# Patient Record
Sex: MALE | Race: WHITE | NOT HISPANIC OR LATINO | Employment: OTHER | URBAN - METROPOLITAN AREA
[De-identification: names, ages, dates, MRNs, and addresses within clinical notes are randomized per-mention and may not be internally consistent; named-entity substitution may affect disease eponyms.]

---

## 2019-04-09 ENCOUNTER — TELEPHONE (OUTPATIENT)
Dept: RHEUMATOLOGY | Facility: CLINIC | Age: 44
End: 2019-04-09

## 2019-04-09 NOTE — TELEPHONE ENCOUNTER
Called and spoke with pt, offered him an appt to see Dr. Luna on 5/17, he will accept.  Discussed records, he will send us an JEFFREY, emailed blank one to him.  Pt aware that Dr. Luna will look at ordering labs after she sees him.      Adeline Piña RN  [facility]

## 2019-05-03 NOTE — TELEPHONE ENCOUNTER
JEFFREY received for [facility] and faxed to [phone].  Va Small CMA  5/3/2019 10:13 AM

## 2019-05-15 ENCOUNTER — ANCILLARY PROCEDURE (OUTPATIENT)
Dept: GENERAL RADIOLOGY | Facility: CLINIC | Age: 44
End: 2019-05-15
Attending: INTERNAL MEDICINE
Payer: COMMERCIAL

## 2019-05-15 ENCOUNTER — OFFICE VISIT (OUTPATIENT)
Dept: RHEUMATOLOGY | Facility: CLINIC | Age: 44
End: 2019-05-15
Attending: INTERNAL MEDICINE
Payer: COMMERCIAL

## 2019-05-15 VITALS
DIASTOLIC BLOOD PRESSURE: 83 MMHG | HEART RATE: 82 BPM | WEIGHT: 177.6 LBS | OXYGEN SATURATION: 96 % | HEIGHT: 74 IN | SYSTOLIC BLOOD PRESSURE: 119 MMHG | BODY MASS INDEX: 22.79 KG/M2

## 2019-05-15 DIAGNOSIS — M47.819 SPONDYLOARTHROPATHY: ICD-10-CM

## 2019-05-15 DIAGNOSIS — M54.50 LOW BACK PAIN WITHOUT SCIATICA, UNSPECIFIED BACK PAIN LATERALITY, UNSPECIFIED CHRONICITY: ICD-10-CM

## 2019-05-15 DIAGNOSIS — M47.819 SPONDYLOARTHROPATHY: Primary | ICD-10-CM

## 2019-05-15 LAB
ALBUMIN SERPL-MCNC: 4.2 G/DL (ref 3.4–5)
ALT SERPL W P-5'-P-CCNC: 40 U/L (ref 0–70)
AST SERPL W P-5'-P-CCNC: 23 U/L (ref 0–45)
BASOPHILS # BLD AUTO: 0 10E9/L (ref 0–0.2)
BASOPHILS NFR BLD AUTO: 0.8 %
CREAT SERPL-MCNC: 0.86 MG/DL (ref 0.66–1.25)
CRP SERPL-MCNC: <2.9 MG/L (ref 0–8)
DIFFERENTIAL METHOD BLD: NORMAL
EOSINOPHIL # BLD AUTO: 0.1 10E9/L (ref 0–0.7)
EOSINOPHIL NFR BLD AUTO: 1 %
ERYTHROCYTE [DISTWIDTH] IN BLOOD BY AUTOMATED COUNT: 12.1 % (ref 10–15)
ERYTHROCYTE [SEDIMENTATION RATE] IN BLOOD BY WESTERGREN METHOD: 4 MM/H (ref 0–15)
GFR SERPL CREATININE-BSD FRML MDRD: >90 ML/MIN/{1.73_M2}
HCT VFR BLD AUTO: 46 % (ref 40–53)
HGB BLD-MCNC: 15.3 G/DL (ref 13.3–17.7)
IMM GRANULOCYTES # BLD: 0 10E9/L (ref 0–0.4)
IMM GRANULOCYTES NFR BLD: 0.2 %
LYMPHOCYTES # BLD AUTO: 0.9 10E9/L (ref 0.8–5.3)
LYMPHOCYTES NFR BLD AUTO: 19.1 %
MCH RBC QN AUTO: 30.7 PG (ref 26.5–33)
MCHC RBC AUTO-ENTMCNC: 33.3 G/DL (ref 31.5–36.5)
MCV RBC AUTO: 92 FL (ref 78–100)
MONOCYTES # BLD AUTO: 0.4 10E9/L (ref 0–1.3)
MONOCYTES NFR BLD AUTO: 7.8 %
NEUTROPHILS # BLD AUTO: 3.5 10E9/L (ref 1.6–8.3)
NEUTROPHILS NFR BLD AUTO: 71.1 %
NRBC # BLD AUTO: 0 10*3/UL
NRBC BLD AUTO-RTO: 0 /100
PLATELET # BLD AUTO: 202 10E9/L (ref 150–450)
RBC # BLD AUTO: 4.99 10E12/L (ref 4.4–5.9)
WBC # BLD AUTO: 4.9 10E9/L (ref 4–11)

## 2019-05-15 PROCEDURE — G0463 HOSPITAL OUTPT CLINIC VISIT: HCPCS | Mod: ZF

## 2019-05-15 PROCEDURE — 82565 ASSAY OF CREATININE: CPT | Performed by: INTERNAL MEDICINE

## 2019-05-15 PROCEDURE — 85652 RBC SED RATE AUTOMATED: CPT | Performed by: INTERNAL MEDICINE

## 2019-05-15 PROCEDURE — 36415 COLL VENOUS BLD VENIPUNCTURE: CPT | Performed by: INTERNAL MEDICINE

## 2019-05-15 PROCEDURE — 86225 DNA ANTIBODY NATIVE: CPT | Performed by: INTERNAL MEDICINE

## 2019-05-15 PROCEDURE — 82306 VITAMIN D 25 HYDROXY: CPT | Performed by: INTERNAL MEDICINE

## 2019-05-15 PROCEDURE — 84450 TRANSFERASE (AST) (SGOT): CPT | Performed by: INTERNAL MEDICINE

## 2019-05-15 PROCEDURE — 82040 ASSAY OF SERUM ALBUMIN: CPT | Performed by: INTERNAL MEDICINE

## 2019-05-15 PROCEDURE — 85025 COMPLETE CBC W/AUTO DIFF WBC: CPT | Performed by: INTERNAL MEDICINE

## 2019-05-15 PROCEDURE — 84460 ALANINE AMINO (ALT) (SGPT): CPT | Performed by: INTERNAL MEDICINE

## 2019-05-15 PROCEDURE — 86140 C-REACTIVE PROTEIN: CPT | Performed by: INTERNAL MEDICINE

## 2019-05-15 ASSESSMENT — PAIN SCALES - GENERAL: PAINLEVEL: NO PAIN (0)

## 2019-05-15 ASSESSMENT — MIFFLIN-ST. JEOR: SCORE: 1760.34

## 2019-05-15 NOTE — PROGRESS NOTES
Rheumatology Consult Note    Reason for referral: Concern for SLE given +anti-DNA, h/o possible reactive arthritis well-controlled on SSZ, needs to transfer care here after moving to [place]    Referring physician: Referred Self    DOS: 5/15/2019    HPI:    Sabino Cardona is a 43 year old  male with fh/o psoriasis and RA, who was referred to our clinic for evaluation and management of possible SLE given +anti-DNA. His wife is a transplant surgeon here at the  and they say the actual reason for referral is to establish/transfer care as they just moved to [place].      Today, he is feeling well.      They moved back from [place] to [place] about 1.5 month ago.      He reports having degenerative arthritis since age 18. Has chronic low back pain with worsening in AM and worsening with inactivity. Allergies in Spring makes it worse.      He started to have heel and ankle pain in Feb 2019, also knees and elbows. That was stressful time of life looking for moving back. Also has 3 small children (one 3 yo and two 1 yo twins) which keep them busy.      Both heels were red and blaching and his wife thought it was gout.      His brother has gout and is almost 51 yo.      He saw a rheumatologist at [facility]. 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 His wife questioned the diagnosis given lack of lupus symptoms.      Then he had x-rays of spine which showed lots of bone spurs.    He was diagnosed with OA, possible reactive arthritis. No h/o uveitis, urethritis, STDs. Has h/o food poisenings. Was put on SSZ in 2/2019, the dose was increased to current dose of 1 gr bid, he tolerates it well with no toxicity. SSZ has helped and he has no current heel inflammation or recent flare ups.      Has fatigue but because of having 3 small children, it's hard for him to say if fatigue was related to inflammation.    ROS is positive for intermittent CP  for years, ibuprofen helps. Prednisone 10 mg every day did not help in the past.    Gets cold sores. No photosensitivity. Gets migraines. No weight loss. No Raynaud's.        ROS:  A comprehensive ROS was done, positives are per HPI.      Past Medical History:   Diagnosis Date     GERD (gastroesophageal reflux disease)      Past Surgical History:   Procedure Laterality Date     APPENDECTOMY       HERNIA REPAIR, INGUINAL RT/LT      left     Family History   Problem Relation Age of Onset     Hypertension Mother      Hypertension Father      Asthma Father      Diabetes Father      Psoriasis Father      Asthma Sister      Hypertension Maternal Grandmother         RA     Diabetes Maternal Grandmother      Hypertension Maternal Grandfather      Cancer Maternal Grandfather         stomach     Cancer Paternal Grandfather         lung-smoker     Social History     Socioeconomic History     Marital status:      Spouse name: Not on file     Number of children: 3     Years of education: Not on file     Highest education level: Not on file   Occupational History     Occupation: [employer]   Social Needs     Financial resource strain: Not on file     Food insecurity:     Worry: Not on file     Inability: Not on file     Transportation needs:     Medical: Not on file     Non-medical: Not on file   Tobacco Use     Smoking status: Never Smoker     Smokeless tobacco: Former User     Types: Chew     Tobacco comment: chew 9 yr   Substance and Sexual Activity     Alcohol use: Yes     Alcohol/week: 1.0 oz     Types: 2 Standard drinks or equivalent per week     Drug use: No     Sexual activity: Yes     Partners: Female   Lifestyle     Physical activity:     Days per week: Not on file     Minutes per session: Not on file     Stress: Not on file   Relationships     Social connections:     Talks on phone: Not on file     Gets together: Not on file     Attends Gnosticism service: Not on file     Active member of club or organization: Not  on file     Attends meetings of clubs or organizations: Not on file     Relationship status: Not on file     Intimate partner violence:     Fear of current or ex partner: Not on file     Emotionally abused: Not on file     Physically abused: Not on file     Forced sexual activity: Not on file   Other Topics Concern      Service Not Asked     Blood Transfusions Not Asked     Caffeine Concern Not Asked     Occupational Exposure Not Asked     Hobby Hazards Not Asked     Sleep Concern Not Asked     Stress Concern Not Asked     Weight Concern Not Asked     Special Diet Not Asked     Back Care Not Asked     Exercise Yes     Comment: run, weights     Bike Helmet Not Asked     Seat Belt Not Asked     Self-Exams Not Asked   Social History Narrative     Not on file     Patient Active Problem List   Diagnosis     Esophageal reflux     Biceps tendinopathy     Wart     Allergies   Allergen Reactions     Nkda [No Known Drug Allergies]      Seasonal Allergies Other (See Comments)     Sneezing, running nose, itchy eyes       Outpatient Encounter Medications as of 5/15/2019   Medication Sig Dispense Refill     Salicylic Acid (SAL-ACID PLASTERS) 40 % PADS Use as instructed. 30 each 3     Multiple Vitamin (MULTI VITAMIN MENS PO) Take 1 tablet by mouth daily.       tazarotene 0.1 % CREA Apply topically to areas of plantar warts daily. Cover w/ mediplast, then cover w/ duct tape. (Patient not taking: Reported on 5/15/2019) 45 g 3     No facility-administered encounter medications on file as of 5/15/2019.                His records were reviewed.    Results for orders placed or performed in visit on 08/03/12   Lipid Profile   Result Value Ref Range    Cholesterol 154 0 - 200 mg/dL    Triglycerides 67 0 - 150 mg/dL    HDL Cholesterol 53 40 - 110 mg/dL    LDL Cholesterol Calculated 88 0 - 129 mg/dL    VLDL-Cholesterol 13 0 - 30 mg/dL    Cholesterol/HDL Ratio 2.9 0.0 - 5.0   CBC with platelets differential   Result Value Ref Range  "   WBC 4.4 4.0 - 11.0 10e9/L    RBC Count 4.75 4.4 - 5.9 10e12/L    Hemoglobin 14.7 13.3 - 17.7 g/dL    Hematocrit 42.1 40.0 - 53.0 %    MCV 89 78 - 100 fl    MCH 30.9 26.5 - 33.0 pg    MCHC 34.9 31.5 - 36.5 g/dL    RDW 12.2 10.0 - 15.0 %    Platelet Count 199 150 - 450 10e9/L    Diff Method Automated Method     % Neutrophils 48.3 40 - 75 %    % Lymphocytes 36.8 20 - 48 %    % Monocytes 8.4 0 - 12 %    % Eosinophils 5.4 0 - 6 %    % Basophils 0.9 0 - 2 %    % Immature Granulocytes 0.2 0 - 0.4 %    Absolute Neutrophil 2.1 1.6 - 8.3 10e9/L    Absolute Lymphocytes 1.6 0.8 - 5.3 10e9/L    Absolute Monocytes 0.4 0.0 - 1.3 10e9/L    Absolute Eosinophils 0.2 0.0 - 0.7 10e9/L    Absolute Basophils 0.0 0.0 - 0.2 10e9/L    Abs Immature Granulocytes 0.0 0 - 0.03 10e9/L   Comprehensive metabolic panel   Result Value Ref Range    Sodium 137 133 - 144 mmol/L    Potassium 4.4 3.4 - 5.3 mmol/L    Chloride 104 94 - 109 mmol/L    Carbon Dioxide 26 20 - 32 mmol/L    Anion Gap 7 6 - 17 mmol/L    Glucose 89 60 - 99 mg/dL    Urea Nitrogen 17 5 - 24 mg/dL    Creatinine 0.83 0.66 - 1.25 mg/dL    GFR Estimate >90 >60 mL/min/1.7m2    GFR Estimate If Black >90 >60 mL/min/1.7m2    Calcium 8.5 8.5 - 10.4 mg/dL    Bilirubin Total 0.6 0.2 - 1.3 mg/dL    Albumin 4.1 3.9 - 5.1 g/dL    Protein Total 7.1 6.8 - 8.8 g/dL    Alkaline Phosphatase 83 40 - 150 U/L    ALT 25 0 - 70 U/L    AST 33 0 - 45 U/L       2/2019: +BEST 1:40 mixed homogenous and nucleolar pattern, positive anti-DNA 17 with NL being up to 4. Neg anti-Sm, RNP, SCL-70, SSA, SSB, ALEXANDREA-1, CHROMATIN, RF, anti-CCP. NL ESR. NL SUA 5.2. Neg HLA-B27.  NL TSH, C3, C4. Unremarkable CMP. Neg U/A. LOW vit D 27. 2/2019: Advanced degenerative changes of L spine and moderate degenerative changes of T spine on x-rays.            Ph.E:    /83   Pulse 82   Ht 1.88 m (6' 2\")   Wt 80.6 kg (177 lb 9.6 oz)   SpO2 96%   BMI 22.80 kg/m        Constitutional: WD/WN. Pleasant. In no acute distress. " Wife present.  Eyes: EOM intact, PERRLA, sclera anicteric, conj not injected  HEENT: No oral ulcers or thrush. Normal salivary pool.  Neck: No cervical LAP or thyromegaly  Chest: Clear to auscultation bilaterally  CV: RRR, no murmurs/ rubs or gallops. No edema, clubbing or cyanosis.   GI: Abdomen is soft and non tender.   MS: No synovitis. Cool joints. No tenderness of the joints. No  joint deformities. Full ROM of the joints. No nodules.  Skin: No skin rash, malar rash, livedo, periungual erythema, or nail changes.  Neuro: A&O x 3. Grossly non focal, muscular power 5/5 in all ext  Psych: NL affect and mood    Assessment/ plan:    1-Spondyloarthropathy with enthesopathy Dx 2/2019 on SSZ since 2/2019. He is HLA-B27 negative. Has fh/o psoriasis. He fits this diagnosis very well and already responds to SSZ. His inflammatory arthritis is under good control and I recommend to continue SSZ at current dose of 1 gr bid. Will get more labs, also SI joint x-rays and T spine MRI to look for signs of axial involvement and axial SpA. Recommend to get SI joint MRI if x-rays come back negative.    2-SSZ monitoring. Labs today and [id number]    3-Positive anti-DNA 2/2019. He does not have lupus and nothing on his exam or hx is suggestive of lupus. He had BEST 1:40 which is considered negative with neg CHRIS and nl C3/C4. I'll re-check it and monitor it for now.    4-Low vit D in the past. Re-check vit D level and replace if indicated.      RTC 3 months    Orders Placed This Encounter   Procedures     X-ray Sacroiliac joint 1-2 vws     MR Thoracic Spine w/o & w Contrast     CBC with platelets differential     AST     ALT     Creatinine     Albumin level     CRP inflammation     DNA double stranded antibodies     Vitamin D Deficiency     Erythrocyte sedimentation rate auto

## 2019-05-15 NOTE — LETTER
5/15/2019      RE: Sabino Cardona  [address]  [facility] [address]       Rheumatology Consult Note    Reason for referral: Concern for SLE given +anti-DNA, h/o possible reactive arthritis well-controlled on SSZ, needs to transfer care here after moving to [place]    Referring physician: Referred Self    DOS: 5/15/2019    HPI:    Sabino Cardona is a 43 year old  male with fh/o psoriasis and RA, who was referred to our clinic for evaluation and management of possible SLE given +anti-DNA. His wife is a transplant surgeon here at the  and they say the actual reason for referral is to establish/transfer care as they just moved to [place].      Today, he is feeling well.      They moved back from [place] to [place] about 1.5 month ago.      He reports having degenerative arthritis since age 18. Has chronic low back pain with worsening in AM and worsening with inactivity. Allergies in Spring makes it worse.      He started to have heel and ankle pain in Feb 2019, also knees and elbows. That was stressful time of life looking for moving back. Also has 3 small children (one 3 yo and two 3 yo twins) which keep them busy.      Both heels were red and blaching and his wife thought it was gout.      His brother has gout and is almost 51 yo.      He saw a rheumatologist at [facility]. 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 His wife questioned the diagnosis given lack of lupus symptoms.      Then he had x-rays of spine which showed lots of bone spurs.    He was diagnosed with OA, possible reactive arthritis. No h/o uveitis, urethritis, STDs. Has h/o food poisenings. Was put on SSZ in 2/2019, the dose was increased to current dose of 1 gr bid, he tolerates it well with no toxicity. SSZ has helped and he has no current heel inflammation or recent flare ups.      Has fatigue but because of having 3 small children, it's hard for him to say  if fatigue was related to inflammation.    ROS is positive for intermittent CP for years, ibuprofen helps. Prednisone 10 mg every day did not help in the past.    Gets cold sores. No photosensitivity. Gets migraines. No weight loss. No Raynaud's.        ROS:  A comprehensive ROS was done, positives are per HPI.      Past Medical History:   Diagnosis Date     GERD (gastroesophageal reflux disease)      Past Surgical History:   Procedure Laterality Date     APPENDECTOMY       HERNIA REPAIR, INGUINAL RT/LT      left     Family History   Problem Relation Age of Onset     Hypertension Mother      Hypertension Father      Asthma Father      Diabetes Father      Psoriasis Father      Asthma Sister      Hypertension Maternal Grandmother         RA     Diabetes Maternal Grandmother      Hypertension Maternal Grandfather      Cancer Maternal Grandfather         stomach     Cancer Paternal Grandfather         lung-smoker     Social History     Socioeconomic History     Marital status:      Spouse name: Not on file     Number of children: 3     Years of education: Not on file     Highest education level: Not on file   Occupational History     Occupation: [employer]   Social Needs     Financial resource strain: Not on file     Food insecurity:     Worry: Not on file     Inability: Not on file     Transportation needs:     Medical: Not on file     Non-medical: Not on file   Tobacco Use     Smoking status: Never Smoker     Smokeless tobacco: Former User     Types: Chew     Tobacco comment: chew 9 yr   Substance and Sexual Activity     Alcohol use: Yes     Alcohol/week: 1.0 oz     Types: 2 Standard drinks or equivalent per week     Drug use: No     Sexual activity: Yes     Partners: Female   Lifestyle     Physical activity:     Days per week: Not on file     Minutes per session: Not on file     Stress: Not on file   Relationships     Social connections:     Talks on phone: Not on file     Gets together: Not on file      Attends Druze service: Not on file     Active member of club or organization: Not on file     Attends meetings of clubs or organizations: Not on file     Relationship status: Not on file     Intimate partner violence:     Fear of current or ex partner: Not on file     Emotionally abused: Not on file     Physically abused: Not on file     Forced sexual activity: Not on file   Other Topics Concern      Service Not Asked     Blood Transfusions Not Asked     Caffeine Concern Not Asked     Occupational Exposure Not Asked     Hobby Hazards Not Asked     Sleep Concern Not Asked     Stress Concern Not Asked     Weight Concern Not Asked     Special Diet Not Asked     Back Care Not Asked     Exercise Yes     Comment: run, weights     Bike Helmet Not Asked     Seat Belt Not Asked     Self-Exams Not Asked   Social History Narrative     Not on file     Patient Active Problem List   Diagnosis     Esophageal reflux     Biceps tendinopathy     Wart     Allergies   Allergen Reactions     Nkda [No Known Drug Allergies]      Seasonal Allergies Other (See Comments)     Sneezing, running nose, itchy eyes       Outpatient Encounter Medications as of 5/15/2019   Medication Sig Dispense Refill     Salicylic Acid (SAL-ACID PLASTERS) 40 % PADS Use as instructed. 30 each 3     Multiple Vitamin (MULTI VITAMIN MENS PO) Take 1 tablet by mouth daily.       tazarotene 0.1 % CREA Apply topically to areas of plantar warts daily. Cover w/ mediplast, then cover w/ duct tape. (Patient not taking: Reported on 5/15/2019) 45 g 3     No facility-administered encounter medications on file as of 5/15/2019.                His records were reviewed.    Results for orders placed or performed in visit on 08/03/12   Lipid Profile   Result Value Ref Range    Cholesterol 154 0 - 200 mg/dL    Triglycerides 67 0 - 150 mg/dL    HDL Cholesterol 53 40 - 110 mg/dL    LDL Cholesterol Calculated 88 0 - 129 mg/dL    VLDL-Cholesterol 13 0 - 30 mg/dL     "Cholesterol/HDL Ratio 2.9 0.0 - 5.0   CBC with platelets differential   Result Value Ref Range    WBC 4.4 4.0 - 11.0 10e9/L    RBC Count 4.75 4.4 - 5.9 10e12/L    Hemoglobin 14.7 13.3 - 17.7 g/dL    Hematocrit 42.1 40.0 - 53.0 %    MCV 89 78 - 100 fl    MCH 30.9 26.5 - 33.0 pg    MCHC 34.9 31.5 - 36.5 g/dL    RDW 12.2 10.0 - 15.0 %    Platelet Count 199 150 - 450 10e9/L    Diff Method Automated Method     % Neutrophils 48.3 40 - 75 %    % Lymphocytes 36.8 20 - 48 %    % Monocytes 8.4 0 - 12 %    % Eosinophils 5.4 0 - 6 %    % Basophils 0.9 0 - 2 %    % Immature Granulocytes 0.2 0 - 0.4 %    Absolute Neutrophil 2.1 1.6 - 8.3 10e9/L    Absolute Lymphocytes 1.6 0.8 - 5.3 10e9/L    Absolute Monocytes 0.4 0.0 - 1.3 10e9/L    Absolute Eosinophils 0.2 0.0 - 0.7 10e9/L    Absolute Basophils 0.0 0.0 - 0.2 10e9/L    Abs Immature Granulocytes 0.0 0 - 0.03 10e9/L   Comprehensive metabolic panel   Result Value Ref Range    Sodium 137 133 - 144 mmol/L    Potassium 4.4 3.4 - 5.3 mmol/L    Chloride 104 94 - 109 mmol/L    Carbon Dioxide 26 20 - 32 mmol/L    Anion Gap 7 6 - 17 mmol/L    Glucose 89 60 - 99 mg/dL    Urea Nitrogen 17 5 - 24 mg/dL    Creatinine 0.83 0.66 - 1.25 mg/dL    GFR Estimate >90 >60 mL/min/1.7m2    GFR Estimate If Black >90 >60 mL/min/1.7m2    Calcium 8.5 8.5 - 10.4 mg/dL    Bilirubin Total 0.6 0.2 - 1.3 mg/dL    Albumin 4.1 3.9 - 5.1 g/dL    Protein Total 7.1 6.8 - 8.8 g/dL    Alkaline Phosphatase 83 40 - 150 U/L    ALT 25 0 - 70 U/L    AST 33 0 - 45 U/L       2/2019: +BEST 1:40 mixed homogenous and nucleolar pattern, positive anti-DNA 17 with NL being up to 4. Neg anti-Sm, RNP, SCL-70, SSA, SSB, ALEXANDREA-1, CHROMATIN, RF, anti-CCP. NL ESR. NL SUA 5.2. Neg HLA-B27.  NL TSH, C3, C4. Unremarkable CMP. Neg U/A. LOW vit D 27.    2/2019: Advanced degenerative changes of L spine and moderate degenerative changes of T spine on x-rays.            Ph.E:    /83   Pulse 82   Ht 1.88 m (6' 2\")   Wt 80.6 kg (177 lb 9.6 oz) "   SpO2 96%   BMI 22.80 kg/m         Constitutional: WD/WN. Pleasant. In no acute distress. Wife present.  Eyes: EOM intact, PERRLA, sclera anicteric, conj not injected  HEENT: No oral ulcers or thrush. Normal salivary pool.  Neck: No cervical LAP or thyromegaly  Chest: Clear to auscultation bilaterally  CV: RRR, no murmurs/ rubs or gallops. No edema, clubbing or cyanosis.   GI: Abdomen is soft and non tender.   MS: No synovitis. Cool joints. No tenderness of the joints. No  joint deformities. Full ROM of the joints. No nodules.  Skin: No skin rash, malar rash, livedo, periungual erythema, or nail changes.  Neuro: A&O x 3. Grossly non focal, muscular power 5/5 in all ext  Psych: NL affect and mood    Assessment/ plan:    1-Spondyloarthropathy with enthesopathy Dx 2/2019 on SSZ since 2/2019. He is HLA-B27 negative. Has fh/o psoriasis. He fits this diagnosis very well and already responds to SSZ. His inflammatory arthritis is under good control and I recommend to continue SSZ at current dose of 1 gr bid. Will get more labs, also SI joint x-rays and T spine MRI to look for signs of axial involvement and axial SpA. Recommend to get SI joint MRI if x-rays come back negative.    2-SSZ monitoring. Labs today and [id number]    3-Positive anti-DNA 2/2019. He does not have lupus and nothing on his exam or hx is suggestive of lupus. He had BEST 1:40 which is considered negative with neg CHRIS and nl C3/C4. I'll re-check it and monitor it for now.    4-Low vit D in the past. Re-check vit D level and replace if indicated.      RTC 3 months    Orders Placed This Encounter   Procedures     X-ray Sacroiliac joint 1-2 vws     MR Thoracic Spine w/o & w Contrast     CBC with platelets differential     AST     ALT     Creatinine     Albumin level     CRP inflammation     DNA double stranded antibodies     Vitamin D Deficiency     Erythrocyte sedimentation rate auto       Jovani Luna MD

## 2019-05-15 NOTE — LETTER
Date:June 11, 2019      Patient was self referred, no letter generated. Do not send.        [facility] Information

## 2019-05-15 NOTE — LETTER
5/15/2019       RE: Sabino Cardona  [address]  [facility] [address]     Dear Colleague,    Thank you for referring your patient, Sabino Cardona, to the [facility] RHEUMATOLOGY at [facility]. Please see a copy of my visit note below.    Rheumatology Consult Note    Reason for referral: Concern for SLE given +anti-DNA, h/o possible reactive arthritis well-controlled on SSZ, needs to transfer care here after moving to [place]    Referring physician: Referred Self    DOS: 5/15/2019    HPI:    Sabino Cardona is a 43 year old  male with fh/o psoriasis and RA, who was referred to our clinic for evaluation and management of possible SLE given +anti-DNA. His wife is a transplant surgeon here at the  and they say the actual reason for referral is to establish/transfer care as they just moved to [place].      Today, he is feeling well.      They moved back from [place] to [place] about 1.5 month ago.      He reports having degenerative arthritis since age 18. Has chronic low back pain with worsening in AM and worsening with inactivity. Allergies in Spring makes it worse.      He started to have heel and ankle pain in Feb 2019, also knees and elbows. That was stressful time of life looking for moving back. Also has 3 small children (one 5 yo and two 1 yo twins) which keep them busy.      Both heels were red and blaching and his wife thought it was gout.      His brother has gout and is almost 49 yo.      He saw a rheumatologist at [facility]. 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 His wife questioned the diagnosis given lack of lupus symptoms.      Then he had x-rays of spine which showed lots of bone spurs.    He was diagnosed with OA, possible reactive arthritis. No h/o uveitis, urethritis, STDs. Has h/o food poisenings. Was put on SSZ in 2/2019, the dose was increased to current dose of 1 gr  bid, he tolerates it well with no toxicity. SSZ has helped and he has no current heel inflammation or recent flare ups.      Has fatigue but because of having 3 small children, it's hard for him to say if fatigue was related to inflammation.    ROS is positive for intermittent CP for years, ibuprofen helps. Prednisone 10 mg every day did not help in the past.    Gets cold sores. No photosensitivity. Gets migraines. No weight loss. No Raynaud's.        ROS:  A comprehensive ROS was done, positives are per HPI.      Past Medical History:   Diagnosis Date     GERD (gastroesophageal reflux disease)      Past Surgical History:   Procedure Laterality Date     APPENDECTOMY       HERNIA REPAIR, INGUINAL RT/LT      left     Family History   Problem Relation Age of Onset     Hypertension Mother      Hypertension Father      Asthma Father      Diabetes Father      Psoriasis Father      Asthma Sister      Hypertension Maternal Grandmother         RA     Diabetes Maternal Grandmother      Hypertension Maternal Grandfather      Cancer Maternal Grandfather         stomach     Cancer Paternal Grandfather         lung-smoker     Social History     Socioeconomic History     Marital status:      Spouse name: Not on file     Number of children: 3     Years of education: Not on file     Highest education level: Not on file   Occupational History     Occupation: sanchez   Social Needs     Financial resource strain: Not on file     Food insecurity:     Worry: Not on file     Inability: Not on file     Transportation needs:     Medical: Not on file     Non-medical: Not on file   Tobacco Use     Smoking status: Never Smoker     Smokeless tobacco: Former User     Types: Chew     Tobacco comment: chew 9 yr   Substance and Sexual Activity     Alcohol use: Yes     Alcohol/week: 1.0 oz     Types: 2 Standard drinks or equivalent per week     Drug use: No     Sexual activity: Yes     Partners: Female   Lifestyle     Physical activity:      Days per week: Not on file     Minutes per session: Not on file     Stress: Not on file   Relationships     Social connections:     Talks on phone: Not on file     Gets together: Not on file     Attends Yarsani service: Not on file     Active member of club or organization: Not on file     Attends meetings of clubs or organizations: Not on file     Relationship status: Not on file     Intimate partner violence:     Fear of current or ex partner: Not on file     Emotionally abused: Not on file     Physically abused: Not on file     Forced sexual activity: Not on file   Other Topics Concern      Service Not Asked     Blood Transfusions Not Asked     Caffeine Concern Not Asked     Occupational Exposure Not Asked     Hobby Hazards Not Asked     Sleep Concern Not Asked     Stress Concern Not Asked     Weight Concern Not Asked     Special Diet Not Asked     Back Care Not Asked     Exercise Yes     Comment: run, weights     Bike Helmet Not Asked     Seat Belt Not Asked     Self-Exams Not Asked   Social History Narrative     Not on file     Patient Active Problem List   Diagnosis     Esophageal reflux     Biceps tendinopathy     Wart     Allergies   Allergen Reactions     Nkda [No Known Drug Allergies]      Seasonal Allergies Other (See Comments)     Sneezing, running nose, itchy eyes       Outpatient Encounter Medications as of 5/15/2019   Medication Sig Dispense Refill     Salicylic Acid (SAL-ACID PLASTERS) 40 % PADS Use as instructed. 30 each 3     Multiple Vitamin (MULTI VITAMIN MENS PO) Take 1 tablet by mouth daily.       tazarotene 0.1 % CREA Apply topically to areas of plantar warts daily. Cover w/ mediplast, then cover w/ duct tape. (Patient not taking: Reported on 5/15/2019) 45 g 3     No facility-administered encounter medications on file as of 5/15/2019.                His records were reviewed.    Results for orders placed or performed in visit on 08/03/12   Lipid Profile   Result Value Ref Range     Cholesterol 154 0 - 200 mg/dL    Triglycerides 67 0 - 150 mg/dL    HDL Cholesterol 53 40 - 110 mg/dL    LDL Cholesterol Calculated 88 0 - 129 mg/dL    VLDL-Cholesterol 13 0 - 30 mg/dL    Cholesterol/HDL Ratio 2.9 0.0 - 5.0   CBC with platelets differential   Result Value Ref Range    WBC 4.4 4.0 - 11.0 10e9/L    RBC Count 4.75 4.4 - 5.9 10e12/L    Hemoglobin 14.7 13.3 - 17.7 g/dL    Hematocrit 42.1 40.0 - 53.0 %    MCV 89 78 - 100 fl    MCH 30.9 26.5 - 33.0 pg    MCHC 34.9 31.5 - 36.5 g/dL    RDW 12.2 10.0 - 15.0 %    Platelet Count 199 150 - 450 10e9/L    Diff Method Automated Method     % Neutrophils 48.3 40 - 75 %    % Lymphocytes 36.8 20 - 48 %    % Monocytes 8.4 0 - 12 %    % Eosinophils 5.4 0 - 6 %    % Basophils 0.9 0 - 2 %    % Immature Granulocytes 0.2 0 - 0.4 %    Absolute Neutrophil 2.1 1.6 - 8.3 10e9/L    Absolute Lymphocytes 1.6 0.8 - 5.3 10e9/L    Absolute Monocytes 0.4 0.0 - 1.3 10e9/L    Absolute Eosinophils 0.2 0.0 - 0.7 10e9/L    Absolute Basophils 0.0 0.0 - 0.2 10e9/L    Abs Immature Granulocytes 0.0 0 - 0.03 10e9/L   Comprehensive metabolic panel   Result Value Ref Range    Sodium 137 133 - 144 mmol/L    Potassium 4.4 3.4 - 5.3 mmol/L    Chloride 104 94 - 109 mmol/L    Carbon Dioxide 26 20 - 32 mmol/L    Anion Gap 7 6 - 17 mmol/L    Glucose 89 60 - 99 mg/dL    Urea Nitrogen 17 5 - 24 mg/dL    Creatinine 0.83 0.66 - 1.25 mg/dL    GFR Estimate >90 >60 mL/min/1.7m2    GFR Estimate If Black >90 >60 mL/min/1.7m2    Calcium 8.5 8.5 - 10.4 mg/dL    Bilirubin Total 0.6 0.2 - 1.3 mg/dL    Albumin 4.1 3.9 - 5.1 g/dL    Protein Total 7.1 6.8 - 8.8 g/dL    Alkaline Phosphatase 83 40 - 150 U/L    ALT 25 0 - 70 U/L    AST 33 0 - 45 U/L       2/2019: +BEST 1:40 mixed homogenous and nucleolar pattern, positive anti-DNA 17 with NL being up to 4. Neg anti-Sm, RNP, SCL-70, SSA, SSB, ALEXANDREA-1, CHROMATIN, RF, anti-CCP. NL ESR. NL SUA 5.2. Neg HLA-B27.  NL TSH, C3, C4. Unremarkable CMP. Neg U/A. LOW vit D 27.    2/2019:  "Advanced degenerative changes of L spine and moderate degenerative changes of T spine on x-rays.            Ph.E:    /83   Pulse 82   Ht 1.88 m (6' 2\")   Wt 80.6 kg (177 lb 9.6 oz)   SpO2 96%   BMI 22.80 kg/m         Constitutional: WD/WN. Pleasant. In no acute distress. Wife present.  Eyes: EOM intact, PERRLA, sclera anicteric, conj not injected  HEENT: No oral ulcers or thrush. Normal salivary pool.  Neck: No cervical LAP or thyromegaly  Chest: Clear to auscultation bilaterally  CV: RRR, no murmurs/ rubs or gallops. No edema, clubbing or cyanosis.   GI: Abdomen is soft and non tender.   MS: No synovitis. Cool joints. No tenderness of the joints. No  joint deformities. Full ROM of the joints. No nodules.  Skin: No skin rash, malar rash, livedo, periungual erythema, or nail changes.  Neuro: A&O x 3. Grossly non focal, muscular power 5/5 in all ext  Psych: NL affect and mood    Assessment/ plan:    1-Spondyloarthropathy with enthesopathy Dx 2/2019 on SSZ since 2/2019. He is HLA-B27 negative. Has fh/o psoriasis. He fits this diagnosis very well and already responds to SSZ. His inflammatory arthritis is under good control and I recommend to continue SSZ at current dose of 1 gr bid. Will get more labs, also SI joint x-rays and T spine MRI to look for signs of axial involvement and axial SpA. Recommend to get SI joint MRI if x-rays come back negative.    2-SSZ monitoring. Labs today and [id number]    3-Positive anti-DNA 2/2019. He does not have lupus and nothing on his exam or hx is suggestive of lupus. He had BEST 1:40 which is considered negative with neg CHRIS and nl C3/C4. I'll re-check it and monitor it for now.    4-Low vit D in the past. Re-check vit D level and replace if indicated.      RTC 3 months    Orders Placed This Encounter   Procedures     X-ray Sacroiliac joint 1-2 vws     MR Thoracic Spine w/o & w Contrast     CBC with platelets differential     AST     ALT     Creatinine     Albumin level     " CRP inflammation     DNA double stranded antibodies     Vitamin D Deficiency     Erythrocyte sedimentation rate auto       Again, thank you for allowing me to participate in the care of your patient.      Sincerely,    Jovani Luna MD

## 2019-05-15 NOTE — LETTER
Patient:  Sabino Cardona  :   1975  MRN:     [id number]        Mr.Richard Cardona  [address] [address] [place] [id number]        Coretta 3, 2019    Dear Mr.Van Hernandez,    We are writing to inform you of your test results. Anti-DNA is slightly above normal range, this could simply be monitored and is not worrisome as we discussed. I faxed a vitamin D prescription to your pharmacy to replace low vitamin D, the goal is 40. After replacement, will re-check level at your next visit and if at goal, will switch to 2000 units daily. I ordered fasting glucose and lipid profile in order to fill out your biometric screening form. You could do it at your convenience.      Results for orders placed or performed in visit on 05/15/19   Erythrocyte sedimentation rate auto   Result Value Ref Range    Sed Rate 4 0 - 15 mm/h   Vitamin D Deficiency   Result Value Ref Range    Vitamin D Deficiency screening 24 20 - 75 ug/L   DNA double stranded antibodies   Result Value Ref Range    DNA-ds 21 (H) <10 IU/mL   CRP inflammation   Result Value Ref Range    CRP Inflammation <2.9 0.0 - 8.0 mg/L   Albumin level   Result Value Ref Range    Albumin 4.2 3.4 - 5.0 g/dL   Creatinine   Result Value Ref Range    Creatinine 0.86 0.66 - 1.25 mg/dL    GFR Estimate >90 >60 mL/min/[1.73_m2]    GFR Estimate If Black >90 >60 mL/min/[1.73_m2]   ALT   Result Value Ref Range    ALT 40 0 - 70 U/L   AST   Result Value Ref Range    AST 23 0 - 45 U/L   CBC with platelets differential   Result Value Ref Range    WBC 4.9 4.0 - 11.0 10e9/L    RBC Count 4.99 4.4 - 5.9 10e12/L    Hemoglobin 15.3 13.3 - 17.7 g/dL    Hematocrit 46.0 40.0 - 53.0 %    MCV 92 78 - 100 fl    MCH 30.7 26.5 - 33.0 pg    MCHC 33.3 31.5 - 36.5 g/dL    RDW 12.1 10.0 - 15.0 %    Platelet Count 202 150 - 450 10e9/L    Diff Method Automated Method     % Neutrophils 71.1 %    % Lymphocytes 19.1 %    % Monocytes 7.8 %    % Eosinophils 1.0 %    % Basophils 0.8 %    % Immature Granulocytes 0.2 %     Nucleated RBCs 0 0 /100    Absolute Neutrophil 3.5 1.6 - 8.3 10e9/L    Absolute Lymphocytes 0.9 0.8 - 5.3 10e9/L    Absolute Monocytes 0.4 0.0 - 1.3 10e9/L    Absolute Eosinophils 0.1 0.0 - 0.7 10e9/L    Absolute Basophils 0.0 0.0 - 0.2 10e9/L    Abs Immature Granulocytes 0.0 0 - 0.4 10e9/L    Absolute Nucleated RBC 0.0        Jovani Luna MD

## 2019-05-15 NOTE — LETTER
Patient:  Sabino Cardona  :   1975  MRN:     [id number]        Mr.Richard Cardona  [address]  [facility] [address]        May 16, 2019    Dear Mr.Van Hernandez,    We are writing to inform you of your test results. There is no evidence of inflammatory arthritis on these SI joint x-rays. I ordered SI joint MRI and asked staff to help you set that up for . If both MRIs show no inflammation or damage due to spondyloarthropathy, will not add biologic or more meds and continue with the sulfasalazine.        Results for orders placed or performed in visit on 05/15/19   X-ray Sacroiliac joint 1-2 vws    Narrative    Exam: 3 views of the sacroiliac joints dated 5/15/2019.    COMPARISON: None.    CLINICAL HISTORY: Evaluate for sacroiliitis.    FINDINGS: 3 views of the sacroiliac joints were obtained. Disc space  narrowing in the lower lumbar spine. Sacroiliac joints are patent. No  erosive changes.      Impression    IMPRESSION:  1. No erosive changes in the sacroiliac joints.  2. Disc space narrowing in the lower lumbar spine.    MD Jovani ALBARRAN MD

## 2019-05-16 DIAGNOSIS — M47.819 SPONDYLOARTHROPATHY: Primary | ICD-10-CM

## 2019-05-16 DIAGNOSIS — M54.50 LOW BACK PAIN WITHOUT SCIATICA, UNSPECIFIED BACK PAIN LATERALITY, UNSPECIFIED CHRONICITY: ICD-10-CM

## 2019-05-16 LAB
DEPRECATED CALCIDIOL+CALCIFEROL SERPL-MC: 24 UG/L (ref 20–75)
DSDNA AB SER-ACNC: 21 IU/ML

## 2019-05-16 NOTE — RESULT ENCOUNTER NOTE
There is no evidence of inflammatory arthritis on these SI joint x-rays. I ordered SI joint MRI and asked staff to help you set that up for 5/25. If both MRIs show no inflammation or damage due to spondyloarthropathy, will not add biologic or more meds and continue with the sulfasalazine.

## 2019-05-28 DIAGNOSIS — E55.9 VITAMIN D DEFICIENCY: Primary | ICD-10-CM

## 2019-05-28 RX ORDER — ERGOCALCIFEROL 1.25 MG/1
50000 CAPSULE, LIQUID FILLED ORAL
Qty: 12 CAPSULE | Refills: 0 | Status: SHIPPED | OUTPATIENT
Start: 2019-05-28 | End: 2020-01-18

## 2019-05-30 ENCOUNTER — ANCILLARY PROCEDURE (OUTPATIENT)
Dept: MRI IMAGING | Facility: CLINIC | Age: 44
End: 2019-05-30
Attending: INTERNAL MEDICINE
Payer: COMMERCIAL

## 2019-05-30 DIAGNOSIS — M54.50 LOW BACK PAIN WITHOUT SCIATICA, UNSPECIFIED BACK PAIN LATERALITY, UNSPECIFIED CHRONICITY: ICD-10-CM

## 2019-05-30 DIAGNOSIS — Z13.220 NEED FOR LIPID SCREENING: Primary | ICD-10-CM

## 2019-05-30 DIAGNOSIS — M47.819 SPONDYLOARTHROPATHY: ICD-10-CM

## 2019-05-30 RX ORDER — GADOBUTROL 604.72 MG/ML
7.5 INJECTION INTRAVENOUS ONCE
Status: COMPLETED | OUTPATIENT
Start: 2019-05-30 | End: 2019-05-30

## 2019-05-30 RX ADMIN — GADOBUTROL 7.5 ML: 604.72 INJECTION INTRAVENOUS at 18:02

## 2019-05-30 NOTE — LETTER
Patient:  Sabino Cardona  :   1975  MRN:     [id number]        Mr.Richard Cardona  [address]  [facility] [address]        Coretta 3, 2019    Dear Mr.Van Hernandez,    We are writing to inform you of your test results. Spondyloarthropathy is the most likely diagnosis based on these MRI findings but the amount of inflammation is low and as long as your pain is under control, recommend to stay on sulfasalazine and hold off starting a biologic.    It was a pleasure seeing you in clinic. Please let me know if you have any questions.        Results for orders placed or performed in visit on 19   MR Pelvis Bone wo & w Contrast    Narrative    Exam: MR PELVIC BONES WO & W CONTRAST    History: B/L SI joint MRI with IV contrast, eval for sacroiliitis;  Spondyloarthropathy (H); Low back pain without sciatica, unspecified  back pain laterality.    Techniques: Multiplanar multisequence imaging through the pelvis was  obtained before and after administration of intravenous gadolinium  contrast using routine protocol.    Contrast: 7.5mL Gadavist    Comparison: May 15, 2019.    Findings:    Focal marrow edema with associated enhancement at the right sacrum  adjacent to sacroiliac joint (image 17 series 5). No evidence of joint  effusion or erosion about the either sacroiliac joint. No MR evidence  of ankylosis, sclerosis or focal fatty marrow replacement to suggest  sequela of chronic inflammation.    Diffuse marrow signal alteration with mild T1 hypointensity, most  compatible with red marrow.    Degenerative changes of visualized spine with desiccation of disks.  L4/L5 left facet synovial cyst.    No evidence of fracture, contusion or minimal infiltrative change.    A physiologic amount of joint fluid are present bilateral hips. Mild  focal edema at the ischium bilaterally, likely enthesopathic change.  Bilateral hamstring tendinosis. The iliopsoas tendons, proximal rectus  femoris and sartorius tendons are  intact bilaterally. Gluteus medius  and minimus tendons are also intact bilaterally. Left greater than the  right nonspecific edema overlying bilateral greater trochanters.    Visualized portions of the bilateral sciatic nerves are unremarkable.      Impression    IMPRESSION:  1. Focal marrow edema with associated enhancement at the right sacrum  adjacent to sacroiliac joint, may be concordant with asymmetric active  sacroiliitis. Given asymmetry, differential consideration by imaging  include entities such as reactive arthritis and psoriatic arthritis.  2. Degenerative changes of the visualized lower lumbar spine.    FLAVIO Luna MD

## 2019-05-30 NOTE — LETTER
Patient:  Sabino Cardona  :   1975  MRN:     [id number]        Mr.Richard Cardona  [address]  [facility] [address]        Coretta 3, 2019    Dear Mr.Van Hernandez,    We are writing to inform you of your test results. There is no evidence of inflammatory arthritis on the MRI, just degenerative arthritis.      Results for orders placed or performed in visit on 19   MR Thoracic Spine w/o & w Contrast    Narrative    MR THORACIC SPINE W/O & W CONTRAST 2019 6:27 PM    Provided History: Mid-back/T-spine pain, initial exam; back pain, eval  for SpA; Spondyloarthropathy (H)  ICD-10: Spondyloarthropathy (H)    Comparison: None    Technique:  Sagittal T1- and T2-weighted images through the thoracic spine and  axial T2-weighted images were obtained without intravenous contrast.  Following intravenous administration of gadolinium, axial and sagittal  T1-weighted images with fat saturation were also obtained.    Contrast: 7.5mL Gadavist    Findings:  The thoracic vertebrae appear normally aligned.  There is multilevel  disc height narrowing T3-6. Multilevel disc bulges at T3-4, T4-5 on  the right, bilateral at T5-6 and T6-7, left T7-8, left T8-9-10. At  T3-4, T6-7 and T7-8 there is mild cord indentation without significant  spinal canal stenosis. No significant neural foraminal stenosis. There  are Schmorl's node endplate deformities scattered throughout the  thoracic spine. The conus medullaris is at the level of T12-L1. There  is normal signal within and normal contour of the thoracic spinal  cord.  There is no abnormal contrast enhancement within the thoracic  spinal cord, thecal sac or vertebral column.      Impression    Impression: Multilevel disc bulges with cord indentation at T3-4, T6-7  and 7-8.    I have personally reviewed the examination and initial interpretation  and I agree with the findings.    MD Jovani BLANCA MD

## 2019-05-30 NOTE — DISCHARGE INSTRUCTIONS
MRI Contrast Discharge Instructions    The IV contrast you received today will pass out of your body in your  urine. This will happen in the next 24 hours. You will not feel this process.  Your urine will not change color.    Drink at least 4 extra glasses of water or juice today (unless your doctor  has restricted your fluids). This reduces the stress on your kidneys.  You may take your regular medicines.    If you are on dialysis: It is best to have dialysis today.    If you have a reaction: Most reactions happen right away. If you have  any new symptoms after leaving the hospital (such as hives or swelling),  call your hospital at the correct number below. Or call your family doctor.  If you have breathing distress or wheezing, call 911.    Special instructions: ***    I have read and understand the above information.    Signature:______________________________________ Date:___________    Staff:__________________________________________ Date:___________     Time:__________    [facility] Departments:    ___[place]: [phone]  ___[facility]: [phone]  ___[place]: [phone] ___[facility]: [phone]  ___[facility]: [phone]  ___[facility]: [phone]  ___[phone]  ___[facility]: [phone]  ___[phone]

## 2019-05-31 DIAGNOSIS — Z13.1 ENCOUNTER FOR SCREENING EXAMINATION FOR IMPAIRED GLUCOSE REGULATION AND DIABETES MELLITUS: Primary | ICD-10-CM

## 2019-06-04 NOTE — RESULT ENCOUNTER NOTE
Spondyloarthropathy is the most likely diagnosis based on these MRI findings but the amount of inflammation is low and as long as your pain is under control, recommend to stay on sulfasalazine and hold off starting a biologic.

## 2019-06-04 NOTE — RESULT ENCOUNTER NOTE
Anti-DNA is slightly above normal range, this could simply be monitored and is not worrisome as we discussed. I faxed a vitamin D prescription to your pharmacy to replace low vitamin D, the goal is 40. After replacement, will re-check level at your next visit and if at goal, will switch to 2000 units daily. I ordered fasting glucose and lipid profile in order to fill out your biometric screening form. You could do it at your convenience.

## 2019-07-08 DIAGNOSIS — Z13.1 ENCOUNTER FOR SCREENING EXAMINATION FOR IMPAIRED GLUCOSE REGULATION AND DIABETES MELLITUS: ICD-10-CM

## 2019-07-08 DIAGNOSIS — Z13.220 NEED FOR LIPID SCREENING: ICD-10-CM

## 2019-07-08 LAB
CHOLEST SERPL-MCNC: 180 MG/DL
GLUCOSE SERPL-MCNC: 88 MG/DL (ref 70–99)
HDLC SERPL-MCNC: 48 MG/DL
LDLC SERPL CALC-MCNC: 114 MG/DL
NONHDLC SERPL-MCNC: 132 MG/DL
TRIGL SERPL-MCNC: 90 MG/DL

## 2019-07-08 PROCEDURE — 82947 ASSAY GLUCOSE BLOOD QUANT: CPT | Performed by: INTERNAL MEDICINE

## 2019-07-08 PROCEDURE — 80061 LIPID PANEL: CPT | Performed by: INTERNAL MEDICINE

## 2019-07-08 PROCEDURE — 36415 COLL VENOUS BLD VENIPUNCTURE: CPT | Performed by: INTERNAL MEDICINE

## 2019-07-08 NOTE — LETTER
Patient:  Sabino aCrdona  :   1975  MRN:     [id number]        Mr.Richard Cardona  [address] [address] [place] [address]        2019    Dear Mr.Van Hernandez,    We are writing to inform you of your test results. LDL (bad cholesterol) is slightly high, please discuss it with your PCP. I'll fill out the well-being form for you based on these labs.      Results for orders placed or performed in visit on 19   Glucose   Result Value Ref Range    Glucose 88 70 - 99 mg/dL   Lipid panel reflex to direct LDL Fasting   Result Value Ref Range    Cholesterol 180 <200 mg/dL    Triglycerides 90 <150 mg/dL    HDL Cholesterol 48 >39 mg/dL    LDL Cholesterol Calculated 114 (H) <100 mg/dL    Non HDL Cholesterol 132 (H) <130 mg/dL       Jovani Luna MD

## 2019-07-09 NOTE — RESULT ENCOUNTER NOTE
LDL (bad cholesterol) is slightly high, please discuss it with your PCP. I'll fill out the well-being form for you based on these labs.

## 2019-09-23 ENCOUNTER — TELEPHONE (OUTPATIENT)
Dept: RHEUMATOLOGY | Facility: CLINIC | Age: 44
End: 2019-09-23

## 2019-09-23 NOTE — TELEPHONE ENCOUNTER
Called and spoke with patient, he is continuing to have pain in his chest wall every evening at the same time.  It fairly predictable and is irritated by certain movements, pt states that it does not involve his heart.  Discussed appt date and time.  Pt will come on 10/2 at 3:30 pm    Adeline Piña RN  Rheumatology Clinic

## 2020-01-09 ENCOUNTER — TELEPHONE (OUTPATIENT)
Dept: RHEUMATOLOGY | Facility: CLINIC | Age: 45
End: 2020-01-09

## 2020-01-09 DIAGNOSIS — E55.9 VITAMIN D DEFICIENCY: ICD-10-CM

## 2020-01-09 DIAGNOSIS — Z51.81 MEDICATION MONITORING ENCOUNTER: ICD-10-CM

## 2020-01-09 DIAGNOSIS — M47.819 SPONDYLOARTHROPATHY: ICD-10-CM

## 2020-01-09 DIAGNOSIS — Z13.1 ENCOUNTER FOR SCREENING EXAMINATION FOR IMPAIRED GLUCOSE REGULATION AND DIABETES MELLITUS: Primary | ICD-10-CM

## 2020-01-09 NOTE — TELEPHONE ENCOUNTER
Spoke to patient , called with a reminder about there upcoming appointment , also instructed to bring medications or medication list.    Patient stated he will cancel appt, per he was dx: with influenza B, spoke to Adeline and she stated the patient doesn't need to come to tomorrow, but will need to reschedule appt. .Stephanie Patel, CMA

## 2020-01-17 ENCOUNTER — HOSPITAL ENCOUNTER (OUTPATIENT)
Dept: LAB | Facility: CLINIC | Age: 45
Discharge: HOME | End: 2020-01-17
Attending: INTERNAL MEDICINE | Admitting: INTERNAL MEDICINE
Payer: COMMERCIAL

## 2020-01-17 DIAGNOSIS — E55.9 VITAMIN D DEFICIENCY: ICD-10-CM

## 2020-01-17 DIAGNOSIS — Z51.81 MEDICATION MONITORING ENCOUNTER: ICD-10-CM

## 2020-01-17 DIAGNOSIS — M47.819 SPONDYLOARTHROPATHY: ICD-10-CM

## 2020-01-17 LAB
ALBUMIN SERPL-MCNC: 3.9 G/DL (ref 3.4–5)
ALT SERPL W P-5'-P-CCNC: 36 U/L (ref 0–70)
AST SERPL W P-5'-P-CCNC: 19 U/L (ref 0–45)
BASOPHILS # BLD AUTO: 0 10E9/L (ref 0–0.2)
BASOPHILS NFR BLD AUTO: 0.3 %
CREAT SERPL-MCNC: 0.83 MG/DL (ref 0.66–1.25)
CRP SERPL-MCNC: <2.9 MG/L (ref 0–8)
DEPRECATED CALCIDIOL+CALCIFEROL SERPL-MC: 27 UG/L (ref 20–75)
DIFFERENTIAL METHOD BLD: ABNORMAL
EOSINOPHIL # BLD AUTO: 0.1 10E9/L (ref 0–0.7)
EOSINOPHIL NFR BLD AUTO: 2.5 %
ERYTHROCYTE [DISTWIDTH] IN BLOOD BY AUTOMATED COUNT: 12 % (ref 10–15)
ERYTHROCYTE [SEDIMENTATION RATE] IN BLOOD BY WESTERGREN METHOD: 8 MM/H (ref 0–15)
GFR SERPL CREATININE-BSD FRML MDRD: >90 ML/MIN/{1.73_M2}
HCT VFR BLD AUTO: 41.1 % (ref 40–53)
HGB BLD-MCNC: 13.8 G/DL (ref 13.3–17.7)
IMM GRANULOCYTES # BLD: 0 10E9/L (ref 0–0.4)
IMM GRANULOCYTES NFR BLD: 0.3 %
LYMPHOCYTES # BLD AUTO: 1.1 10E9/L (ref 0.8–5.3)
LYMPHOCYTES NFR BLD AUTO: 30.4 %
MCH RBC QN AUTO: 30.5 PG (ref 26.5–33)
MCHC RBC AUTO-ENTMCNC: 33.6 G/DL (ref 31.5–36.5)
MCV RBC AUTO: 91 FL (ref 78–100)
MONOCYTES # BLD AUTO: 0.4 10E9/L (ref 0–1.3)
MONOCYTES NFR BLD AUTO: 11 %
NEUTROPHILS # BLD AUTO: 2 10E9/L (ref 1.6–8.3)
NEUTROPHILS NFR BLD AUTO: 55.5 %
NRBC # BLD AUTO: 0 10*3/UL
NRBC BLD AUTO-RTO: 0 /100
PLATELET # BLD AUTO: 258 10E9/L (ref 150–450)
RBC # BLD AUTO: 4.52 10E12/L (ref 4.4–5.9)
WBC # BLD AUTO: 3.6 10E9/L (ref 4–11)

## 2020-01-17 PROCEDURE — 86225 DNA ANTIBODY NATIVE: CPT | Performed by: INTERNAL MEDICINE

## 2020-01-17 PROCEDURE — 36415 COLL VENOUS BLD VENIPUNCTURE: CPT | Performed by: INTERNAL MEDICINE

## 2020-01-17 PROCEDURE — 84460 ALANINE AMINO (ALT) (SGPT): CPT | Performed by: INTERNAL MEDICINE

## 2020-01-17 PROCEDURE — 86160 COMPLEMENT ANTIGEN: CPT | Performed by: INTERNAL MEDICINE

## 2020-01-17 PROCEDURE — 82565 ASSAY OF CREATININE: CPT | Performed by: INTERNAL MEDICINE

## 2020-01-17 PROCEDURE — 82040 ASSAY OF SERUM ALBUMIN: CPT | Performed by: INTERNAL MEDICINE

## 2020-01-17 PROCEDURE — 85652 RBC SED RATE AUTOMATED: CPT | Performed by: INTERNAL MEDICINE

## 2020-01-17 PROCEDURE — 84450 TRANSFERASE (AST) (SGOT): CPT | Performed by: INTERNAL MEDICINE

## 2020-01-17 PROCEDURE — 82306 VITAMIN D 25 HYDROXY: CPT | Performed by: INTERNAL MEDICINE

## 2020-01-17 PROCEDURE — 85025 COMPLETE CBC W/AUTO DIFF WBC: CPT | Performed by: INTERNAL MEDICINE

## 2020-01-17 PROCEDURE — 86140 C-REACTIVE PROTEIN: CPT | Performed by: INTERNAL MEDICINE

## 2020-01-17 NOTE — LETTER
Patient:  Sabino Cardona  :   1975  MRN:     [id number]        Mr.Richard Cardona  [address] [address] [place] [id number]        2020    Dear Mr.Van Hernandez,    We are writing to inform you of your test results. Low vit D has been replaced. Improved anti-DNA. White count is slightly low but could be monitored for now.      Results for orders placed or performed during the hospital encounter of 20   Vitamin D Deficiency     Status: None   Result Value Ref Range    Vitamin D Deficiency screening 27 20 - 75 ug/L   Erythrocyte sedimentation rate auto     Status: None   Result Value Ref Range    Sed Rate 8 0 - 15 mm/h   CRP inflammation     Status: None   Result Value Ref Range    CRP Inflammation <2.9 0.0 - 8.0 mg/L   Creatinine     Status: None   Result Value Ref Range    Creatinine 0.83 0.66 - 1.25 mg/dL    GFR Estimate >90 >60 mL/min/[1.73_m2]    GFR Estimate If Black >90 >60 mL/min/[1.73_m2]   CBC with platelets differential     Status: Abnormal   Result Value Ref Range    WBC 3.6 (L) 4.0 - 11.0 10e9/L    RBC Count 4.52 4.4 - 5.9 10e12/L    Hemoglobin 13.8 13.3 - 17.7 g/dL    Hematocrit 41.1 40.0 - 53.0 %    MCV 91 78 - 100 fl    MCH 30.5 26.5 - 33.0 pg    MCHC 33.6 31.5 - 36.5 g/dL    RDW 12.0 10.0 - 15.0 %    Platelet Count 258 150 - 450 10e9/L    Diff Method Automated Method     % Neutrophils 55.5 %    % Lymphocytes 30.4 %    % Monocytes 11.0 %    % Eosinophils 2.5 %    % Basophils 0.3 %    % Immature Granulocytes 0.3 %    Nucleated RBCs 0 0 /100    Absolute Neutrophil 2.0 1.6 - 8.3 10e9/L    Absolute Lymphocytes 1.1 0.8 - 5.3 10e9/L    Absolute Monocytes 0.4 0.0 - 1.3 10e9/L    Absolute Eosinophils 0.1 0.0 - 0.7 10e9/L    Absolute Basophils 0.0 0.0 - 0.2 10e9/L    Abs Immature Granulocytes 0.0 0 - 0.4 10e9/L    Absolute Nucleated RBC 0.0    AST     Status: None   Result Value Ref Range    AST 19 0 - 45 U/L   Albumin level     Status: None   Result Value Ref Range    Albumin 3.9 3.4 -  5.0 g/dL   ALT     Status: None   Result Value Ref Range    ALT 36 0 - 70 U/L   Complement C3     Status: None   Result Value Ref Range    Complement C3 118 81 - 157 mg/dL   Complement C4     Status: None   Result Value Ref Range    Complement C4 25 13 - 39 mg/dL   DNA double stranded antibodies     Status: Abnormal   Result Value Ref Range    DNA-ds 16 (H) <10 IU/mL       Jovani Luna MD

## 2020-01-17 NOTE — LETTER
Patient:  Sabino Cardona  :   1975  MRN:     [id number]        Mr.Richard Cardona  [address] [address] [place] [address]        2020    Dear Mr.Van Hernandez,    We are writing to inform you of your test results. Stable labs except low vit D and slight drop in white count (could be related to recent infection or sulfasalazine or anti-DNA). I ordered anti-DNA and C3/C4 to see if they could be done as add on to the blood which was drawn yesterday. I refilled your vit D and sulfasalazine. Will you be able to come see me on  at 8:30 or 9 am? I have cancellations. Please let me know as soon as possible, thank you.      Results for orders placed or performed during the hospital encounter of 20   Vitamin D Deficiency     Status: None   Result Value Ref Range    Vitamin D Deficiency screening 27 20 - 75 ug/L   Erythrocyte sedimentation rate auto     Status: None   Result Value Ref Range    Sed Rate 8 0 - 15 mm/h   CRP inflammation     Status: None   Result Value Ref Range    CRP Inflammation <2.9 0.0 - 8.0 mg/L   Creatinine     Status: None   Result Value Ref Range    Creatinine 0.83 0.66 - 1.25 mg/dL    GFR Estimate >90 >60 mL/min/[1.73_m2]    GFR Estimate If Black >90 >60 mL/min/[1.73_m2]   CBC with platelets differential     Status: Abnormal   Result Value Ref Range    WBC 3.6 (L) 4.0 - 11.0 10e9/L    RBC Count 4.52 4.4 - 5.9 10e12/L    Hemoglobin 13.8 13.3 - 17.7 g/dL    Hematocrit 41.1 40.0 - 53.0 %    MCV 91 78 - 100 fl    MCH 30.5 26.5 - 33.0 pg    MCHC 33.6 31.5 - 36.5 g/dL    RDW 12.0 10.0 - 15.0 %    Platelet Count 258 150 - 450 10e9/L    Diff Method Automated Method     % Neutrophils 55.5 %    % Lymphocytes 30.4 %    % Monocytes 11.0 %    % Eosinophils 2.5 %    % Basophils 0.3 %    % Immature Granulocytes 0.3 %    Nucleated RBCs 0 0 /100    Absolute Neutrophil 2.0 1.6 - 8.3 10e9/L    Absolute Lymphocytes 1.1 0.8 - 5.3 10e9/L    Absolute Monocytes 0.4 0.0 - 1.3 10e9/L    Absolute  Eosinophils 0.1 0.0 - 0.7 10e9/L    Absolute Basophils 0.0 0.0 - 0.2 10e9/L    Abs Immature Granulocytes 0.0 0 - 0.4 10e9/L    Absolute Nucleated RBC 0.0    AST     Status: None   Result Value Ref Range    AST 19 0 - 45 U/L   Albumin level     Status: None   Result Value Ref Range    Albumin 3.9 3.4 - 5.0 g/dL   ALT     Status: None   Result Value Ref Range    ALT 36 0 - 70 U/L     Jovani Luna MD

## 2020-01-18 DIAGNOSIS — E55.9 VITAMIN D DEFICIENCY: ICD-10-CM

## 2020-01-18 DIAGNOSIS — D72.819 LEUKOPENIA, UNSPECIFIED TYPE: Primary | ICD-10-CM

## 2020-01-18 DIAGNOSIS — M47.819 SPONDYLOARTHROPATHY: ICD-10-CM

## 2020-01-18 RX ORDER — SULFASALAZINE 500 MG/1
1000 TABLET, DELAYED RELEASE ORAL 2 TIMES DAILY
Qty: 180 TABLET | Refills: 0 | Status: SHIPPED | OUTPATIENT
Start: 2020-01-18 | End: 2020-02-14

## 2020-01-18 RX ORDER — ERGOCALCIFEROL 1.25 MG/1
50000 CAPSULE, LIQUID FILLED ORAL
Qty: 12 CAPSULE | Refills: 0 | Status: SHIPPED | OUTPATIENT
Start: 2020-01-18 | End: 2020-07-10

## 2020-01-18 NOTE — RESULT ENCOUNTER NOTE
Stable labs except low vit D and slight drop in white count (could be related to recent infection or sulfasalazine or anti-DNA). I ordered anti-DNA and C3/C4 to see if they could be done as add on to the blood which was drawn yesterday. I refilled your vit D and sulfasalazine. Will you be able to come see me on Mon 1/20 at 8:30 or 9 am? I have cancellations. Please let me know as soon as possible, thank you.

## 2020-01-20 ENCOUNTER — TELEPHONE (OUTPATIENT)
Dept: RHEUMATOLOGY | Facility: CLINIC | Age: 45
End: 2020-01-20

## 2020-01-20 LAB
C3 SERPL-MCNC: 118 MG/DL (ref 81–157)
C4 SERPL-MCNC: 25 MG/DL (ref 13–39)
DSDNA AB SER-ACNC: 16 IU/ML

## 2020-01-20 NOTE — TELEPHONE ENCOUNTER
Called [facility] and told them that the ER tab was fine as per Dr. Luna as well as the 360 tabs rather than 180.     I also spoke with the lab and they said that the C3 and C4 and the Ds-DNA are send out tests and therefore take a little bit longer, but they are working on it.     Lata Barnett, BSN RN   Rheumatology Clinic Nurse   [facility]

## 2020-01-20 NOTE — TELEPHONE ENCOUNTER
[facility] Call Center    Phone Message    May a detailed message be left on voicemail: yes    Reason for Call: Medication Question or concern regarding medication   Prescription Clarification  Name of Medication: sulfaSALAzine ER (AZULFIDINE EN) 500 MG EC tablet  Prescribing Provider: Jeremy   [facility]   What on the order needs clarification? The pharmacist states the current rx is for extended release, and the pt used to be getting extended release in the prescription. The pharmacist is also asking about the quantity. It used to be 180 and now it's 360. Thanks        Action Taken: Message routed to:  [facility] ([facility]

## 2020-02-13 ENCOUNTER — TELEPHONE (OUTPATIENT)
Dept: RHEUMATOLOGY | Facility: CLINIC | Age: 45
End: 2020-02-13

## 2020-02-13 NOTE — TELEPHONE ENCOUNTER
Spoke to patient , called with a reminder about there upcoming appointment , also instructed to bring medications or medication list.    Stephanie Patel CMA

## 2020-02-14 ENCOUNTER — OFFICE VISIT (OUTPATIENT)
Dept: RHEUMATOLOGY | Facility: CLINIC | Age: 45
End: 2020-02-14
Attending: INTERNAL MEDICINE
Payer: COMMERCIAL

## 2020-02-14 VITALS
DIASTOLIC BLOOD PRESSURE: 69 MMHG | SYSTOLIC BLOOD PRESSURE: 107 MMHG | HEIGHT: 75 IN | BODY MASS INDEX: 22.81 KG/M2 | HEART RATE: 74 BPM | WEIGHT: 183.5 LBS | TEMPERATURE: 98.1 F

## 2020-02-14 DIAGNOSIS — Z51.81 MEDICATION MONITORING ENCOUNTER: Primary | ICD-10-CM

## 2020-02-14 DIAGNOSIS — M47.819 SPONDYLOARTHROPATHY: ICD-10-CM

## 2020-02-14 PROCEDURE — G0463 HOSPITAL OUTPT CLINIC VISIT: HCPCS | Mod: ZF

## 2020-02-14 RX ORDER — SULFASALAZINE 500 MG/1
TABLET, DELAYED RELEASE ORAL
Qty: 450 TABLET | Refills: 1 | Status: SHIPPED | OUTPATIENT
Start: 2020-02-14 | End: 2020-07-10

## 2020-02-14 RX ORDER — CELECOXIB 100 MG/1
100 CAPSULE ORAL 2 TIMES DAILY PRN
Qty: 60 CAPSULE | Refills: 3 | Status: SHIPPED | OUTPATIENT
Start: 2020-02-14 | End: 2021-03-01

## 2020-02-14 ASSESSMENT — MIFFLIN-ST. JEOR: SCORE: 1790.04

## 2020-02-14 ASSESSMENT — PAIN SCALES - GENERAL: PAINLEVEL: MILD PAIN (2)

## 2020-02-14 NOTE — NURSING NOTE
"/69   Pulse 74   Temp 98.1  F (36.7  C) (Oral)   Ht 1.892 m (6' 2.5\")   Wt 83.2 kg (183 lb 8 oz)   BMI 23.24 kg/m    Chief Complaint   Patient presents with     RECHECK     follow up with [facility], tz[facility] cma       "

## 2020-02-14 NOTE — LETTER
2/14/2020      RE: Sabino Cardona  [address]  [facility] [address]       Rheumatology F/U Note    Reason for visit: +anti-DNA, h/o possible reactive arthritis on SSZ    Initial visit date: 5/15/2019    DOS: 2/14/2020    HPI:    Sabino Cardona is a 43 year old  male with fh/o psoriasis and RA, who was referred to our clinic for evaluation and management of possible SLE given +anti-DNA. His wife is a transplant surgeon here at the  and they say the actual reason for referral is to establish/transfer care as they just moved to [place].      Today, he is feeling well.      They moved back from [place] to [place] about 1.5 month ago.      He reports having degenerative arthritis since age 18. Has chronic low back pain with worsening in AM and worsening with inactivity. Allergies in Spring makes it worse.      He started to have heel and ankle pain in Feb 2019, also knees and elbows. That was stressful time of life looking for moving back. Also has 3 small children (one 5 yo and two 1 yo twins) which keep them busy.      Both heels were red and blaching and his wife thought it was gout.      His brother has gout and is almost 51 yo.      He saw a rheumatologist at [facility]. 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 His wife questioned the diagnosis given lack of lupus symptoms.      Then he had x-rays of spine which showed lots of bone spurs.    He was diagnosed with OA, possible reactive arthritis. No h/o uveitis, urethritis, STDs. Has h/o food poisenings. Was put on SSZ in 2/2019, the dose was increased to current dose of 1 gr bid, he tolerates it well with no toxicity. SSZ has helped and he has no current heel inflammation or recent flare ups.      Has fatigue but because of having 3 small children, it's hard for him to say if fatigue was related to inflammation.    ROS is positive for intermittent CP for years, ibuprofen  helps. Prednisone 10 mg every day did not help in the past.    Gets cold sores. No photosensitivity. Gets migraines. No weight loss. No Raynaud's.      Today 2/14/2020: No fatigue, skin rashes, oral ulcers.    Has mild achy knees.    About 1.5 months ago, started having flare up of achilles tendonitis in both feet, worse in AM and at night. They turn red and swollen.          ROS:  A comprehensive ROS was done, positives are per HPI.      Past Medical History:   Diagnosis Date     GERD (gastroesophageal reflux disease)      Past Surgical History:   Procedure Laterality Date     APPENDECTOMY       HERNIA REPAIR, INGUINAL RT/LT      left     Family History   Problem Relation Age of Onset     Hypertension Mother      Hypertension Father      Asthma Father      Diabetes Father      Psoriasis Father      Asthma Sister      Hypertension Maternal Grandmother         RA     Diabetes Maternal Grandmother      Hypertension Maternal Grandfather      Cancer Maternal Grandfather         stomach     Cancer Paternal Grandfather         lung-smoker     Social History     Socioeconomic History     Marital status:      Spouse name: Not on file     Number of children: 3     Years of education: Not on file     Highest education level: Not on file   Occupational History     Occupation: sanchez   Social Needs     Financial resource strain: Not on file     Food insecurity:     Worry: Not on file     Inability: Not on file     Transportation needs:     Medical: Not on file     Non-medical: Not on file   Tobacco Use     Smoking status: Never Smoker     Smokeless tobacco: Former User     Types: Chew     Tobacco comment: chew 9 yr   Substance and Sexual Activity     Alcohol use: Yes     Alcohol/week: 1.7 standard drinks     Types: 2 Standard drinks or equivalent per week     Drug use: No     Sexual activity: Yes     Partners: Female   Lifestyle     Physical activity:     Days per week: Not on file     Minutes per session: Not on file      Stress: Not on file   Relationships     Social connections:     Talks on phone: Not on file     Gets together: Not on file     Attends Sabianism service: Not on file     Active member of club or organization: Not on file     Attends meetings of clubs or organizations: Not on file     Relationship status: Not on file     Intimate partner violence:     Fear of current or ex partner: Not on file     Emotionally abused: Not on file     Physically abused: Not on file     Forced sexual activity: Not on file   Other Topics Concern      Service Not Asked     Blood Transfusions Not Asked     Caffeine Concern Not Asked     Occupational Exposure Not Asked     Hobby Hazards Not Asked     Sleep Concern Not Asked     Stress Concern Not Asked     Weight Concern Not Asked     Special Diet Not Asked     Back Care Not Asked     Exercise Yes     Comment: run, weights     Bike Helmet Not Asked     Seat Belt Not Asked     Self-Exams Not Asked   Social History Narrative     Not on file     Patient Active Problem List   Diagnosis     Esophageal reflux     Biceps tendinopathy     Wart     Allergies   Allergen Reactions     Nkda [No Known Drug Allergies]      Seasonal Allergies Other (See Comments)     Sneezing, running nose, itchy eyes       Outpatient Encounter Medications as of 2/14/2020   Medication Sig Dispense Refill     Multiple Vitamin (MULTI VITAMIN MENS PO) Take 1 tablet by mouth daily.       Salicylic Acid (SAL-ACID PLASTERS) 40 % PADS Use as instructed. 30 each 3     sulfaSALAzine ER (AZULFIDINE EN) 500 MG EC tablet Take 2 tablets (1,000 mg) by mouth 2 times daily Labs every 8-12 weeks. 180 tablet 0     tazarotene 0.1 % CREA Apply topically to areas of plantar warts daily. Cover w/ mediplast, then cover w/ duct tape. 45 g 3     vitamin D2 (ERGOCALCIFEROL) 38375 units (1250 mcg) capsule Take 1 capsule (50,000 Units) by mouth every 7 days 12 capsule 0     No facility-administered encounter medications on file as of  "2/14/2020.                His records were reviewed.    No results found for any visits on 02/14/20.    2/2019: +BEST 1:40 mixed homogenous and nucleolar pattern, positive anti-DNA 17 with NL being up to 4. Neg anti-Sm, RNP, SCL-70, SSA, SSB, ALEXANDREA-1, CHROMATIN, RF, anti-CCP. NL ESR. NL SUA 5.2. Neg HLA-B27.  NL TSH, C3, C4. Unremarkable CMP. Neg U/A. LOW vit D 27.    2/2019: Advanced degenerative changes of L spine and moderate degenerative changes of T spine on x-rays.            Ph.E:    /69   Pulse 74   Temp 98.1  F (36.7  C) (Oral)   Ht 1.892 m (6' 2.5\")   Wt 83.2 kg (183 lb 8 oz)   BMI 23.24 kg/m         Constitutional: WD/WN. Pleasant. In no acute distress.   Eyes: EOM intact, PERRLA, sclera anicteric, conj not injected  HEENT: No oral ulcers or thrush. Normal salivary pool.  Neck: No cervical LAP or thyromegaly  Chest: Clear to auscultation bilaterally  CV: RRR, no murmurs/ rubs or gallops. No edema, clubbing or cyanosis.   GI: Abdomen is soft and non tender.   MS: No synovitis. Cool joints. No tenderness of the joints. No  joint deformities. Full ROM of the joints. No nodules.  Skin: No skin rash, malar rash, livedo, periungual erythema, or nail changes.  Neuro: A&O x 3. Grossly non focal, muscular power 5/5 in all ext  Psych: NL affect and mood    Assessment/ plan:    1-Spondyloarthropathy with enthesopathy Dx 2/2019 on SSZ since 2/2019. He is HLA-B27 negative. Has fh/o psoriasis. He fits this diagnosis very well and already responds to SSZ. His inflammatory arthritis was under good control at initial visit, but gets flare ups of achilles tendonitis which started post flu.    Increase sulfasalazine to 3 tabs in the morning, 2 tabs in the evening with food    Labs in 2 weeks    If this does not work, consider adding celebrex 100 mg twice a day as needed      2-SSZ monitoring. Labs in 2wk then q3mo    3-Positive anti-DNA 2/2019. He does not have lupus and nothing on his exam or hx is suggestive of " lupus. Anti-DNA is going down. I'll monitor it for now.    4-Low vit D in the past. S/p replacement.      RTC 3-4 months    Orders Placed This Encounter   Procedures     CBC with platelets differential     ALT     AST     Creatinine     Jovani Luna MD

## 2020-02-14 NOTE — PATIENT INSTRUCTIONS
Increase sulfasalazine to 3 tabs in the morning, 2 tabs in the evening with food    Labs in 2 weeks    If this does not work, consider adding celebrex 100 mg twice a day as needed    Return in 3-4 months (new pt slots could be used)

## 2020-02-14 NOTE — PROGRESS NOTES
Rheumatology F/U Note    Reason for visit: +anti-DNA, h/o possible reactive arthritis on SSZ    Initial visit date: 5/15/2019    DOS: 2/14/2020    HPI:    Sabino Cardona is a 43 year old  male with fh/o psoriasis and RA, who was referred to our clinic for evaluation and management of possible SLE given +anti-DNA. His wife is a transplant surgeon here at the  and they say the actual reason for referral is to establish/transfer care as they just moved to [place].      Today, he is feeling well.      They moved back from [place] to [place] about 1.5 month ago.      He reports having degenerative arthritis since age 18. Has chronic low back pain with worsening in AM and worsening with inactivity. Allergies in Spring makes it worse.      He started to have heel and ankle pain in Feb 2019, also knees and elbows. That was stressful time of life looking for moving back. Also has 3 small children (one 5 yo and two 3 yo twins) which keep them busy.      Both heels were red and blaching and his wife thought it was gout.      His brother has gout and is almost 51 yo.      He saw a rheumatologist at [facility]. 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 His wife questioned the diagnosis given lack of lupus symptoms.      Then he had x-rays of spine which showed lots of bone spurs.    He was diagnosed with OA, possible reactive arthritis. No h/o uveitis, urethritis, STDs. Has h/o food poisenings. Was put on SSZ in 2/2019, the dose was increased to current dose of 1 gr bid, he tolerates it well with no toxicity. SSZ has helped and he has no current heel inflammation or recent flare ups.      Has fatigue but because of having 3 small children, it's hard for him to say if fatigue was related to inflammation.    ROS is positive for intermittent CP for years, ibuprofen helps. Prednisone 10 mg every day did not help in the past.    Gets cold sores. No  photosensitivity. Gets migraines. No weight loss. No Raynaud's.      Today 2/14/2020: No fatigue, skin rashes, oral ulcers.    Has mild achy knees.    About 1.5 months ago, started having flare up of achilles tendonitis in both feet, worse in AM and at night. They turn red and swollen.          ROS:  A comprehensive ROS was done, positives are per HPI.      Past Medical History:   Diagnosis Date     GERD (gastroesophageal reflux disease)      Past Surgical History:   Procedure Laterality Date     APPENDECTOMY       HERNIA REPAIR, INGUINAL RT/LT      left     Family History   Problem Relation Age of Onset     Hypertension Mother      Hypertension Father      Asthma Father      Diabetes Father      Psoriasis Father      Asthma Sister      Hypertension Maternal Grandmother         RA     Diabetes Maternal Grandmother      Hypertension Maternal Grandfather      Cancer Maternal Grandfather         stomach     Cancer Paternal Grandfather         lung-smoker     Social History     Socioeconomic History     Marital status:      Spouse name: Not on file     Number of children: 3     Years of education: Not on file     Highest education level: Not on file   Occupational History     Occupation: [employer]   Social Needs     Financial resource strain: Not on file     Food insecurity:     Worry: Not on file     Inability: Not on file     Transportation needs:     Medical: Not on file     Non-medical: Not on file   Tobacco Use     Smoking status: Never Smoker     Smokeless tobacco: Former User     Types: Chew     Tobacco comment: chew 9 yr   Substance and Sexual Activity     Alcohol use: Yes     Alcohol/week: 1.7 standard drinks     Types: 2 Standard drinks or equivalent per week     Drug use: No     Sexual activity: Yes     Partners: Female   Lifestyle     Physical activity:     Days per week: Not on file     Minutes per session: Not on file     Stress: Not on file   Relationships     Social connections:     Talks on  phone: Not on file     Gets together: Not on file     Attends Nondenominational service: Not on file     Active member of club or organization: Not on file     Attends meetings of clubs or organizations: Not on file     Relationship status: Not on file     Intimate partner violence:     Fear of current or ex partner: Not on file     Emotionally abused: Not on file     Physically abused: Not on file     Forced sexual activity: Not on file   Other Topics Concern      Service Not Asked     Blood Transfusions Not Asked     Caffeine Concern Not Asked     Occupational Exposure Not Asked     Hobby Hazards Not Asked     Sleep Concern Not Asked     Stress Concern Not Asked     Weight Concern Not Asked     Special Diet Not Asked     Back Care Not Asked     Exercise Yes     Comment: run, weights     Bike Helmet Not Asked     Seat Belt Not Asked     Self-Exams Not Asked   Social History Narrative     Not on file     Patient Active Problem List   Diagnosis     Esophageal reflux     Biceps tendinopathy     Wart     Allergies   Allergen Reactions     Nkda [No Known Drug Allergies]      Seasonal Allergies Other (See Comments)     Sneezing, running nose, itchy eyes       Outpatient Encounter Medications as of 2/14/2020   Medication Sig Dispense Refill     Multiple Vitamin (MULTI VITAMIN MENS PO) Take 1 tablet by mouth daily.       Salicylic Acid (SAL-ACID PLASTERS) 40 % PADS Use as instructed. 30 each 3     sulfaSALAzine ER (AZULFIDINE EN) 500 MG EC tablet Take 2 tablets (1,000 mg) by mouth 2 times daily Labs every 8-12 weeks. 180 tablet 0     tazarotene 0.1 % CREA Apply topically to areas of plantar warts daily. Cover w/ mediplast, then cover w/ duct tape. 45 g 3     vitamin D2 (ERGOCALCIFEROL) 23114 units (1250 mcg) capsule Take 1 capsule (50,000 Units) by mouth every 7 days 12 capsule 0     No facility-administered encounter medications on file as of 2/14/2020.                His records were reviewed.    No results found for any  "visits on 02/14/20.    2/2019: +BEST 1:40 mixed homogenous and nucleolar pattern, positive anti-DNA 17 with NL being up to 4. Neg anti-Sm, RNP, SCL-70, SSA, SSB, ALEXANDREA-1, CHROMATIN, RF, anti-CCP. NL ESR. NL SUA 5.2. Neg HLA-B27.  NL TSH, C3, C4. Unremarkable CMP. Neg U/A. LOW vit D 27.    2/2019: Advanced degenerative changes of L spine and moderate degenerative changes of T spine on x-rays.            Ph.E:    /69   Pulse 74   Temp 98.1  F (36.7  C) (Oral)   Ht 1.892 m (6' 2.5\")   Wt 83.2 kg (183 lb 8 oz)   BMI 23.24 kg/m        Constitutional: WD/WN. Pleasant. In no acute distress.   Eyes: EOM intact, PERRLA, sclera anicteric, conj not injected  HEENT: No oral ulcers or thrush. Normal salivary pool.  Neck: No cervical LAP or thyromegaly  Chest: Clear to auscultation bilaterally  CV: RRR, no murmurs/ rubs or gallops. No edema, clubbing or cyanosis.   GI: Abdomen is soft and non tender.   MS: No synovitis. Cool joints. No tenderness of the joints. No  joint deformities. Full ROM of the joints. No nodules.  Skin: No skin rash, malar rash, livedo, periungual erythema, or nail changes.  Neuro: A&O x 3. Grossly non focal, muscular power 5/5 in all ext  Psych: NL affect and mood    Assessment/ plan:    1-Spondyloarthropathy with enthesopathy Dx 2/2019 on SSZ since 2/2019. He is HLA-B27 negative. Has fh/o psoriasis. He fits this diagnosis very well and already responds to SSZ. His inflammatory arthritis was under good control at initial visit, but gets flare ups of achilles tendonitis which started post flu.    Increase sulfasalazine to 3 tabs in the morning, 2 tabs in the evening with food    Labs in 2 weeks    If this does not work, consider adding celebrex 100 mg twice a day as needed      2-SSZ monitoring. Labs in 2wk then q3mo    3-Positive anti-DNA 2/2019. He does not have lupus and nothing on his exam or hx is suggestive of lupus. Anti-DNA is going down. I'll monitor it for now.    4-Low vit D in the past. " S/p replacement.      RTC 3-4 months    Orders Placed This Encounter   Procedures     CBC with platelets differential     ALT     AST     Creatinine

## 2020-03-02 ENCOUNTER — HEALTH MAINTENANCE LETTER (OUTPATIENT)
Age: 45
End: 2020-03-02

## 2020-07-01 DIAGNOSIS — Z51.81 MEDICATION MONITORING ENCOUNTER: ICD-10-CM

## 2020-07-01 LAB
ALBUMIN SERPL-MCNC: 4.3 G/DL (ref 3.4–5)
ALT SERPL W P-5'-P-CCNC: 47 U/L (ref 0–70)
AST SERPL W P-5'-P-CCNC: 34 U/L (ref 0–45)
BASOPHILS # BLD AUTO: 0.1 10E9/L (ref 0–0.2)
BASOPHILS NFR BLD AUTO: 0.8 %
CREAT SERPL-MCNC: 0.97 MG/DL (ref 0.66–1.25)
DIFFERENTIAL METHOD BLD: NORMAL
EOSINOPHIL # BLD AUTO: 0 10E9/L (ref 0–0.7)
EOSINOPHIL NFR BLD AUTO: 0 %
ERYTHROCYTE [DISTWIDTH] IN BLOOD BY AUTOMATED COUNT: 11.4 % (ref 10–15)
GFR SERPL CREATININE-BSD FRML MDRD: >90 ML/MIN/{1.73_M2}
HCT VFR BLD AUTO: 42.6 % (ref 40–53)
HGB BLD-MCNC: 15 G/DL (ref 13.3–17.7)
IMM GRANULOCYTES # BLD: 0 10E9/L (ref 0–0.4)
IMM GRANULOCYTES NFR BLD: 0.2 %
LYMPHOCYTES # BLD AUTO: 1.3 10E9/L (ref 0.8–5.3)
LYMPHOCYTES NFR BLD AUTO: 19.6 %
MCH RBC QN AUTO: 31.6 PG (ref 26.5–33)
MCHC RBC AUTO-ENTMCNC: 35.2 G/DL (ref 31.5–36.5)
MCV RBC AUTO: 90 FL (ref 78–100)
MONOCYTES # BLD AUTO: 0.4 10E9/L (ref 0–1.3)
MONOCYTES NFR BLD AUTO: 6.2 %
NEUTROPHILS # BLD AUTO: 4.7 10E9/L (ref 1.6–8.3)
NEUTROPHILS NFR BLD AUTO: 73.2 %
PLATELET # BLD AUTO: 243 10E9/L (ref 150–450)
RBC # BLD AUTO: 4.74 10E12/L (ref 4.4–5.9)
WBC # BLD AUTO: 6.4 10E9/L (ref 4–11)

## 2020-07-01 PROCEDURE — 82565 ASSAY OF CREATININE: CPT | Performed by: INTERNAL MEDICINE

## 2020-07-01 PROCEDURE — 82040 ASSAY OF SERUM ALBUMIN: CPT | Performed by: INTERNAL MEDICINE

## 2020-07-01 PROCEDURE — 85025 COMPLETE CBC W/AUTO DIFF WBC: CPT | Performed by: INTERNAL MEDICINE

## 2020-07-01 PROCEDURE — 84450 TRANSFERASE (AST) (SGOT): CPT | Performed by: INTERNAL MEDICINE

## 2020-07-01 PROCEDURE — 84460 ALANINE AMINO (ALT) (SGPT): CPT | Performed by: INTERNAL MEDICINE

## 2020-07-01 PROCEDURE — 36415 COLL VENOUS BLD VENIPUNCTURE: CPT | Performed by: INTERNAL MEDICINE

## 2020-07-01 NOTE — LETTER
July 2, 2020      Sabino Cardona  [id number]  [facility] [address]        Dear Mr.Van Hernandez,    We are writing to inform you of your test results.    They are normal.    Resulted Orders   Creatinine   Result Value Ref Range    Creatinine 0.97 0.66 - 1.25 mg/dL    GFR Estimate >90 >60 mL/min/[1.73_m2]      Comment:      Non  GFR Calc  Starting 12/18/2018, serum creatinine based estimated GFR (eGFR) will be   calculated using the Chronic Kidney Disease Epidemiology Collaboration   (CKD-EPI) equation.      GFR Estimate If Black >90 >60 mL/min/[1.73_m2]      Comment:       GFR Calc  Starting 12/18/2018, serum creatinine based estimated GFR (eGFR) will be   calculated using the Chronic Kidney Disease Epidemiology Collaboration   (CKD-EPI) equation.     CBC with platelets differential   Result Value Ref Range    WBC 6.4 4.0 - 11.0 10e9/L    RBC Count 4.74 4.4 - 5.9 10e12/L    Hemoglobin 15.0 13.3 - 17.7 g/dL    Hematocrit 42.6 40.0 - 53.0 %    MCV 90 78 - 100 fl    MCH 31.6 26.5 - 33.0 pg    MCHC 35.2 31.5 - 36.5 g/dL    RDW 11.4 10.0 - 15.0 %    Platelet Count 243 150 - 450 10e9/L    % Neutrophils 73.2 %    % Lymphocytes 19.6 %    % Monocytes 6.2 %    % Eosinophils 0.0 %    % Basophils 0.8 %    % Immature Granulocytes 0.2 %    Absolute Neutrophil 4.7 1.6 - 8.3 10e9/L    Absolute Lymphocytes 1.3 0.8 - 5.3 10e9/L    Absolute Monocytes 0.4 0.0 - 1.3 10e9/L    Absolute Eosinophils 0.0 0.0 - 0.7 10e9/L    Absolute Basophils 0.1 0.0 - 0.2 10e9/L    Abs Immature Granulocytes 0.0 0 - 0.4 10e9/L    Diff Method Automated Method    AST   Result Value Ref Range    AST 34 0 - 45 U/L   Albumin level   Result Value Ref Range    Albumin 4.3 3.4 - 5.0 g/dL   ALT   Result Value Ref Range    ALT 47 0 - 70 U/L       If you have any questions or concerns, please call the clinic at the number listed above.       Sincerely,        Jovani Luna MD

## 2020-07-10 ENCOUNTER — VIRTUAL VISIT (OUTPATIENT)
Dept: RHEUMATOLOGY | Facility: CLINIC | Age: 45
End: 2020-07-10
Attending: INTERNAL MEDICINE
Payer: COMMERCIAL

## 2020-07-10 DIAGNOSIS — R76.8 POSITIVE DOUBLE STRANDED DNA ANTIBODY TEST: ICD-10-CM

## 2020-07-10 DIAGNOSIS — E55.9 VITAMIN D DEFICIENCY: Primary | ICD-10-CM

## 2020-07-10 DIAGNOSIS — M47.819 SPONDYLOARTHROPATHY: ICD-10-CM

## 2020-07-10 RX ORDER — SULFASALAZINE 500 MG/1
TABLET, DELAYED RELEASE ORAL
Qty: 450 TABLET | Refills: 1 | Status: SHIPPED | OUTPATIENT
Start: 2020-07-10 | End: 2021-06-28

## 2020-07-10 NOTE — PROGRESS NOTES
"Sabino Cardona is a 45 year old male who is being evaluated via a billable video visit.      The patient has been notified of following:     \"This video visit will be conducted via a call between you and your physician/provider. We have found that certain health care needs can be provided without the need for an in-person physical exam.  This service lets us provide the care you need with a video conversation.  If a prescription is necessary we can send it directly to your pharmacy.  If lab work is needed we can place an order for that and you can then stop by our lab to have the test done at a later time.    Video visits are billed at different rates depending on your insurance coverage.  Please reach out to your insurance provider with any questions.    If during the course of the call the physician/provider feels a video visit is not appropriate, you will not be charged for this service.\"    Patient has given verbal consent for Video visit? Yes  How would you like to obtain your AVS? [employer]  Patient would like the video invitation sent by: Other e-mail: Using [employer]  Will anyone else be joining your video visit? No        Video-Visit Details    Type of service:  Video Visit    Video Start Time: 4:00 pm  Video End Time: 4:17 pm    Originating Location (pt. Location): Home    Distant Location (provider location):  [facility]     Platform used for Video Visit: [employer]        Rheumatology F/U Virtual Visit Note    Reason for visit: +anti-DNA, peripheral SpA on SSZ    Initial visit date: 2/14/2020    DOS: 7/10/2020    HPI:    Sabino Cardona is a 43 year old  male with fh/o psoriasis and RA, who was referred to our clinic for evaluation and management of possible SLE given +anti-DNA. His wife is a transplant surgeon here at the  and they say the actual reason for referral is to establish/transfer care as they just moved to [place].      Today, he is feeling well.      They moved back from " [place] to [place] about 1.5 month ago.      He reports having degenerative arthritis since age 18. Has chronic low back pain with worsening in AM and worsening with inactivity. Allergies in Spring makes it worse.      He started to have heel and ankle pain in Feb 2019, also knees and elbows. That was stressful time of life looking for moving back. Also has 3 small children (one 5 yo and two 3 yo twins) which keep them busy.      Both heels were red and blaching and his wife thought it was gout.      His brother has gout and is almost 51 yo.      He saw a rheumatologist at [facility]. 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 His wife questioned the diagnosis given lack of lupus symptoms.      Then he had x-rays of spine which showed lots of bone spurs.    He was diagnosed with OA, possible reactive arthritis. No h/o uveitis, urethritis, STDs. Has h/o food poisenings. Was put on SSZ in 2/2019, the dose was increased to current dose of 1 gr bid, he tolerates it well with no toxicity. SSZ has helped and he has no current heel inflammation or recent flare ups.      Has fatigue but because of having 3 small children, it's hard for him to say if fatigue was related to inflammation.    ROS is positive for intermittent CP for years, ibuprofen helps. Prednisone 10 mg every day did not help in the past.    Gets cold sores. No photosensitivity. Gets migraines. No weight loss. No Raynaud's.      2/14/2020: No fatigue, skin rashes, oral ulcers.    Has mild achy knees.    About 1.5 months ago, started having flare up of achilles tendonitis in both feet, worse in AM and at night. They turn red and swollen.      Today 7/10/2020: At last visit in 2/2020, increased SSZ from 2 to 2.5 gr/day given active achilles tendonitis, also prescribed celebrex 100 mg bid prn. Change in SSZ has helped and he no longer needs to take celebrex (last use 6 wk go). Tolerates SSZ at  higher dose well. No flare ups since last visit.    No symptoms and no complaints today. Felling very well. No skin rashes, oral ulcers, CP or SOB or fatigue.      Had flu in 1/2020, after that got the flu shot.      ROS:  A comprehensive ROS was done, positives are per HPI.      Past Medical History:   Diagnosis Date     GERD (gastroesophageal reflux disease)      Past Surgical History:   Procedure Laterality Date     APPENDECTOMY       HERNIA REPAIR, INGUINAL RT/LT      left     Family History   Problem Relation Age of Onset     Hypertension Mother      Hypertension Father      Asthma Father      Diabetes Father      Psoriasis Father      Asthma Sister      Hypertension Maternal Grandmother         RA     Diabetes Maternal Grandmother      Hypertension Maternal Grandfather      Cancer Maternal Grandfather         stomach     Cancer Paternal Grandfather         lung-smoker     Social History     Socioeconomic History     Marital status:      Spouse name: Not on file     Number of children: 3     Years of education: Not on file     Highest education level: Not on file   Occupational History     Occupation: [employer]   Social Needs     Financial resource strain: Not on file     Food insecurity     Worry: Not on file     Inability: Not on file     Transportation needs     Medical: Not on file     Non-medical: Not on file   Tobacco Use     Smoking status: Never Smoker     Smokeless tobacco: Former User     Types: Chew     Tobacco comment: chew 9 yr   Substance and Sexual Activity     Alcohol use: Yes     Alcohol/week: 1.7 standard drinks     Types: 2 Standard drinks or equivalent per week     Drug use: No     Sexual activity: Yes     Partners: Female   Lifestyle     Physical activity     Days per week: Not on file     Minutes per session: Not on file     Stress: Not on file   Relationships     Social connections     Talks on phone: Not on file     Gets together: Not on file     Attends [employer] service: Not on  file     Active member of club or organization: Not on file     Attends meetings of clubs or organizations: Not on file     Relationship status: Not on file     Intimate partner violence     Fear of current or ex partner: Not on file     Emotionally abused: Not on file     Physically abused: Not on file     Forced sexual activity: Not on file   Other Topics Concern      Service Not Asked     Blood Transfusions Not Asked     Caffeine Concern Not Asked     Occupational Exposure Not Asked     Hobby Hazards Not Asked     Sleep Concern Not Asked     Stress Concern Not Asked     Weight Concern Not Asked     Special Diet Not Asked     Back Care Not Asked     Exercise Yes     Comment: run, weights     Bike Helmet Not Asked     Seat Belt Not Asked     Self-Exams Not Asked   Social History Narrative     Not on file     Patient Active Problem List   Diagnosis     Esophageal reflux     Biceps tendinopathy     Wart     Allergies   Allergen Reactions     Nkda [No Known Drug Allergies]      Seasonal Allergies Other (See Comments)     Sneezing, running nose, itchy eyes       Outpatient Encounter Medications as of 7/10/2020   Medication Sig Dispense Refill     celecoxib (CELEBREX) 100 MG capsule Take 1 capsule (100 mg) by mouth 2 times daily as needed for pain 60 capsule 3     Multiple Vitamin (MULTI VITAMIN MENS PO) Take 1 tablet by mouth daily.       Salicylic Acid (SAL-ACID PLASTERS) 40 % PADS Use as instructed. 30 each 3     sulfaSALAzine ER (AZULFIDINE EN) 500 MG EC tablet 3 tabs in AM, 2 tabs in  tablet 1     tazarotene 0.1 % CREA Apply topically to areas of plantar warts daily. Cover w/ mediplast, then cover w/ duct tape. 45 g 3     vitamin D2 (ERGOCALCIFEROL) 35156 units (1250 mcg) capsule Take 1 capsule (50,000 Units) by mouth every 7 days 12 capsule 0     No facility-administered encounter medications on file as of 7/10/2020.                His records were reviewed.      2/2019: +BEST 1:40 mixed homogenous  and nucleolar pattern, positive anti-DNA 17 with NL being up to 4. Neg anti-Sm, RNP, SCL-70, SSA, SSB, ALEXANDREA-1, CHROMATIN, RF, anti-CCP. NL ESR. NL SUA 5.2. Neg HLA-B27.  NL TSH, C3, C4. Unremarkable CMP. Neg U/A. LOW vit D 27. 2/2019: Advanced degenerative changes of L spine and moderate degenerative changes of T spine on x-rays.  Component      Latest Ref Rng & Units 1/17/2020   WBC      4.0 - 11.0 10e9/L 3.6 (L)   RBC Count      4.4 - 5.9 10e12/L 4.52   Hemoglobin      13.3 - 17.7 g/dL 13.8   Hematocrit      40.0 - 53.0 % 41.1   MCV      78 - 100 fl 91   MCH      26.5 - 33.0 pg 30.5   MCHC      31.5 - 36.5 g/dL 33.6   RDW      10.0 - 15.0 % 12.0   Platelet Count      150 - 450 10e9/L 258   Diff Method       Automated Method   % Neutrophils      % 55.5   % Lymphocytes      % 30.4   % Monocytes      % 11.0   % Eosinophils      % 2.5   % Basophils      % 0.3   % Immature Granulocytes      % 0.3   Nucleated RBCs      0 /100 0   Absolute Neutrophil      1.6 - 8.3 10e9/L 2.0   Absolute Lymphocytes      0.8 - 5.3 10e9/L 1.1   Absolute Monocytes      0.0 - 1.3 10e9/L 0.4   Absolute Eosinophils      0.0 - 0.7 10e9/L 0.1   Absolute Basophils      0.0 - 0.2 10e9/L 0.0   Abs Immature Granulocytes      0 - 0.4 10e9/L 0.0   Absolute Nucleated RBC       0.0   Creatinine      0.66 - 1.25 mg/dL 0.83   GFR Estimate      >60 mL/min/1.73:m2 >90   GFR Estimate If Black      >60 mL/min/1.73:m2 >90   Vitamin D Deficiency screening      20 - 75 ug/L 27   Sed Rate      0 - 15 mm/h 8   CRP Inflammation      0.0 - 8.0 mg/L <2.9   AST      0 - 45 U/L 19   Albumin      3.4 - 5.0 g/dL 3.9   ALT      0 - 70 U/L 36   Complement C3      81 - 157 mg/dL 118   Complement C4      13 - 39 mg/dL 25   DNA-ds      <10 IU/mL 16 (H)       Component      Latest Ref Rng & Units 7/1/2020   WBC      4.0 - 11.0 10e9/L 6.4   RBC Count      4.4 - 5.9 10e12/L 4.74   Hemoglobin      13.3 - 17.7 g/dL 15.0   Hematocrit      40.0 - 53.0 % 42.6   MCV      78 - 100 fl  90   MCH      26.5 - 33.0 pg 31.6   MCHC      31.5 - 36.5 g/dL 35.2   RDW      10.0 - 15.0 % 11.4   Platelet Count      150 - 450 10e9/L 243   % Neutrophils      % 73.2   % Lymphocytes      % 19.6   % Monocytes      % 6.2   % Eosinophils      % 0.0   % Basophils      % 0.8   % Immature Granulocytes      % 0.2   Absolute Neutrophil      1.6 - 8.3 10e9/L 4.7   Absolute Lymphocytes      0.8 - 5.3 10e9/L 1.3   Absolute Monocytes      0.0 - 1.3 10e9/L 0.4   Absolute Eosinophils      0.0 - 0.7 10e9/L 0.0   Absolute Basophils      0.0 - 0.2 10e9/L 0.1   Abs Immature Granulocytes      0 - 0.4 10e9/L 0.0   Diff Method       Automated Method   Creatinine      0.66 - 1.25 mg/dL 0.97   GFR Estimate      >60 mL/min/1.73:m2 >90   GFR Estimate If Black      >60 mL/min/1.73:m2 >90   AST      0 - 45 U/L 34   Albumin      3.4 - 5.0 g/dL 4.3   ALT      0 - 70 U/L 47       Ph.E:      Constitutional: WD/WN. Pleasant. In no acute distress.   Eyes: EOM intact, sclera anicteric, conj not injected  Chest: Breathing normally on RA  MS: No synovitis.  Skin: No skin rash  Neuro: A&O x 3. Grossly non focal  Psych: NL affect and mood    Assessment/ plan:    1-Spondyloarthropathy with enthesopathy Dx 2/2019 on SSZ since 2/2019. He is HLA-B27 negative. Has fh/o psoriasis. He fits this diagnosis very well and already responds to SSZ. His inflammatory arthritis was under good control at initial visit, but at last visit in 2/2020, had flare ups of achilles tendonitis which started post flu. I increased sulfasalazine to 3 tabs in the morning, 2 tabs in the evening with food inn 2/2020 which helped significantly.    His SpA is under excellent control. Continue SSZ 2.5 gr a day.      In 2/2020, added celebrex 100 mg twice a day as needed, but his SpA is under excellent control and he has not taken it x 6 weeks.      2-SSZ monitoring. Labs q3mo. NL labs 7/1/2020. Next due in 10/2020.    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 He does not have lupus and nothing on  his exam or hx is suggestive of lupus. Anti-DNA is going down. I'll monitor it for now.    4-Low vit D in the past. S/p replacement. Will re-check level with next visit.      RTC 12 months    Orders Placed This Encounter   Procedures     ALT     Albumin level     AST     CBC with platelets differential     Creatinine     Vitamin D Deficiency     DNA double stranded antibodies     Today's plan:    SSZ monitoring labs every 3 months, next due in 10/2020    Good idea to take vitamin D 2000 units a day    I'll check anti-DNA, vit D level with your next blood test, it could be checked just once a year    Return in a year      Jovani Luna MD

## 2020-07-10 NOTE — LETTER
"7/10/2020       RE: Sabino Cardona  [address]  [facility] [address]     Dear Colleague,    Thank you for referring your patient, Sabino Cardona, to the [facility] RHEUMATOLOGY at [facility]. Please see a copy of my visit note below.    Sabino Cardona is a 45 year old male who is being evaluated via a billable video visit.      The patient has been notified of following:     \"This video visit will be conducted via a call between you and your physician/provider. We have found that certain health care needs can be provided without the need for an in-person physical exam.  This service lets us provide the care you need with a video conversation.  If a prescription is necessary we can send it directly to your pharmacy.  If lab work is needed we can place an order for that and you can then stop by our lab to have the test done at a later time.    Video visits are billed at different rates depending on your insurance coverage.  Please reach out to your insurance provider with any questions.    If during the course of the call the physician/provider feels a video visit is not appropriate, you will not be charged for this service.\"    Patient has given verbal consent for Video visit? Yes  How would you like to obtain your AVS? Verimatrix  Patient would like the video invitation sent by: Other e-mail: Using Verimatrix  Will anyone else be joining your video visit? No        Video-Visit Details    Type of service:  Video Visit    Video Start Time: 4:00 pm  Video End Time: 4:17 pm    Originating Location (pt. Location): Home    Distant Location (provider location):  [facility] RHEUMATOLOGY     Platform used for Video Visit: [facility]        Rheumatology F/U Virtual Visit Note    Reason for visit: +anti-DNA, peripheral SpA on SSZ    Initial visit date: 2/14/2020    DOS: 7/10/2020    HPI:    Sabino Cardona is a 43 year old  male with fh/o psoriasis and RA, who was referred to our " clinic for evaluation and management of possible SLE given +anti-DNA. His wife is a transplant surgeon here at the  and they say the actual reason for referral is to establish/transfer care as they just moved to [place].      Today, he is feeling well.      They moved back from [place] to [place] about 1.5 month ago.      He reports having degenerative arthritis since age 18. Has chronic low back pain with worsening in AM and worsening with inactivity. Allergies in Spring makes it worse.      He started to have heel and ankle pain in Feb 2019, also knees and elbows. That was stressful time of life looking for moving back. Also has 3 small children (one 3 yo and two 1 yo twins) which keep them busy.      Both heels were red and blaching and his wife thought it was gout.      His brother has gout and is almost 49 yo.      He saw a rheumatologist at [facility]. 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 His wife questioned the diagnosis given lack of lupus symptoms.      Then he had x-rays of spine which showed lots of bone spurs.    He was diagnosed with OA, possible reactive arthritis. No h/o uveitis, urethritis, STDs. Has h/o food poisenings. Was put on SSZ in 2/2019, the dose was increased to current dose of 1 gr bid, he tolerates it well with no toxicity. SSZ has helped and he has no current heel inflammation or recent flare ups.      Has fatigue but because of having 3 small children, it's hard for him to say if fatigue was related to inflammation.    ROS is positive for intermittent CP for years, ibuprofen helps. Prednisone 10 mg every day did not help in the past.    Gets cold sores. No photosensitivity. Gets migraines. No weight loss. No Raynaud's.      2/14/2020: No fatigue, skin rashes, oral ulcers.    Has mild achy knees.    About 1.5 months ago, started having flare up of achilles tendonitis in both feet, worse in AM and at night. They  turn red and swollen.      Today 7/10/2020: At last visit in 2/2020, increased SSZ from 2 to 2.5 gr/day given active achilles tendonitis, also prescribed celebrex 100 mg bid prn. Change in SSZ has helped and he no longer needs to take celebrex (last use 6 wk go). Tolerates SSZ at higher dose well. No flare ups since last visit.    No symptoms and no complaints today. Felling very well. No skin rashes, oral ulcers, CP or SOB or fatigue.      Had flu in 1/2020, after that got the flu shot.      ROS:  A comprehensive ROS was done, positives are per HPI.      Past Medical History:   Diagnosis Date     GERD (gastroesophageal reflux disease)      Past Surgical History:   Procedure Laterality Date     APPENDECTOMY       HERNIA REPAIR, INGUINAL RT/LT      left     Family History   Problem Relation Age of Onset     Hypertension Mother      Hypertension Father      Asthma Father      Diabetes Father      Psoriasis Father      Asthma Sister      Hypertension Maternal Grandmother         RA     Diabetes Maternal Grandmother      Hypertension Maternal Grandfather      Cancer Maternal Grandfather         stomach     Cancer Paternal Grandfather         lung-smoker     Social History     Socioeconomic History     Marital status:      Spouse name: Not on file     Number of children: 3     Years of education: Not on file     Highest education level: Not on file   Occupational History     Occupation: [employer]anchez   Social Needs     Financial resource strain: Not on file     Food insecurity     Worry: Not on file     Inability: Not on file     Transportation needs     Medical: Not on file     Non-medical: Not on file   Tobacco Use     Smoking status: Never Smoker     Smokeless tobacco: Former User     Types: Chew     Tobacco comment: chew 9 yr   Substance and Sexual Activity     Alcohol use: Yes     Alcohol/week: 1.7 standard drinks     Types: 2 Standard drinks or equivalent per week     Drug use: No     Sexual activity: Yes      Partners: Female   Lifestyle     Physical activity     Days per week: Not on file     Minutes per session: Not on file     Stress: Not on file   Relationships     Social connections     Talks on phone: Not on file     Gets together: Not on file     Attends Gnosticism service: Not on file     Active member of club or organization: Not on file     Attends meetings of clubs or organizations: Not on file     Relationship status: Not on file     Intimate partner violence     Fear of current or ex partner: Not on file     Emotionally abused: Not on file     Physically abused: Not on file     Forced sexual activity: Not on file   Other Topics Concern      Service Not Asked     Blood Transfusions Not Asked     Caffeine Concern Not Asked     Occupational Exposure Not Asked     Hobby Hazards Not Asked     Sleep Concern Not Asked     Stress Concern Not Asked     Weight Concern Not Asked     Special Diet Not Asked     Back Care Not Asked     Exercise Yes     Comment: run, weights     Bike Helmet Not Asked     Seat Belt Not Asked     Self-Exams Not Asked   Social History Narrative     Not on file     Patient Active Problem List   Diagnosis     Esophageal reflux     Biceps tendinopathy     Wart     Allergies   Allergen Reactions     Nkda [No Known Drug Allergies]      Seasonal Allergies Other (See Comments)     Sneezing, running nose, itchy eyes       Outpatient Encounter Medications as of 7/10/2020   Medication Sig Dispense Refill     celecoxib (CELEBREX) 100 MG capsule Take 1 capsule (100 mg) by mouth 2 times daily as needed for pain 60 capsule 3     Multiple Vitamin (MULTI VITAMIN MENS PO) Take 1 tablet by mouth daily.       Salicylic Acid (SAL-ACID PLASTERS) 40 % PADS Use as instructed. 30 each 3     sulfaSALAzine ER (AZULFIDINE EN) 500 MG EC tablet 3 tabs in AM, 2 tabs in  tablet 1     tazarotene 0.1 % CREA Apply topically to areas of plantar warts daily. Cover w/ mediplast, then cover w/ duct tape. 45 g 3      vitamin D2 (ERGOCALCIFEROL) 34325 units (1250 mcg) capsule Take 1 capsule (50,000 Units) by mouth every 7 days 12 capsule 0     No facility-administered encounter medications on file as of 7/10/2020.                His records were reviewed.      2/2019: +BEST 1:40 mixed homogenous and nucleolar pattern, positive anti-DNA 17 with NL being up to 4. Neg anti-Sm, RNP, SCL-70, SSA, SSB, ALEXANDREA-1, CHROMATIN, RF, anti-CCP. NL ESR. NL SUA 5.2. Neg HLA-B27.  NL TSH, C3, C4. Unremarkable CMP. Neg U/A. LOW vit D 27.    2/2019: Advanced degenerative changes of L spine and moderate degenerative changes of T spine on x-rays.  Component      Latest Ref Rng & Units 1/17/2020   WBC      4.0 - 11.0 10e9/L 3.6 (L)   RBC Count      4.4 - 5.9 10e12/L 4.52   Hemoglobin      13.3 - 17.7 g/dL 13.8   Hematocrit      40.0 - 53.0 % 41.1   MCV      78 - 100 fl 91   MCH      26.5 - 33.0 pg 30.5   MCHC      31.5 - 36.5 g/dL 33.6   RDW      10.0 - 15.0 % 12.0   Platelet Count      150 - 450 10e9/L 258   Diff Method       Automated Method   % Neutrophils      % 55.5   % Lymphocytes      % 30.4   % Monocytes      % 11.0   % Eosinophils      % 2.5   % Basophils      % 0.3   % Immature Granulocytes      % 0.3   Nucleated RBCs      0 /100 0   Absolute Neutrophil      1.6 - 8.3 10e9/L 2.0   Absolute Lymphocytes      0.8 - 5.3 10e9/L 1.1   Absolute Monocytes      0.0 - 1.3 10e9/L 0.4   Absolute Eosinophils      0.0 - 0.7 10e9/L 0.1   Absolute Basophils      0.0 - 0.2 10e9/L 0.0   Abs Immature Granulocytes      0 - 0.4 10e9/L 0.0   Absolute Nucleated RBC       0.0   Creatinine      0.66 - 1.25 mg/dL 0.83   GFR Estimate      >60 mL/min/1.73:m2 >90   GFR Estimate If Black      >60 mL/min/1.73:m2 >90   Vitamin D Deficiency screening      20 - 75 ug/L 27   Sed Rate      0 - 15 mm/h 8   CRP Inflammation      0.0 - 8.0 mg/L <2.9   AST      0 - 45 U/L 19   Albumin      3.4 - 5.0 g/dL 3.9   ALT      0 - 70 U/L 36   Complement C3      81 - 157 mg/dL 118   Complement  C4      13 - 39 mg/dL 25   DNA-ds      <10 IU/mL 16 (H)       Component      Latest Ref Rng & Units 7/1/2020   WBC      4.0 - 11.0 10e9/L 6.4   RBC Count      4.4 - 5.9 10e12/L 4.74   Hemoglobin      13.3 - 17.7 g/dL 15.0   Hematocrit      40.0 - 53.0 % 42.6   MCV      78 - 100 fl 90   MCH      26.5 - 33.0 pg 31.6   MCHC      31.5 - 36.5 g/dL 35.2   RDW      10.0 - 15.0 % 11.4   Platelet Count      150 - 450 10e9/L 243   % Neutrophils      % 73.2   % Lymphocytes      % 19.6   % Monocytes      % 6.2   % Eosinophils      % 0.0   % Basophils      % 0.8   % Immature Granulocytes      % 0.2   Absolute Neutrophil      1.6 - 8.3 10e9/L 4.7   Absolute Lymphocytes      0.8 - 5.3 10e9/L 1.3   Absolute Monocytes      0.0 - 1.3 10e9/L 0.4   Absolute Eosinophils      0.0 - 0.7 10e9/L 0.0   Absolute Basophils      0.0 - 0.2 10e9/L 0.1   Abs Immature Granulocytes      0 - 0.4 10e9/L 0.0   Diff Method       Automated Method   Creatinine      0.66 - 1.25 mg/dL 0.97   GFR Estimate      >60 mL/min/1.73:m2 >90   GFR Estimate If Black      >60 mL/min/1.73:m2 >90   AST      0 - 45 U/L 34   Albumin      3.4 - 5.0 g/dL 4.3   ALT      0 - 70 U/L 47       Ph.E:      Constitutional: WD/WN. Pleasant. In no acute distress.   Eyes: EOM intact, sclera anicteric, conj not injected  Chest: Breathing normally on RA  MS: No synovitis.  Skin: No skin rash  Neuro: A&O x 3. Grossly non focal  Psych: NL affect and mood    Assessment/ plan:    1-Spondyloarthropathy with enthesopathy Dx 2/2019 on SSZ since 2/2019. He is HLA-B27 negative. Has fh/o psoriasis. He fits this diagnosis very well and already responds to SSZ. His inflammatory arthritis was under good control at initial visit, but at last visit in 2/2020, had flare ups of achilles tendonitis which started post flu. I increased sulfasalazine to 3 tabs in the morning, 2 tabs in the evening with food inn 2/2020 which helped significantly.    His SpA is under excellent control. Continue SSZ 2.5 gr a  day.      In 2/2020, added celebrex 100 mg twice a day as needed, but his SpA is under excellent control and he has not taken it x 6 weeks.      2-SSZ monitoring. Labs q3mo. NL labs 7/1/2020. Next due in 10/2020.    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 He does not have lupus and nothing on his exam or hx is suggestive of lupus. Anti-DNA is going down. I'll monitor it for now.    4-Low vit D in the past. S/p replacement. Will re-check level with next visit.      RTC 12 months    Orders Placed This Encounter   Procedures     ALT     Albumin level     AST     CBC with platelets differential     Creatinine     Vitamin D Deficiency     DNA double stranded antibodies     Today's plan:    SSZ monitoring labs every 3 months, next due in 10/2020    Good idea to take vitamin D 2000 units a day    I'll check anti-DNA, vit D level with your next blood test, it could be checked just once a year    Return in a year      Jovani Luna MD      Again, thank you for allowing me to participate in the care of your patient.      Sincerely,    Jovani Luna MD

## 2020-07-10 NOTE — LETTER
Date:July 13, 2020      Patient was self referred, no letter generated. Do not send.        [facility] Information

## 2020-07-10 NOTE — PATIENT INSTRUCTIONS
VIVIEN monitoring labs every 3 months, next due in 10/2020    Good idea to take vitamin D 2000 units a day    I'll check anti-DNA, vit D level with your next blood test, it could be checked just once a year    Return in a year

## 2020-12-14 ENCOUNTER — HEALTH MAINTENANCE LETTER (OUTPATIENT)
Age: 45
End: 2020-12-14

## 2021-01-18 DIAGNOSIS — M47.819 SPONDYLOARTHROPATHY: ICD-10-CM

## 2021-01-18 LAB
BASOPHILS # BLD AUTO: 0 10E9/L (ref 0–0.2)
BASOPHILS NFR BLD AUTO: 0.4 %
DIFFERENTIAL METHOD BLD: NORMAL
EOSINOPHIL # BLD AUTO: 0.1 10E9/L (ref 0–0.7)
EOSINOPHIL NFR BLD AUTO: 1 %
ERYTHROCYTE [DISTWIDTH] IN BLOOD BY AUTOMATED COUNT: 11.9 % (ref 10–15)
HCT VFR BLD AUTO: 42.7 % (ref 40–53)
HGB BLD-MCNC: 14.7 G/DL (ref 13.3–17.7)
LYMPHOCYTES # BLD AUTO: 1.4 10E9/L (ref 0.8–5.3)
LYMPHOCYTES NFR BLD AUTO: 27 %
MCH RBC QN AUTO: 31.4 PG (ref 26.5–33)
MCHC RBC AUTO-ENTMCNC: 34.4 G/DL (ref 31.5–36.5)
MCV RBC AUTO: 91 FL (ref 78–100)
MONOCYTES # BLD AUTO: 0.4 10E9/L (ref 0–1.3)
MONOCYTES NFR BLD AUTO: 8.3 %
NEUTROPHILS # BLD AUTO: 3.3 10E9/L (ref 1.6–8.3)
NEUTROPHILS NFR BLD AUTO: 63.3 %
PLATELET # BLD AUTO: 241 10E9/L (ref 150–450)
RBC # BLD AUTO: 4.68 10E12/L (ref 4.4–5.9)
WBC # BLD AUTO: 5.2 10E9/L (ref 4–11)

## 2021-01-18 PROCEDURE — 36415 COLL VENOUS BLD VENIPUNCTURE: CPT | Performed by: INTERNAL MEDICINE

## 2021-01-18 PROCEDURE — 82565 ASSAY OF CREATININE: CPT | Performed by: INTERNAL MEDICINE

## 2021-01-18 PROCEDURE — 82040 ASSAY OF SERUM ALBUMIN: CPT | Performed by: INTERNAL MEDICINE

## 2021-01-18 PROCEDURE — 85025 COMPLETE CBC W/AUTO DIFF WBC: CPT | Performed by: INTERNAL MEDICINE

## 2021-01-18 PROCEDURE — 84450 TRANSFERASE (AST) (SGOT): CPT | Performed by: INTERNAL MEDICINE

## 2021-01-18 PROCEDURE — 84460 ALANINE AMINO (ALT) (SGPT): CPT | Performed by: INTERNAL MEDICINE

## 2021-01-18 NOTE — LETTER
January 27, 2021      Sabino Cardona  [address]  [facility] [address]        Dear Mr.Van Hernandez,    We are writing to inform you of your test results.    They are normal.    Resulted Orders   Creatinine   Result Value Ref Range    Creatinine 0.86 0.66 - 1.25 mg/dL    GFR Estimate >90 >60 mL/min/[1.73_m2]      Comment:      Non  GFR Calc  Starting 12/18/2018, serum creatinine based estimated GFR (eGFR) will be   calculated using the Chronic Kidney Disease Epidemiology Collaboration   (CKD-EPI) equation.      GFR Estimate If Black >90 >60 mL/min/[1.73_m2]      Comment:       GFR Calc  Starting 12/18/2018, serum creatinine based estimated GFR (eGFR) will be   calculated using the Chronic Kidney Disease Epidemiology Collaboration   (CKD-EPI) equation.     CBC with platelets differential   Result Value Ref Range    WBC 5.2 4.0 - 11.0 10e9/L    RBC Count 4.68 4.4 - 5.9 10e12/L    Hemoglobin 14.7 13.3 - 17.7 g/dL    Hematocrit 42.7 40.0 - 53.0 %    MCV 91 78 - 100 fl    MCH 31.4 26.5 - 33.0 pg    MCHC 34.4 31.5 - 36.5 g/dL    RDW 11.9 10.0 - 15.0 %    Platelet Count 241 150 - 450 10e9/L    % Neutrophils 63.3 %    % Lymphocytes 27.0 %    % Monocytes 8.3 %    % Eosinophils 1.0 %    % Basophils 0.4 %    Absolute Neutrophil 3.3 1.6 - 8.3 10e9/L    Absolute Lymphocytes 1.4 0.8 - 5.3 10e9/L    Absolute Monocytes 0.4 0.0 - 1.3 10e9/L    Absolute Eosinophils 0.1 0.0 - 0.7 10e9/L    Absolute Basophils 0.0 0.0 - 0.2 10e9/L    Diff Method Automated Method    AST   Result Value Ref Range    AST 23 0 - 45 U/L   Albumin level   Result Value Ref Range    Albumin 4.3 3.4 - 5.0 g/dL   ALT   Result Value Ref Range    ALT 42 0 - 70 U/L       If you have any questions or concerns, please call the clinic at the number listed above.       Sincerely,      Jovani Luna MD

## 2021-01-19 LAB
ALBUMIN SERPL-MCNC: 4.3 G/DL (ref 3.4–5)
ALT SERPL W P-5'-P-CCNC: 42 U/L (ref 0–70)
AST SERPL W P-5'-P-CCNC: 23 U/L (ref 0–45)
CREAT SERPL-MCNC: 0.86 MG/DL (ref 0.66–1.25)
GFR SERPL CREATININE-BSD FRML MDRD: >90 ML/MIN/{1.73_M2}

## 2021-02-26 DIAGNOSIS — M47.819 SPONDYLOARTHROPATHY: ICD-10-CM

## 2021-03-01 RX ORDER — CELECOXIB 100 MG/1
CAPSULE ORAL
Qty: 60 CAPSULE | Refills: 3 | Status: SHIPPED | OUTPATIENT
Start: 2021-03-01 | End: 2022-03-22

## 2021-03-01 NOTE — TELEPHONE ENCOUNTER
Celecoxib Oral Capsule 100 MG      Last Written Prescription Date:  2-14-20  Last Fill Quantity: 60,   # refills: 3  Last Office Visit : 7-10-20  Future Office visit:  7-16-21    Routing refill request to provider for review/approval because:  Failed protocol: no BP check last 12  month

## 2021-03-12 NOTE — TELEPHONE ENCOUNTER
FUTURE VISIT INFORMATION      FUTURE VISIT INFORMATION:    Date: 3/23/21    Time: 9 AM    Location: [facility]  REFERRAL INFORMATION:    Referring provider:      Referring providers clinic:      Reason for visit/diagnosis: Large Oral Ulcer    RECORDS REQUESTED FROM:       Clinic name Comments Records Status Imaging Status   [facility] 7/10/20 - RHEUM OV with Dr. Jeremy Carr                                    * RN to forward e-mail about patient, received e-mail patient hasn't been seen for this yet before

## 2021-03-23 ENCOUNTER — OFFICE VISIT (OUTPATIENT)
Dept: OTOLARYNGOLOGY | Facility: CLINIC | Age: 46
End: 2021-03-23
Payer: COMMERCIAL

## 2021-03-23 ENCOUNTER — PRE VISIT (OUTPATIENT)
Dept: OTOLARYNGOLOGY | Facility: CLINIC | Age: 46
End: 2021-03-23

## 2021-03-23 VITALS
HEART RATE: 73 BPM | BODY MASS INDEX: 25.41 KG/M2 | TEMPERATURE: 97.8 F | OXYGEN SATURATION: 96 % | WEIGHT: 198 LBS | HEIGHT: 74 IN

## 2021-03-23 DIAGNOSIS — K11.20 SIALADENITIS: ICD-10-CM

## 2021-03-23 DIAGNOSIS — K13.79 MUCOCELE OF MOUTH: Primary | ICD-10-CM

## 2021-03-23 PROCEDURE — 99203 OFFICE O/P NEW LOW 30 MIN: CPT | Performed by: OTOLARYNGOLOGY

## 2021-03-23 RX ORDER — VITAMIN B COMPLEX
TABLET ORAL EVERY MORNING
COMMUNITY

## 2021-03-23 ASSESSMENT — MIFFLIN-ST. JEOR: SCORE: 1842.87

## 2021-03-23 ASSESSMENT — PAIN SCALES - GENERAL: PAINLEVEL: NO PAIN (0)

## 2021-03-23 NOTE — PROGRESS NOTES
HISTORY OF PRESENT ILLNESS:  Mr. James is a patient with a history of a lip lesion present for at least a year.  It can deflate.  It is on the right side.  It does not get too large, maybe at most, 1 cm.  This is some concern to him because he is on immunosuppressives with sulfasalazine.  He otherwise is getting by reasonably well.      ASSESSMENT:  Patient with a history of mucocele.        PLAN:  He says it needs to be resected probably just in clinic.  It would be a simple resection if I have something to look at and it looks like a mucocele.  I have talked to him also about Sicca syndrome as well as other oral ulcerations that can occur and I will plan to do this over the next couple of weeks.         Last 2 Scores for Patient-Answered VHI Questionnaire  No flowsheet data found.    Last 2 Scores for Patient-Answered CSI Questionnaire  No flowsheet data found.      Last 2 Scores for Patient-Answered EAT Questionnaire  No flowsheet data found.        PAST MEDICAL HISTORY:   Past Medical History:   Diagnosis Date     GERD (gastroesophageal reflux disease)        PAST SURGICAL HISTORY:   Past Surgical History:   Procedure Laterality Date     APPENDECTOMY       HERNIA REPAIR, INGUINAL RT/LT      left       FAMILY HISTORY:   Family History   Problem Relation Age of Onset     Hypertension Mother      Hypertension Father      Asthma Father      Diabetes Father      Psoriasis Father      Asthma Sister      Hypertension Maternal Grandmother         RA     Diabetes Maternal Grandmother      Hypertension Maternal Grandfather      Cancer Maternal Grandfather         stomach     Cancer Paternal Grandfather         lung-smoker       SOCIAL HISTORY:   Social History     Tobacco Use     Smoking status: Never Smoker     Smokeless tobacco: Former User     Types: Chew     Tobacco comment: chew 9 yr   Substance Use Topics     Alcohol use: Yes     Alcohol/week: 1.7 standard drinks     Types: 2 Standard drinks or equivalent per  week       REVIEW OF SYSTEMS: Ten point review of systems was performed and is negative except for: No flowsheet data found.     ALLERGIES: Nkda [no known drug allergies] and Seasonal allergies    MEDICATIONS:   Current Outpatient Medications   Medication Sig Dispense Refill     celecoxib (CELEBREX) 100 MG capsule TAKE ONE CAPSULE BY MOUTH TWICE DAILY AS NEEDED FOR PAIN 60 capsule 3     sulfaSALAzine ER (AZULFIDINE EN) 500 MG EC tablet 3 tabs in AM, 2 tabs in  tablet 1     Vitamin D3 (CHOLECALCIFEROL) 25 mcg (1000 units) tablet Take by mouth daily       Multiple Vitamin (MULTI VITAMIN MENS PO) Take 1 tablet by mouth daily.           PHYSICAL EXAMINATION:  He  is awake, alert and in no apparent distress.        Nasal exam shows a mild septal deviation without obstruction.  Examination of the oral cavity shows no suspicious lesions.  There is symmetric movement of the tongue and soft palate.    The oropharynx is clear.  His neck is supple without significant adenopathy.  Lip on the right shows an area of lesion probabluy right mucocoele that popped   Upper airway is clear.  Cranial nerves II-XII are grossly intact.      IMPRESSION/PLAN:

## 2021-03-23 NOTE — LETTER
3/23/2021       RE: Sabino Cardona  [address]  [facility] [address]     Dear Colleague,    Thank you for referring your patient, Sabino Cardona, to the [facility] [place] at [facility]. Please see a copy of my visit note below.     HISTORY OF PRESENT ILLNESS:  Mr. James is a patient with a history of a lip lesion present for at least a year.  It can deflate.  It is on the right side.  It does not get too large, maybe at most, 1 cm.  This is some concern to him because he is on immunosuppressives with sulfasalazine.  He otherwise is getting by reasonably well.      ASSESSMENT:  Patient with a history of mucocele.        PLAN:  He says it needs to be resected probably just in clinic.  It would be a simple resection if I have something to look at and it looks like a mucocele.  I have talked to him also about Sicca syndrome as well as other oral ulcerations that can occur and I will plan to do this over the next couple of weeks.         Last 2 Scores for Patient-Answered VHI Questionnaire  No flowsheet data found.    Last 2 Scores for Patient-Answered CSI Questionnaire  No flowsheet data found.      Last 2 Scores for Patient-Answered EAT Questionnaire  No flowsheet data found.        PAST MEDICAL HISTORY:   Past Medical History:   Diagnosis Date     GERD (gastroesophageal reflux disease)        PAST SURGICAL HISTORY:   Past Surgical History:   Procedure Laterality Date     APPENDECTOMY       HERNIA REPAIR, INGUINAL RT/LT      left       FAMILY HISTORY:   Family History   Problem Relation Age of Onset     Hypertension Mother      Hypertension Father      Asthma Father      Diabetes Father      Psoriasis Father      Asthma Sister      Hypertension Maternal Grandmother         RA     Diabetes Maternal Grandmother      Hypertension Maternal Grandfather      Cancer Maternal Grandfather         stomach     Cancer Paternal  Grandfather         lung-smoker       SOCIAL HISTORY:   Social History     Tobacco Use     Smoking status: Never Smoker     Smokeless tobacco: Former User     Types: Chew     Tobacco comment: chew 9 yr   Substance Use Topics     Alcohol use: Yes     Alcohol/week: 1.7 standard drinks     Types: 2 Standard drinks or equivalent per week       REVIEW OF SYSTEMS: Ten point review of systems was performed and is negative except for: No flowsheet data found.     ALLERGIES: Nkda [no known drug allergies] and Seasonal allergies    MEDICATIONS:   Current Outpatient Medications   Medication Sig Dispense Refill     celecoxib (CELEBREX) 100 MG capsule TAKE ONE CAPSULE BY MOUTH TWICE DAILY AS NEEDED FOR PAIN 60 capsule 3     sulfaSALAzine ER (AZULFIDINE EN) 500 MG EC tablet 3 tabs in AM, 2 tabs in  tablet 1     Vitamin D3 (CHOLECALCIFEROL) 25 mcg (1000 units) tablet Take by mouth daily       Multiple Vitamin (MULTI VITAMIN MENS PO) Take 1 tablet by mouth daily.           PHYSICAL EXAMINATION:  He  is awake, alert and in no apparent distress.        Nasal exam shows a mild septal deviation without obstruction.  Examination of the oral cavity shows no suspicious lesions.  There is symmetric movement of the tongue and soft palate.    The oropharynx is clear.  His neck is supple without significant adenopathy.  Lip on the right shows an area of lesion probabluy right mucocoele that popped   Upper airway is clear.  Cranial nerves II-XII are grossly intact.      IMPRESSION/PLAN:      Again, thank you for allowing me to participate in the care of your patient.      Sincerely,    Efrain Dailey MD

## 2021-03-23 NOTE — NURSING NOTE
"Chief Complaint   Patient presents with     Consult     Large oral ulcer       Pulse 73, temperature 97.8  F (36.6  C), temperature source Temporal, height 1.88 m (6' 2\"), weight 89.8 kg (198 lb), SpO2 96 %.    Jacinta Boyer, EMT    "

## 2021-03-23 NOTE — PATIENT INSTRUCTIONS
1. You were seen in the ENT Clinic today by Dr. Dailey.  If you have any questions or concerns after your appointment, please call   -  ENT Clinic: [phone]      2.   Plan to return to clinic April 6th    Mayda Martin LPN  [facility] Otolaryngology  [phone]

## 2021-04-06 ENCOUNTER — TELEPHONE (OUTPATIENT)
Dept: OTOLARYNGOLOGY | Facility: CLINIC | Age: 46
End: 2021-04-06

## 2021-04-06 ENCOUNTER — OFFICE VISIT (OUTPATIENT)
Dept: OTOLARYNGOLOGY | Facility: CLINIC | Age: 46
End: 2021-04-06
Payer: COMMERCIAL

## 2021-04-06 VITALS
BODY MASS INDEX: 25.18 KG/M2 | OXYGEN SATURATION: 98 % | HEIGHT: 74 IN | TEMPERATURE: 97.7 F | HEART RATE: 87 BPM | WEIGHT: 196.21 LBS

## 2021-04-06 DIAGNOSIS — K13.79 MUCOCELE OF MOUTH: Primary | ICD-10-CM

## 2021-04-06 PROCEDURE — 40490 BIOPSY OF LIP: CPT | Performed by: OTOLARYNGOLOGY

## 2021-04-06 PROCEDURE — 99213 OFFICE O/P EST LOW 20 MIN: CPT | Mod: 25 | Performed by: OTOLARYNGOLOGY

## 2021-04-06 PROCEDURE — 88304 TISSUE EXAM BY PATHOLOGIST: CPT | Performed by: PATHOLOGY

## 2021-04-06 ASSESSMENT — MIFFLIN-ST. JEOR: SCORE: 1834.75

## 2021-04-06 ASSESSMENT — PAIN SCALES - GENERAL: PAINLEVEL: NO PAIN (0)

## 2021-04-06 NOTE — PROGRESS NOTES
HISTORY OF PRESENT ILLNESS:  Emerson is 45 years of age.  He is here today for followup as if he is having any new issues with his lip.  He has had this area of mucocele reform.  He has no other new complaints at the present time today.      PHYSICAL EXAMINATION:  The patient is alert and oriented x3 and pleasant.  Skin of the face, lips, and neck on him is otherwise normal.  Oral cavity and oropharynx is otherwise clear, but in looking at his lip, he has a 1 cm mucocele that is submucosal on the side of the lip just inside the oral commissure.      PROCEDURE:  After informed consent was obtained, we went ahead and we did a submucosal injection of lidocaine with epinephrine.  We put in about 1.5-2 mL.  I then performed an elliptical excision of his lip, which include the mucosa and submucosa as well as the whole mucocele.  It was not a simple biopsy because it took out a number of additional salivary gland tissues and went ahead and I was able to close this with simple interrupted sutures of 4-0 chromic.  It put in about five or six sutures for this, leaving 4- 5 mm tails.  He tolerated this well.

## 2021-04-06 NOTE — PATIENT INSTRUCTIONS
1. You were seen in the ENT Clinic today by Dr. Dailey.  If you have any questions or concerns after your appointment, please call   -  ENT Clinic: [phone]      2.   We will call you with biopsy results.     3. We will see you in clinic in the next couple of weeks. We will call you to schedule.     Mayda Martin LPN  [facility] Otolaryngology  [phone]

## 2021-04-06 NOTE — LETTER
4/6/2021       RE: Sabino Cardona  [address]  [place] [facility] [address]     Dear Colleague,    Thank you for referring your patient, Sabino Cardona, to the [facility] EAR NOSE AND THROAT CLINIC [place] at [facility]. Please see a copy of my visit note below.    HISTORY OF PRESENT ILLNESS:  Emerson is 45 years of age.  He is here today for followup as if he is having any new issues with his lip.  He has had this area of mucocele reform.  He has no other new complaints at the present time today.      PHYSICAL EXAMINATION:  The patient is alert and oriented x3 and pleasant.  Skin of the face, lips, and neck on him is otherwise normal.  Oral cavity and oropharynx is otherwise clear, but in looking at his lip, he has a 1 cm mucocele that is submucosal on the side of the lip just inside the oral commissure.      PROCEDURE:  After informed consent was obtained, we went ahead and we did a submucosal injection of lidocaine with epinephrine.  We put in about 1.5-2 mL.  I then performed an elliptical excision of his lip, which include the mucosa and submucosa as well as the whole mucocele.  It was not a simple biopsy because it took out a number of additional salivary gland tissues and went ahead and I was able to close this with simple interrupted sutures of 4-0 chromic.  It put in about five or six sutures for this, leaving 4- 5 mm tails.  He tolerated this well.     Again, thank you for allowing me to participate in the care of your patient.      Sincerely,    Efrain Dailey MD

## 2021-04-06 NOTE — NURSING NOTE
"Chief Complaint   Patient presents with     RECHECK     mucocele of right lip excision       Pulse 87, temperature 97.7  F (36.5  C), temperature source Temporal, height 1.88 m (6' 2\"), weight 89 kg (196 lb 3.4 oz), SpO2 98 %.    Jacinta Boyer, EMT    "

## 2021-04-07 LAB — COPATH REPORT: NORMAL

## 2021-04-09 ENCOUNTER — TELEPHONE (OUTPATIENT)
Dept: OTOLARYNGOLOGY | Facility: CLINIC | Age: 46
End: 2021-04-09

## 2021-04-09 NOTE — TELEPHONE ENCOUNTER
Writer called patient in regards to recent pathology report. Writer stated that everything is benign. He acknowledged and states he saw this on his my chart. Writer stated that per Dr. Dailey that area should not flare up after area has healed. Patient was agreeable to this.       Patient scheduled for follow up 4/20/21 with Dr. Dailey. Patient is aware of this as well.      Mayda Martin LPN

## 2021-04-18 ENCOUNTER — HEALTH MAINTENANCE LETTER (OUTPATIENT)
Age: 46
End: 2021-04-18

## 2021-04-20 ENCOUNTER — VIRTUAL VISIT (OUTPATIENT)
Dept: OTOLARYNGOLOGY | Facility: CLINIC | Age: 46
End: 2021-04-20
Payer: COMMERCIAL

## 2021-04-20 ENCOUNTER — TELEPHONE (OUTPATIENT)
Dept: OTOLARYNGOLOGY | Facility: CLINIC | Age: 46
End: 2021-04-20

## 2021-04-20 VITALS — WEIGHT: 196 LBS | BODY MASS INDEX: 25.15 KG/M2 | HEIGHT: 74 IN

## 2021-04-20 DIAGNOSIS — K13.79 MUCOCELE OF MOUTH: Primary | ICD-10-CM

## 2021-04-20 PROCEDURE — 99213 OFFICE O/P EST LOW 20 MIN: CPT | Mod: 95 | Performed by: OTOLARYNGOLOGY

## 2021-04-20 ASSESSMENT — MIFFLIN-ST. JEOR: SCORE: 1833.8

## 2021-04-20 NOTE — PROGRESS NOTES
HISTORY OF PRESENT ILLNESS:  Mr. Cardona is home with his children today.  He is not able to make a physical visit because his wife has been indisposed with work or similar.  He has no other current complaints at the present time today.  He is otherwise getting by quite well.      PHYSICAL EXAMINATION:  The patient is alert and oriented x3 and pleasant.  Skin of the face, lips, and neck when I spoke to him today about his oral cavity, he says that his mouth has healed, but that he had maybe a second small mucocele adjacent to our operative site that swelled and then went down in size.  It is hard to know if this may be from a small hematoma.  We have no way of knowing.  He has healed well and is doing well.   We are going to see him again in about six weeks.  We had to do a fairly extensive resection of this area and put in a fair number of stitches.      PLAN:  We will see him again in person to make sure there is no need to have a steroid injection or anything else like that to help him along with any sort of scarring that might occur submucosally in the lip on that side.  He understands and agrees.  We will see him in six weeks.

## 2021-04-20 NOTE — TELEPHONE ENCOUNTER
[facility] Call Center    Phone Message    May a detailed message be left on voicemail: yes     Reason for Call: Other:   Pt's wife was called in last night to do a surgery and they have 3 kids. Pt unable to make it to his appt this morning with O[facility]. Next available is not until June. This is a cyst removal 2 wk follow-up. Please follow-up with pt to assist with rescheduling.     Action Taken: Other:  ent    Travel Screening: Not Applicable                                                                     ;

## 2021-04-20 NOTE — TELEPHONE ENCOUNTER
Writer called pt and offered a telephone visit in place of patient's in clinic appointment with Dr. Dailey. Patient accepted the offer and was informed that Dr. Dailey will call him later today.    Jacinta Boyer, EMT

## 2021-04-20 NOTE — LETTER
4/20/2021       RE: Sabino Cardona  [address]  [place] [address]     Dear Colleague,    Thank you for referring your patient, Sabino Cardona, to the [facility] EAR NOSE AND THROAT CLINIC [place] at [facility]. Please see a copy of my visit note below.    HISTORY OF PRESENT ILLNESS:  Mr. Cardona is home with his children today.  He is not able to make a physical visit because his wife has been indisposed with work or similar.  He has no other current complaints at the present time today.  He is otherwise getting by quite well.      PHYSICAL EXAMINATION:  The patient is alert and oriented x3 and pleasant.  Skin of the face, lips, and neck when I spoke to him today about his oral cavity, he says that his mouth has healed, but that he had maybe a second small mucocele adjacent to our operative site that swelled and then went down in size.  It is hard to know if this may be from a small hematoma.  We have no way of knowing.  He has healed well and is doing well.   We are going to see him again in about six weeks.  We had to do a fairly extensive resection of this area and put in a fair number of stitches.      PLAN:  We will see him again in person to make sure there is no need to have a steroid injection or anything else like that to help him along with any sort of scarring that might occur submucosally in the lip on that side.  He understands and agrees.  We will see him in six weeks.           Again, thank you for allowing me to participate in the care of your patient.      Sincerely,    Efrain Dailey MD

## 2021-04-20 NOTE — PATIENT INSTRUCTIONS
1. You were seen in the ENT Clinic today by Dr. Dailey.  If you have any questions or concerns after your appointment, please call   -  ENT Clinic: [phone]      2.   Plan to return to clinic in 6 weeks.     Mayda Martin LPN  [facility] Otolaryngology  [phone]

## 2021-06-01 ENCOUNTER — OFFICE VISIT (OUTPATIENT)
Dept: OTOLARYNGOLOGY | Facility: CLINIC | Age: 46
End: 2021-06-01
Payer: COMMERCIAL

## 2021-06-01 VITALS — HEIGHT: 74 IN | BODY MASS INDEX: 24.38 KG/M2 | WEIGHT: 190 LBS

## 2021-06-01 DIAGNOSIS — K21.9 GASTROESOPHAGEAL REFLUX DISEASE WITHOUT ESOPHAGITIS: Primary | ICD-10-CM

## 2021-06-01 PROCEDURE — 99213 OFFICE O/P EST LOW 20 MIN: CPT | Performed by: OTOLARYNGOLOGY

## 2021-06-01 ASSESSMENT — MIFFLIN-ST. JEOR: SCORE: 1801.58

## 2021-06-01 ASSESSMENT — PAIN SCALES - GENERAL: PAINLEVEL: NO PAIN (0)

## 2021-06-01 NOTE — LETTER
6/1/2021       RE: Sabino Cardona  [address]  [place] [address]     Dear Colleague,    Thank you for referring your patient, Sabino Cardona, to the [facility] EAR NOSE AND THROAT CLINIC [place] at [facility]. Please see a copy of my visit note below.    HISTORY OF PRESENT ILLNESS: Mr. Cardona is 46 years old.  He is here for followup today.  He had a relatively large lipoma in his right lip that we took out.  He has a second one now.  This is all removed before.  I think that he says it to be somewhat sizable.  It is about 0.5 cm to 3/4 of a centimeter at the present time.  It will get slightly bigger, slightly smaller and that is the main issue today.      PHYSICAL EXAMINATION: On physical examination, the patient is alert and oriented x3, and pleasant.  The patient's neck exam is otherwise normal.  Oral cavity, oropharynx is clear.  His lip is examined and there is no question that this appears to be a small mucocele.  Everything else is healed.  There is no hard scar or anything else like that, but it looks like a small mucocele.  No adenopathy in the neck.    ASSESSMENT:  This patient with history of right lip mucocele.      PLAN:  I am going to try to just bring him back to clinic and try to just do a simple excision on this while grasping it and using a curved iris scissors and putting in maybe a couple of stitches.  He understands and agrees, and I will see him again over the course of next two to three weeks for this.          Again, thank you for allowing me to participate in the care of your patient.      Sincerely,    Efrain Dailey MD

## 2021-06-01 NOTE — PATIENT INSTRUCTIONS
1. You were seen in the ENT Clinic today by Dr. Dailey.  If you have any questions or concerns after your appointment, please call   -  ENT Clinic: [phone]      Mayda Martin LPN  [facility] Otolaryngology  [phone]

## 2021-06-01 NOTE — PROGRESS NOTES
HISTORY OF PRESENT ILLNESS: Mr. Cardona is 46 years old.  He is here for followup today.  He had a relatively large lipoma in his right lip that we took out.  He has a second one now.  This is all removed before.  I think that he says it to be somewhat sizable.  It is about 0.5 cm to 3/4 of a centimeter at the present time.  It will get slightly bigger, slightly smaller and that is the main issue today.      PHYSICAL EXAMINATION: On physical examination, the patient is alert and oriented x3, and pleasant.  The patient's neck exam is otherwise normal.  Oral cavity, oropharynx is clear.  His lip is examined and there is no question that this appears to be a small mucocele.  Everything else is healed.  There is no hard scar or anything else like that, but it looks like a small mucocele.  No adenopathy in the neck.    ASSESSMENT:  This patient with history of right lip mucocele.      PLAN:  I am going to try to just bring him back to clinic and try to just do a simple excision on this while grasping it and using a curved iris scissors and putting in maybe a couple of stitches.  He understands and agrees, and I will see him again over the course of next two to three weeks for this.

## 2021-06-01 NOTE — NURSING NOTE
"Chief Complaint   Patient presents with     RECHECK     6 week follow up       Height 1.88 m (6' 2\"), weight 86.2 kg (190 lb).    Jacinta Boyer, EMT    "

## 2021-06-28 ENCOUNTER — VIRTUAL VISIT (OUTPATIENT)
Dept: RHEUMATOLOGY | Facility: CLINIC | Age: 46
End: 2021-06-28
Attending: INTERNAL MEDICINE
Payer: COMMERCIAL

## 2021-06-28 DIAGNOSIS — R76.8 POSITIVE DOUBLE STRANDED DNA ANTIBODY TEST: ICD-10-CM

## 2021-06-28 DIAGNOSIS — Z51.81 MEDICATION MONITORING ENCOUNTER: ICD-10-CM

## 2021-06-28 DIAGNOSIS — M47.819 SPONDYLOARTHROPATHY: ICD-10-CM

## 2021-06-28 DIAGNOSIS — E55.9 VITAMIN D DEFICIENCY: Primary | ICD-10-CM

## 2021-06-28 PROCEDURE — 99214 OFFICE O/P EST MOD 30 MIN: CPT | Mod: 95 | Performed by: INTERNAL MEDICINE

## 2021-06-28 RX ORDER — SULFASALAZINE 500 MG/1
1500 TABLET, DELAYED RELEASE ORAL 2 TIMES DAILY
Qty: 540 TABLET | Refills: 1 | Status: SHIPPED | OUTPATIENT
Start: 2021-06-28 | End: 2021-10-29

## 2021-06-28 NOTE — LETTER
6/28/2021       RE: Sabino Cardona  [address]  [place] [address]     Dear Colleague,    Thank you for referring your patient, Sabino Cardona, to the [facility] RHEUMATOLOGY CLINIC [place] at [facility]. Please see a copy of my visit note below.    [employer] TEXT [phone]        Emerson is a 46 year old who is being evaluated via a billable video visit.      How would you like to obtain your AVS? MyChart    Will anyone else be joining your video visit? No      Video Start Time: 3:41 PM  Video-Visit Details    Type of service:  Video Visit    Video End Time: 4:01 PM    Originating Location (pt. Location): Home    Distant Location (provider location):  [facility] RHEUMATOLOGY CLINIC [place]     Platform used for Video Visit: [facility]       Rheumatology F/U Virtual Visit Note    Reason for visit: +anti-DNA, peripheral SpA on SSZ    Initial visit date: 2/14/2020    Last seen: 7/10/2020    DOS: 6/28/2021    HPI from initial visit:    Sabino Cardona is a 43 year old  male with fh/o psoriasis and RA, who was referred to our clinic for evaluation and management of possible SLE given +anti-DNA. His wife is a transplant surgeon here at the  and they say the actual reason for referral is to establish/transfer care as they just moved to [place].      Today, he is feeling well.      They moved back from [place] to [place] about 1.5 month ago.      He reports having degenerative arthritis since age 18. Has chronic low back pain with worsening in AM and worsening with inactivity. Allergies in Spring makes it worse.      He started to have heel and ankle pain in Feb 2019, also knees and elbows. That was stressful time of life looking for moving back. Also has 3 small children (one 5 yo and two 3 yo twins) which keep them busy.      Both heels were red and blaching and his wife thought it was gout.      His brother has gout and is  almost 51 yo.      He saw a rheumatologist at [facility]. 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 His wife questioned the diagnosis given lack of lupus symptoms.      Then he had x-rays of spine which showed lots of bone spurs.    He was diagnosed with OA, possible reactive arthritis. No h/o uveitis, urethritis, STDs. Has h/o food poisenings. Was put on SSZ in 2/2019, the dose was increased to current dose of 1 gr bid, he tolerates it well with no toxicity. SSZ has helped and he has no current heel inflammation or recent flare ups.      Has fatigue but because of having 3 small children, it's hard for him to say if fatigue was related to inflammation.    ROS is positive for intermittent CP for years, ibuprofen helps. Prednisone 10 mg every day did not help in the past.    Gets cold sores. No photosensitivity. Gets migraines. No weight loss. No Raynaud's.      2/14/2020: No fatigue, skin rashes, oral ulcers.    Has mild achy knees.    About 1.5 months ago, started having flare up of achilles tendonitis in both feet, worse in AM and at night. They turn red and swollen.      7/10/2020: At last visit in 2/2020, increased SSZ from 2 to 2.5 gr/day given active achilles tendonitis, also prescribed celebrex 100 mg bid prn. Change in SSZ has helped and he no longer needs to take celebrex (last use 6 wk go). Tolerates SSZ at higher dose well. No flare ups since last visit.    No symptoms and no complaints today. Felling very well. No skin rashes, oral ulcers, CP or SOB or fatigue.    Had flu in 1/2020, after that got the flu shot.     Today 6/28/2021: Hands, feet swell up 2 times a week x 1 hr, it gets better after moving around. No heel pain/swelling.    Has 3 small children, physical job. Fully vaccinated, no SE. Got pfizer.    Over last couple of months, has been good on taking her SSZ 5 tabs a day everyday. Noticed more sx including CP by missing some  doses. No SSZ SEs. Rarely takes the celebrex, if notices sore feet x 2 days in a row, he takes it. It does not help as much.      ROS:  A comprehensive ROS was done, positives are per HPI.      Past Medical History:   Diagnosis Date     GERD (gastroesophageal reflux disease)      Past Surgical History:   Procedure Laterality Date     APPENDECTOMY       HERNIA REPAIR, INGUINAL RT/LT      left     Family History   Problem Relation Age of Onset     Hypertension Mother      Hypertension Father      Asthma Father      Diabetes Father      Psoriasis Father      Asthma Sister      Hypertension Maternal Grandmother         RA     Diabetes Maternal Grandmother      Hypertension Maternal Grandfather      Cancer Maternal Grandfather         stomach     Cancer Paternal Grandfather         lung-smoker     Social History     Socioeconomic History     Marital status:      Spouse name: Not on file     Number of children: 3     Years of education: Not on file     Highest education level: Not on file   Occupational History     Occupation: [employer]   Social Needs     Financial resource strain: Not on file     Food insecurity     Worry: Not on file     Inability: Not on file     Transportation needs     Medical: Not on file     Non-medical: Not on file   Tobacco Use     Smoking status: Never Smoker     Smokeless tobacco: Former User     Types: Chew     Tobacco comment: chew 9 yr   Substance and Sexual Activity     Alcohol use: Yes     Alcohol/week: 1.7 standard drinks     Types: 2 Standard drinks or equivalent per week     Drug use: No     Sexual activity: Yes     Partners: Female   Lifestyle     Physical activity     Days per week: Not on file     Minutes per session: Not on file     Stress: Not on file   Relationships     Social connections     Talks on phone: Not on file     Gets together: Not on file     Attends [employer]ism service: Not on file     Active member of club or organization: Not on file     Attends meetings of  clubs or organizations: Not on file     Relationship status: Not on file     Intimate partner violence     Fear of current or ex partner: Not on file     Emotionally abused: Not on file     Physically abused: Not on file     Forced sexual activity: Not on file   Other Topics Concern      Service Not Asked     Blood Transfusions Not Asked     Caffeine Concern Not Asked     Occupational Exposure Not Asked     Hobby Hazards Not Asked     Sleep Concern Not Asked     Stress Concern Not Asked     Weight Concern Not Asked     Special Diet Not Asked     Back Care Not Asked     Exercise Yes     Comment: run, weights     Bike Helmet Not Asked     Seat Belt Not Asked     Self-Exams Not Asked   Social History Narrative     Not on file     Patient Active Problem List   Diagnosis     Esophageal reflux     Biceps tendinopathy     Wart     Allergies   Allergen Reactions     Nkda [No Known Drug Allergies]      Seasonal Allergies Other (See Comments)     Sneezing, running nose, itchy eyes       Outpatient Encounter Medications as of 6/28/2021   Medication Sig Dispense Refill     celecoxib (CELEBREX) 100 MG capsule TAKE ONE CAPSULE BY MOUTH TWICE DAILY AS NEEDED FOR PAIN 60 capsule 3     Multiple Vitamin (MULTI VITAMIN MENS PO) Take 1 tablet by mouth daily.       sulfaSALAzine ER (AZULFIDINE EN) 500 MG EC tablet 3 tabs in AM, 2 tabs in  tablet 1     Vitamin D3 (CHOLECALCIFEROL) 25 mcg (1000 units) tablet Take by mouth daily       No facility-administered encounter medications on file as of 6/28/2021.                His records were reviewed.      2/2019: +BEST 1:40 mixed homogenous and nucleolar pattern, positive anti-DNA 17 with NL being up to 4. Neg anti-Sm, RNP, SCL-70, SSA, SSB, ALEXANDREA-1, CHROMATIN, RF, anti-CCP. NL ESR. NL SUA 5.2. Neg HLA-B27.  NL TSH, C3, C4. Unremarkable CMP. Neg U/A. LOW vit D 27.    2/2019: Advanced degenerative changes of L spine and moderate degenerative changes of T spine on x-rays.  Component       Latest Ref Rng & Units 1/17/2020   WBC      4.0 - 11.0 10e9/L 3.6 (L)   RBC Count      4.4 - 5.9 10e12/L 4.52   Hemoglobin      13.3 - 17.7 g/dL 13.8   Hematocrit      40.0 - 53.0 % 41.1   MCV      78 - 100 fl 91   MCH      26.5 - 33.0 pg 30.5   MCHC      31.5 - 36.5 g/dL 33.6   RDW      10.0 - 15.0 % 12.0   Platelet Count      150 - 450 10e9/L 258   Diff Method       Automated Method   % Neutrophils      % 55.5   % Lymphocytes      % 30.4   % Monocytes      % 11.0   % Eosinophils      % 2.5   % Basophils      % 0.3   % Immature Granulocytes      % 0.3   Nucleated RBCs      0 /100 0   Absolute Neutrophil      1.6 - 8.3 10e9/L 2.0   Absolute Lymphocytes      0.8 - 5.3 10e9/L 1.1   Absolute Monocytes      0.0 - 1.3 10e9/L 0.4   Absolute Eosinophils      0.0 - 0.7 10e9/L 0.1   Absolute Basophils      0.0 - 0.2 10e9/L 0.0   Abs Immature Granulocytes      0 - 0.4 10e9/L 0.0   Absolute Nucleated RBC       0.0   Creatinine      0.66 - 1.25 mg/dL 0.83   GFR Estimate      >60 mL/min/1.73:m2 >90   GFR Estimate If Black      >60 mL/min/1.73:m2 >90   Vitamin D Deficiency screening      20 - 75 ug/L 27   Sed Rate      0 - 15 mm/h 8   CRP Inflammation      0.0 - 8.0 mg/L <2.9   AST      0 - 45 U/L 19   Albumin      3.4 - 5.0 g/dL 3.9   ALT      0 - 70 U/L 36   Complement C3      81 - 157 mg/dL 118   Complement C4      13 - 39 mg/dL 25   DNA-ds      <10 IU/mL 16 (H)     Component      Latest Ref Rng & Units 1/18/2021   WBC      4.0 - 11.0 10e9/L 5.2   RBC Count      4.4 - 5.9 10e12/L 4.68   Hemoglobin      13.3 - 17.7 g/dL 14.7   Hematocrit      40.0 - 53.0 % 42.7   MCV      78 - 100 fl 91   MCH      26.5 - 33.0 pg 31.4   MCHC      31.5 - 36.5 g/dL 34.4   RDW      10.0 - 15.0 % 11.9   Platelet Count      150 - 450 10e9/L 241   % Neutrophils      % 63.3   % Lymphocytes      % 27.0   % Monocytes      % 8.3   % Eosinophils      % 1.0   % Basophils      % 0.4   Absolute Neutrophil      1.6 - 8.3 10e9/L 3.3   Absolute Lymphocytes       0.8 - 5.3 10e9/L 1.4   Absolute Monocytes      0.0 - 1.3 10e9/L 0.4   Absolute Eosinophils      0.0 - 0.7 10e9/L 0.1   Absolute Basophils      0.0 - 0.2 10e9/L 0.0   Diff Method       Automated Method   Creatinine      0.66 - 1.25 mg/dL 0.86   GFR Estimate      >60 mL/min/1.73:m2 >90   GFR Estimate If Black      >60 mL/min/1.73:m2 >90   AST      0 - 45 U/L 23   Albumin      3.4 - 5.0 g/dL 4.3   ALT      0 - 70 U/L 42   Ph.E:      Constitutional: WD/WN. Pleasant. In no acute distress.   Eyes: EOM intact, sclera anicteric, conj not injected  Chest: Breathing normally on RA  MS: No synovitis.  Skin: No skin rash  Neuro: A&O x 3. Grossly non focal  Psych: NL affect and mood    Assessment/ plan:    1-Spondyloarthropathy with enthesopathy Dx 2/2019 on SSZ since 2/2019. He is HLA-B27 negative. Has fh/o psoriasis. He fits this diagnosis very well and already responds to SSZ. His inflammatory arthritis was under good control at initial visit, but at last visit in 2/2020, had flare ups of achilles tendonitis which started post flu. I increased sulfasalazine to 3 tabs in the morning, 2 tabs in the evening with food in 2/2020 which helped significantly.    His SpA is under fair control. Given episodes of joint pain, swelling and for better control of arthritis, recommend to increase SSZ to 3 gram a day. He is on celebrex prn, not much help.      2-SSZ monitoring. Labs q3mo. 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 He does not have lupus and nothing on his exam or hx is suggestive of lupus. Anti-DNA is going down. I'll monitor it for now.    4-Low vit D in the past. S/p replacement. Will re-check level with next visit.        Orders Placed This Encounter   Procedures     ALT     Albumin level     AST     CBC with platelets differential     Creatinine     Complement C4     Complement C3     CRP inflammation     DNA double stranded antibodies     Vitamin D Deficiency     ESR     Today's plan:    Increase SSZ to 3 tabs  twice a day    Labs 2-3 weeks after being on 6 SSZ a day then [id number]    Return in 4-5 months (in person)        Jovani Luna MD

## 2021-06-28 NOTE — PROGRESS NOTES
Lyks TEXT [phone]        Emerson is a 46 year old who is being evaluated via a billable video visit.      How would you like to obtain your AVS? MyChart    Will anyone else be joining your video visit? No      Video Start Time: 3:41 PM  Video-Visit Details    Type of service:  Video Visit    Video End Time: 4:01 PM    Originating Location (pt. Location): Home    Distant Location (provider location):  [facility] [place]     Platform used for Video Visit: [facility]       Rheumatology F/U Virtual Visit Note    Reason for visit: +anti-DNA, peripheral SpA on SSZ    Initial visit date: 2/14/2020    Last seen: 7/10/2020    DOS: 6/28/2021    HPI from initial visit:    Sabino Cardona is a 43 year old  male with fh/o psoriasis and RA, who was referred to our clinic for evaluation and management of possible SLE given +anti-DNA. His wife is a transplant surgeon here at the  and they say the actual reason for referral is to establish/transfer care as they just moved to [place].      Today, he is feeling well.      They moved back from [place] to [place] about 1.5 month ago.      He reports having degenerative arthritis since age 18. Has chronic low back pain with worsening in AM and worsening with inactivity. Allergies in Spring makes it worse.      He started to have heel and ankle pain in Feb 2019, also knees and elbows. That was stressful time of life looking for moving back. Also has 3 small children (one 3 yo and two 3 yo twins) which keep them busy.      Both heels were red and blaching and his wife thought it was gout.      His brother has gout and is almost 51 yo.      He saw a rheumatologist at [facility]. 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 His wife questioned the diagnosis given lack of lupus symptoms.      Then he had x-rays of spine which showed lots of bone spurs.    He was diagnosed with  OA, possible reactive arthritis. No h/o uveitis, urethritis, STDs. Has h/o food poisenings. Was put on SSZ in 2/2019, the dose was increased to current dose of 1 gr bid, he tolerates it well with no toxicity. SSZ has helped and he has no current heel inflammation or recent flare ups.      Has fatigue but because of having 3 small children, it's hard for him to say if fatigue was related to inflammation.    ROS is positive for intermittent CP for years, ibuprofen helps. Prednisone 10 mg every day did not help in the past.    Gets cold sores. No photosensitivity. Gets migraines. No weight loss. No Raynaud's.      2/14/2020: No fatigue, skin rashes, oral ulcers.    Has mild achy knees.    About 1.5 months ago, started having flare up of achilles tendonitis in both feet, worse in AM and at night. They turn red and swollen.      7/10/2020: At last visit in 2/2020, increased SSZ from 2 to 2.5 gr/day given active achilles tendonitis, also prescribed celebrex 100 mg bid prn. Change in SSZ has helped and he no longer needs to take celebrex (last use 6 wk go). Tolerates SSZ at higher dose well. No flare ups since last visit.    No symptoms and no complaints today. Felling very well. No skin rashes, oral ulcers, CP or SOB or fatigue.    Had flu in 1/2020, after that got the flu shot.     Today 6/28/2021: Hands, feet swell up 2 times a week x 1 hr, it gets better after moving around. No heel pain/swelling.    Has 3 small children, physical job. Fully vaccinated, no SE. Got pfizer.    Over last couple of months, has been good on taking her SSZ 5 tabs a day everyday. Noticed more sx including CP by missing some doses. No SSZ SEs. Rarely takes the celebrex, if notices sore feet x 2 days in a row, he takes it. It does not help as much.      ROS:  A comprehensive ROS was done, positives are per HPI.      Past Medical History:   Diagnosis Date     GERD (gastroesophageal reflux disease)      Past Surgical History:   Procedure  Laterality Date     APPENDECTOMY       HERNIA REPAIR, INGUINAL RT/LT      left     Family History   Problem Relation Age of Onset     Hypertension Mother      Hypertension Father      Asthma Father      Diabetes Father      Psoriasis Father      Asthma Sister      Hypertension Maternal Grandmother         RA     Diabetes Maternal Grandmother      Hypertension Maternal Grandfather      Cancer Maternal Grandfather         stomach     Cancer Paternal Grandfather         lung-smoker     Social History     Socioeconomic History     Marital status:      Spouse name: Not on file     Number of children: 3     Years of education: Not on file     Highest education level: Not on file   Occupational History     Occupation: [employer]   Social Needs     Financial resource strain: Not on file     Food insecurity     Worry: Not on file     Inability: Not on file     Transportation needs     Medical: Not on file     Non-medical: Not on file   Tobacco Use     Smoking status: Never Smoker     Smokeless tobacco: Former User     Types: Chew     Tobacco comment: chew 9 yr   Substance and Sexual Activity     Alcohol use: Yes     Alcohol/week: 1.7 standard drinks     Types: 2 Standard drinks or equivalent per week     Drug use: No     Sexual activity: Yes     Partners: Female   Lifestyle     Physical activity     Days per week: Not on file     Minutes per session: Not on file     Stress: Not on file   Relationships     Social connections     Talks on phone: Not on file     Gets together: Not on file     Attends Mu-ism service: Not on file     Active member of club or organization: Not on file     Attends meetings of clubs or organizations: Not on file     Relationship status: Not on file     Intimate partner violence     Fear of current or ex partner: Not on file     Emotionally abused: Not on file     Physically abused: Not on file     Forced sexual activity: Not on file   Other Topics Concern      Service Not Asked      Blood Transfusions Not Asked     Caffeine Concern Not Asked     Occupational Exposure Not Asked     Hobby Hazards Not Asked     Sleep Concern Not Asked     Stress Concern Not Asked     Weight Concern Not Asked     Special Diet Not Asked     Back Care Not Asked     Exercise Yes     Comment: run, weights     Bike Helmet Not Asked     Seat Belt Not Asked     Self-Exams Not Asked   Social History Narrative     Not on file     Patient Active Problem List   Diagnosis     Esophageal reflux     Biceps tendinopathy     Wart     Allergies   Allergen Reactions     Nkda [No Known Drug Allergies]      Seasonal Allergies Other (See Comments)     Sneezing, running nose, itchy eyes       Outpatient Encounter Medications as of 6/28/2021   Medication Sig Dispense Refill     celecoxib (CELEBREX) 100 MG capsule TAKE ONE CAPSULE BY MOUTH TWICE DAILY AS NEEDED FOR PAIN 60 capsule 3     Multiple Vitamin (MULTI VITAMIN MENS PO) Take 1 tablet by mouth daily.       sulfaSALAzine ER (AZULFIDINE EN) 500 MG EC tablet 3 tabs in AM, 2 tabs in  tablet 1     Vitamin D3 (CHOLECALCIFEROL) 25 mcg (1000 units) tablet Take by mouth daily       No facility-administered encounter medications on file as of 6/28/2021.                His records were reviewed.      2/2019: +BEST 1:40 mixed homogenous and nucleolar pattern, positive anti-DNA 17 with NL being up to 4. Neg anti-Sm, RNP, SCL-70, SSA, SSB, ALEXANDREA-1, CHROMATIN, RF, anti-CCP. NL ESR. NL SUA 5.2. Neg HLA-B27.  NL TSH, C3, C4. Unremarkable CMP. Neg U/A. LOW vit D 27.    2/2019: Advanced degenerative changes of L spine and moderate degenerative changes of T spine on x-rays.  Component      Latest Ref Rng & Units 1/17/2020   WBC      4.0 - 11.0 10e9/L 3.6 (L)   RBC Count      4.4 - 5.9 10e12/L 4.52   Hemoglobin      13.3 - 17.7 g/dL 13.8   Hematocrit      40.0 - 53.0 % 41.1   MCV      78 - 100 fl 91   MCH      26.5 - 33.0 pg 30.5   MCHC      31.5 - 36.5 g/dL 33.6   RDW      10.0 - 15.0 % 12.0    Platelet Count      150 - 450 10e9/L 258   Diff Method       Automated Method   % Neutrophils      % 55.5   % Lymphocytes      % 30.4   % Monocytes      % 11.0   % Eosinophils      % 2.5   % Basophils      % 0.3   % Immature Granulocytes      % 0.3   Nucleated RBCs      0 /100 0   Absolute Neutrophil      1.6 - 8.3 10e9/L 2.0   Absolute Lymphocytes      0.8 - 5.3 10e9/L 1.1   Absolute Monocytes      0.0 - 1.3 10e9/L 0.4   Absolute Eosinophils      0.0 - 0.7 10e9/L 0.1   Absolute Basophils      0.0 - 0.2 10e9/L 0.0   Abs Immature Granulocytes      0 - 0.4 10e9/L 0.0   Absolute Nucleated RBC       0.0   Creatinine      0.66 - 1.25 mg/dL 0.83   GFR Estimate      >60 mL/min/1.73:m2 >90   GFR Estimate If Black      >60 mL/min/1.73:m2 >90   Vitamin D Deficiency screening      20 - 75 ug/L 27   Sed Rate      0 - 15 mm/h 8   CRP Inflammation      0.0 - 8.0 mg/L <2.9   AST      0 - 45 U/L 19   Albumin      3.4 - 5.0 g/dL 3.9   ALT      0 - 70 U/L 36   Complement C3      81 - 157 mg/dL 118   Complement C4      13 - 39 mg/dL 25   DNA-ds      <10 IU/mL 16 (H)     Component      Latest Ref Rng & Units 1/18/2021   WBC      4.0 - 11.0 10e9/L 5.2   RBC Count      4.4 - 5.9 10e12/L 4.68   Hemoglobin      13.3 - 17.7 g/dL 14.7   Hematocrit      40.0 - 53.0 % 42.7   MCV      78 - 100 fl 91   MCH      26.5 - 33.0 pg 31.4   MCHC      31.5 - 36.5 g/dL 34.4   RDW      10.0 - 15.0 % 11.9   Platelet Count      150 - 450 10e9/L 241   % Neutrophils      % 63.3   % Lymphocytes      % 27.0   % Monocytes      % 8.3   % Eosinophils      % 1.0   % Basophils      % 0.4   Absolute Neutrophil      1.6 - 8.3 10e9/L 3.3   Absolute Lymphocytes      0.8 - 5.3 10e9/L 1.4   Absolute Monocytes      0.0 - 1.3 10e9/L 0.4   Absolute Eosinophils      0.0 - 0.7 10e9/L 0.1   Absolute Basophils      0.0 - 0.2 10e9/L 0.0   Diff Method       Automated Method   Creatinine      0.66 - 1.25 mg/dL 0.86   GFR Estimate      >60 mL/min/1.73:m2 >90   GFR Estimate If  Black      >60 mL/min/1.73:m2 >90   AST      0 - 45 U/L 23   Albumin      3.4 - 5.0 g/dL 4.3   ALT      0 - 70 U/L 42   Ph.E:      Constitutional: WD/WN. Pleasant. In no acute distress.   Eyes: EOM intact, sclera anicteric, conj not injected  Chest: Breathing normally on RA  MS: No synovitis.  Skin: No skin rash  Neuro: A&O x 3. Grossly non focal  Psych: NL affect and mood    Assessment/ plan:    1-Spondyloarthropathy with enthesopathy Dx 2/2019 on SSZ since 2/2019. He is HLA-B27 negative. Has fh/o psoriasis. He fits this diagnosis very well and already responds to SSZ. His inflammatory arthritis was under good control at initial visit, but at last visit in 2/2020, had flare ups of achilles tendonitis which started post flu. I increased sulfasalazine to 3 tabs in the morning, 2 tabs in the evening with food in 2/2020 which helped significantly.    His SpA is under fair control. Given episodes of joint pain, swelling and for better control of arthritis, recommend to increase SSZ to 3 gram a day. He is on celebrex prn, not much help.      2-SSZ monitoring. Labs q3mo. 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 He does not have lupus and nothing on his exam or hx is suggestive of lupus. Anti-DNA is going down. I'll monitor it for now.    4-Low vit D in the past. S/p replacement. Will re-check level with next visit.        Orders Placed This Encounter   Procedures     ALT     Albumin level     AST     CBC with platelets differential     Creatinine     Complement C4     Complement C3     CRP inflammation     DNA double stranded antibodies     Vitamin D Deficiency     ESR     Today's plan:    Increase SSZ to 3 tabs twice a day    Labs 2-3 weeks after being on 6 SSZ a day then [id number]    Return in 4-5 months (in person)        Jovani Luna MD

## 2021-06-28 NOTE — PATIENT INSTRUCTIONS
Increase SSZ to 3 tabs twice a day    Labs 2-3 weeks after being on 6 SSZ a day    Return in 4-5 months (in person)

## 2021-06-29 ENCOUNTER — OFFICE VISIT (OUTPATIENT)
Dept: OTOLARYNGOLOGY | Facility: CLINIC | Age: 46
End: 2021-06-29
Payer: COMMERCIAL

## 2021-06-29 VITALS — WEIGHT: 195 LBS | BODY MASS INDEX: 25.03 KG/M2 | HEIGHT: 74 IN

## 2021-06-29 DIAGNOSIS — K13.0 LIP LESION: ICD-10-CM

## 2021-06-29 DIAGNOSIS — K13.79 MUCOCELE OF MOUTH: Primary | ICD-10-CM

## 2021-06-29 PROCEDURE — 88305 TISSUE EXAM BY PATHOLOGIST: CPT | Mod: 26 | Performed by: PATHOLOGY

## 2021-06-29 PROCEDURE — 99212 OFFICE O/P EST SF 10 MIN: CPT | Mod: 25 | Performed by: OTOLARYNGOLOGY

## 2021-06-29 PROCEDURE — 40490 BIOPSY OF LIP: CPT | Performed by: OTOLARYNGOLOGY

## 2021-06-29 ASSESSMENT — MIFFLIN-ST. JEOR: SCORE: 1824.26

## 2021-06-29 ASSESSMENT — PAIN SCALES - GENERAL: PAINLEVEL: NO PAIN (0)

## 2021-06-29 NOTE — PROGRESS NOTES
HISTORY OF PRESENT ILLNESS:  Sabino is here for follow up today.  He is a gentleman who had a very large oral cavity mucocele that we removed in clinic.  He was left with a secondary residual mucocele in the anterior lip at the present time.  This area is somewhat adjacent to the previous resection, but not exactly where the otherwise one was.  He has no other current complaints at the present time.    PHYSICAL EXAMINATION:  Oral cavity shows a less than 1 cm mucocele that we felt we could resect.    PROCEDURE:  After informed consent was obtained from the patient, the lip was anesthetized with lidocaine.  I went ahead and we grasped the lesion.  We used curved iris scissors to excise it down to the level of the musculature of the lip and this was removed and sent for routine pathology.      I did a careful examination of the lip afterwards to see if there was any residual tissue that appeared to be looking like a mucocele in the area below the muscle or in the muscle and nothing seemed to be apparent at the present time, so we did not resect anything additional to the mucocele or right below it.      PLAN:   We will talk to him again over the course of the next three to four weeks once we get the pathology back.

## 2021-06-29 NOTE — PATIENT INSTRUCTIONS
1. Please follow-up in clinic in 3-4 weeks for phone visit.   2. Please call the ENT clinic with any questions,concerns, new or worsening symptoms.    -Clinic number is [phone]   - Leandra's direct line (Dr. Dailey's nurse) [phone]

## 2021-06-29 NOTE — LETTER
6/29/2021       RE: Sabino Cardona  [address]  [place] [address]     Dear Colleague,    Thank you for referring your patient, Sabino Cardona, to the [facility] EAR NOSE AND THROAT CLINIC [place] at [facility]. Please see a copy of my visit note below.    HISTORY OF PRESENT ILLNESS:  Sabino is here for follow up today.  He is a gentleman who had a very large oral cavity mucocele that we removed in clinic.  He was left with a secondary residual mucocele in the anterior lip at the present time.  This area is somewhat adjacent to the previous resection, but not exactly where the otherwise one was.  He has no other current complaints at the present time.    PHYSICAL EXAMINATION:  Oral cavity shows a less than 1 cm mucocele that we felt we could resect.    PROCEDURE:  After informed consent was obtained from the patient, the lip was anesthetized with lidocaine.  I went ahead and we grasped the lesion.  We used curved iris scissors to excise it down to the level of the musculature of the lip and this was removed and sent for routine pathology.      I did a careful examination of the lip afterwards to see if there was any residual tissue that appeared to be looking like a mucocele in the area below the muscle or in the muscle and nothing seemed to be apparent at the present time, so we did not resect anything additional to the mucocele or right below it.      PLAN:   We will talk to him again over the course of the next three to four weeks once we get the pathology back.          Again, thank you for allowing me to participate in the care of your patient.      Sincerely,    Efrain Dailey MD

## 2021-06-30 LAB — COPATH REPORT: NORMAL

## 2021-07-08 ENCOUNTER — PATIENT OUTREACH (OUTPATIENT)
Dept: OTOLARYNGOLOGY | Facility: CLINIC | Age: 46
End: 2021-07-08

## 2021-07-08 NOTE — PROGRESS NOTES
Call patient with the following pathology results:    SPECIMEN(S):   Right lower lip biopsy     FINAL DIAGNOSIS:   Right lower lip biopsy:   - Granulation tissue with chronic and focal acute inflammation   - Benign squamous epithelium with reactive change     Patient indicates he is doing well following his procedure and has no concerns. He will continue with plan to follow-up as scheduled with Dr. Dailey on 7/20.    Patient encouraged to elias sooner with further questions or concerns.     Leandra Irizarry RN, BSN

## 2021-07-20 ENCOUNTER — VIRTUAL VISIT (OUTPATIENT)
Dept: OTOLARYNGOLOGY | Facility: CLINIC | Age: 46
End: 2021-07-20
Payer: COMMERCIAL

## 2021-07-20 VITALS — HEIGHT: 74 IN | WEIGHT: 196 LBS | BODY MASS INDEX: 25.15 KG/M2

## 2021-07-20 DIAGNOSIS — K13.0 MUCOCELE OF LOWER LIP: Primary | ICD-10-CM

## 2021-07-20 PROCEDURE — 99213 OFFICE O/P EST LOW 20 MIN: CPT | Mod: 95 | Performed by: OTOLARYNGOLOGY

## 2021-07-20 ASSESSMENT — PAIN SCALES - GENERAL: PAINLEVEL: NO PAIN (0)

## 2021-07-20 ASSESSMENT — MIFFLIN-ST. JEOR: SCORE: 1828.8

## 2021-07-20 NOTE — NURSING NOTE
"Chief Complaint   Patient presents with     RECHECK     3-4 week follow up       Height 1.88 m (6' 2\"), weight 88.9 kg (196 lb).    Jacinta Boyer, EMT    "

## 2021-07-20 NOTE — LETTER
2021       RE: Sabino Cardona  [address]  [place] [address]     Dear Colleague,    Thank you for referring your patient, Sabino Cardona, to the [facility] EAR NOSE AND THROAT CLINIC [place] at [facility]. Please see a copy of my visit note below.      Otolaryngology Clinic      Name: Sabino Cardona  MRN: [id number]  Age: 46 year old  : 2021      Chief Complaint:   No chief complaint on file.  Sabino Cardona is a gentleman who had a very significantly large mucocele of the right lip.  We removed a mucocele that was lobular somehow may be resulting from multiple minor salivary glands.   We took those out as well. He was left with another small mucocele.  We removed that a couple of weeks ago.  We talked to him today on the phone and this is essentially completely healed.  He is having no new problems with this.      I told him that I want to see him in about six or eight weeks to see if it would need to be addressed with any steroids or anything else of this nature that are injected, and he agreed to that. We will see him at that point in time.       History of Present Illness:   Sabino Cardona is a 46 year old male with a history of large oral cavity mucocele who presents for follow up.     Review of Systems:   Pertinent items are noted in HPI or as in patient entered ROS below, remainder of complete ROS is negative.   No flowsheet data found.     Physical Exam:   There were no vitals taken for this visit.     P  Follow-up: No follow-ups on file.      Efrain Dailey MD

## 2021-07-20 NOTE — PATIENT INSTRUCTIONS
1. Please follow-up in clinic in 6 weeks.   2. Please call the ENT clinic with any questions,concerns, new or worsening symptoms.    -Clinic number is [phone]   - Leandra's direct line (Dr. Dailey's nurse) [phone]

## 2021-07-20 NOTE — PROGRESS NOTES
Otolaryngology Clinic      Name: Sabino Cardona  MRN: [id number]  Age: 46 year old  : 2023      Chief Complaint:   No chief complaint on file.  Sabino Cardona is a gentleman who had a very significantly large mucocele of the right lip.  We removed a mucocele that was lobular somehow may be resulting from multiple minor salivary glands.   We took those out as well. He was left with another small mucocele.  We removed that a couple of weeks ago.  We talked to him today on the phone and this is essentially completely healed.  He is having no new problems with this.      I told him that I want to see him in about six or eight weeks to see if it would need to be addressed with any steroids or anything else of this nature that are injected, and he agreed to that. We will see him at that point in time.       History of Present Illness:   Sabino Cardona is a 46 year old male with a history of large oral cavity mucocele who presents for follow up.     Review of Systems:   Pertinent items are noted in HPI or as in patient entered ROS below, remainder of complete ROS is negative.   No flowsheet data found.     Physical Exam:   There were no vitals taken for this visit.     P  Follow-up: No follow-ups on file.      Efrain Dailey MD

## 2021-08-31 ENCOUNTER — OFFICE VISIT (OUTPATIENT)
Dept: OTOLARYNGOLOGY | Facility: CLINIC | Age: 46
End: 2021-08-31
Payer: COMMERCIAL

## 2021-08-31 VITALS
TEMPERATURE: 97.6 F | WEIGHT: 195 LBS | HEART RATE: 97 BPM | OXYGEN SATURATION: 98 % | BODY MASS INDEX: 25.03 KG/M2 | HEIGHT: 74 IN

## 2021-08-31 DIAGNOSIS — K21.9 GASTROESOPHAGEAL REFLUX DISEASE WITHOUT ESOPHAGITIS: Primary | ICD-10-CM

## 2021-08-31 DIAGNOSIS — B07.9 VIRAL WARTS, UNSPECIFIED TYPE: ICD-10-CM

## 2021-08-31 PROCEDURE — 99213 OFFICE O/P EST LOW 20 MIN: CPT | Performed by: OTOLARYNGOLOGY

## 2021-08-31 ASSESSMENT — PAIN SCALES - GENERAL: PAINLEVEL: NO PAIN (0)

## 2021-08-31 ASSESSMENT — MIFFLIN-ST. JEOR: SCORE: 1824.26

## 2021-08-31 NOTE — LETTER
"2021       RE: Sabino Cardona  [address]  [place] [address]     Dear Colleague,    Thank you for referring your patient, Sabino Cardona, to the [facility] EAR NOSE AND THROAT CLINIC [place] at [facility]. Please see a copy of my visit note below.      Otolaryngology Clinic    Name: Sabino Cardona  MRN: [id number]  Age: 46 year old  : 2023      Chief Complaint:   Follow up     History of Present Illness:   Sabino Cardona is a 46 year old male with a history of a large mucocele of the right lip who presents for follow up. The patient initially presented with a 1 cm mucocele 5 months ago (2021). The patient then underwent mucocele resection, additional salivary gland tissue resection, and a biopsy (2021). His pathology was negative at that time. Two months ago (2021), the patient was found to have a secondary residual mucocele in the anterior lip, which was resected at this visit. At the patient's last virtual visit 6 weeks ago (2021), he reported feeling completely healed at that time.    Today, he reports that his mucocele has returned on his lip.      Review of Systems:   Pertinent items are noted in HPI or as in patient entered ROS below, remainder of complete ROS is negative.   No flowsheet data found.     Physical Exam:   Pulse 97   Temp 97.6  F (36.4  C) (Temporal)   Ht 1.88 m (6' 2\")   Wt 88.5 kg (195 lb)   SpO2 98%   BMI 25.04 kg/m       PHYSICAL EXAMINATION:    Constitutional:  The patient was unaccompanied, well-groomed, and in no acute distress.    Skin:  Warm and pink.    Neurologic:  Alert and oriented x 3.  CN's III-XII within normal limits.  Voice normal.   Psychiatric:  The patient's affect was calm, cooperative, and appropriate.    Respiratory:  Breathing comfortably without stridor or exertion of accessory muscles.    Eyes: Extraocular movement intact.    Head:  " Normocephalic and atraumatic.  No lesions or scars.    Nose:  Sinuses were non-tender.  Anterior rhinoscopy revealed midline septum and absence of purulence or polyps.    OC/OP:  0.5 cm mucocele that has appeared to returned. No abnormal lymph tissue in the oropharynx.  The pterygoid region is non-tender.       Assessment and Plan:  No diagnosis found.  Sabino Cardona is a 46 year old male with a history of a large mucocele of the right lip who presents for follow up. He unfortunately has a recurrent mucocele, that is rare. I explained to him that it would be best for him to return and have this be evaluated more extensively in the OR. Evaluating this in the OR will help me further visualize his mucocele and better evaluate next treatment options. He is in understanding and agreement with this plan.    Follow-up: No follow-ups on file.       Scribe Disclosure:  I, Najma Franz, am serving as a scribe to document services personally performed by Efrain Dailey MD at this visit, based upon the provider's statements to me. All documentation has been reviewed by the aforementioned provider prior to being entered into the official medical record.         Again, thank you for allowing me to participate in the care of your patient.      Sincerely,    Efrain Dailey MD

## 2021-08-31 NOTE — NURSING NOTE
"Chief Complaint   Patient presents with     RECHECK     6 week follow up       Pulse 97, temperature 97.6  F (36.4  C), temperature source Temporal, height 1.88 m (6' 2\"), weight 88.5 kg (195 lb), SpO2 98 %.    Jacinta Boyer, EMT    "

## 2021-08-31 NOTE — PROGRESS NOTES
"  Otolaryngology Clinic    Name: Sabino Cardona  MRN: [id number]  Age: 46 year old  : 2021      Chief Complaint:   Follow up     History of Present Illness:   Sabino Cardona is a 46 year old male with a history of a large mucocele of the right lip who presents for follow up. The patient initially presented with a 1 cm mucocele 5 months ago (2021). The patient then underwent mucocele resection, additional salivary gland tissue resection, and a biopsy (2021). His pathology was negative at that time. Two months ago (2021), the patient was found to have a secondary residual mucocele in the anterior lip, which was resected at this visit. At the patient's last virtual visit 6 weeks ago (2021), he reported feeling completely healed at that time.    Today, he reports that his mucocele has returned on his lip.      Review of Systems:   Pertinent items are noted in HPI or as in patient entered ROS below, remainder of complete ROS is negative.   No flowsheet data found.     Physical Exam:   Pulse 97   Temp 97.6  F (36.4  C) (Temporal)   Ht 1.88 m (6' 2\")   Wt 88.5 kg (195 lb)   SpO2 98%   BMI 25.04 kg/m       PHYSICAL EXAMINATION:    Constitutional:  The patient was unaccompanied, well-groomed, and in no acute distress.    Skin:  Warm and pink.    Neurologic:  Alert and oriented x 3.  CN's III-XII within normal limits.  Voice normal.   Psychiatric:  The patient's affect was calm, cooperative, and appropriate.    Respiratory:  Breathing comfortably without stridor or exertion of accessory muscles.    Eyes: Extraocular movement intact.    Head:  Normocephalic and atraumatic.  No lesions or scars.    Nose:  Sinuses were non-tender.  Anterior rhinoscopy revealed midline septum and absence of purulence or polyps.    OC/OP:  0.5 cm mucocele that has appeared to returned. No abnormal lymph tissue in the oropharynx.  The pterygoid region is non-tender.       Assessment and " Plan:  No diagnosis found.  Sabino Cardona is a 46 year old male with a history of a large mucocele of the right lip who presents for follow up. He unfortunately has a recurrent mucocele, that is rare. I explained to him that it would be best for him to return and have this be evaluated more extensively in the OR. Evaluating this in the OR will help me further visualize his mucocele and better evaluate next treatment options. He is in understanding and agreement with this plan.    Follow-up: No follow-ups on file.       Scribe Disclosure:  I, Najma Franz, am serving as a scribe to document services personally performed by Efrain Dailey MD at this visit, based upon the provider's statements to me. All documentation has been reviewed by the aforementioned provider prior to being entered into the official medical record.

## 2021-09-19 ENCOUNTER — TELEPHONE (OUTPATIENT)
Dept: INTERNAL MEDICINE | Facility: CLINIC | Age: 46
End: 2021-09-19

## 2021-09-19 NOTE — TELEPHONE ENCOUNTER
Dear Jonna;    (1) placed mammogram order for Dr. Earl today    (2) Please see attached note regarding her  Mr. Castillo David for appt. With me and help coordinate.     Thanks ROSE MARIE Das     Appt made for pt's wife-in her chart.  Pt scheduled for

## 2021-09-19 NOTE — TELEPHONE ENCOUNTER
----- Message from Priscila Earl MD sent at 9/19/2021  7:36 AM CDT -----  Regarding: PCP  Matt Pressley,  My  needs a PCP.  I have been after him to get one here since we moved.  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  Otherwise pretty healthy but could use the usual health maintenance stuff.  He will also need a colonoscopy soon unless the newer guideline have gone into effect (colon cancer screening at age 45).  He is not having any issues just needs health maintenance.  Would you be able to see him?  He prefers mornings first thing if possible.    On another note, I have to get my mammogram.  I skipped it last year because of the pandemic.  Would you mind ordering that for me?  And if you think I need any health maintenance, let me know!    Thanks!  Priscila Earl

## 2021-10-02 ENCOUNTER — HEALTH MAINTENANCE LETTER (OUTPATIENT)
Age: 46
End: 2021-10-02

## 2021-10-06 ENCOUNTER — PREP FOR PROCEDURE (OUTPATIENT)
Dept: OTOLARYNGOLOGY | Facility: CLINIC | Age: 46
End: 2021-10-06

## 2021-10-06 DIAGNOSIS — K13.0 MUCOCELE OF LIP: Primary | ICD-10-CM

## 2021-10-15 DIAGNOSIS — M25.511 RIGHT SHOULDER PAIN, UNSPECIFIED CHRONICITY: Primary | ICD-10-CM

## 2021-10-18 ENCOUNTER — OFFICE VISIT (OUTPATIENT)
Dept: ORTHOPEDICS | Facility: CLINIC | Age: 46
End: 2021-10-18
Payer: COMMERCIAL

## 2021-10-18 ENCOUNTER — ANCILLARY PROCEDURE (OUTPATIENT)
Dept: GENERAL RADIOLOGY | Facility: CLINIC | Age: 46
End: 2021-10-18
Attending: ORTHOPAEDIC SURGERY
Payer: COMMERCIAL

## 2021-10-18 VITALS — HEIGHT: 74 IN | WEIGHT: 197 LBS | BODY MASS INDEX: 25.28 KG/M2

## 2021-10-18 DIAGNOSIS — G89.29 CHRONIC RIGHT SHOULDER PAIN: Primary | ICD-10-CM

## 2021-10-18 DIAGNOSIS — M25.511 CHRONIC RIGHT SHOULDER PAIN: Primary | ICD-10-CM

## 2021-10-18 DIAGNOSIS — M25.511 RIGHT SHOULDER PAIN, UNSPECIFIED CHRONICITY: ICD-10-CM

## 2021-10-18 PROCEDURE — 73030 X-RAY EXAM OF SHOULDER: CPT | Mod: RT | Performed by: RADIOLOGY

## 2021-10-18 PROCEDURE — 99204 OFFICE O/P NEW MOD 45 MIN: CPT | Mod: GC | Performed by: ORTHOPAEDIC SURGERY

## 2021-10-18 ASSESSMENT — MIFFLIN-ST. JEOR: SCORE: 1833.59

## 2021-10-18 NOTE — PROGRESS NOTES
[facility]  Orthopaedic Surgery Clinic Note  10/18/2021     Chief Complaint:  Right shoulder pain    History of Present Illness:   Sabino Cardona is a 46 year old male who presents for evaluation of right shoulder pain. Patient states that he has had pain over the past 2 to 3 months without any injury or inciting event.  Is mostly localized to the anterior aspect of his shoulder.  He does have pain that awakens him at night.  He also has subjective weakness.  He has not had any formal therapy or injections.  He has been doing some at home exercises.  His pain is better with activity modification and limiting his workload.    Allergies:   Nkda [no known drug allergies] and Seasonal allergies    Current Medications:   has a current medication list which includes the following prescription(s): celecoxib, multiple vitamin, sulfasalazine er, and vitamin d3.    Past Medical History/Past Surgical History:    has a past medical history of GERD (gastroesophageal reflux disease).     has a past surgical history that includes hernia repair, inguinal rt/lt and appendectomy.    Social History:   Tobacco: none  Occupation: contractor  Activity: fishing    Review of Systems:   A 15-point review of systems obtained and is significant as mentioned above. The patient has no prior history of diabetes, DVT, PE or other clotting disorders.     Family History:   The patient's family history includes Asthma in his father and sister; Cancer in his maternal grandfather and paternal grandfather; Diabetes in his father and maternal grandmother; Hypertension in his father, maternal grandfather, maternal grandmother, and mother; Psoriasis in his father.    The General Medical History Form was reviewed with the patient, updated, and signed; this is located in [employer] in Epic. This will act as supplement to this note    Physical Exam:  General: A&O, NAD  HEENT: NC/AT, EOMI  Cardiovascular: normal rate  Pulmonary: unlabored  respirations  Neuro: motor/sensory grossly intact  Psych: appropriate mood and affect  Right upper extremity and shoulder exam  - No signs of swelling, no previous incisions, no noticeable atrophy  - Tenderness to palpation at anterior aspect over biceps tendon  - ROM active forward elevation 170, external rotation 60, internal rotation L1; passive ROM same  - Strength forward elevation 5/5, external rotation 5/5, internal rotation 5/5  - Archer negative, Maria Del Carmen negative, Obriens positive, Speeds positive, Yergasons positive, Cross body adduction positive  - Lift off negative, Belly press negative  - Anterior apprehension negative  - Neurovascularly intact to axillary/ulnar/radial/median nerve distributions  - Brisk cap refill  - SANE score 60    Imaging:  Radiographs:  XR AP, grashey, scapular Y, and axillary views were ordered  Indication: Right shoulder pain  Impression: All views reviewed and demonstrate no acute osseous abnormality and no degenerative changes of the glenohumeral joint and mild degenerative changes of the acromioclavicular joint. Glenohumeral joint concentric and reduced on axillary view. Partially visualized lung fields are clear.     Images independently ordered, reviewed, and the results were discussed with the patient today.     Assessment:   46 year old male with right shoulder pain     Plan:  We discussed the clinical and radiographic findings.  We discussed the diagnosis and management options.  At this time, he would like to proceed with conservative treatment.  We will provide a referral for physical therapy at this time.  If he continues to have issues in 3 months we will have him follow-up and likely obtain an MRI at that time.  All questions were answered.    I spent a total of 40 minutes during this encounter, 30 minutes of which was dedicated to counseling, medical decision making, and answering of questions.     Roosevelt Mcclain MD  Sports Medicine & Shoulder Fellow  [facility]  Orthopaedic Surgery

## 2021-10-18 NOTE — NURSING NOTE
"Reason For Visit:   Chief Complaint   Patient presents with     Consult     Right shoulder pain       PCP: No Ref-Primary, Physician    ?  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  Work status?  Full time.    Date of injury: No  Type of injury: no.    Date of surgery: No  Type of surgery: no.    Smoker: No  Request smoking cessation information: No    Right hand dominant    SANE score  Affected shoulder: Right  Right shoulder SANE: 60  Left shoulder SANE: 100    Ht 1.88 m (6' 2.02\")   Wt 89.4 kg (197 lb)   BMI 25.28 kg/m        Pain Assessment  Patient Currently in Pain: Yes  0-10 Pain Scale: 3  Primary Pain Location: Shoulder (right)    Munira Paiz LPN      "

## 2021-10-18 NOTE — LETTER
10/18/2021         RE: Sabino Cardona  [address]  [place] [address]        Dear Colleague,    Thank you for referring your patient, Sabino Cardona, to the [facility] [place]. Please see a copy of my visit note below.    [facility]  Orthopaedic Surgery Clinic Note  10/18/2021     Chief Complaint:  Right shoulder pain    History of Present Illness:   Sabino Cardona is a 46 year old male who presents for evaluation of right shoulder pain. Patient states that he has had pain over the past 2 to 3 months without any injury or inciting event.  Is mostly localized to the anterior aspect of his shoulder.  He does have pain that awakens him at night.  He also has subjective weakness.  He has not had any formal therapy or injections.  He has been doing some at home exercises.  His pain is better with activity modification and limiting his workload.    Allergies:   Nkda [no known drug allergies] and Seasonal allergies    Current Medications:   has a current medication list which includes the following prescription(s): celecoxib, multiple vitamin, sulfasalazine er, and vitamin d3.    Past Medical History/Past Surgical History:    has a past medical history of GERD (gastroesophageal reflux disease).     has a past surgical history that includes hernia repair, inguinal rt/lt and appendectomy.    Social History:   Tobacco: none  Occupation: contractor  Activity: fishing    Review of Systems:   A 15-point review of systems obtained and is significant as mentioned above. The patient has no prior history of diabetes, DVT, PE or other clotting disorders.     Family History:   The patient's family history includes Asthma in his father and sister; Cancer in his maternal grandfather and paternal grandfather; Diabetes in his father and maternal grandmother; Hypertension in his father, maternal grandfather, maternal grandmother, and mother; Psoriasis in his father.    The  General Medical History Form was reviewed with the patient, updated, and signed; this is located in [facility] in Epic. This will act as supplement to this note    Physical Exam:  General: A&O, NAD  HEENT: NC/AT, EOMI  Cardiovascular: normal rate  Pulmonary: unlabored respirations  Neuro: motor/sensory grossly intact  Psych: appropriate mood and affect  Right upper extremity and shoulder exam  - No signs of swelling, no previous incisions, no noticeable atrophy  - Tenderness to palpation at anterior aspect over biceps tendon  - ROM active forward elevation 170, external rotation 60, internal rotation L1; passive ROM same  - Strength forward elevation 5/5, external rotation 5/5, internal rotation 5/5  - Archer negative, Maria Del Carmen negative, Obriens positive, Speeds positive, Yergasons positive, Cross body adduction positive  - Lift off negative, Belly press negative  - Anterior apprehension negative  - Neurovascularly intact to axillary/ulnar/radial/median nerve distributions  - Brisk cap refill  - SANE score 60    Imaging:  Radiographs:  XR AP, grashey, scapular Y, and axillary views were ordered  Indication: Right shoulder pain  Impression: All views reviewed and demonstrate no acute osseous abnormality and no degenerative changes of the glenohumeral joint and mild degenerative changes of the acromioclavicular joint. Glenohumeral joint concentric and reduced on axillary view. Partially visualized lung fields are clear.     Images independently ordered, reviewed, and the results were discussed with the patient today.     Assessment:   46 year old male with right shoulder pain     Plan:  We discussed the clinical and radiographic findings.  We discussed the diagnosis and management options.  At this time, he would like to proceed with conservative treatment.  We will provide a referral for physical therapy at this time.  If he continues to have issues in 3 months we will have him follow-up and likely obtain an MRI at that  time.  All questions were answered.    I spent a total of 40 minutes during this encounter, 30 minutes of which was dedicated to counseling, medical decision making, and answering of questions.     Roosevelt Mcclain MD  Sports Medicine & Shoulder Fellow  [facility]        I saw the patient with the resident or fellow.  I agree with the resident or fellow note and plan of care.      Jack Molina MD

## 2021-10-18 NOTE — PROGRESS NOTES
I saw the patient with the resident or fellow.  I agree with the resident or fellow note and plan of care.      Jack Molina MD

## 2021-10-19 ENCOUNTER — TELEPHONE (OUTPATIENT)
Dept: OTOLARYNGOLOGY | Facility: CLINIC | Age: 46
End: 2021-10-19

## 2021-10-19 NOTE — TELEPHONE ENCOUNTER
Called patient at [phone] and left a voicemail to call back regarding scheduling with Dr. Dailey.

## 2021-10-20 ENCOUNTER — TELEPHONE (OUTPATIENT)
Dept: OTOLARYNGOLOGY | Facility: CLINIC | Age: 46
End: 2021-10-20

## 2021-10-20 NOTE — TELEPHONE ENCOUNTER
FUTURE VISIT INFORMATION      SURGERY INFORMATION:    11/8 - Dr. Dailey    Consult: ov 8/31/21    RECORDS REQUESTED FROM:       Pertinent Medical History: None

## 2021-10-23 ENCOUNTER — HOSPITAL ENCOUNTER (OUTPATIENT)
Facility: AMBULATORY SURGERY CENTER | Age: 46
End: 2021-10-23
Attending: OTOLARYNGOLOGY
Payer: COMMERCIAL

## 2021-10-23 DIAGNOSIS — K13.0 MUCOCELE OF LIP: ICD-10-CM

## 2021-10-25 DIAGNOSIS — Z11.59 ENCOUNTER FOR SCREENING FOR OTHER VIRAL DISEASES: ICD-10-CM

## 2021-10-29 DIAGNOSIS — R76.8 POSITIVE DOUBLE STRANDED DNA ANTIBODY TEST: Primary | ICD-10-CM

## 2021-10-29 RX ORDER — SULFASALAZINE 500 MG/1
1500 TABLET ORAL 2 TIMES DAILY
Qty: 540 TABLET | Refills: 1 | Status: SHIPPED | OUTPATIENT
Start: 2021-10-29 | End: 2022-06-07

## 2021-10-29 NOTE — TELEPHONE ENCOUNTER
Sulfasalazine      Last Written Prescription Date:  6/28/21  Last Fill Quantity: 540,   # refills: 1  Last Office Visit: 6/28/21  Future Office visit:  None    CBC RESULTS: Recent Labs   Lab Test 01/18/21  1715   WBC 5.2   RBC 4.68   HGB 14.7   HCT 42.7   MCV 91   MCH 31.4   MCHC 34.4   RDW 11.9          Creatinine   Date Value Ref Range Status   01/18/2021 0.86 0.66 - 1.25 mg/dL Final   ]    Liver Function Studies -   Recent Labs   Lab Test 01/18/21  1715   ALBUMIN 4.3   AST 23   ALT 42       Routing refill request to provider for review/approval because:  Provider review and signature needed. My chart message sent to pt asking him to complete labs    Adeline Piña RN  Rheumatology Clinic

## 2021-11-02 NOTE — PATIENT INSTRUCTIONS
Preparing for Your Surgery      Name:  Sabino Cardona   MRN:  [id number]   :  1975   Today's Date:  2021       Arriving for surgery:  Surgery date:  21  Arrival time:  11:20 am    Restrictions due to COVID 19:       One visitor is allowed in the Pre Op area.       When you go into surgery, one visitor is allowed to wait in the Surgery Waiting Room       (provided there is enough space to social distance).         In hospital patients are allowed 1 visitor per day       The visitor may change daily     Visiting Hours: 8 am - 8:30 pm   No ill visitors.   All visitors must wear face mask.    [employer] parking is available for anyone with mobility limitations or disabilities.  ([place]  24 hours/ 7 days a week; [place]  7 am- 3:30 pm, Mon- Fri)    Please come to:   [facility] [address] [address]  -  Proceed to the 5th floor to check into the Ambulatory Surgery Center.              >> There will be patient concierges on the 1st and 5th floor, for assistance or an escort, if you would like.              >> Please call [phone] with any questions.    What can I eat or drink?  -  You may eat and drink normally for up to 8 hours before your surgery.   -  You may have clear liquids until 4 hours before surgery.    Examples of clear liquids:  Water  Clear broth  Juices (apple, white grape, white cranberry  and cider) without pulp  Noncarbonated, powder based beverages  (lemonade and Mayo-Aid)  Sodas (Sprite, 7-Up, ginger ale and seltzer)  Coffee or tea (without milk or cream)  Gatorade    -  No Alcohol for at least 24 hours before surgery     Which medicines can I take?  Hold Aspirin for 7 days before surgery.   Hold Multivitamins for 7 days before surgery.  Hold Supplements for 7 days before surgery.  Hold Ibuprofen (Advil, Motrin) for 1 day before surgery--unless otherwise directed by surgeon.  Hold Naproxen (Aleve) for 4  days before surgery.  Hold celebrex x 3 days before surgery.  -  PLEASE TAKE these medications the day of surgery:  Tylenol if needed.    How do I prepare myself?  - Please take 2 showers before surgery using Scrubcare or Hibiclens soap.    Use this soap only from the neck to your toes.     Leave the soap on your skin for one minute--then rinse thoroughly.      You may use your own shampoo and conditioner; no other hair products.   - Please remove all jewelry and body piercings.  - No lotions, deodorants or fragrance.  - No makeup or fingernail polish.   - Bring your ID and insurance card.    -If you have a Deep Brain Stimulator, Spinal Cord Stimulator or any neuro stimulator device---you must bring the remote control to the hospital     - All patients are required to have a Covid-19 test within 4 days of surgery/procedure.      -Patients will be contacted by the [facility] scheduling team within 1 week of surgery to make an appointment.      - Patients may call the Scheduling team at [phone] if they have not been scheduled within 4 days of  surgery.      ALL PATIENTS GOING HOME THE SAME DAY OF SURGERY ARE REQUIRED TO HAVE A RESPONSIBLE ADULT TO DRIVE AND BE IN ATTENDANCE WITH THEM FOR 24 HOURS FOLLOWING SURGERY.      Questions or Concerns:    - For any questions regarding the day of surgery or your hospital stay, please contact the Pre Admission Nursing Office at [phone].       - If you have health changes between today and your surgery please call your surgeon.       For questions after surgery please call your surgeons office.

## 2021-11-03 ENCOUNTER — VIRTUAL VISIT (OUTPATIENT)
Dept: SURGERY | Facility: CLINIC | Age: 46
End: 2021-11-03
Payer: COMMERCIAL

## 2021-11-03 ENCOUNTER — PRE VISIT (OUTPATIENT)
Dept: SURGERY | Facility: CLINIC | Age: 46
End: 2021-11-03

## 2021-11-03 ENCOUNTER — ANESTHESIA EVENT (OUTPATIENT)
Dept: SURGERY | Facility: AMBULATORY SURGERY CENTER | Age: 46
End: 2021-11-03

## 2021-11-03 ENCOUNTER — PREP FOR PROCEDURE (OUTPATIENT)
Dept: OTOLARYNGOLOGY | Facility: CLINIC | Age: 46
End: 2021-11-03

## 2021-11-03 DIAGNOSIS — Z01.818 PREOP EXAMINATION: Primary | ICD-10-CM

## 2021-11-03 PROCEDURE — 99203 OFFICE O/P NEW LOW 30 MIN: CPT | Mod: 95 | Performed by: PHYSICIAN ASSISTANT

## 2021-11-03 ASSESSMENT — PAIN SCALES - GENERAL: PAINLEVEL: NO PAIN (0)

## 2021-11-03 ASSESSMENT — LIFESTYLE VARIABLES: TOBACCO_USE: 0

## 2021-11-03 ASSESSMENT — ENCOUNTER SYMPTOMS: SEIZURES: 0

## 2021-11-03 NOTE — H&P
Pre-Operative H & P     CC:  Preoperative exam to assess for increased cardiopulmonary risk while undergoing surgery and anesthesia.    Date of Encounter: 11/3/2021  Primary Care Physician:  No Ref-Primary, Physician     Reason for Visit: Mucocele of lip     Video-Visit Details    Type of service:  Video Visit  * visit switched to phone due to video freezing intermittently and sporadic audio.    Patient verbally consented to video service today: YES    Video Start Time: 0920  Video End Time (time video stopped): 0934    Originating Location (pt. Location): Other place of employment    Distant Location (provider location):  [facility] PREOPERATIVE [facility]     Mode of Communication:  Video Conference via [employer]  Sabino Cardona is a 47 y/o male who presents for pre-operative H&P in preparation for excision of mucocele of the right lip with Efrain Dailey MD on 11/8/21 at [facility] for treatment of Mucocele of lip.      Mr. Cardona has a history of a large mucocele of the right lip. He initially presented with a 1 cm mucocele on 03/23/2021. He underwent mucocele resection, additional salivary gland tissue resection, and a biopsy on 04/06/2021. His pathology was negative at that time. On 06/29/2021, the patient was found to have a secondary residual mucocele in the anterior lip, which was resected on 8/31/21. His mucocele has returned on his lip. He now presents for the above procedure.       PMH is also significant for spondyloarthropathy (followed by Dr. Luna) and GERD.       History was obtained from patient & chart review.       Hx of abnormal bleeding or anti-platelet use: denies    Menstrual history: No LMP for male patient.:      Prior to Admission Medications  Current Outpatient Medications   Medication Sig Dispense Refill     celecoxib (CELEBREX) 100 MG capsule TAKE ONE CAPSULE BY MOUTH TWICE DAILY AS NEEDED FOR PAIN (Patient taking differently:  daily as needed ) 60 capsule 3     Multiple Vitamin (MULTI VITAMIN MENS PO) Take 1 tablet by mouth every morning        sulfaSALAzine (AZULFIDINE) 500 MG tablet Take 3 tablets (1,500 mg) by mouth 2 times daily 540 tablet 1     Vitamin D3 (CHOLECALCIFEROL) 25 mcg (1000 units) tablet Take by mouth every morning          Family History  Family History   Problem Relation Age of Onset     Hypertension Mother      Hypertension Father      Asthma Father      Diabetes Father      Psoriasis Father      Asthma Sister      Hypertension Maternal Grandmother         RA     Diabetes Maternal Grandmother      Hypertension Maternal Grandfather      Cancer Maternal Grandfather         stomach     Cancer Paternal Grandfather         lung-smoker     Anesthesia Reaction No family hx of      Deep Vein Thrombosis (DVT) No family hx of        The complete review of systems is negative other than noted in the HPI or here.       Anesthesia Pre-Procedure Evaluation    Patient: Sabino Cardona   MRN: [id number] : 1975        Preoperative Diagnosis: Mucocele of lip [K13.0]    Procedure : Procedure(s):  excision of mucocele of the right lip  Video Visit       Past Medical History:   Diagnosis Date     Biceps tendinopathy      GERD (gastroesophageal reflux disease)      Mucocele of lip      Spondyloarthropathy       Past Surgical History:   Procedure Laterality Date     APPENDECTOMY       HERNIA REPAIR, INGUINAL RT/LT      left      Allergies   Allergen Reactions     Nkda [No Known Drug Allergies]      Seasonal Allergies Other (See Comments)     Sneezing, running nose, itchy eyes      Social History     Tobacco Use     Smoking status: Never Smoker     Smokeless tobacco: Former User     Types: Chew     Tobacco comment: chew 9 yr   Substance Use Topics     Alcohol use: Yes     Alcohol/week: 1.7 standard drinks     Types: 2 Standard drinks or equivalent per week      Wt Readings from Last 1 Encounters:   10/18/21 89.4 kg (197 lb)           ROS/MED HX  The complete review of systems is negative other than noted in the HPI or here.  Patient denies recent illness, fever and respiratory infection during past month.  Pt denies steroid use during past year.    ENT/Pulmonary:  - neg pulmonary ROS  (-) tobacco use, asthma and sleep apnea   Neurologic: Comment: Takes ibuprofen, tylenol migraine prn    (+) no peripheral neuropathy migraines,  (-) no seizures and no CVA   Cardiovascular:  - neg cardiovascular ROS     METS/Exercise Tolerance: >4 METS    Hematologic:  - neg hematologic  ROS  (-) history of blood clots and history of blood transfusion   Musculoskeletal: Comment: Spondyloarthropathy - followed by Dr. Luna. Taking sulfasalazine & celebrex.    Biceps tendinopathy        GI/Hepatic:     (+) GERD, Asymptomatic on medication,  (-) liver disease   Renal/Genitourinary:  - neg Renal ROS  (-) renal disease   Endo:  - neg endo ROS  (-) Type II DM   Psychiatric/Substance Use:  - neg psychiatric ROS     Infectious Disease:  - neg infectious disease ROS     Malignancy:  - neg malignancy ROS     Other:  - neg other ROS              PAC Discussion and Assessment    ASA Classification: 2  Case is suitable for: ASC      Virtual visit -  No vitals were obtained       Physical Exam  Constitutional: Awake, alert, cooperative, no apparent distress, and appears stated age.  Neurologic: Awake, alert, oriented to name, place and time.   Neuropsychiatric: Calm, cooperative. Normal affect. Answers questions appropriately.    ** Patient's visit was done virtually today.  A full physical exam was not completed.  Please refer to the physical examination documented by the anesthesiologist in the anesthesia record on the day of surgery. **        PRIOR LABS/DIAGNOSTIC STUDIES:   All labs and imaging personally reviewed     CBC:   Lab Results   Component Value Date    WBC 5.2 01/18/2021    WBC 6.4 07/01/2020    HGB 14.7 01/18/2021    HGB 15.0 07/01/2020    HCT 42.7  01/18/2021    HCT 42.6 07/01/2020     01/18/2021     07/01/2020     BMP:   Lab Results   Component Value Date     08/03/2012    POTASSIUM 4.4 08/03/2012    CHLORIDE 104 08/03/2012    CO2 26 08/03/2012    BUN 17 08/03/2012    CR 0.86 01/18/2021    CR 0.97 07/01/2020    GLC 88 07/08/2019    GLC 89 08/03/2012     COAGS: No results found for: PTT, INR, FIBR  POC: No results found for: BGM, HCG, HCGS  HEPATIC:   Lab Results   Component Value Date    ALBUMIN 4.3 01/18/2021    PROTTOTAL 7.1 08/03/2012    ALT 42 01/18/2021    AST 23 01/18/2021    ALKPHOS 83 08/03/2012    BILITOTAL 0.6 08/03/2012     OTHER:   Lab Results   Component Value Date    REGIS 8.5 08/03/2012    CRP <2.9 01/17/2020    SED 8 01/17/2020       The patient's records and results personally reviewed by this provider.       COVID19 testing to be completed within 4 days of DOS      ASSESSMENT and PLAN  Sabino Cardona is a 47 y/o male who presents for pre-operative H&P in preparation for excision of mucocele of the right lip with Efrain Dailey MD on 11/8/21 at [facility] for treatment of Mucocele of lip.     Pt has had prior anesthetic.  No history of anesthetic complications.      He has the following specific operative considerations:   # JEFERSON 1/8 = low risk  # VTE risk: 0.26%  # Risk of PONV score = 1.  If > 2, anti-emetic intervention recommended.  # Anesthesia considerations:  Refer to PAC assessment in anesthesia records      CARDIAC: METS >4      # RCRI : No serious cardiac risks.  0.4% risk of major adverse cardiac event.       PULMONARY:     # Former tobacco chewer x9 yrs, quit 2010    # Never smoked    # Denies asthma or inhaler use    GI:     # GERD, asymptomatic on pepcid       ENDO: BMI 25    # No DM    ORTHO:     # Spondyloarthropathy - followed by Dr. Luna. Taking sulfasalazine & celebrex.    # Biceps tendinopathy      Patient is optimized and is acceptable candidate for the proposed procedure. No  further diagnostic evaluation is needed.    ** Patient's visit was done virtually today.  A full physical exam was not completed.  Please refer to the physical examination documented by the anesthesiologist in the anesthesia record on the day of surgery. **    Final plan per anesthesiologist on day of surgery.     Arrival time, NPO, shower and medication instructions provided by nursing staff today.  Preparing For Your Surgery handout given.        On the day of service:     Prep time: 12 minutes  Visit time: 12 minutes  Documentation time: 10 minutes  ------------------------------------------  Total time: 34 minutes      Annita Cortez PA-C  Preoperative Assessment Center  [facility]  Phone: [phone]  Fax: [phone]

## 2021-11-03 NOTE — ANESTHESIA PREPROCEDURE EVALUATION
Anesthesia Pre-Procedure Evaluation    Patient: Sabino Cardona   MRN: [id number] : 1975        Preoperative Diagnosis: Mucocele of lip [K13.0]    Procedure : Procedure(s):  excision of mucocele of the right lip  Video Visit       Past Medical History:   Diagnosis Date     Biceps tendinopathy      GERD (gastroesophageal reflux disease)      Mucocele of lip      Spondyloarthropathy       Past Surgical History:   Procedure Laterality Date     APPENDECTOMY       HERNIA REPAIR, INGUINAL RT/LT      left      Allergies   Allergen Reactions     Nkda [No Known Drug Allergies]      Seasonal Allergies Other (See Comments)     Sneezing, running nose, itchy eyes      Social History     Tobacco Use     Smoking status: Never Smoker     Smokeless tobacco: Former User     Types: Chew     Tobacco comment: chew 9 yr   Substance Use Topics     Alcohol use: Yes     Alcohol/week: 1.7 standard drinks     Types: 2 Standard drinks or equivalent per week      Wt Readings from Last 1 Encounters:   10/18/21 89.4 kg (197 lb)        Anesthesia Evaluation   Pt has had prior anesthetic.     No history of anesthetic complications       ROS/MED HX  ENT/Pulmonary:  - neg pulmonary ROS  (-) tobacco use, asthma and sleep apnea   Neurologic: Comment: Takes ibuprofen, tylenol migraine prn    (+) no peripheral neuropathy migraines,  (-) no seizures and no CVA   Cardiovascular:  - neg cardiovascular ROS     METS/Exercise Tolerance: >4 METS    Hematologic:  - neg hematologic  ROS  (-) history of blood clots and history of blood transfusion   Musculoskeletal: Comment: Spondyloarthropathy - followed by Dr. Luna. Taking sulfasalazine & celebrex.    Biceps tendinopathy        GI/Hepatic:     (+) GERD, Asymptomatic on medication,  (-) liver disease   Renal/Genitourinary:  - neg Renal ROS  (-) renal disease   Endo:  - neg endo ROS  (-) Type II DM   Psychiatric/Substance Use:  - neg psychiatric ROS     Infectious Disease:  - neg infectious disease  ROS     Malignancy:  - neg malignancy ROS     Other:  - neg other ROS             OUTSIDE LABS:  CBC:   Lab Results   Component Value Date    WBC 5.2 01/18/2021    WBC 6.4 07/01/2020    HGB 14.7 01/18/2021    HGB 15.0 07/01/2020    HCT 42.7 01/18/2021    HCT 42.6 07/01/2020     01/18/2021     07/01/2020     BMP:   Lab Results   Component Value Date     08/03/2012    POTASSIUM 4.4 08/03/2012    CHLORIDE 104 08/03/2012    CO2 26 08/03/2012    BUN 17 08/03/2012    CR 0.86 01/18/2021    CR 0.97 07/01/2020    GLC 88 07/08/2019    GLC 89 08/03/2012     COAGS: No results found for: PTT, INR, FIBR  POC: No results found for: BGM, HCG, HCGS  HEPATIC:   Lab Results   Component Value Date    ALBUMIN 4.3 01/18/2021    PROTTOTAL 7.1 08/03/2012    ALT 42 01/18/2021    AST 23 01/18/2021    ALKPHOS 83 08/03/2012    BILITOTAL 0.6 08/03/2012     OTHER:   Lab Results   Component Value Date    REGIS 8.5 08/03/2012    CRP <2.9 01/17/2020    SED 8 01/17/2020             PAC Discussion and Assessment    ASA Classification: 2  Case is suitable for: ASC                    PAC Resident/NP Anesthesia Assessment: Sabino Cardona is a 45 y/o male who presents for pre-operative H&P in preparation for excision of mucocele of the right lip with Efrain Dailey MD on 11/8/21 at [facility] for treatment of Mucocele of lip.     Pt has had prior anesthetic.  No history of anesthetic complications.      He has the following specific operative considerations:   # JEFERSON 1/8 = low risk  # VTE risk: 0.26%  # Risk of PONV score = 1.  If > 2, anti-emetic intervention recommended.  # Anesthesia considerations:  Refer to PAC assessment in anesthesia records      CARDIAC: METS >4      # RCRI : No serious cardiac risks.  0.4% risk of major adverse cardiac event.       PULMONARY:     # Former tobacco chewer x9 yrs, quit 2010    # Never smoked    # Denies asthma or inhaler use    GI:     # GERD, asymptomatic on pepcid        ENDO: BMI 25    # No DM    ORTHO:     # Spondyloarthropathy - followed by Dr. Luna. Taking sulfasalazine & celebrex.    # Biceps tendinopathy      Patient is optimized and is acceptable candidate for the proposed procedure. No further diagnostic evaluation is needed.    ** Patient's visit was done virtually today.  A full physical exam was not completed.  Please refer to the physical examination documented by the anesthesiologist in the anesthesia record on the day of surgery. **    Final plan per anesthesiologist on day of surgery.     Reviewed and Signed by PAC Mid-Level Provider/Resident  Mid-Level Provider/Resident: Annita Cortez PA-C  Date: 11/3/21  Time: 1002                               Annita Cortez PA-C

## 2021-11-03 NOTE — PROGRESS NOTES
Emerson is a 46 year old who is being evaluated via a billable video visit.      How would you like to obtain your AVS? MyChart  HPI         Review of Systems             Physical Exam     CHRISTINA Ortiz LPN

## 2021-11-05 RX ORDER — LIDOCAINE 40 MG/G
CREAM TOPICAL
Status: CANCELLED | OUTPATIENT
Start: 2021-11-05

## 2021-11-05 RX ORDER — ONDANSETRON 2 MG/ML
4 INJECTION INTRAMUSCULAR; INTRAVENOUS EVERY 30 MIN PRN
Status: CANCELLED | OUTPATIENT
Start: 2021-11-05

## 2021-11-05 RX ORDER — SODIUM CHLORIDE, SODIUM LACTATE, POTASSIUM CHLORIDE, CALCIUM CHLORIDE 600; 310; 30; 20 MG/100ML; MG/100ML; MG/100ML; MG/100ML
INJECTION, SOLUTION INTRAVENOUS CONTINUOUS
Status: CANCELLED | OUTPATIENT
Start: 2021-11-05

## 2021-11-05 RX ORDER — ONDANSETRON 4 MG/1
4 TABLET, ORALLY DISINTEGRATING ORAL EVERY 30 MIN PRN
Status: CANCELLED | OUTPATIENT
Start: 2021-11-05

## 2021-11-05 RX ORDER — OXYCODONE HYDROCHLORIDE 5 MG/1
5 TABLET ORAL EVERY 4 HOURS PRN
Status: CANCELLED | OUTPATIENT
Start: 2021-11-05

## 2021-11-05 RX ORDER — FENTANYL CITRATE 50 UG/ML
25 INJECTION, SOLUTION INTRAMUSCULAR; INTRAVENOUS EVERY 5 MIN PRN
Status: CANCELLED | OUTPATIENT
Start: 2021-11-05

## 2021-11-05 RX ORDER — MEPERIDINE HYDROCHLORIDE 25 MG/ML
12.5 INJECTION INTRAMUSCULAR; INTRAVENOUS; SUBCUTANEOUS
Status: CANCELLED | OUTPATIENT
Start: 2021-11-05

## 2021-11-05 RX ORDER — ACETAMINOPHEN 325 MG/1
975 TABLET ORAL ONCE
Status: CANCELLED | OUTPATIENT
Start: 2021-11-05 | End: 2021-11-05

## 2021-11-08 ENCOUNTER — ANESTHESIA (OUTPATIENT)
Dept: SURGERY | Facility: AMBULATORY SURGERY CENTER | Age: 46
End: 2021-11-08

## 2021-11-15 ASSESSMENT — ENCOUNTER SYMPTOMS
JOINT SWELLING: 1
BACK PAIN: 1
HEARTBURN: 1

## 2021-11-16 ENCOUNTER — THERAPY VISIT (OUTPATIENT)
Dept: PHYSICAL THERAPY | Facility: CLINIC | Age: 46
End: 2021-11-16
Payer: COMMERCIAL

## 2021-11-16 ENCOUNTER — OFFICE VISIT (OUTPATIENT)
Dept: INTERNAL MEDICINE | Facility: CLINIC | Age: 46
End: 2021-11-16
Payer: COMMERCIAL

## 2021-11-16 VITALS
OXYGEN SATURATION: 97 % | WEIGHT: 196 LBS | DIASTOLIC BLOOD PRESSURE: 90 MMHG | BODY MASS INDEX: 25.15 KG/M2 | HEART RATE: 80 BPM | HEIGHT: 74 IN | RESPIRATION RATE: 16 BRPM | SYSTOLIC BLOOD PRESSURE: 143 MMHG

## 2021-11-16 DIAGNOSIS — M25.511 CHRONIC RIGHT SHOULDER PAIN: ICD-10-CM

## 2021-11-16 DIAGNOSIS — Z13.220 SCREENING CHOLESTEROL LEVEL: Primary | ICD-10-CM

## 2021-11-16 DIAGNOSIS — Z23 NEED FOR TDAP VACCINATION: ICD-10-CM

## 2021-11-16 DIAGNOSIS — G89.29 CHRONIC RIGHT SHOULDER PAIN: ICD-10-CM

## 2021-11-16 PROCEDURE — 90715 TDAP VACCINE 7 YRS/> IM: CPT | Performed by: INTERNAL MEDICINE

## 2021-11-16 PROCEDURE — 99396 PREV VISIT EST AGE 40-64: CPT | Mod: 25 | Performed by: INTERNAL MEDICINE

## 2021-11-16 PROCEDURE — 97161 PT EVAL LOW COMPLEX 20 MIN: CPT | Mod: GP | Performed by: PHYSICAL THERAPIST

## 2021-11-16 PROCEDURE — 97110 THERAPEUTIC EXERCISES: CPT | Mod: GP | Performed by: PHYSICAL THERAPIST

## 2021-11-16 PROCEDURE — 90471 IMMUNIZATION ADMIN: CPT | Performed by: INTERNAL MEDICINE

## 2021-11-16 ASSESSMENT — PAIN SCALES - GENERAL: PAINLEVEL: NO PAIN (0)

## 2021-11-16 ASSESSMENT — MIFFLIN-ST. JEOR: SCORE: 1828.8

## 2021-11-16 NOTE — NURSING NOTE
Sabino DILL Jonathan Hernandez is a 46 year old male patient that presents today in clinic for the following:    Chief Complaint   Patient presents with     Physical     No specific concerns     The patient's allergies and medications were reviewed as noted. A set of vitals were recorded as noted without incident. The patient does not have any other questions for the provider.    Carmen Lennon, EMT at 8:17 AM on 11/16/2021

## 2021-11-16 NOTE — PROGRESS NOTES
Physical Therapy Initial Evaluation  Subjective:  The history is provided by the patient. No  was used.   Therapist Generated HPI Evaluation  Problem details: Pt reports insidious onset of R shoulder pain about 2-3 months ago (August 2021). The most noticeable symptoms are when it affects his ability to sleep. also has pain with certain movements. Referred to PT. .         Type of problem:  Right shoulder.    This is a new condition.  Condition occurred with:  Unknown cause.  Where condition occurred: for unknown reasons.  Patient reports pain:  Anterior.  Pain is described as sharp and is intermittent.  Pain is the same all the time.  Since onset symptoms are unchanged.  Symptoms are exacerbated by lying on extremity, using arm behind back and using arm overhead  and relieved by rest.  Special tests included:  X-ray (mild AC jt DJD).    Restrictions due to condition include:  Working in normal job without restrictions.  Barriers include:  None as reported by patient.    Patient Health History         Pain is reported as 3/10 on pain scale.  General health as reported by patient is good.  Pertinent medical history includes: none.   Red flags:  None as reported by patient.  Medical allergies: none.   Surgeries include:  Other (appendix, hernia).    Current medications:  Other (sulfasalazine).    Current occupation is contractor.   Primary job tasks include:  Prolonged sitting and lifting/carrying.                                    Objective:  Standing Alignment:      Shoulder/UE:  Normal                                       Shoulder Evaluation:  ROM:  AROM:    Flexion:  Left:  WNL    Right:  WNL with ERP    Abduction:  Left: WNL   Right:  Min loss with ERP/PDM                Flexion/External Rotation:  Left:  T4    Right:  T7  Extension/Internal Rotation:  Left:  T5    Right:  T9          Strength:    Flexion: Left:5/5   Pain:    Right: 5-/5      Pain:  +    Abduction:  Left: 5/5  Pain:     Right: 5/5      Pain:+    Internal Rotation:  Left:5/5     Pain:    Right: 5/5     Pain:  External Rotation:   Left:5/5     Pain:   Right:5/5     Pain:        Elbow Flexion:  Left:5/5     Pain:    Right:5/5     Pain:  Elbow Extension:  Left:5/5     Pain:    Right:5/5     Pain:      Palpation:  not assessed                                         Tiffany Cervical Evaluation      Movement Loss:    Flexion (Flex): nil    Extension (EXT): min  Lateral Flexion Right (LF R): min  Lateral Flexion Left (LF L): min  Rotation Right (ROT R): nil  Rotation Left (ROT L): nil                                                 ROS    Assessment/Plan:    Patient is a 46 year old male with right side shoulder complaints.    Patient has the following significant findings with corresponding treatment plan.                Diagnosis 1:  R shoulder impingement/derangement  Pain -  manual therapy, self management, education, directional preference exercise and home program  Decreased ROM/flexibility - manual therapy, therapeutic exercise, therapeutic activity and home program  Decreased joint mobility - manual therapy, therapeutic exercise, therapeutic activity and home program  Decreased strength - therapeutic exercise, therapeutic activities and home program  Inflammation - self management/home program  Decreased function - therapeutic activities and home program      Cumulative Therapy Evaluation is: Low complexity.    Previous and current functional limitations:  (See Goal Flow Sheet for this information)    Short term and Long term goals: (See Goal Flow Sheet for this information)     Communication ability:  Patient appears to be able to clearly communicate and understand verbal and written communication and follow directions correctly.  Treatment Explanation - The following has been discussed with the patient:   RX ordered/plan of care  Anticipated outcomes  Possible risks and side effects  This patient would benefit from PT  intervention to resume normal activities.   Rehab potential is excellent.    Frequency:  1 X week, once daily  Duration:  for 6 weeks  Discharge Plan:  Achieve all LTG.  Independent in home treatment program.  Reach maximal therapeutic benefit.    Please refer to the daily flowsheet for treatment today, total treatment time and time spent performing 1:1 timed codes.

## 2021-11-29 ENCOUNTER — THERAPY VISIT (OUTPATIENT)
Dept: PHYSICAL THERAPY | Facility: CLINIC | Age: 46
End: 2021-11-29
Payer: COMMERCIAL

## 2021-11-29 DIAGNOSIS — M25.511 CHRONIC RIGHT SHOULDER PAIN: ICD-10-CM

## 2021-11-29 DIAGNOSIS — G89.29 CHRONIC RIGHT SHOULDER PAIN: ICD-10-CM

## 2021-11-29 PROCEDURE — 97140 MANUAL THERAPY 1/> REGIONS: CPT | Mod: GP | Performed by: PHYSICAL THERAPIST

## 2021-11-29 PROCEDURE — 97110 THERAPEUTIC EXERCISES: CPT | Mod: GP | Performed by: PHYSICAL THERAPIST

## 2021-11-29 NOTE — PROGRESS NOTES
Subjective:  HPI  Physical Exam                    Objective:  System    Physical Exam    General     ROS    Assessment/Plan:    SUBJECTIVE  Subjective changes as noted by pt:  The shoulder extension made the symptoms worse within three days. Has tried some different stretches on his own, feels a little more mobility.       Current pain level: 3/10     Changes in function:  None  Adverse reaction to treatment or activity:  None    OBJECTIVE  Changes in objective findings:  Yes, PDM R shoulder abduction. Min loss ext/IR with ERP.         ASSESSMENT  Sabino continues to require intervention to meet STG and LTG's: PT  Patient has experienced an exacerbation of symptoms.  Response to therapy has shown a worsening of  pain level  Progress made towards STG/LTG?  None    PLAN  Current treatment program is being advanced to more complex exercises.    PTA/ATC plan:  N/A    Please refer to the daily flowsheet for treatment today, total treatment time and time spent performing 1:1 timed codes.

## 2021-12-03 ENCOUNTER — TELEPHONE (OUTPATIENT)
Dept: GASTROENTEROLOGY | Facility: CLINIC | Age: 46
End: 2021-12-03

## 2021-12-03 ENCOUNTER — THERAPY VISIT (OUTPATIENT)
Dept: PHYSICAL THERAPY | Facility: CLINIC | Age: 46
End: 2021-12-03
Payer: COMMERCIAL

## 2021-12-03 DIAGNOSIS — M25.511 CHRONIC RIGHT SHOULDER PAIN: ICD-10-CM

## 2021-12-03 DIAGNOSIS — Z11.59 ENCOUNTER FOR SCREENING FOR OTHER VIRAL DISEASES: ICD-10-CM

## 2021-12-03 DIAGNOSIS — G89.29 CHRONIC RIGHT SHOULDER PAIN: ICD-10-CM

## 2021-12-03 PROCEDURE — 97110 THERAPEUTIC EXERCISES: CPT | Mod: GP | Performed by: PHYSICAL THERAPIST

## 2021-12-03 PROCEDURE — 97112 NEUROMUSCULAR REEDUCATION: CPT | Mod: GP | Performed by: PHYSICAL THERAPIST

## 2021-12-03 PROCEDURE — 97140 MANUAL THERAPY 1/> REGIONS: CPT | Mod: GP | Performed by: PHYSICAL THERAPIST

## 2021-12-03 NOTE — TELEPHONE ENCOUNTER
Screening Questions  1. Are you active on mychart? Y    2. What insurance is in the chart? [employer]    2.  Ordering/Referring Provider: Huan Das    3. BMI 24.9, If greater than 40 review exclusion criteria    4.  Respiratory Screening (If yes to any of the following Hospital setting only):     Do you use daily home oxygen? N  Do you have mod to severe Obstructive Sleep Apnea? N   Do you have Pulmonary Hypertension? N   Do you have UNCONTROLLED asthma? N    5. Have you had a heart or lung transplant (If yes, please review exclusion criteria) ? N    6. Are you currently on dialysis or have chronic kidney disease? N    7. Have you had a stroke or Transient ischemic attack (TIA) within 6 months? N    8. In the past 6 months, have you had any heart related issues including cardiomyopathy or heart attack? N                 If yes, did it require cardiac stenting or other implantable device?N      9. Do you have any implantable devices in your body (pacemaker, defib, LVAD)? N    10. Do you take nitroglycerin? If yes, how often? N    11. Are you currently taking any blood thinners?N    12. Are you a diabetic? N    13. (Females) Are you currently pregnant?   If yes, how many weeks?      15. Are you taking any prescription pain medications on a routine schedule? N If yes, MAC sedation.    16. Do you have any chemical dependencies such as alcohol, street drugs, or methadone? N If yes, MAC sedation.    17. Do you have any history of post-traumatic stress syndrome, severe anxiety or history of psychosis? N    18. Do you transfer independently? Y    19.  Do you have any issues with constipation? N    20. Preferred Pharm[facility] for Pre Prescription [id number] [place]    Scheduling Details    Which Colonoscopy Prep was Sent?: Miralax  Procedure Scheduled: Colonoscopy  Surgeon: Stephy  Date of Procedure: 12-13  Location: [facility]  Caller (Please ask for phone number if not scheduled by patient): Emerson      Sedation Type:  CS  Conscious Sedation- Needs  for 6 hours after the procedure  MAC/General-Needs  for 24 hours after procedure    Pre-op Required at [facility] [place], [place] and OR for MAC sedation:   (if yes advise patient they will need a pre-op prior to procedure)      Is patient on blood thinners? -N (If yes- inform patient to follow up with PCP or provider for follow up instructions)     Informed patient they will need an adult  Y  Cannot take any type of public or medical transportation alone    Pre-Procedure Covid test to be completed at [facility] or Externally: Y - [facility] 12-9    Confirmed Nurse will call to complete assessment Y    Additional comments:

## 2021-12-06 ENCOUNTER — TELEPHONE (OUTPATIENT)
Dept: GASTROENTEROLOGY | Facility: CLINIC | Age: 46
End: 2021-12-06

## 2021-12-06 NOTE — TELEPHONE ENCOUNTER
Attempted to contact patient regarding upcoming colonoscopy  procedure on 12/13/21 for pre assessment questions. No answer.     Left message to return call to [phone] #2    Covid test scheduled: 12/9/21    Arrival time: 1010    Facility location: [facility]    Sedation type: CS    Indication for procedure: screening    Anticoagulants: no.     Bowel prep recommendation: Miralax/Magnesium citrate/Dulcolax     Komal Caban RN

## 2021-12-08 ENCOUNTER — TELEPHONE (OUTPATIENT)
Dept: GASTROENTEROLOGY | Facility: CLINIC | Age: 46
End: 2021-12-08
Payer: COMMERCIAL

## 2021-12-08 NOTE — TELEPHONE ENCOUNTER
Caller: Emerson    Procedure: Colon    Date, Location, and Surgeon of Procedure Cancelled: 12/13/21 [facility]    Ordering Provider:Pippa    Reason for cancel (please be detailed, any staff messages or encounters to note?): Patient        Rescheduled: Yes     If rescheduled:    Date: 1/3/22   Location: [facility]   Note any change or update to original order/sedation: None

## 2021-12-09 ENCOUNTER — THERAPY VISIT (OUTPATIENT)
Dept: PHYSICAL THERAPY | Facility: CLINIC | Age: 46
End: 2021-12-09
Payer: COMMERCIAL

## 2021-12-09 DIAGNOSIS — M25.511 CHRONIC RIGHT SHOULDER PAIN: ICD-10-CM

## 2021-12-09 DIAGNOSIS — Z11.59 ENCOUNTER FOR SCREENING FOR OTHER VIRAL DISEASES: ICD-10-CM

## 2021-12-09 DIAGNOSIS — G89.29 CHRONIC RIGHT SHOULDER PAIN: ICD-10-CM

## 2021-12-09 PROCEDURE — 97110 THERAPEUTIC EXERCISES: CPT | Mod: GP | Performed by: PHYSICAL THERAPIST

## 2021-12-09 PROCEDURE — 97140 MANUAL THERAPY 1/> REGIONS: CPT | Mod: GP | Performed by: PHYSICAL THERAPIST

## 2021-12-09 PROCEDURE — 97112 NEUROMUSCULAR REEDUCATION: CPT | Mod: GP | Performed by: PHYSICAL THERAPIST

## 2021-12-09 NOTE — PROGRESS NOTES
Subjective:  HPI  Physical Exam                    Objective:  System    Physical Exam    General     ROS    Assessment/Plan:    SUBJECTIVE  Subjective changes as noted by pt:  Emerson reports that things were going well until yesterday when he played basketball with his kids. Was very sore afterward and more sore today.        Current pain level: 4/10     Changes in function:  Yes (See Goal flowsheet attached for changes in current functional level)     Adverse reaction to treatment or activity:  None    OBJECTIVE  Changes in objective findings:  Yes, R shoulder AROM WNL with PDM on abduction. Multiple cues necessary for scapular retraction/derpression on horizontal abduction.         ASSESSMENT  Sabino continues to require intervention to meet STG and LTG's: PT  Patient has experienced an exacerbation of symptoms.  Response to therapy has shown a worsening of  pain level  Progress made towards STG/LTG?  Yes (See Goal flowsheet attached for updates on achievement of STG and LTG)    PLAN  Continue current treatment plan until patient demonstrates readiness to progress to higher level exercises.    PTA/ATC plan:  N/A    Please refer to the daily flowsheet for treatment today, total treatment time and time spent performing 1:1 timed codes.

## 2021-12-16 ENCOUNTER — THERAPY VISIT (OUTPATIENT)
Dept: PHYSICAL THERAPY | Facility: CLINIC | Age: 46
End: 2021-12-16
Payer: COMMERCIAL

## 2021-12-16 DIAGNOSIS — M25.511 CHRONIC RIGHT SHOULDER PAIN: ICD-10-CM

## 2021-12-16 DIAGNOSIS — G89.29 CHRONIC RIGHT SHOULDER PAIN: ICD-10-CM

## 2021-12-16 PROCEDURE — 97112 NEUROMUSCULAR REEDUCATION: CPT | Mod: GP | Performed by: PHYSICAL THERAPIST

## 2021-12-16 PROCEDURE — 97110 THERAPEUTIC EXERCISES: CPT | Mod: GP | Performed by: PHYSICAL THERAPIST

## 2021-12-16 NOTE — PROGRESS NOTES
Subjective:  HPI  Physical Exam                    Objective:  System    Physical Exam    General     ROS    Assessment/Plan:    SUBJECTIVE  Subjective changes as noted by pt:   Fell on ice onto are a few days ago, no lasting adverse effect from it. Overall feels he's improving. Noticing the pain less often, easier time getting comfortable. Performing ext/IR 5x/day.     Current pain level: 3/10     Changes in function:  Yes (See Goal flowsheet attached for changes in current functional level)     Adverse reaction to treatment or activity:  None    OBJECTIVE  Changes in objective findings:  Yes, R shoulder AROM WNL with ERP on ext/IR.          ASSESSMENT  Sabino continues to require intervention to meet STG and LTG's: PT  Patient's symptoms are resolving.  Patient is progressing as expected.  Response to therapy has shown an improvement in  pain level, ROM  and function  Progress made towards STG/LTG?  Yes (See Goal flowsheet attached for updates on achievement of STG and LTG)    PLAN  Continue current treatment plan until patient demonstrates readiness to progress to higher level exercises.    PTA/ATC plan:  N/A    Please refer to the daily flowsheet for treatment today, total treatment time and time spent performing 1:1 timed codes.

## 2021-12-21 NOTE — TELEPHONE ENCOUNTER
Rescheduled procedure.     Pre assessment questions completed for upcoming colonoscopy procedure scheduled on 1/3/22    COVID test scheduled 12/30/21    Reviewed procedural arrival time 0930 and facility location [facility].    Designated  policy reviewed. Instructed to have someone stay 6 hours post procedure.     Bowel prep recommendation: Miralax/Magnesium citrate/Dulcolax     Reviewed Miralax prep instructions with patient. No fiber/iron supplements or foods that contain nuts/seeds 7 days prior to procedure.     Anticoagulation/blood thinners? no    Electronic implanted devices? no    Patient verbalized understanding and had no questions or concerns at this time.    Komal Caban RN

## 2021-12-28 NOTE — PROGRESS NOTES
Subjective:  HPI  Physical Exam                    Objective:  System    Physical Exam    General     ROS    Assessment/Plan:    DISCHARGE REPORT    Progress reporting period is from 11/16/21 to 12/30/21.       SUBJECTIVE  Subjective changes noted by patient:  Emerson reports that the shoulder is making progress. Feels that it's getting better, moving the right direction. Does still note anterior shoulder pain with random movements, but is not able to reproduce his pain with AROM today. 85% improved.  .       Current pain level is 0/10  .     Previous pain level was  3/10 .   Changes in function:  Yes (See Goal flowsheet attached for changes in current functional level)  Adverse reaction to treatment or activity: None    OBJECTIVE  Changes noted in objective findings:  Yes, Min loss R shoulder ext/IR with min pain. Inc ROM following repeated R shoulder ext/IR.     MMT WNL bilaterally pain free.        ASSESSMENT/PLAN  Updated problem list and treatment plan: Diagnosis 1:  R shoulder impingement/derangement    Pain -  manual therapy, self management, education, directional preference exercise and home program  Decreased ROM/flexibility - manual therapy, therapeutic exercise, therapeutic activity and home program  Decreased joint mobility - manual therapy, therapeutic exercise, therapeutic activity and home program  Decreased strength - therapeutic exercise, therapeutic activities and home program  STG/LTGs have been met or progress has been made towards goals:  Yes (See Goal flow sheet completed today.)  Assessment of Progress: The patient has met all of their long term goals.  Self Management Plans:  Patient is independent in a home treatment program.  Patient is independent in self management of symptoms.  I have re-evaluated this patient and find that the nature, scope, duration and intensity of the therapy is appropriate for the medical condition of the patient.  Sabino continues to require the following  intervention to meet STG and LTG's:  PT intervention is no longer required to meet STG/LTG.    Recommendations:  This patient is ready to be discharged from therapy and continue their home treatment program.    Please refer to the daily flowsheet for treatment today, total treatment time and time spent performing 1:1 timed codes.

## 2021-12-30 ENCOUNTER — LAB (OUTPATIENT)
Dept: LAB | Facility: CLINIC | Age: 46
End: 2021-12-30
Payer: COMMERCIAL

## 2021-12-30 ENCOUNTER — THERAPY VISIT (OUTPATIENT)
Dept: PHYSICAL THERAPY | Facility: CLINIC | Age: 46
End: 2021-12-30
Payer: COMMERCIAL

## 2021-12-30 DIAGNOSIS — Z11.59 ENCOUNTER FOR SCREENING FOR OTHER VIRAL DISEASES: ICD-10-CM

## 2021-12-30 DIAGNOSIS — M25.511 CHRONIC RIGHT SHOULDER PAIN: ICD-10-CM

## 2021-12-30 DIAGNOSIS — G89.29 CHRONIC RIGHT SHOULDER PAIN: ICD-10-CM

## 2021-12-30 PROCEDURE — 97530 THERAPEUTIC ACTIVITIES: CPT | Mod: GP | Performed by: PHYSICAL THERAPIST

## 2021-12-30 PROCEDURE — 97110 THERAPEUTIC EXERCISES: CPT | Mod: GP | Performed by: PHYSICAL THERAPIST

## 2021-12-30 PROCEDURE — 97112 NEUROMUSCULAR REEDUCATION: CPT | Mod: GP | Performed by: PHYSICAL THERAPIST

## 2021-12-30 PROCEDURE — U0005 INFEC AGEN DETEC AMPLI PROBE: HCPCS

## 2021-12-30 PROCEDURE — U0003 INFECTIOUS AGENT DETECTION BY NUCLEIC ACID (DNA OR RNA); SEVERE ACUTE RESPIRATORY SYNDROME CORONAVIRUS 2 (SARS-COV-2) (CORONAVIRUS DISEASE [COVID-19]), AMPLIFIED PROBE TECHNIQUE, MAKING USE OF HIGH THROUGHPUT TECHNOLOGIES AS DESCRIBED BY CMS-2020-01-R: HCPCS

## 2021-12-31 LAB — SARS-COV-2 RNA RESP QL NAA+PROBE: NEGATIVE

## 2022-01-03 ENCOUNTER — HOSPITAL ENCOUNTER (OUTPATIENT)
Facility: AMBULATORY SURGERY CENTER | Age: 47
Discharge: HOME | End: 2022-01-03
Attending: INTERNAL MEDICINE | Admitting: INTERNAL MEDICINE
Payer: COMMERCIAL

## 2022-01-03 VITALS
SYSTOLIC BLOOD PRESSURE: 120 MMHG | HEIGHT: 74 IN | OXYGEN SATURATION: 95 % | BODY MASS INDEX: 24.38 KG/M2 | HEART RATE: 74 BPM | DIASTOLIC BLOOD PRESSURE: 79 MMHG | TEMPERATURE: 96.6 F | RESPIRATION RATE: 16 BRPM | WEIGHT: 190 LBS

## 2022-01-03 LAB — COLONOSCOPY: NORMAL

## 2022-01-03 PROCEDURE — 45385 COLONOSCOPY W/LESION REMOVAL: CPT | Mod: 33

## 2022-01-03 PROCEDURE — 88305 TISSUE EXAM BY PATHOLOGIST: CPT | Mod: TC | Performed by: INTERNAL MEDICINE

## 2022-01-03 PROCEDURE — 88305 TISSUE EXAM BY PATHOLOGIST: CPT | Mod: 26 | Performed by: PATHOLOGY

## 2022-01-03 RX ORDER — LIDOCAINE 40 MG/G
CREAM TOPICAL
Status: DISCONTINUED | OUTPATIENT
Start: 2022-01-03 | End: 2022-01-04 | Stop reason: HOSPADM

## 2022-01-03 RX ORDER — ONDANSETRON 2 MG/ML
4 INJECTION INTRAMUSCULAR; INTRAVENOUS
Status: DISCONTINUED | OUTPATIENT
Start: 2022-01-03 | End: 2022-01-04 | Stop reason: HOSPADM

## 2022-01-03 RX ORDER — FENTANYL CITRATE 50 UG/ML
INJECTION, SOLUTION INTRAMUSCULAR; INTRAVENOUS PRN
Status: DISCONTINUED | OUTPATIENT
Start: 2022-01-03 | End: 2022-01-03 | Stop reason: HOSPADM

## 2022-01-03 RX ORDER — SIMETHICONE
LIQUID (ML) MISCELLANEOUS PRN
Status: DISCONTINUED | OUTPATIENT
Start: 2022-01-03 | End: 2022-01-03 | Stop reason: HOSPADM

## 2022-01-03 ASSESSMENT — MIFFLIN-ST. JEOR: SCORE: 1801.58

## 2022-01-03 NOTE — H&P
Sabino Cardona  [id number]  male  46 year old      Reason for procedure/surgery: screening     Patient Active Problem List   Diagnosis     Esophageal reflux     Biceps tendinopathy     Wart     Mucocele of lip       Past Surgical History:    Past Surgical History:   Procedure Laterality Date     APPENDECTOMY       HERNIA REPAIR, INGUINAL RT/LT      left       Past Medical History:   Past Medical History:   Diagnosis Date     Biceps tendinopathy      GERD (gastroesophageal reflux disease)      Mucocele of lip      Spondyloarthropathy        Social History:   Social History     Tobacco Use     Smoking status: Never Smoker     Smokeless tobacco: Former User     Types: Chew     Tobacco comment: chew 9 yr   Substance Use Topics     Alcohol use: Yes     Alcohol/week: 1.7 standard drinks     Types: 2 Standard drinks or equivalent per week       Family History:   Family History   Problem Relation Age of Onset     Hypertension Mother      Hypertension Father      Asthma Father      Diabetes Father      Psoriasis Father      Asthma Sister      Hypertension Maternal Grandmother         RA     Diabetes Maternal Grandmother      Hypertension Maternal Grandfather      Cancer Maternal Grandfather         stomach     Cancer Paternal Grandfather         lung-smoker     Anesthesia Reaction No family hx of      Deep Vein Thrombosis (DVT) No family hx of        Allergies:   Allergies   Allergen Reactions     Nkda [No Known Drug Allergies]      Seasonal Allergies Other (See Comments)     Sneezing, running nose, itchy eyes       Active Medications:   Current Outpatient Medications   Medication Sig Dispense Refill     celecoxib (CELEBREX) 100 MG capsule TAKE ONE CAPSULE BY MOUTH TWICE DAILY AS NEEDED FOR PAIN (Patient taking differently: daily as needed ) 60 capsule 3     sulfaSALAzine (AZULFIDINE) 500 MG tablet Take 3 tablets (1,500 mg) by mouth 2 times daily 540 tablet 1     Vitamin D3 (CHOLECALCIFEROL) 25 mcg (1000 units) tablet  "Take by mouth every morning        Multiple Vitamin (MULTI VITAMIN MENS PO) Take 1 tablet by mouth every morning          Systemic Review:   CONSTITUTIONAL: NEGATIVE for fever, chills, change in weight  ENT/MOUTH: NEGATIVE for ear, mouth and throat problems  RESP: NEGATIVE for significant cough or SOB  CV: NEGATIVE for chest pain, palpitations or peripheral edema    Physical Examination:   Vital Signs: /85   Pulse 89   Temp (!) 96.4  F (35.8  C) (Skin)   Resp 18   Ht 1.88 m (6' 2\")   Wt 86.2 kg (190 lb)   SpO2 98%   BMI 24.39 kg/m    GENERAL: healthy, alert and no distress  NECK: no adenopathy, no asymmetry, masses, or scars  RESP: lungs clear to auscultation - no rales, rhonchi or wheezes  CV: regular rate and rhythm, normal S1 S2, no S3 or S4, no murmur, click or rub, no peripheral edema and peripheral pulses strong  ABDOMEN: soft, nontender, no hepatosplenomegaly, no masses and bowel sounds normal  MS: no gross musculoskeletal defects noted, no edema    Plan: Appropriate to proceed as scheduled.      Ingrid Keyes MD  1/3/2022    PCP:  No Ref-Primary, Physician    "

## 2022-01-04 LAB
PATH REPORT.COMMENTS IMP SPEC: NORMAL
PATH REPORT.COMMENTS IMP SPEC: NORMAL
PATH REPORT.FINAL DX SPEC: NORMAL
PATH REPORT.GROSS SPEC: NORMAL
PATH REPORT.MICROSCOPIC SPEC OTHER STN: NORMAL
PATH REPORT.RELEVANT HX SPEC: NORMAL
PHOTO IMAGE: NORMAL

## 2022-03-20 DIAGNOSIS — R76.8 POSITIVE DOUBLE STRANDED DNA ANTIBODY TEST: ICD-10-CM

## 2022-03-20 DIAGNOSIS — E55.9 VITAMIN D DEFICIENCY: Primary | ICD-10-CM

## 2022-03-20 DIAGNOSIS — M47.819 SPONDYLOARTHROPATHY: ICD-10-CM

## 2022-03-20 DIAGNOSIS — Z51.81 MEDICATION MONITORING ENCOUNTER: ICD-10-CM

## 2022-03-23 RX ORDER — CELECOXIB 100 MG/1
CAPSULE ORAL
Qty: 60 CAPSULE | Refills: 0 | Status: SHIPPED | OUTPATIENT
Start: 2022-03-23 | End: 2022-09-01

## 2022-04-13 ENCOUNTER — LAB (OUTPATIENT)
Dept: LAB | Facility: CLINIC | Age: 47
End: 2022-04-13
Payer: COMMERCIAL

## 2022-04-13 DIAGNOSIS — M47.819 SPONDYLOARTHROPATHY: ICD-10-CM

## 2022-04-13 DIAGNOSIS — E55.9 VITAMIN D DEFICIENCY: ICD-10-CM

## 2022-04-13 DIAGNOSIS — Z51.81 MEDICATION MONITORING ENCOUNTER: ICD-10-CM

## 2022-04-13 DIAGNOSIS — Z13.220 SCREENING CHOLESTEROL LEVEL: ICD-10-CM

## 2022-04-13 DIAGNOSIS — R76.8 POSITIVE DOUBLE STRANDED DNA ANTIBODY TEST: ICD-10-CM

## 2022-04-13 LAB
ALBUMIN SERPL-MCNC: 4.3 G/DL (ref 3.4–5)
ALT SERPL W P-5'-P-CCNC: 39 U/L (ref 0–70)
AST SERPL W P-5'-P-CCNC: 20 U/L (ref 0–45)
BASOPHILS # BLD AUTO: 0 10E3/UL (ref 0–0.2)
BASOPHILS NFR BLD AUTO: 1 %
CHOLEST SERPL-MCNC: 210 MG/DL
CREAT SERPL-MCNC: 0.87 MG/DL (ref 0.66–1.25)
CRP SERPL-MCNC: <2.9 MG/L (ref 0–8)
EOSINOPHIL # BLD AUTO: 0 10E3/UL (ref 0–0.7)
EOSINOPHIL NFR BLD AUTO: 0 %
ERYTHROCYTE [DISTWIDTH] IN BLOOD BY AUTOMATED COUNT: 12 % (ref 10–15)
ERYTHROCYTE [SEDIMENTATION RATE] IN BLOOD BY WESTERGREN METHOD: 5 MM/HR (ref 0–15)
FASTING STATUS PATIENT QL REPORTED: NO
GFR SERPL CREATININE-BSD FRML MDRD: >90 ML/MIN/1.73M2
HCT VFR BLD AUTO: 41.5 % (ref 40–53)
HDLC SERPL-MCNC: 52 MG/DL
HGB BLD-MCNC: 14.2 G/DL (ref 13.3–17.7)
IMM GRANULOCYTES # BLD: 0 10E3/UL
IMM GRANULOCYTES NFR BLD: 0 %
LDLC SERPL CALC-MCNC: 137 MG/DL
LYMPHOCYTES # BLD AUTO: 1 10E3/UL (ref 0.8–5.3)
LYMPHOCYTES NFR BLD AUTO: 16 %
MCH RBC QN AUTO: 31.5 PG (ref 26.5–33)
MCHC RBC AUTO-ENTMCNC: 34.2 G/DL (ref 31.5–36.5)
MCV RBC AUTO: 92 FL (ref 78–100)
MONOCYTES # BLD AUTO: 0.3 10E3/UL (ref 0–1.3)
MONOCYTES NFR BLD AUTO: 6 %
NEUTROPHILS # BLD AUTO: 4.6 10E3/UL (ref 1.6–8.3)
NEUTROPHILS NFR BLD AUTO: 77 %
NONHDLC SERPL-MCNC: 158 MG/DL
NRBC # BLD AUTO: 0 10E3/UL
NRBC BLD AUTO-RTO: 0 /100
PLATELET # BLD AUTO: 223 10E3/UL (ref 150–450)
RBC # BLD AUTO: 4.51 10E6/UL (ref 4.4–5.9)
TRIGL SERPL-MCNC: 104 MG/DL
WBC # BLD AUTO: 6 10E3/UL (ref 4–11)

## 2022-04-13 PROCEDURE — 82306 VITAMIN D 25 HYDROXY: CPT

## 2022-04-13 PROCEDURE — 84450 TRANSFERASE (AST) (SGOT): CPT

## 2022-04-13 PROCEDURE — 86160 COMPLEMENT ANTIGEN: CPT

## 2022-04-13 PROCEDURE — 86140 C-REACTIVE PROTEIN: CPT

## 2022-04-13 PROCEDURE — 86225 DNA ANTIBODY NATIVE: CPT

## 2022-04-13 PROCEDURE — 85652 RBC SED RATE AUTOMATED: CPT

## 2022-04-13 PROCEDURE — 84460 ALANINE AMINO (ALT) (SGPT): CPT

## 2022-04-13 PROCEDURE — 82040 ASSAY OF SERUM ALBUMIN: CPT

## 2022-04-13 PROCEDURE — 85025 COMPLETE CBC W/AUTO DIFF WBC: CPT

## 2022-04-13 PROCEDURE — 36415 COLL VENOUS BLD VENIPUNCTURE: CPT

## 2022-04-13 PROCEDURE — 80061 LIPID PANEL: CPT

## 2022-04-13 PROCEDURE — 82565 ASSAY OF CREATININE: CPT

## 2022-04-14 LAB
C3 SERPL-MCNC: 94 MG/DL (ref 81–157)
C4 SERPL-MCNC: 16 MG/DL (ref 13–39)
DEPRECATED CALCIDIOL+CALCIFEROL SERPL-MC: 41 UG/L (ref 20–75)
DSDNA AB SER-ACNC: 23 IU/ML

## 2022-05-02 ENCOUNTER — TELEPHONE (OUTPATIENT)
Dept: INTERNAL MEDICINE | Facility: CLINIC | Age: 47
End: 2022-05-02

## 2022-05-02 NOTE — TELEPHONE ENCOUNTER
No show 5/2/2022    HPI:    Last visit with us 11/16/2021    Past Medical History:   Diagnosis Date     Biceps tendinopathy      GERD (gastroesophageal reflux disease)      Mucocele of lip      Spondyloarthropathy      Past Surgical History:   Procedure Laterality Date     APPENDECTOMY       COLONOSCOPY N/A 1/3/2022    Procedure: COLONOSCOPY, WITH POLYPECTOMY;  Surgeon: Ingrid Keyes MD;  Location: [facility] OR     HERNIA REPAIR, INGUINAL RT/LT      left     PE:    Vitals noted, gen, nad, cooperative, alert    A/P:    1. Immunizations; Pfizer COVID vaccine x 3. Tdap 11/16/2021  2. He has Rheumatology follow up with Dr. Luna 9/29/2022 for psoriasis and RA/spondyloarrthroapthy On Sulfasalazine and Celebrex   3. Colonoscopy 1/3/2022 and repeat in 7-10 years   4. Lipids 4/13/2022  and HDL 52  5. Dermatology   6. PT R shoulder 12/30/2021

## 2022-06-07 DIAGNOSIS — R76.8 POSITIVE DOUBLE STRANDED DNA ANTIBODY TEST: ICD-10-CM

## 2022-06-07 RX ORDER — SULFASALAZINE 500 MG/1
1500 TABLET ORAL 2 TIMES DAILY
Qty: 540 TABLET | Refills: 1 | Status: SHIPPED | OUTPATIENT
Start: 2022-06-07 | End: 2022-12-22

## 2022-06-07 NOTE — TELEPHONE ENCOUNTER
Sulfasalazine 500 mg tab - take 3 tabs twice a day     Last Written Prescription Date: 10/29/2021  Last Fill Quantity: 540,   # refills: 1  Last Office Visit: 6/28/2021  Future Office visit:  9/29/2022    CBC RESULTS: Recent Labs   Lab Test 04/13/22  1659   WBC 6.0   RBC 4.51   HGB 14.2   HCT 41.5   MCV 92   MCH 31.5   MCHC 34.2   RDW 12.0          Creatinine   Date Value Ref Range Status   04/13/2022 0.87 0.66 - 1.25 mg/dL Final   01/18/2021 0.86 0.66 - 1.25 mg/dL Final   ]    Liver Function Studies -   Recent Labs   Lab Test 04/13/22  1659   ALBUMIN 4.3   AST 20   ALT 39       Routing refill request to provider for review/approval because:  DMARD

## 2022-06-12 NOTE — PROGRESS NOTES
HPI:    Overall doing well. He would like to see Dermatology for a skin cancer screening. He has some B dorsum hand and facial skin changes. He remains active. He takes PRN Celebrex for his rheumatological issues. Otherwise, no additional HEENT, cardiopulmonary, abdominal, , neurological, systemic, psychiatric, lymphatic, endocrine, vascular complaints.     Past Medical History:   Diagnosis Date     Biceps tendinopathy      GERD (gastroesophageal reflux disease)      Mucocele of lip      Spondyloarthropathy      Past Surgical History:   Procedure Laterality Date     APPENDECTOMY       COLONOSCOPY N/A 1/3/2022    Procedure: COLONOSCOPY, WITH POLYPECTOMY;  Surgeon: Ingrid Keyes MD;  Location: [facility] OR     HERNIA REPAIR, INGUINAL RT/LT      left     PE:    Vitals noted, gen, nad, cooperative, alert, neck supple nl rom, lungs with good air movement, RRR, S1, S2, no MRG, abdomen, no acute findings, Grossly normal neurological exam.     A/P:    1. Immunizations; Pfizer COVID vaccine x 3. Tdap 11/16/2021  2. He has Rheumatology follow up with Dr. Luna 9/29/2022 for psoriasis and RA/spondyloarrthroapthy On Sulfasalazine and Celebrex. Labs done 4/13/2022. Recently started Plaquenil (he likes to fish and discussed Plaquenil can be a photosensitizer).   3. Colonoscopy 1/3/2022 and repeat in 7-10 years   4. Lipids 4/13/2022  and HDL 52  5. Dermatology; dermatology referral for skin check.   6. PT R shoulder 12/30/2021  7. Vitamin D level checked 4/13/2022 normal at 41  8. PSA next year at age 48; no early family h/o prostate cancer       30 minutes spent on the date of the encounter doing chart review, history and exam, documentation and further activities as noted above exclusive of procedures and other billable interpretations

## 2022-06-14 ENCOUNTER — OFFICE VISIT (OUTPATIENT)
Dept: INTERNAL MEDICINE | Facility: CLINIC | Age: 47
End: 2022-06-14
Payer: COMMERCIAL

## 2022-06-14 VITALS
HEIGHT: 74 IN | BODY MASS INDEX: 25 KG/M2 | SYSTOLIC BLOOD PRESSURE: 137 MMHG | WEIGHT: 194.8 LBS | HEART RATE: 81 BPM | TEMPERATURE: 98 F | OXYGEN SATURATION: 97 % | DIASTOLIC BLOOD PRESSURE: 83 MMHG | RESPIRATION RATE: 12 BRPM

## 2022-06-14 DIAGNOSIS — Z12.83 SKIN CANCER SCREENING: Primary | ICD-10-CM

## 2022-06-14 PROCEDURE — 99214 OFFICE O/P EST MOD 30 MIN: CPT | Performed by: INTERNAL MEDICINE

## 2022-06-14 ASSESSMENT — PAIN SCALES - GENERAL: PAINLEVEL: NO PAIN (0)

## 2022-06-14 NOTE — NURSING NOTE
Chief Complaint   Patient presents with     Recheck Medication     Patient comes in for follow up.         Darron Agarwal MA on 6/14/2022 at 7:21 AM

## 2022-07-20 ENCOUNTER — MYC MEDICAL ADVICE (OUTPATIENT)
Dept: INTERNAL MEDICINE | Facility: CLINIC | Age: 47
End: 2022-07-20

## 2022-07-20 ENCOUNTER — TELEPHONE (OUTPATIENT)
Dept: INTERNAL MEDICINE | Facility: CLINIC | Age: 47
End: 2022-07-20

## 2022-07-20 NOTE — TELEPHONE ENCOUNTER
----- Message from Shama Aguilar LPN sent at 7/20/2022  8:15 AM CDT -----  Dr. Das would like this patient to be seen by any provider that has on open appointment today or tomorrow. Please help schedule.    Thank you!!

## 2022-07-22 ENCOUNTER — OFFICE VISIT (OUTPATIENT)
Dept: INTERNAL MEDICINE | Facility: CLINIC | Age: 47
End: 2022-07-22
Payer: COMMERCIAL

## 2022-07-22 VITALS
BODY MASS INDEX: 24.12 KG/M2 | HEART RATE: 86 BPM | SYSTOLIC BLOOD PRESSURE: 136 MMHG | WEIGHT: 194 LBS | RESPIRATION RATE: 16 BRPM | HEIGHT: 75 IN | DIASTOLIC BLOOD PRESSURE: 89 MMHG | OXYGEN SATURATION: 96 %

## 2022-07-22 DIAGNOSIS — J02.9 SORE THROAT: Primary | ICD-10-CM

## 2022-07-22 PROBLEM — J02.0 STREPTOCOCCAL SORE THROAT: Status: ACTIVE | Noted: 2022-07-22

## 2022-07-22 LAB
DEPRECATED S PYO AG THROAT QL EIA: NEGATIVE
GROUP A STREP BY PCR: NOT DETECTED

## 2022-07-22 PROCEDURE — 99213 OFFICE O/P EST LOW 20 MIN: CPT | Performed by: HOSPITALIST

## 2022-07-22 PROCEDURE — 87651 STREP A DNA AMP PROBE: CPT | Mod: 90 | Performed by: PATHOLOGY

## 2022-07-22 PROCEDURE — 99000 SPECIMEN HANDLING OFFICE-LAB: CPT | Performed by: PATHOLOGY

## 2022-07-22 ASSESSMENT — ENCOUNTER SYMPTOMS
CHILLS: 0
SORE THROAT: 1
RHINORRHEA: 1
ABDOMINAL PAIN: 0
DYSURIA: 0
CONSTIPATION: 0
FEVER: 0
DIARRHEA: 0

## 2022-07-22 NOTE — PATIENT INSTRUCTIONS
Likely heartburn related to the sore throat. Will check Strep testing just in cares. Less likely Mononucleosis, will hold on testing.     I would increase you Prilosec (Omeprazole) 20mg twice a day for 2 weeks, then go back to how your were previously taking.     Follow up as needed.

## 2022-07-22 NOTE — PROGRESS NOTES
Assessment/Plan  Problem List Items Addressed This Visit        Nervous and Auditory    Sore throat - Primary     Likely heartburn related to the sore throat. Will check Strep testing just in cares. Less likely Mononucleosis, will hold on testing. Likely would be recovering from COVID19 infection at this time, no shortness of breath and had check COVID19 home test x2 negative.    I would increase you Prilosec (Omeprazole) 20mg twice a day for 2 weeks, then go back to how your were previously taking.     Follow up as needed.           Relevant Orders    Streptococcus A Rapid Scr w Reflx to PCR (Completed)    Group A Streptococcus PCR Throat Swab          No results found for any visits on 07/22/22.    Health Maintenance Due   Topic Date Due     ADVANCE CARE PLANNING  Never done     HIV SCREENING  Never done     HEPATITIS C SCREENING  Never done     ZOSTER IMMUNIZATION (1 of 2) Never done     COVID-19 Vaccine (4 - Booster for Pfizer series) 02/17/2022       Subjective  Patient mentions having a sore throat for about 10 days. No fevers or chills. Does have heartburn. Mention going camping with family a few weeks back. Typically sleeps with head a little raised. During camping, he did not sleep with head raised and had worsening heartburns. Has been having some nausea congestion with runny nose and watery eyes recently. No one else has a sore throat at home. Patient checked a home COVID19 test x2 at home which were negative.      Review of Systems   Constitutional: Negative for chills and fever.   HENT: Positive for congestion, rhinorrhea and sore throat.    Cardiovascular: Negative for chest pain and peripheral edema.   Gastrointestinal: Negative for abdominal pain, constipation and diarrhea.   Genitourinary: Negative for dysuria.   Skin: Negative for rash.       History  Past Medical History:   Diagnosis Date     Biceps tendinopathy      GERD (gastroesophageal reflux disease)      Mucocele of lip       "Spondyloarthropathy        Past Surgical History:   Procedure Laterality Date     APPENDECTOMY       COLONOSCOPY N/A 1/3/2022    Procedure: COLONOSCOPY, WITH POLYPECTOMY;  Surgeon: Ingrid Keyes MD;  Location: [facility] OR     HERNIA REPAIR, INGUINAL RT/LT      left       Family History   Problem Relation Age of Onset     Hypertension Mother      Hypertension Father      Asthma Father      Diabetes Father      Psoriasis Father      Asthma Sister      Hypertension Maternal Grandmother         RA     Diabetes Maternal Grandmother      Hypertension Maternal Grandfather      Cancer Maternal Grandfather         stomach     Cancer Paternal Grandfather         lung-smoker     Anesthesia Reaction No family hx of      Deep Vein Thrombosis (DVT) No family hx of        Social History     Tobacco Use     Smoking status: Never Smoker     Smokeless tobacco: Former User     Types: Chew     Quit date: 5/15/2010     Tobacco comment: chew 9 yr   Substance Use Topics     Alcohol use: Yes     Alcohol/week: 1.7 standard drinks     Types: 2 Standard drinks or equivalent per week        Objective  /89 (BP Location: Right arm, Patient Position: Sitting, Cuff Size: Adult Regular)   Pulse 86   Resp 16   Ht 1.892 m (6' 2.5\")   Wt 88 kg (194 lb)   SpO2 96%   BMI 24.57 kg/m    Vitals taken by Allan Aguilar MD    Physical Exam  Constitutional:       General: He is not in acute distress.     Appearance: He is not ill-appearing or toxic-appearing.   HENT:      Head: Normocephalic.      Mouth/Throat:      Mouth: Mucous membranes are moist.      Pharynx: No oropharyngeal exudate.      Comments: Mild posterior pharynx without exudates.     Eyes:      Conjunctiva/sclera: Conjunctivae normal.   Cardiovascular:      Rate and Rhythm: Normal rate and regular rhythm.      Heart sounds: Normal heart sounds. No murmur heard.    No friction rub. No gallop.   Pulmonary:      Effort: Pulmonary effort is normal. No respiratory distress.      " Breath sounds: Normal breath sounds. No wheezing, rhonchi or rales.   Abdominal:      General: Abdomen is flat.      Palpations: Abdomen is soft.   Lymphadenopathy:      Cervical: No cervical adenopathy.   Skin:     General: Skin is warm and dry.   Neurological:      Mental Status: He is alert.   Psychiatric:         Mood and Affect: Mood normal.         Thought Content: Thought content normal.         I spent greater than 50% of the 20 minutes in the visit coordinating care regarding patient's recommendations towards Sore throat    Return if symptoms worsen or fail to improve.      Allan Aguilar MD  [facility] INTER[facility]IC[facility] [place]

## 2022-07-22 NOTE — NURSING NOTE
Sabino DILL Jonathan Hernandez is a 47 year old male patient that presents today in clinic for the following:    Chief Complaint   Patient presents with     Pharyngitis     Sore throat x2 weeks     The patient's allergies and medications were reviewed as noted. A set of vitals were recorded as noted without incident. The patient does not have any other questions for the provider.    Carmen Lennon, EMT at 3:37 PM on 7/22/2022

## 2022-07-22 NOTE — ASSESSMENT & PLAN NOTE
Likely heartburn related to the sore throat. Will check Strep testing just in cares. Less likely Mononucleosis, will hold on testing. Likely would be recovering from COVID19 infection at this time, no shortness of breath and had check COVID19 home test x2 negative.    I would increase you Prilosec (Omeprazole) 20mg twice a day for 2 weeks, then go back to how your were previously taking.     Follow up as needed.

## 2022-09-01 DIAGNOSIS — M47.819 SPONDYLOARTHROPATHY: ICD-10-CM

## 2022-09-01 RX ORDER — CELECOXIB 100 MG/1
100 CAPSULE ORAL 2 TIMES DAILY PRN
Qty: 60 CAPSULE | Refills: 0 | Status: SHIPPED | OUTPATIENT
Start: 2022-09-01 | End: 2023-08-03

## 2022-09-01 NOTE — TELEPHONE ENCOUNTER
celecoxib (CELEBREX) 100 MG capsule      Last Written Prescription Date:  3/23/22  Last Fill Quantity: 60,   # refills: 0  Last Office Visit: 6/28/21  Future Office visit:  9/29/22    Hemoglobin   Date Value Ref Range Status   04/13/2022 14.2 13.3 - 17.7 g/dL Final   01/18/2021 14.7 13.3 - 17.7 g/dL Final       Creatinine   Date Value Ref Range Status   04/13/2022 0.87 0.66 - 1.25 mg/dL Final   01/18/2021 0.86 0.66 - 1.25 mg/dL Final       Liver Function Studies -   Recent Labs   Lab Test 04/13/22  1659   ALBUMIN 4.3   AST 20   ALT 39     Approved per protocol, met NSAID criteria.    MAGDI DavidsonN, RN

## 2022-09-03 ENCOUNTER — HEALTH MAINTENANCE LETTER (OUTPATIENT)
Age: 47
End: 2022-09-03

## 2022-09-23 ENCOUNTER — OFFICE VISIT (OUTPATIENT)
Dept: RHEUMATOLOGY | Facility: CLINIC | Age: 47
End: 2022-09-23
Attending: INTERNAL MEDICINE
Payer: COMMERCIAL

## 2022-09-23 VITALS
WEIGHT: 195.3 LBS | SYSTOLIC BLOOD PRESSURE: 156 MMHG | HEART RATE: 76 BPM | DIASTOLIC BLOOD PRESSURE: 88 MMHG | OXYGEN SATURATION: 96 % | HEIGHT: 74 IN | BODY MASS INDEX: 25.07 KG/M2 | TEMPERATURE: 97.7 F

## 2022-09-23 DIAGNOSIS — R76.8 POSITIVE DOUBLE STRANDED DNA ANTIBODY TEST: ICD-10-CM

## 2022-09-23 DIAGNOSIS — Z51.81 MEDICATION MONITORING ENCOUNTER: ICD-10-CM

## 2022-09-23 DIAGNOSIS — M19.90 INFLAMMATORY ARTHRITIS: ICD-10-CM

## 2022-09-23 DIAGNOSIS — M47.819 SPONDYLOARTHROPATHY: Primary | ICD-10-CM

## 2022-09-23 PROCEDURE — 99214 OFFICE O/P EST MOD 30 MIN: CPT | Performed by: INTERNAL MEDICINE

## 2022-09-23 PROCEDURE — G0463 HOSPITAL OUTPT CLINIC VISIT: HCPCS

## 2022-09-23 ASSESSMENT — PAIN SCALES - GENERAL: PAINLEVEL: NO PAIN (0)

## 2022-09-23 NOTE — LETTER
Date:October 17, 2022      Patient was self referred, no letter generated. Do not send.        [facility] Health Information

## 2022-09-23 NOTE — NURSING NOTE
"Chief Complaint   Patient presents with     RECHECK     BP (!) 156/88   Pulse 76   Temp 97.7  F (36.5  C)   Ht 1.88 m (6' 2\")   Wt 88.6 kg (195 lb 4.8 oz)   SpO2 96%   BMI 25.08 kg/m      Mary Valadez on 9/23/2022 at 1:34 PM    "

## 2022-09-23 NOTE — LETTER
9/23/2022       RE: Sabino Cardona  [address]  [facility] [address]     Dear Colleague,    Thank you for referring your patient, Sabino Cardona, to the [facility] [place] at [facility]. Please see a copy of my visit note below.    Rheumatology F/U In Person Visit Note    Reason for visit: +anti-DNA, peripheral SpA on SSZ    Initial visit date: 2/14/2020    Last seen: 6/28/2021    DOS: 9/23/2022    HPI from initial visit:    Sabino Cardona is a 43 year old  male with fh/o psoriasis and RA, who was referred to our clinic for evaluation and management of possible SLE given +anti-DNA. His wife is a transplant surgeon here at the  and they say the actual reason for referral is to establish/transfer care as they just moved to [place].      Today, he is feeling well.      They moved back from [place] to [place] about 1.5 month ago.      He reports having degenerative arthritis since age 18. Has chronic low back pain with worsening in AM and worsening with inactivity. Allergies in Spring makes it worse.      He started to have heel and ankle pain in Feb 2019, also knees and elbows. That was stressful time of life looking for moving back. Also has 3 small children (one 5 yo and two 1 yo twins) which keep them busy.      Both heels were red and blaching and his wife thought it was gout.      His brother has gout and is almost 51 yo.      He saw a rheumatologist at [facility]. 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 His wife questioned the diagnosis given lack of lupus symptoms.      Then he had x-rays of spine which showed lots of bone spurs.    He was diagnosed with OA, possible reactive arthritis. No h/o uveitis, urethritis, STDs. Has h/o food poisenings. Was put on SSZ in 2/2019, the dose was increased to current dose of 1 gr bid, he tolerates it  well with no toxicity. SSZ has helped and he has no current heel inflammation or recent flare ups.      Has fatigue but because of having 3 small children, it's hard for him to say if fatigue was related to inflammation.    ROS is positive for intermittent CP for years, ibuprofen helps. Prednisone 10 mg every day did not help in the past.    Gets cold sores. No photosensitivity. Gets migraines. No weight loss. No Raynaud's.      2/14/2020: No fatigue, skin rashes, oral ulcers.    Has mild achy knees.    About 1.5 months ago, started having flare up of achilles tendonitis in both feet, worse in AM and at night. They turn red and swollen.      7/10/2020: At last visit in 2/2020, increased SSZ from 2 to 2.5 gr/day given active achilles tendonitis, also prescribed celebrex 100 mg bid prn. Change in SSZ has helped and he no longer needs to take celebrex (last use 6 wk go). Tolerates SSZ at higher dose well. No flare ups since last visit.    No symptoms and no complaints today. Felling very well. No skin rashes, oral ulcers, CP or SOB or fatigue.    Had flu in 1/2020, after that got the flu shot.     6/28/2021: Hands, feet swell up 2 times a week x 1 hr, it gets better after moving around. No heel pain/swelling.    Has 3 small children, physical job. Fully vaccinated, no SE. Got pfizer.    Over last couple of months, has been good on taking her SSZ 5 tabs a day everyday. Noticed more sx including CP by missing some doses. No SSZ SEs. Rarely takes the celebrex, if notices sore feet x 2 days in a row, he takes it. It does not help as much.    Today 9/23/2022: Emerson presents for follow up. 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 No other complaints. Tolerates HCQ well.      During the day he is usually good, at night migratory pain comes back affecting shoulders (R shoulder more  consistent) and hips. His pain is tolerable most days but sometimes,  keeps him up.    No am stiffness.    Advil works better than celebrex.    If misses SSZ x 3-4 days, pain recurs.      ROS:  A comprehensive ROS was done, positives are per HPI.      Past Medical History:   Diagnosis Date     Biceps tendinopathy      GERD (gastroesophageal reflux disease)      Mucocele of lip      Spondyloarthropathy      Past Surgical History:   Procedure Laterality Date     APPENDECTOMY       COLONOSCOPY N/A 1/3/2022    Procedure: COLONOSCOPY, WITH POLYPECTOMY;  Surgeon: Ingrid Keyes MD;  Location: [facility] OR     HERNIA REPAIR, INGUINAL RT/LT      left     Family History   Problem Relation Age of Onset     Hypertension Mother      Hypertension Father      Asthma Father      Diabetes Father      Psoriasis Father      Asthma Sister      Hypertension Maternal Grandmother         RA     Diabetes Maternal Grandmother      Hypertension Maternal Grandfather      Cancer Maternal Grandfather         stomach     Cancer Paternal Grandfather         lung-smoker     Anesthesia Reaction No family hx of      Deep Vein Thrombosis (DVT) No family hx of      Social History     Socioeconomic History     Marital status:      Spouse name: Not on file     Number of children: 3     Years of education: Not on file     Highest education level: Not on file   Occupational History     Occupation: [employer]   Tobacco Use     Smoking status: Never Smoker     Smokeless tobacco: Former User     Types: Chew     Quit date: 5/15/2010     Tobacco comment: chew 9 yr   Substance and Sexual Activity     Alcohol use: Yes     Alcohol/week: 1.7 standard drinks     Types: 2 Standard drinks or equivalent per week     Drug use: No     Sexual activity: Yes     Partners: Female   Other Topics Concern      Service Not Asked     Blood Transfusions Not Asked     Caffeine Concern Not Asked     Occupational Exposure Not Asked     Hobby Hazards Not Asked     Sleep  Concern Not Asked     Stress Concern Not Asked     Weight Concern Not Asked     Special Diet Not Asked     Back Care Not Asked     Exercise Yes     Comment: run, weights     Bike Helmet Not Asked     Seat Belt Not Asked     Self-Exams Not Asked   Social History Narrative     Not on file     Social Determinants of Health     Financial Resource Strain: Not on file   Food Insecurity: Not on file   Transportation Needs: Not on file   Physical Activity: Not on file   Stress: Not on file   Social Connections: Not on file   Intimate Partner Violence: Not on file   Housing Stability: Not on file     Patient Active Problem List   Diagnosis     Esophageal reflux     Biceps tendinopathy     Wart     Mucocele of lip     Sore throat     Allergies   Allergen Reactions     Nkda [No Known Drug Allergies]      Seasonal Allergies Other (See Comments)     Sneezing, running nose, itchy eyes       Outpatient Encounter Medications as of 9/23/2022   Medication Sig Dispense Refill     celecoxib (CELEBREX) 100 MG capsule Take 1 capsule (100 mg) by mouth 2 times daily as needed for moderate pain Please keep your appointment on 9/29/22, thank you. 60 capsule 0     hydroxychloroquine (PLAQUENIL) 200 MG tablet Take 1 tablet (200 mg) by mouth 2 times daily Annual Plaquenil toxicity eye screening required. 60 tablet 11     sulfaSALAzine (AZULFIDINE) 500 MG tablet TAKE 3 TABLETS (1,500 MG) BY MOUTH 2 TIMES DAILY 540 tablet 1     Vitamin D3 (CHOLECALCIFEROL) 25 mcg (1000 units) tablet Take by mouth every morning        No facility-administered encounter medications on file as of 9/23/2022.               His records were reviewed.      2/2019: +BEST 1:40 mixed homogenous and nucleolar pattern, positive anti-DNA 17 with NL being up to 4. Neg anti-Sm, RNP, SCL-70, SSA, SSB, ALEXANDREA-1, CHROMATIN, RF, anti-CCP. NL ESR. NL SUA 5.2. Neg HLA-B27.  NL TSH, C3, C4. Unremarkable CMP. Neg U/A. LOW vit D 27.    2/2019: Advanced degenerative changes of L spine and  moderate degenerative changes of T spine on x-rays.  Component      Latest Ref Rng & Units 1/17/2020   WBC      4.0 - 11.0 10e9/L 3.6 (L)   RBC Count      4.4 - 5.9 10e12/L 4.52   Hemoglobin      13.3 - 17.7 g/dL 13.8   Hematocrit      40.0 - 53.0 % 41.1   MCV      78 - 100 fl 91   MCH      26.5 - 33.0 pg 30.5   MCHC      31.5 - 36.5 g/dL 33.6   RDW      10.0 - 15.0 % 12.0   Platelet Count      150 - 450 10e9/L 258   Diff Method       Automated Method   % Neutrophils      % 55.5   % Lymphocytes      % 30.4   % Monocytes      % 11.0   % Eosinophils      % 2.5   % Basophils      % 0.3   % Immature Granulocytes      % 0.3   Nucleated RBCs      0 /100 0   Absolute Neutrophil      1.6 - 8.3 10e9/L 2.0   Absolute Lymphocytes      0.8 - 5.3 10e9/L 1.1   Absolute Monocytes      0.0 - 1.3 10e9/L 0.4   Absolute Eosinophils      0.0 - 0.7 10e9/L 0.1   Absolute Basophils      0.0 - 0.2 10e9/L 0.0   Abs Immature Granulocytes      0 - 0.4 10e9/L 0.0   Absolute Nucleated RBC       0.0   Creatinine      0.66 - 1.25 mg/dL 0.83   GFR Estimate      >60 mL/min/1.73:m2 >90   GFR Estimate If Black      >60 mL/min/1.73:m2 >90   Vitamin D Deficiency screening      20 - 75 ug/L 27   Sed Rate      0 - 15 mm/h 8   CRP Inflammation      0.0 - 8.0 mg/L <2.9   AST      0 - 45 U/L 19   Albumin      3.4 - 5.0 g/dL 3.9   ALT      0 - 70 U/L 36   Complement C3      81 - 157 mg/dL 118   Complement C4      13 - 39 mg/dL 25   DNA-ds      <10 IU/mL 16 (H)     Component      Latest Ref Rng & Units 1/18/2021   WBC      4.0 - 11.0 10e9/L 5.2   RBC Count      4.4 - 5.9 10e12/L 4.68   Hemoglobin      13.3 - 17.7 g/dL 14.7   Hematocrit      40.0 - 53.0 % 42.7   MCV      78 - 100 fl 91   MCH      26.5 - 33.0 pg 31.4   MCHC      31.5 - 36.5 g/dL 34.4   RDW      10.0 - 15.0 % 11.9   Platelet Count      150 - 450 10e9/L 241   % Neutrophils      % 63.3   % Lymphocytes      % 27.0   % Monocytes      % 8.3   % Eosinophils      % 1.0   % Basophils      % 0.4    Absolute Neutrophil      1.6 - 8.3 10e9/L 3.3   Absolute Lymphocytes      0.8 - 5.3 10e9/L 1.4   Absolute Monocytes      0.0 - 1.3 10e9/L 0.4   Absolute Eosinophils      0.0 - 0.7 10e9/L 0.1   Absolute Basophils      0.0 - 0.2 10e9/L 0.0   Diff Method       Automated Method   Creatinine      0.66 - 1.25 mg/dL 0.86   GFR Estimate      >60 mL/min/1.73:m2 >90   GFR Estimate If Black      >60 mL/min/1.73:m2 >90   AST      0 - 45 U/L 23   Albumin      3.4 - 5.0 g/dL 4.3   ALT      0 - 70 U/L 42     Component      Latest Ref Rng & Units 4/13/2022   WBC      4.0 - 11.0 10e3/uL 6.0   RBC Count      4.40 - 5.90 10e6/uL 4.51   Hemoglobin      13.3 - 17.7 g/dL 14.2   Hematocrit      40.0 - 53.0 % 41.5   MCV      78 - 100 fL 92   MCH      26.5 - 33.0 pg 31.5   MCHC      31.5 - 36.5 g/dL 34.2   RDW      10.0 - 15.0 % 12.0   Platelet Count      150 - 450 10e3/uL 223   % Neutrophils      % 77   % Lymphocytes      % 16   % Monocytes      % 6   % Eosinophils      % 0   % Basophils      % 1   % Immature Granulocytes      % 0   NRBCs per 100 WBC      <1 /100 0   Absolute Neutrophils      1.6 - 8.3 10e3/uL 4.6   Absolute Lymphocytes      0.8 - 5.3 10e3/uL 1.0   Absolute Monocytes      0.0 - 1.3 10e3/uL 0.3   Absolute Eosinophils      0.0 - 0.7 10e3/uL 0.0   Absolute Basophils      0.0 - 0.2 10e3/uL 0.0   Absolute Immature Granulocytes      <=0.4 10e3/uL 0.0   Absolute NRBCs      10e3/uL 0.0   Cholesterol      <200 mg/dL 210 (H)   Triglycerides      <150 mg/dL 104   HDL Cholesterol      >=40 mg/dL 52   LDL Cholesterol Calculated      <=100 mg/dL 137 (H)   Non HDL Cholesterol      <130 mg/dL 158 (H)   Patient Fasting > 8hrs?       No   Creatinine      0.66 - 1.25 mg/dL 0.87   GFR Estimate      >60 mL/min/1.73m2 >90   Sed Rate      0 - 15 mm/hr 5   Vitamin D Deficiency screening      20 - 75 ug/L 41   DNA-ds      <10.0 IU/mL 23.0 (H)   CRP Inflammation      0.0 - 8.0 mg/L <2.9   Complement C3      81 - 157 mg/dL 94   Complement C4       "13 - 39 mg/dL 16   AST      0 - 45 U/L 20   Albumin      3.4 - 5.0 g/dL 4.3   ALT      0 - 70 U/L 39       Ph.E:    BP (!) 156/88   Pulse 76   Temp 97.7  F (36.5  C)   Ht 1.88 m (6' 2\")   Wt 88.6 kg (195 lb 4.8 oz)   SpO2 96%   BMI 25.08 kg/m      Constitutional: WD/WN. Pleasant. In no acute distress.   Eyes: EOM intact, sclera anicteric, conj not injected  HEENT: no oral ulcers  Chest: CTAB  CV: no M/R/G, RRR  MS: No active synovitis or joint tenderness  Abdomen: soft, NT  Skin: No skin rash  Neuro: A&O x 3. Grossly non focal  Psych: NL affect and mood    Assessment/ plan:    1-Spondyloarthropathy with enthesopathy Dx 2/2019 on SSZ since 2/2019. He is HLA-B27 negative. Has fh/o psoriasis. He fits this diagnosis very well and already responds to SSZ. His inflammatory arthritis was under good control at initial visit, but in 2/2020, had flare ups of achilles tendonitis which started post flu. I increased sulfasalazine to 3 tabs in the morning, 2 tabs in the evening with food in 2/2020 which helped significantly. In 6/2021, given episodes of joint pain, swelling and for better control of arthritis, recommended to increase SSZ to 3 gram a day. He is on celebrex prn, not much help.    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 HCQ peak benefit is 6 months, will give it more time to show benefit. Will check labs, will monitor closely for lupus given + BEST, +anti-DNA. HCQ could help to prevent progression to lupus. Advised him to try tylenol.    Stable 4/2022 labs.      2-SSZ monitoring. Labs q3-6mo. 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 He does not have lupus and nothing on his exam or hx is suggestive of lupus. I'll monitor it for now.    4-Low vit D in the past. S/p replacement.     5-HCQ monitoring. Recommend yearly eye exam, referral was placed.        Orders Placed This Encounter   Procedures     ALT "     Albumin level     AST     Creatinine     Complement C4     Complement C3     CRP inflammation     DNA double stranded antibodies     Erythrocyte sedimentation rate auto     UA with Microscopic reflex to Culture     Protein  random urine     Creatinine random urine     Adult Eye  Referral     CBC with Platelets & Differential     Today's plan:    Try tylenol pm over the counter    Labs    Refer for eye exam    Return in 3 months (video)      Jovani Luna MD          Again, thank you for allowing me to participate in the care of your patient.      Sincerely,    Jovani Luna MD

## 2022-09-23 NOTE — PROGRESS NOTES
Rheumatology F/U In Person Visit Note    Reason for visit: +anti-DNA, peripheral SpA on SSZ    Initial visit date: 2/14/2020    Last seen: 6/28/2021    DOS: 9/23/2022    HPI from initial visit:    Sabino Cardona is a 43 year old  male with fh/o psoriasis and RA, who was referred to our clinic for evaluation and management of possible SLE given +anti-DNA. His wife is a transplant surgeon here at the  and they say the actual reason for referral is to establish/transfer care as they just moved to [place].      Today, he is feeling well.      They moved back from [place] to [place] about 1.5 month ago.      He reports having degenerative arthritis since age 18. Has chronic low back pain with worsening in AM and worsening with inactivity. Allergies in Spring makes it worse.      He started to have heel and ankle pain in Feb 2019, also knees and elbows. That was stressful time of life looking for moving back. Also has 3 small children (one 3 yo and two 1 yo twins) which keep them busy.      Both heels were red and blaching and his wife thought it was gout.      His brother has gout and is almost 49 yo.      He saw a rheumatologist at [facility]. 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 His wife questioned the diagnosis given lack of lupus symptoms.      Then he had x-rays of spine which showed lots of bone spurs.    He was diagnosed with OA, possible reactive arthritis. No h/o uveitis, urethritis, STDs. Has h/o food poisenings. Was put on SSZ in 2/2019, the dose was increased to current dose of 1 gr bid, he tolerates it well with no toxicity. SSZ has helped and he has no current heel inflammation or recent flare ups.      Has fatigue but because of having 3 small children, it's hard for him to say if fatigue was related to inflammation.    ROS is positive for intermittent CP for years, ibuprofen helps. Prednisone 10 mg every day did not  help in the past.    Gets cold sores. No photosensitivity. Gets migraines. No weight loss. No Raynaud's.      2/14/2020: No fatigue, skin rashes, oral ulcers.    Has mild achy knees.    About 1.5 months ago, started having flare up of achilles tendonitis in both feet, worse in AM and at night. They turn red and swollen.      7/10/2020: At last visit in 2/2020, increased SSZ from 2 to 2.5 gr/day given active achilles tendonitis, also prescribed celebrex 100 mg bid prn. Change in SSZ has helped and he no longer needs to take celebrex (last use 6 wk go). Tolerates SSZ at higher dose well. No flare ups since last visit.    No symptoms and no complaints today. Felling very well. No skin rashes, oral ulcers, CP or SOB or fatigue.    Had flu in 1/2020, after that got the flu shot.     6/28/2021: Hands, feet swell up 2 times a week x 1 hr, it gets better after moving around. No heel pain/swelling.    Has 3 small children, physical job. Fully vaccinated, no SE. Got pfizer.    Over last couple of months, has been good on taking her SSZ 5 tabs a day everyday. Noticed more sx including CP by missing some doses. No SSZ SEs. Rarely takes the celebrex, if notices sore feet x 2 days in a row, he takes it. It does not help as much.    Today 9/23/2022: Emerson presents for follow up. 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 No other complaints. Tolerates HCQ well.      During the day he is usually good, at night migratory pain comes back affecting shoulders (R shoulder more consistent) and hips. His pain is tolerable most days but sometimes,  keeps him up.    No am stiffness.    Advil works better than celebrex.    If misses SSZ x 3-4 days, pain recurs.      ROS:  A comprehensive ROS was done, positives are per HPI.      Past Medical History:   Diagnosis Date     Biceps tendinopathy      GERD  (gastroesophageal reflux disease)      Mucocele of lip      Spondyloarthropathy      Past Surgical History:   Procedure Laterality Date     APPENDECTOMY       COLONOSCOPY N/A 1/3/2022    Procedure: COLONOSCOPY, WITH POLYPECTOMY;  Surgeon: Ingrid Keyes MD;  Location: [facility] OR     HERNIA REPAIR, INGUINAL RT/LT      left     Family History   Problem Relation Age of Onset     Hypertension Mother      Hypertension Father      Asthma Father      Diabetes Father      Psoriasis Father      Asthma Sister      Hypertension Maternal Grandmother         RA     Diabetes Maternal Grandmother      Hypertension Maternal Grandfather      Cancer Maternal Grandfather         stomach     Cancer Paternal Grandfather         lung-smoker     Anesthesia Reaction No family hx of      Deep Vein Thrombosis (DVT) No family hx of      Social History     Socioeconomic History     Marital status:      Spouse name: Not on file     Number of children: 3     Years of education: Not on file     Highest education level: Not on file   Occupational History     Occupation: [employer]   Tobacco Use     Smoking status: Never Smoker     Smokeless tobacco: Former User     Types: Chew     Quit date: 5/15/2010     Tobacco comment: chew 9 yr   Substance and Sexual Activity     Alcohol use: Yes     Alcohol/week: 1.7 standard drinks     Types: 2 Standard drinks or equivalent per week     Drug use: No     Sexual activity: Yes     Partners: Female   Other Topics Concern      Service Not Asked     Blood Transfusions Not Asked     Caffeine Concern Not Asked     Occupational Exposure Not Asked     Hobby Hazards Not Asked     Sleep Concern Not Asked     Stress Concern Not Asked     Weight Concern Not Asked     Special Diet Not Asked     Back Care Not Asked     Exercise Yes     Comment: run, weights     Bike Helmet Not Asked     Seat Belt Not Asked     Self-Exams Not Asked   Social History Narrative     Not on file     Social Determinants of Health      Financial Resource Strain: Not on file   Food Insecurity: Not on file   Transportation Needs: Not on file   Physical Activity: Not on file   Stress: Not on file   Social Connections: Not on file   Intimate Partner Violence: Not on file   Housing Stability: Not on file     Patient Active Problem List   Diagnosis     Esophageal reflux     Biceps tendinopathy     Wart     Mucocele of lip     Sore throat     Allergies   Allergen Reactions     Nkda [No Known Drug Allergies]      Seasonal Allergies Other (See Comments)     Sneezing, running nose, itchy eyes       Outpatient Encounter Medications as of 9/23/2022   Medication Sig Dispense Refill     celecoxib (CELEBREX) 100 MG capsule Take 1 capsule (100 mg) by mouth 2 times daily as needed for moderate pain Please keep your appointment on 9/29/22, thank you. 60 capsule 0     hydroxychloroquine (PLAQUENIL) 200 MG tablet Take 1 tablet (200 mg) by mouth 2 times daily Annual Plaquenil toxicity eye screening required. 60 tablet 11     sulfaSALAzine (AZULFIDINE) 500 MG tablet TAKE 3 TABLETS (1,500 MG) BY MOUTH 2 TIMES DAILY 540 tablet 1     Vitamin D3 (CHOLECALCIFEROL) 25 mcg (1000 units) tablet Take by mouth every morning        No facility-administered encounter medications on file as of 9/23/2022.               His records were reviewed.      2/2019: +BEST 1:40 mixed homogenous and nucleolar pattern, positive anti-DNA 17 with NL being up to 4. Neg anti-Sm, RNP, SCL-70, SSA, SSB, ALEXANDREA-1, CHROMATIN, RF, anti-CCP. NL ESR. NL SUA 5.2. Neg HLA-B27.  NL TSH, C3, C4. Unremarkable CMP. Neg U/A. LOW vit D 27.    2/2019: Advanced degenerative changes of L spine and moderate degenerative changes of T spine on x-rays.  Component      Latest Ref Rng & Units 1/17/2020   WBC      4.0 - 11.0 10e9/L 3.6 (L)   RBC Count      4.4 - 5.9 10e12/L 4.52   Hemoglobin      13.3 - 17.7 g/dL 13.8   Hematocrit      40.0 - 53.0 % 41.1   MCV      78 - 100 fl 91   MCH      26.5 - 33.0 pg 30.5   MCHC       31.5 - 36.5 g/dL 33.6   RDW      10.0 - 15.0 % 12.0   Platelet Count      150 - 450 10e9/L 258   Diff Method       Automated Method   % Neutrophils      % 55.5   % Lymphocytes      % 30.4   % Monocytes      % 11.0   % Eosinophils      % 2.5   % Basophils      % 0.3   % Immature Granulocytes      % 0.3   Nucleated RBCs      0 /100 0   Absolute Neutrophil      1.6 - 8.3 10e9/L 2.0   Absolute Lymphocytes      0.8 - 5.3 10e9/L 1.1   Absolute Monocytes      0.0 - 1.3 10e9/L 0.4   Absolute Eosinophils      0.0 - 0.7 10e9/L 0.1   Absolute Basophils      0.0 - 0.2 10e9/L 0.0   Abs Immature Granulocytes      0 - 0.4 10e9/L 0.0   Absolute Nucleated RBC       0.0   Creatinine      0.66 - 1.25 mg/dL 0.83   GFR Estimate      >60 mL/min/1.73:m2 >90   GFR Estimate If Black      >60 mL/min/1.73:m2 >90   Vitamin D Deficiency screening      20 - 75 ug/L 27   Sed Rate      0 - 15 mm/h 8   CRP Inflammation      0.0 - 8.0 mg/L <2.9   AST      0 - 45 U/L 19   Albumin      3.4 - 5.0 g/dL 3.9   ALT      0 - 70 U/L 36   Complement C3      81 - 157 mg/dL 118   Complement C4      13 - 39 mg/dL 25   DNA-ds      <10 IU/mL 16 (H)     Component      Latest Ref Rng & Units 1/18/2021   WBC      4.0 - 11.0 10e9/L 5.2   RBC Count      4.4 - 5.9 10e12/L 4.68   Hemoglobin      13.3 - 17.7 g/dL 14.7   Hematocrit      40.0 - 53.0 % 42.7   MCV      78 - 100 fl 91   MCH      26.5 - 33.0 pg 31.4   MCHC      31.5 - 36.5 g/dL 34.4   RDW      10.0 - 15.0 % 11.9   Platelet Count      150 - 450 10e9/L 241   % Neutrophils      % 63.3   % Lymphocytes      % 27.0   % Monocytes      % 8.3   % Eosinophils      % 1.0   % Basophils      % 0.4   Absolute Neutrophil      1.6 - 8.3 10e9/L 3.3   Absolute Lymphocytes      0.8 - 5.3 10e9/L 1.4   Absolute Monocytes      0.0 - 1.3 10e9/L 0.4   Absolute Eosinophils      0.0 - 0.7 10e9/L 0.1   Absolute Basophils      0.0 - 0.2 10e9/L 0.0   Diff Method       Automated Method   Creatinine      0.66 - 1.25 mg/dL 0.86   GFR  "Estimate      >60 mL/min/1.73:m2 >90   GFR Estimate If Black      >60 mL/min/1.73:m2 >90   AST      0 - 45 U/L 23   Albumin      3.4 - 5.0 g/dL 4.3   ALT      0 - 70 U/L 42     Component      Latest Ref Rng & Units 4/13/2022   WBC      4.0 - 11.0 10e3/uL 6.0   RBC Count      4.40 - 5.90 10e6/uL 4.51   Hemoglobin      13.3 - 17.7 g/dL 14.2   Hematocrit      40.0 - 53.0 % 41.5   MCV      78 - 100 fL 92   MCH      26.5 - 33.0 pg 31.5   MCHC      31.5 - 36.5 g/dL 34.2   RDW      10.0 - 15.0 % 12.0   Platelet Count      150 - 450 10e3/uL 223   % Neutrophils      % 77   % Lymphocytes      % 16   % Monocytes      % 6   % Eosinophils      % 0   % Basophils      % 1   % Immature Granulocytes      % 0   NRBCs per 100 WBC      <1 /100 0   Absolute Neutrophils      1.6 - 8.3 10e3/uL 4.6   Absolute Lymphocytes      0.8 - 5.3 10e3/uL 1.0   Absolute Monocytes      0.0 - 1.3 10e3/uL 0.3   Absolute Eosinophils      0.0 - 0.7 10e3/uL 0.0   Absolute Basophils      0.0 - 0.2 10e3/uL 0.0   Absolute Immature Granulocytes      <=0.4 10e3/uL 0.0   Absolute NRBCs      10e3/uL 0.0   Cholesterol      <200 mg/dL 210 (H)   Triglycerides      <150 mg/dL 104   HDL Cholesterol      >=40 mg/dL 52   LDL Cholesterol Calculated      <=100 mg/dL 137 (H)   Non HDL Cholesterol      <130 mg/dL 158 (H)   Patient Fasting > 8hrs?       No   Creatinine      0.66 - 1.25 mg/dL 0.87   GFR Estimate      >60 mL/min/1.73m2 >90   Sed Rate      0 - 15 mm/hr 5   Vitamin D Deficiency screening      20 - 75 ug/L 41   DNA-ds      <10.0 IU/mL 23.0 (H)   CRP Inflammation      0.0 - 8.0 mg/L <2.9   Complement C3      81 - 157 mg/dL 94   Complement C4      13 - 39 mg/dL 16   AST      0 - 45 U/L 20   Albumin      3.4 - 5.0 g/dL 4.3   ALT      0 - 70 U/L 39       Ph.E:    BP (!) 156/88   Pulse 76   Temp 97.7  F (36.5  C)   Ht 1.88 m (6' 2\")   Wt 88.6 kg (195 lb 4.8 oz)   SpO2 96%   BMI 25.08 kg/m      Constitutional: WD/WN. Pleasant. In no acute distress.   Eyes: EOM " intact, sclera anicteric, conj not injected  HEENT: no oral ulcers  Chest: CTAB  CV: no M/R/G, RRR  MS: No active synovitis or joint tenderness  Abdomen: soft, NT  Skin: No skin rash  Neuro: A&O x 3. Grossly non focal  Psych: NL affect and mood    Assessment/ plan:    1-Spondyloarthropathy with enthesopathy Dx 2/2019 on SSZ since 2/2019. He is HLA-B27 negative. Has fh/o psoriasis. He fits this diagnosis very well and already responds to SSZ. His inflammatory arthritis was under good control at initial visit, but in 2/2020, had flare ups of achilles tendonitis which started post flu. I increased sulfasalazine to 3 tabs in the morning, 2 tabs in the evening with food in 2/2020 which helped significantly. In 6/2021, given episodes of joint pain, swelling and for better control of arthritis, recommended to increase SSZ to 3 gram a day. He is on celebrex prn, not much help.    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 HCQ peak benefit is 6 months, will give it more time to show benefit. Will check labs, will monitor closely for lupus given + BEST, +anti-DNA. HCQ could help to prevent progression to lupus. Advised him to try tylenol.    Stable 4/2022 labs.      2-SSZ monitoring. Labs q3-6mo. 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 He does not have lupus and nothing on his exam or hx is suggestive of lupus. I'll monitor it for now.    4-Low vit D in the past. S/p replacement.     5-HCQ monitoring. Recommend yearly eye exam, referral was placed.        Orders Placed This Encounter   Procedures     ALT     Albumin level     AST     Creatinine     Complement C4     Complement C3     CRP inflammation     DNA double stranded antibodies     Erythrocyte sedimentation rate auto     UA with Microscopic reflex to Culture     Protein  random urine     Creatinine random urine     Adult Eye  Referral     CBC with  Platelets & Differential     Today's plan:    Try tylenol pm over the counter    Labs    Refer for eye exam    Return in 3 months (video)      Jovani Luna MD

## 2022-09-28 ENCOUNTER — LAB (OUTPATIENT)
Dept: LAB | Facility: CLINIC | Age: 47
End: 2022-09-28
Payer: COMMERCIAL

## 2022-09-28 DIAGNOSIS — M19.90 INFLAMMATORY ARTHRITIS: ICD-10-CM

## 2022-09-28 DIAGNOSIS — Z51.81 MEDICATION MONITORING ENCOUNTER: ICD-10-CM

## 2022-09-28 DIAGNOSIS — R76.8 POSITIVE DOUBLE STRANDED DNA ANTIBODY TEST: ICD-10-CM

## 2022-09-28 DIAGNOSIS — M47.819 SPONDYLOARTHROPATHY: ICD-10-CM

## 2022-09-28 LAB
ALBUMIN MFR UR ELPH: 70.9 MG/DL
ALBUMIN SERPL-MCNC: 4 G/DL (ref 3.4–5)
ALBUMIN UR-MCNC: NEGATIVE MG/DL
ALT SERPL W P-5'-P-CCNC: 33 U/L (ref 0–70)
APPEARANCE UR: CLEAR
AST SERPL W P-5'-P-CCNC: 21 U/L (ref 0–45)
BACTERIA #/AREA URNS HPF: ABNORMAL /HPF
BASOPHILS # BLD AUTO: 0 10E3/UL (ref 0–0.2)
BASOPHILS NFR BLD AUTO: 1 %
BILIRUB UR QL STRIP: NEGATIVE
COLOR UR AUTO: ABNORMAL
CREAT SERPL-MCNC: 0.83 MG/DL (ref 0.66–1.25)
CREAT UR-MCNC: 103 MG/DL
CRP SERPL-MCNC: 13.9 MG/L (ref 0–8)
EOSINOPHIL # BLD AUTO: 0 10E3/UL (ref 0–0.7)
EOSINOPHIL NFR BLD AUTO: 0 %
ERYTHROCYTE [DISTWIDTH] IN BLOOD BY AUTOMATED COUNT: 11.4 % (ref 10–15)
ERYTHROCYTE [SEDIMENTATION RATE] IN BLOOD BY WESTERGREN METHOD: 6 MM/HR (ref 0–15)
GFR SERPL CREATININE-BSD FRML MDRD: >90 ML/MIN/1.73M2
GLUCOSE UR STRIP-MCNC: NEGATIVE MG/DL
HCT VFR BLD AUTO: 40.8 % (ref 40–53)
HGB BLD-MCNC: 14.1 G/DL (ref 13.3–17.7)
HGB UR QL STRIP: NEGATIVE
IMM GRANULOCYTES # BLD: 0 10E3/UL
IMM GRANULOCYTES NFR BLD: 0 %
KETONES UR STRIP-MCNC: NEGATIVE MG/DL
LEUKOCYTE ESTERASE UR QL STRIP: NEGATIVE
LYMPHOCYTES # BLD AUTO: 0.8 10E3/UL (ref 0.8–5.3)
LYMPHOCYTES NFR BLD AUTO: 26 %
MCH RBC QN AUTO: 31.5 PG (ref 26.5–33)
MCHC RBC AUTO-ENTMCNC: 34.6 G/DL (ref 31.5–36.5)
MCV RBC AUTO: 91 FL (ref 78–100)
MONOCYTES # BLD AUTO: 0.4 10E3/UL (ref 0–1.3)
MONOCYTES NFR BLD AUTO: 11 %
NEUTROPHILS # BLD AUTO: 1.9 10E3/UL (ref 1.6–8.3)
NEUTROPHILS NFR BLD AUTO: 62 %
NITRATE UR QL: NEGATIVE
NRBC # BLD AUTO: 0 10E3/UL
NRBC BLD AUTO-RTO: 0 /100
PH UR STRIP: 6.5 [PH] (ref 5–7)
PLATELET # BLD AUTO: 198 10E3/UL (ref 150–450)
PROT/CREAT 24H UR: 0.69 MG/MG CR (ref 0–0.2)
RBC # BLD AUTO: 4.48 10E6/UL (ref 4.4–5.9)
RBC #/AREA URNS AUTO: ABNORMAL /HPF
SP GR UR STRIP: 1.01 (ref 1–1.03)
SQUAMOUS #/AREA URNS AUTO: ABNORMAL /LPF
UROBILINOGEN UR STRIP-MCNC: NORMAL MG/DL
WBC # BLD AUTO: 3.1 10E3/UL (ref 4–11)
WBC #/AREA URNS AUTO: ABNORMAL /HPF

## 2022-09-28 PROCEDURE — 85652 RBC SED RATE AUTOMATED: CPT

## 2022-09-28 PROCEDURE — 81001 URINALYSIS AUTO W/SCOPE: CPT

## 2022-09-28 PROCEDURE — 86160 COMPLEMENT ANTIGEN: CPT

## 2022-09-28 PROCEDURE — 36415 COLL VENOUS BLD VENIPUNCTURE: CPT

## 2022-09-28 PROCEDURE — 85025 COMPLETE CBC W/AUTO DIFF WBC: CPT

## 2022-09-28 PROCEDURE — 82040 ASSAY OF SERUM ALBUMIN: CPT

## 2022-09-28 PROCEDURE — 82565 ASSAY OF CREATININE: CPT

## 2022-09-28 PROCEDURE — 84460 ALANINE AMINO (ALT) (SGPT): CPT

## 2022-09-28 PROCEDURE — 84156 ASSAY OF PROTEIN URINE: CPT

## 2022-09-28 PROCEDURE — 86225 DNA ANTIBODY NATIVE: CPT

## 2022-09-28 PROCEDURE — 84450 TRANSFERASE (AST) (SGOT): CPT

## 2022-09-28 PROCEDURE — 86140 C-REACTIVE PROTEIN: CPT

## 2022-09-29 LAB
C3 SERPL-MCNC: 97 MG/DL (ref 81–157)
C4 SERPL-MCNC: 22 MG/DL (ref 13–39)
DSDNA AB SER-ACNC: 18 IU/ML

## 2022-10-04 DIAGNOSIS — R82.90 ABNORMAL RESULT ON SCREENING URINE TEST: ICD-10-CM

## 2022-10-04 DIAGNOSIS — R76.8 POSITIVE DOUBLE STRANDED DNA ANTIBODY TEST: Primary | ICD-10-CM

## 2022-11-09 ENCOUNTER — OFFICE VISIT (OUTPATIENT)
Dept: DERMATOLOGY | Facility: CLINIC | Age: 47
End: 2022-11-09
Attending: INTERNAL MEDICINE
Payer: COMMERCIAL

## 2022-11-09 DIAGNOSIS — D22.9 MULTIPLE BENIGN NEVI: ICD-10-CM

## 2022-11-09 DIAGNOSIS — L82.1 SEBORRHEIC KERATOSES: ICD-10-CM

## 2022-11-09 DIAGNOSIS — L81.4 SOLAR LENTIGO: ICD-10-CM

## 2022-11-09 DIAGNOSIS — D18.01 CHERRY ANGIOMA: ICD-10-CM

## 2022-11-09 DIAGNOSIS — D84.9 IMMUNOSUPPRESSED STATUS (H): Primary | ICD-10-CM

## 2022-11-09 PROCEDURE — 99202 OFFICE O/P NEW SF 15 MIN: CPT | Performed by: PHYSICIAN ASSISTANT

## 2022-11-09 NOTE — LETTER
11/9/2022         RE: Sabino Cardona  [address]  [facility] [address]        Dear Colleague,    Thank you for referring your patient, Sabino Cardona, to the [facility]. Please see a copy of my visit note below.    [facility] Dermatology Note  Encounter Date: Nov 9, 2022  Office Visit     Dermatology Problem List:  Last skin check 11/9/22, recommend yearly  1. Relevant medical history: History of psoriasis and inflammatory arthritis, on Plaquenil  2. *LTM - 3 mm dark brown macule on the right dorsal foot    Family History: Father with unknown type of skin cancer  Social History: Works as a contractor, enjoys being outside frequently. Has twin boys (5 yo) and daughter (7 yo)  ____________________________________________    Assessment & Plan:    # Immunosuppressed status, currently on Plaquenil for psoriasis and RA.   - ABCDEs: Counseled ABCDEs of melanoma: Asymmetry, Border (irregularity), Color (not uniform, changes in color), Diameter (greater than 6 mm which is about the size of a pencil eraser), and Evolving (any changes in preexisting moles).  - Sun protection: Counseled SPF30+ sunscreen, UPF clothing, sun avoidance, tanning bed avoidance.     # 3 mm dark brown macule on the right dorsal foot   - Photo documented today   - Will clinically monitor for changes.    # Cherry angioma(s).    - No further intervention needed.    # Multiple clinically benign nevi.    - No further intervention needed.   - Signs and Symptoms of non-melanoma skin cancer and ABCDEs of melanoma reviewed with patient. Patient encouraged to perform monthly self skin exams and educated on how to perform them. UV precautions reviewed with patient. Patient was asked about new or changing moles/lesions on body.   - Sunscreen: Apply 20 minutes prior to going outdoors and reapply every two hours, when wet or sweating. We recommend using an SPF 30 or higher, and to use one that is water  resistant.       # Seborrheic keratosis, non irritated.   - No further intervention needed.     # Solar lentigines.   - No further intervention needed.   - Sunscreen: Apply 20 minutes prior to going outdoors and reapply every two hours, when wet or sweating. We recommend using an SPF 30 or higher, and to use one that is water resistant.          Procedures Performed:   None.    Follow-up: 1 year(s) in-person, or earlier for new or changing lesions    Staff and Scribe:     Scribe Disclosure:   I, Fernando Kidd, am serving as a scribe to document services personally performed by Shanna Valle PA-C, based on data collection and the provider's statements to me.    Provider Disclosure:   The documentation recorded by the scribe accurately reflects the services I personally performed and the decisions made by me.    All risks, benefits and alternatives were discussed with patient.  Patient is in agreement and understands the assessment and plan.  All questions were answered.    Shanna Valle PA-C, [facility]  [facility]: Phone: [phone], Fax: [phone]  [facility]: Phone: [phone],  Fax: [phone]  [facility]: Phone: [phone], Fax: [phone]  ____________________________________________    CC: Skin Check (Full body skin check. Spots of concern on back of hands and face. Patient has not had a previous skin check. Family history of skin cancer in father- unknown type. )    HPI:  Mr. Sabino Castillo David is a(n) 47 year old male who presents today as a new patient for a skin check.    Referred to derm by Dr. Das for skin cancer screening. Note from 6/14/22 reviewed.    Today, he has an area of concern on the back of the hands and the face. He believes the nevus on his foot has been present for a while, although he is uncertain. He has made more of an effort to be diligent with his sun  protection recently.    Patient is otherwise feeling well, without additional concerns.    Labs:  NA    Physical Exam:  Vitals: There were no vitals taken for this visit.  SKIN: Total skin excluding the undergarment areas was performed. The exam included the head/face, neck, both arms, chest, back, abdomen, both legs, digits and/or nails.   - Collins Type II  - There are dome shaped bright red papules on the trunk and extremities.   - Multiple regular brown pigmented macules and papules are identified on the trunk and extremities.   - Scattered brown macules on sun exposed areas.  - There are waxy stuck on tan to brown papules on the trunk, extremities and dorsal hands.   - 3 mm dark brown macule on the right dorsal foot  - No other lesions of concern on areas examined.               Medications:  Current Outpatient Medications   Medication     celecoxib (CELEBREX) 100 MG capsule     hydroxychloroquine (PLAQUENIL) 200 MG tablet     sulfaSALAzine (AZULFIDINE) 500 MG tablet     Vitamin D3 (CHOLECALCIFEROL) 25 mcg (1000 units) tablet     No current facility-administered medications for this visit.      Past Medical History:   Patient Active Problem List   Diagnosis     Esophageal reflux     Biceps tendinopathy     Wart     Mucocele of lip     Sore throat     Past Medical History:   Diagnosis Date     Biceps tendinopathy      GERD (gastroesophageal reflux disease)      Mucocele of lip      Spondyloarthropathy         CC Huan Das MD  [address] [place] [phone] on close of this encounter.        Again, thank you for allowing me to participate in the care of your patient.        Sincerely,        Shanna Valle PA-C

## 2022-11-09 NOTE — PATIENT INSTRUCTIONS
Patient Education     Checking for Skin Cancer  You can find cancer early by checking your skin each month. There are 3 kinds of skin cancer. They are melanoma, basal cell carcinoma, and squamous cell carcinoma. Doing monthly skin checks is the best way to find new marks or skin changes. Follow the instructions below for checking your skin.   The ABCDEs of checking moles for melanoma   Check your moles or growths for signs of melanoma using ABCDE:   Asymmetry: the sides of the mole or growth don t match  Border: the edges are ragged, notched, or blurred  Color: the color within the mole or growth varies  Diameter: the mole or growth is larger than 6 mm (size of a pencil eraser)  Evolving: the size, shape, or color of the mole or growth is changing (evolving is not shown in the images below)    Checking for other types of skin cancer  Basal cell carcinoma or squamous cell carcinoma have symptoms such as:     A spot or mole that looks different from all other marks on your skin  Changes in how an area feels, such as itching, tenderness, or pain  Changes in the skin's surface, such as oozing, bleeding, or scaliness  A sore that does not heal  New swelling or redness beyond the border of a mole    Who s at risk?  Anyone can get skin cancer. But you are at greater risk if you have:   Fair skin, light-colored hair, or light-colored eyes  Many moles or abnormal moles on your skin  A history of sunburns from sunlight or tanning beds  A family history of skin cancer  A history of exposure to radiation or chemicals  A weakened immune system  If you have had skin cancer in the past, you are at risk for recurring skin cancer.   How to check your skin  Do your monthly skin checkups in front of a full-length mirror. Check all parts of your body, including your:   Head (ears, face, neck, and scalp)  Torso (front, back, and sides)  Arms (tops, undersides, upper, and lower armpits)  Hands (palms, backs, and fingers, including  under the nails)  Buttocks and genitals  Legs (front, back, and sides)  Feet (tops, soles, toes, including under the nails, and between toes)  If you have a lot of moles, take digital photos of them each month. Make sure to take photos both up close and from a distance. These can help you see if any moles change over time.   Most skin changes are not cancer. But if you see any changes in your skin, call your doctor right away. Only he or she can diagnose a problem. If you have skin cancer, seeing your doctor can be the first step toward getting the treatment that could save your life.   [employer] last reviewed this educational content on 4/1/2019 2000-2020 [employer]. [address] [place] [address], [place] [address]. All rights reserved. This information is not intended as a substitute for professional medical care. Always follow your healthcare professional's instructions.       When should I call my doctor?  If you are worsening or not improving, please, contact us or seek urgent care as noted below.     Who should I call with questions (adults)?  [facility] (adult and pediatric): [phone]  [facility] (adult): [phone]  For urgent needs outside of business hours call the [facility] at [phone] and ask for the dermatology resident on call to be paged  If this is a medical emergency and you are unable to reach an ER, Call [phone]    Who should I call with questions (pediatric)?  [facility]- Pediatric Dermatology  Dr. Marcy Gutierrez, Dr. Gayle Rhodes, Dr. Priscilla Delgado, MARLENY Gerard, Dr. Fariha Cowart, Dr. Mary Kate Pope & Dr. Huan Goodrich  Non-urgent nurse triage line; [phone]- Prema and Brandy SORENSEN Care Coordinatoreugene Francois (/Complex ) [phone]    If you need a prescription refill, please contact your pharmacy. Refills are approved or denied by our  Physicians during normal business hours, Monday through Fridays  Per office policy, refills will not be granted if you have not been seen within the past year (or sooner depending on your child's condition)    Scheduling Information:  Pediatric Appointment Scheduling and Call Center [phone]  Radiology Scheduling- [phone]  Sedation Unit Scheduling- [phone]  [place] Scheduling- General [phone]; Pediatric Dermatology [phone]  Main  Services: [phone]  Czech: [phone]  Ghanaian: [phone]  Hmong/Venezuelan/Croatian: [phone]  Preadmission Nursing Department Fax Number: [phone] (Fax all pre-operative paperwork to this number)    For urgent matters arising during evenings, weekends, or holidays that cannot wait for normal business hours please call [phone] and ask for the dermatology resident on call to be paged.

## 2022-11-09 NOTE — PROGRESS NOTES
[facility] Dermatology Note  Encounter Date: Nov 9, 2022  Office Visit     Dermatology Problem List:  Last skin check 11/9/22, recommend yearly  1. Relevant medical history: History of psoriasis and inflammatory arthritis, on Plaquenil  2. *LTM - 3 mm dark brown macule on the right dorsal foot    Family History: Father with unknown type of skin cancer  Social History: Works as a contractor, enjoys being outside frequently. Has twin boys (5 yo) and daughter (7 yo)  ____________________________________________    Assessment & Plan:    # Immunosuppressed status, currently on Plaquenil for psoriasis and RA.   - ABCDEs: Counseled ABCDEs of melanoma: Asymmetry, Border (irregularity), Color (not uniform, changes in color), Diameter (greater than 6 mm which is about the size of a pencil eraser), and Evolving (any changes in preexisting moles).  - Sun protection: Counseled SPF30+ sunscreen, UPF clothing, sun avoidance, tanning bed avoidance.     # 3 mm dark brown macule on the right dorsal foot   - Photo documented today   - Will clinically monitor for changes.    # Cherry angioma(s).    - No further intervention needed.    # Multiple clinically benign nevi.    - No further intervention needed.   - Signs and Symptoms of non-melanoma skin cancer and ABCDEs of melanoma reviewed with patient. Patient encouraged to perform monthly self skin exams and educated on how to perform them. UV precautions reviewed with patient. Patient was asked about new or changing moles/lesions on body.   - Sunscreen: Apply 20 minutes prior to going outdoors and reapply every two hours, when wet or sweating. We recommend using an SPF 30 or higher, and to use one that is water resistant.       # Seborrheic keratosis, non irritated.   - No further intervention needed.     # Solar lentigines.   - No further intervention needed.   - Sunscreen: Apply 20 minutes prior to going outdoors and reapply every two hours, when wet or sweating.  We recommend using an SPF 30 or higher, and to use one that is water resistant.          Procedures Performed:   None.    Follow-up: 1 year(s) in-person, or earlier for new or changing lesions    Staff and Scribe:     Scribe Disclosure:   I, Fernando Kidd, am serving as a scribe to document services personally performed by Shanna Valle PA-C, based on data collection and the provider's statements to me.    Provider Disclosure:   The documentation recorded by the scribe accurately reflects the services I personally performed and the decisions made by me.    All risks, benefits and alternatives were discussed with patient.  Patient is in agreement and understands the assessment and plan.  All questions were answered.    Shanna Valle PA-C, MPAS  [facility]: Phone: [phone], Fax: [phone]  [facility]: Phone: [phone],  Fax: [phone]  [facility]: Phone: [phone], Fax: [phone]  ____________________________________________    CC: Skin Check (Full body skin check. Spots of concern on back of hands and face. Patient has not had a previous skin check. Family history of skin cancer in father- unknown type. )    HPI:  Mr. Sabino Cardona is a(n) 47 year old male who presents today as a new patient for a skin check.    Referred to derm by Dr. Das for skin cancer screening. Note from 6/14/22 reviewed.    Today, he has an area of concern on the back of the hands and the face. He believes the nevus on his foot has been present for a while, although he is uncertain. He has made more of an effort to be diligent with his sun protection recently.    Patient is otherwise feeling well, without additional concerns.    Labs:  NA    Physical Exam:  Vitals: There were no vitals taken for this visit.  SKIN: Total skin excluding the undergarment areas was performed. The exam included the  head/face, neck, both arms, chest, back, abdomen, both legs, digits and/or nails.   - Collins Type II  - There are dome shaped bright red papules on the trunk and extremities.   - Multiple regular brown pigmented macules and papules are identified on the trunk and extremities.   - Scattered brown macules on sun exposed areas.  - There are waxy stuck on tan to brown papules on the trunk, extremities and dorsal hands.   - 3 mm dark brown macule on the right dorsal foot  - No other lesions of concern on areas examined.               Medications:  Current Outpatient Medications   Medication     celecoxib (CELEBREX) 100 MG capsule     hydroxychloroquine (PLAQUENIL) 200 MG tablet     sulfaSALAzine (AZULFIDINE) 500 MG tablet     Vitamin D3 (CHOLECALCIFEROL) 25 mcg (1000 units) tablet     No current facility-administered medications for this visit.      Past Medical History:   Patient Active Problem List   Diagnosis     Esophageal reflux     Biceps tendinopathy     Wart     Mucocele of lip     Sore throat     Past Medical History:   Diagnosis Date     Biceps tendinopathy      GERD (gastroesophageal reflux disease)      Mucocele of lip      Spondyloarthropathy         CC Huan Das MD  [address] [address] on close of this encounter.

## 2022-11-09 NOTE — NURSING NOTE
Sabino Cardona's goals for this visit include:   Chief Complaint   Patient presents with     Skin Check     Full body skin check. Spots of concern on back of hands and face. Patient has not had a previous skin check. Family history of skin cancer in father- unknown type.        He requests these members of his care team be copied on today's visit information:     PCP: Huan Das    Referring Provider:  Huan Das MD  [address] [place] [id number]    There were no vitals taken for this visit.    Do you need any medication refills at today's visit? No  Sharlene Infante [facility]

## 2022-11-24 NOTE — TELEPHONE ENCOUNTER
Called patient at [phone] and spoke with the patient regarding scheduling surgery with Dr. Dailey. Patient will have surgery on 11/8. Patient cannot get in to see his PCP before 11/8 for his H&P so he will see PAC. Patient is aware that Central Scheduling will call to assist with scheduling his COVID test. Also sending a surgery packet in the mail. No further questions at this time.    15

## 2022-12-09 ENCOUNTER — OFFICE VISIT (OUTPATIENT)
Dept: OPTOMETRY | Facility: CLINIC | Age: 47
End: 2022-12-09
Payer: COMMERCIAL

## 2022-12-09 DIAGNOSIS — H52.4 PRESBYOPIA: ICD-10-CM

## 2022-12-09 DIAGNOSIS — Z79.899 HIGH RISK MEDICATION USE: Primary | ICD-10-CM

## 2022-12-09 PROCEDURE — 92134 CPTRZ OPH DX IMG PST SGM RTA: CPT | Performed by: OPTOMETRIST

## 2022-12-09 PROCEDURE — 92083 EXTENDED VISUAL FIELD XM: CPT | Performed by: OPTOMETRIST

## 2022-12-09 PROCEDURE — 92004 COMPRE OPH EXAM NEW PT 1/>: CPT | Performed by: OPTOMETRIST

## 2022-12-09 ASSESSMENT — CONF VISUAL FIELD
OS_INFERIOR_TEMPORAL_RESTRICTION: 0
OD_SUPERIOR_NASAL_RESTRICTION: 0
OS_NORMAL: 1
OS_SUPERIOR_TEMPORAL_RESTRICTION: 0
OD_INFERIOR_TEMPORAL_RESTRICTION: 0
OS_SUPERIOR_NASAL_RESTRICTION: 0
OS_INFERIOR_NASAL_RESTRICTION: 0
OD_NORMAL: 1
OD_SUPERIOR_TEMPORAL_RESTRICTION: 0
OD_INFERIOR_NASAL_RESTRICTION: 0
METHOD: COUNTING FINGERS

## 2022-12-09 ASSESSMENT — EXTERNAL EXAM - LEFT EYE: OS_EXAM: NORMAL

## 2022-12-09 ASSESSMENT — CUP TO DISC RATIO
OS_RATIO: 0.4
OD_RATIO: 0.4

## 2022-12-09 ASSESSMENT — TONOMETRY
OS_IOP_MMHG: 11
IOP_METHOD: TONOPEN
OD_IOP_MMHG: 13

## 2022-12-09 ASSESSMENT — SLIT LAMP EXAM - LIDS
COMMENTS: NORMAL
COMMENTS: NORMAL

## 2022-12-09 ASSESSMENT — EXTERNAL EXAM - RIGHT EYE: OD_EXAM: NORMAL

## 2022-12-09 ASSESSMENT — VISUAL ACUITY
METHOD: SNELLEN - LINEAR
METHOD_MR: PT DEFERRED
OS_SC: 20/20
OD_SC: 20/20

## 2022-12-09 NOTE — PROGRESS NOTES
Assessment/Plan  (Z79.899) High risk medication use  (primary encounter diagnosis)  Comment: Started May 2022  Plan: HVF 10-2 OU, OCT Retina Spectralis OU (both eyes)        Discussed findings with patient. Monitor annually with dilated exam. Due for repeat OCT and visual field after 5 years (May 2027).     (H52.4) Presbyopia  Comment: Based on patient's age and near vision symptoms   Plan: Recommend using lower power OTC reading glasses to help with near blur. Return with vision changes.     Complete documentation of historical and exam elements from today's encounter can  be found in the full encounter summary report (not reduplicated in this progress  note). I personally obtained the chief complaint(s) and history of present illness. I  confirmed and edited as necessary the review of systems, past medical/surgical  history, family history, social history, and examination findings as documented by  others; and I examined the patient myself. I personally reviewed the relevant tests,  images, and reports as documented above. I formulated and edited as necessary the  assessment and plan and discussed the findings and management plan with the  patient and family.    Ramiro Bullock, OD

## 2022-12-09 NOTE — NURSING NOTE
Chief Complaints and History of Present Illnesses   Patient presents with     Retinal Evaluation     Plaquenil use X 7 months 200mg twice a day         Chief Complaint(s) and History of Present Illness(es)     Retinal Evaluation            Comments: Plaquenil use X 7 months              Comments    Pt states no vision problems  States no flashes, floaters, eye pain or redness    Melissa Núñez COT 7:55 AM December 9, 2022

## 2022-12-11 NOTE — PROGRESS NOTES
HPI:    Last visit with me 6/14/2022 and additional information in that note. Overall doing quite well. He states plaquenil is now quite effective. No complaints. No other HEENT, cardiopulmonary, abdominal, , neurological, systemic, psychiatric, lymphatic, endocrine, vascular complaints.     Past Medical History:   Diagnosis Date     Biceps tendinopathy      GERD (gastroesophageal reflux disease)      Mucocele of lip      Spondyloarthropathy      Past Surgical History:   Procedure Laterality Date     APPENDECTOMY       COLONOSCOPY N/A 1/3/2022    Procedure: COLONOSCOPY, WITH POLYPECTOMY;  Surgeon: Ingrid Keyes MD;  Location: [facility] OR     HERNIA REPAIR, INGUINAL RT/LT      left     PE:    Vitals noted, gen, nad, cooperative, alert, neck supple nl rom, lungs with good air movement, RRR, S1, S2, no MRG, abdomen, no acute findings. Grossly normal neurological exam.     A/P:    1. Immunizations; Pfizer COVID vaccine x 3. Tdap 11/16/2021. He got this year's Influenza vaccine.   2. He had Rheumatology follow up with Dr. Luna 9/23/2022 for psoriasis and RA/spondyloarrthroapthy On Sulfasalazine and PRN  Celebrex. Labs done 9/28/2022. Recently started Plaquenil (he likes to fish and discussed Plaquenil can be a photosensitizer).   3. Colonoscopy 1/3/2022 and repeat in 7-10 years   4. Mild elevated LDL: Lipids 4/13/2022  and HDL 52  5. Dermatology;  for skin check seen 9/23/2022 and next visit 11/8/2023  6. PT R shoulder 12/30/2021  7. Vitamin D level checked 4/13/2022 normal at 41  8. PSA next year at age 48 (in 5/2023); no early family h/o prostate cancer   9. Seen Optometry 12/9/2022 and next visit 12/8/2023      I will see him next 5/2023    30 minutes spent on the date of the encounter doing chart review, history and exam, documentation and further activities as noted above exclusive of procedures and other billable interpretations

## 2022-12-12 ENCOUNTER — OFFICE VISIT (OUTPATIENT)
Dept: INTERNAL MEDICINE | Facility: CLINIC | Age: 47
End: 2022-12-12
Payer: COMMERCIAL

## 2022-12-12 VITALS
DIASTOLIC BLOOD PRESSURE: 88 MMHG | OXYGEN SATURATION: 97 % | WEIGHT: 197.9 LBS | SYSTOLIC BLOOD PRESSURE: 134 MMHG | BODY MASS INDEX: 25.41 KG/M2 | HEART RATE: 70 BPM

## 2022-12-12 DIAGNOSIS — E78.5 HYPERLIPIDEMIA LDL GOAL <130: Primary | ICD-10-CM

## 2022-12-12 PROCEDURE — 99214 OFFICE O/P EST MOD 30 MIN: CPT | Performed by: INTERNAL MEDICINE

## 2022-12-12 NOTE — NURSING NOTE
Sabino DILL Jonathan Hernandez is a 47 year old male that presents in clinic today for the following:     Chief Complaint   Patient presents with     Follow Up       The patient's allergies and medications were reviewed. The patient's vitals were obtained without incident. The patient does not have any other questions for the provider.     Jojo Zapata, EMT at 7:22 AM on 12/12/2022.  Primary Care Clinic: [phone]

## 2022-12-18 DIAGNOSIS — R76.8 POSITIVE DOUBLE STRANDED DNA ANTIBODY TEST: ICD-10-CM

## 2022-12-21 NOTE — TELEPHONE ENCOUNTER
SULFASALAZINE 500MG TABS      Last Written Prescription Date:  6/7/22  Last Fill Quantity: 540,   # refills: 1  Last Office Visit: 9/23/22  Future Office visit:  1/19/23    CBC RESULTS: Recent Labs   Lab Test 09/28/22  0821   WBC 3.1*   RBC 4.48   HGB 14.1   HCT 40.8   MCV 91   MCH 31.5   MCHC 34.6   RDW 11.4          Creatinine   Date Value Ref Range Status   09/28/2022 0.83 0.66 - 1.25 mg/dL Final   01/18/2021 0.86 0.66 - 1.25 mg/dL Final   ]    Liver Function Studies -   Recent Labs   Lab Test 09/28/22  0821   ALBUMIN 4.0   AST 21   ALT 33       Routing refill request to provider for review/approval because:  MD approval

## 2022-12-22 DIAGNOSIS — R76.8 POSITIVE DOUBLE STRANDED DNA ANTIBODY TEST: ICD-10-CM

## 2022-12-22 RX ORDER — SULFASALAZINE 500 MG/1
1500 TABLET ORAL 2 TIMES DAILY
Qty: 540 TABLET | Refills: 0 | Status: SHIPPED | OUTPATIENT
Start: 2022-12-22 | End: 2024-02-08

## 2022-12-22 RX ORDER — SULFASALAZINE 500 MG/1
1500 TABLET ORAL 2 TIMES DAILY
Qty: 540 TABLET | Refills: 1 | Status: SHIPPED | OUTPATIENT
Start: 2022-12-22 | End: 2024-02-08

## 2023-01-15 ENCOUNTER — HEALTH MAINTENANCE LETTER (OUTPATIENT)
Age: 48
End: 2023-01-15

## 2023-01-19 ENCOUNTER — VIRTUAL VISIT (OUTPATIENT)
Dept: RHEUMATOLOGY | Facility: CLINIC | Age: 48
End: 2023-01-19
Attending: INTERNAL MEDICINE
Payer: COMMERCIAL

## 2023-01-19 DIAGNOSIS — M19.90 INFLAMMATORY ARTHRITIS: ICD-10-CM

## 2023-01-19 DIAGNOSIS — M47.819 SPONDYLOARTHROPATHY: Primary | ICD-10-CM

## 2023-01-19 DIAGNOSIS — Z51.81 MEDICATION MONITORING ENCOUNTER: ICD-10-CM

## 2023-01-19 PROCEDURE — 99214 OFFICE O/P EST MOD 30 MIN: CPT | Mod: VID | Performed by: INTERNAL MEDICINE

## 2023-01-19 PROCEDURE — G0463 HOSPITAL OUTPT CLINIC VISIT: HCPCS | Mod: PN,GT | Performed by: INTERNAL MEDICINE

## 2023-01-19 RX ORDER — HYDROXYCHLOROQUINE SULFATE 200 MG/1
200 TABLET, FILM COATED ORAL 2 TIMES DAILY
Qty: 180 TABLET | Refills: 3 | Status: SHIPPED | OUTPATIENT
Start: 2023-01-19 | End: 2024-01-24

## 2023-01-19 NOTE — LETTER
1/19/2023       RE: Sabino Cardona  [address]  [facility] [address]     Dear Colleague,    Thank you for referring your patient, Sabino Cardona, to the [facility]HE[facility] [place] at [facility]. Please see a copy of my visit note below.    Emerson is a 47 year old who is being evaluated via a billable video visit.      How would you like to obtain your AVS? MyChart  If the video visit is dropped, the invitation should be resent by: Text to cell phone: [phone]  Will anyone else be joining your video visit? No      Keiko WEN Nguyen on 1/19/2023 at 2:49 PM      Video-Visit Details    Type of service:  Video Visit     Originating Location (pt. Location): Home    Distant Location (provider location):  On-site  Platform used for Video Visit: [employer]       Rheumatology F/U Virtual Visit Note    Reason for visit: +anti-DNA, peripheral SpA on SSZ+HCQ    Initial visit date: 2/14/2020    Last seen: 9/23/2022    DOS: 1/19/2023    HPI from initial visit:    Sabino Cardona is a 43 year old  male with fh/o psoriasis and RA, who was referred to our clinic for evaluation and management of possible SLE given +anti-DNA. His wife is a transplant surgeon here at the  and they say the actual reason for referral is to establish/transfer care as they just moved to [place].      Today, he is feeling well.      They moved back from [place] to [place] about 1.5 month ago.      He reports having degenerative arthritis since age 18. Has chronic low back pain with worsening in AM and worsening with inactivity. Allergies in Spring makes it worse.      He started to have heel and ankle pain in Feb 2019, also knees and elbows. That was stressful time of life looking for moving back. Also has 3 small children (one 3 yo and two 1 yo twins) which keep them busy.      Both heels were red and blaching and his wife thought it was gout.      His brother has  gout and is almost 51 yo.      He saw a rheumatologist at [facility]. 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 His wife questioned the diagnosis given lack of lupus symptoms.      Then he had x-rays of spine which showed lots of bone spurs.    He was diagnosed with OA, possible reactive arthritis. No h/o uveitis, urethritis, STDs. Has h/o food poisenings. Was put on SSZ in 2/2019, the dose was increased to current dose of 1 gr bid, he tolerates it well with no toxicity. SSZ has helped and he has no current heel inflammation or recent flare ups.      Has fatigue but because of having 3 small children, it's hard for him to say if fatigue was related to inflammation.    ROS is positive for intermittent CP for years, ibuprofen helps. Prednisone 10 mg every day did not help in the past.    Gets cold sores. No photosensitivity. Gets migraines. No weight loss. No Raynaud's.      2/14/2020: No fatigue, skin rashes, oral ulcers.    Has mild achy knees.    About 1.5 months ago, started having flare up of achilles tendonitis in both feet, worse in AM and at night. They turn red and swollen.      7/10/2020: At last visit in 2/2020, increased SSZ from 2 to 2.5 gr/day given active achilles tendonitis, also prescribed celebrex 100 mg bid prn. Change in SSZ has helped and he no longer needs to take celebrex (last use 6 wk go). Tolerates SSZ at higher dose well. No flare ups since last visit.    No symptoms and no complaints today. Felling very well. No skin rashes, oral ulcers, CP or SOB or fatigue.    Had flu in 1/2020, after that got the flu shot.     6/28/2021: Hands, feet swell up 2 times a week x 1 hr, it gets better after moving around. No heel pain/swelling.    Has 3 small children, physical job. Fully vaccinated, no SE. Got pfizer.    Over last couple of months, has been good on taking her SSZ 5 tabs a day everyday. Noticed more sx including CP by missing  some doses. No SSZ SEs. Rarely takes the celebrex, if notices sore feet x 2 days in a row, he takes it. It does not help as much.    9/23/2022: Emerson presents for follow up. 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 No other complaints. Tolerates HCQ well.      During the day he is usually good, at night migratory pain comes back affecting shoulders (R shoulder more consistent) and hips. His pain is tolerable most days but sometimes,  keeps him up.    No am stiffness.    Advil works better than celebrex.    If misses SSZ x 3-4 days, pain recurs.      Today 1/19/2023: Tolerates HCQ well, migratory arthralgia affecting large joints is much improved, reports occasional pain, takes ibuprofen prn. tolerates HCQ, thinks it has helped.      ROS:  A comprehensive ROS was done, positives are per HPI.      Past Medical History:   Diagnosis Date     Biceps tendinopathy      GERD (gastroesophageal reflux disease)      Mucocele of lip      Spondyloarthropathy      Past Surgical History:   Procedure Laterality Date     APPENDECTOMY       COLONOSCOPY N/A 1/3/2022    Procedure: COLONOSCOPY, WITH POLYPECTOMY;  Surgeon: Ingrid Keyes MD;  Location: [facility] OR     HERNIA REPAIR, INGUINAL RT/LT      left     Family History   Problem Relation Age of Onset     Hypertension Mother      Hypertension Father      Asthma Father      Diabetes Father      Psoriasis Father      Hypertension Maternal Grandmother         RA     Diabetes Maternal Grandmother      Hypertension Maternal Grandfather      Cancer Maternal Grandfather         stomach     Cancer Paternal Grandfather         lung-smoker     Asthma Sister      Anesthesia Reaction No family hx of      Deep Vein Thrombosis (DVT) No family hx of      Glaucoma No family hx of      Macular Degeneration No family hx of      Social History     Socioeconomic  History     Marital status:      Spouse name: Not on file     Number of children: 3     Years of education: Not on file     Highest education level: Not on file   Occupational History     Occupation: [employer]   Tobacco Use     Smoking status: Never     Smokeless tobacco: Former     Types: Chew     Quit date: 5/15/2010     Tobacco comments:     chew 9 yr   Substance and Sexual Activity     Alcohol use: Yes     Alcohol/week: 1.7 standard drinks     Types: 2 Standard drinks or equivalent per week     Drug use: No     Sexual activity: Yes     Partners: Female   Other Topics Concern      Service Not Asked     Blood Transfusions Not Asked     Caffeine Concern Not Asked     Occupational Exposure Not Asked     Hobby Hazards Not Asked     Sleep Concern Not Asked     Stress Concern Not Asked     Weight Concern Not Asked     Special Diet Not Asked     Back Care Not Asked     Exercise Yes     Comment: run, weights     Bike Helmet Not Asked     Seat Belt Not Asked     Self-Exams Not Asked   Social History Narrative     Not on file     Social Determinants of Health     Financial Resource Strain: Not on file   Food Insecurity: Not on file   Transportation Needs: Not on file   Physical Activity: Not on file   Stress: Not on file   Social Connections: Not on file   Intimate Partner Violence: Not on file   Housing Stability: Not on file     Patient Active Problem List   Diagnosis     Esophageal reflux     Biceps tendinopathy     Wart     Mucocele of lip     Sore throat     Allergies   Allergen Reactions     Nkda [No Known Drug Allergies]      Seasonal Allergies Other (See Comments)     Sneezing, running nose, itchy eyes       Outpatient Encounter Medications as of 1/19/2023   Medication Sig Dispense Refill     hydroxychloroquine (PLAQUENIL) 200 MG tablet Take 1 tablet (200 mg) by mouth 2 times daily Annual Plaquenil toxicity eye screening required. 60 tablet 11     sulfaSALAzine (AZULFIDINE) 500 MG tablet Take 3  tablets (1,500 mg) by mouth 2 times daily 540 tablet 0     sulfaSALAzine (AZULFIDINE) 500 MG tablet Take 3 tablets (1,500 mg) by mouth 2 times daily 540 tablet 1     Vitamin D3 (CHOLECALCIFEROL) 25 mcg (1000 units) tablet Take by mouth every morning        celecoxib (CELEBREX) 100 MG capsule Take 1 capsule (100 mg) by mouth 2 times daily as needed for moderate pain Please keep your appointment on 9/29/22, thank you. (Patient not taking: Reported on 11/9/2022) 60 capsule 0     No facility-administered encounter medications on file as of 1/19/2023.               His records were reviewed.      2/2019: +BEST 1:40 mixed homogenous and nucleolar pattern, positive anti-DNA 17 with NL being up to 4. Neg anti-Sm, RNP, SCL-70, SSA, SSB, ALEXANDREA-1, CHROMATIN, RF, anti-CCP. NL ESR. NL SUA 5.2. Neg HLA-B27.  NL TSH, C3, C4. Unremarkable CMP. Neg U/A. LOW vit D 27.    2/2019: Advanced degenerative changes of L spine and moderate degenerative changes of T spine on x-rays.  Component      Latest Ref Rng & Units 1/17/2020   WBC      4.0 - 11.0 10e9/L 3.6 (L)   RBC Count      4.4 - 5.9 10e12/L 4.52   Hemoglobin      13.3 - 17.7 g/dL 13.8   Hematocrit      40.0 - 53.0 % 41.1   MCV      78 - 100 fl 91   MCH      26.5 - 33.0 pg 30.5   MCHC      31.5 - 36.5 g/dL 33.6   RDW      10.0 - 15.0 % 12.0   Platelet Count      150 - 450 10e9/L 258   Diff Method       Automated Method   % Neutrophils      % 55.5   % Lymphocytes      % 30.4   % Monocytes      % 11.0   % Eosinophils      % 2.5   % Basophils      % 0.3   % Immature Granulocytes      % 0.3   Nucleated RBCs      0 /100 0   Absolute Neutrophil      1.6 - 8.3 10e9/L 2.0   Absolute Lymphocytes      0.8 - 5.3 10e9/L 1.1   Absolute Monocytes      0.0 - 1.3 10e9/L 0.4   Absolute Eosinophils      0.0 - 0.7 10e9/L 0.1   Absolute Basophils      0.0 - 0.2 10e9/L 0.0   Abs Immature Granulocytes      0 - 0.4 10e9/L 0.0   Absolute Nucleated RBC       0.0   Creatinine      0.66 - 1.25 mg/dL 0.83   GFR  Estimate      >60 mL/min/1.73:m2 >90   GFR Estimate If Black      >60 mL/min/1.73:m2 >90   Vitamin D Deficiency screening      20 - 75 ug/L 27   Sed Rate      0 - 15 mm/h 8   CRP Inflammation      0.0 - 8.0 mg/L <2.9   AST      0 - 45 U/L 19   Albumin      3.4 - 5.0 g/dL 3.9   ALT      0 - 70 U/L 36   Complement C3      81 - 157 mg/dL 118   Complement C4      13 - 39 mg/dL 25   DNA-ds      <10 IU/mL 16 (H)     Component      Latest Ref Rng & Units 1/18/2021   WBC      4.0 - 11.0 10e9/L 5.2   RBC Count      4.4 - 5.9 10e12/L 4.68   Hemoglobin      13.3 - 17.7 g/dL 14.7   Hematocrit      40.0 - 53.0 % 42.7   MCV      78 - 100 fl 91   MCH      26.5 - 33.0 pg 31.4   MCHC      31.5 - 36.5 g/dL 34.4   RDW      10.0 - 15.0 % 11.9   Platelet Count      150 - 450 10e9/L 241   % Neutrophils      % 63.3   % Lymphocytes      % 27.0   % Monocytes      % 8.3   % Eosinophils      % 1.0   % Basophils      % 0.4   Absolute Neutrophil      1.6 - 8.3 10e9/L 3.3   Absolute Lymphocytes      0.8 - 5.3 10e9/L 1.4   Absolute Monocytes      0.0 - 1.3 10e9/L 0.4   Absolute Eosinophils      0.0 - 0.7 10e9/L 0.1   Absolute Basophils      0.0 - 0.2 10e9/L 0.0   Diff Method       Automated Method   Creatinine      0.66 - 1.25 mg/dL 0.86   GFR Estimate      >60 mL/min/1.73:m2 >90   GFR Estimate If Black      >60 mL/min/1.73:m2 >90   AST      0 - 45 U/L 23   Albumin      3.4 - 5.0 g/dL 4.3   ALT      0 - 70 U/L 42     Component      Latest Ref Rng & Units 4/13/2022   WBC      4.0 - 11.0 10e3/uL 6.0   RBC Count      4.40 - 5.90 10e6/uL 4.51   Hemoglobin      13.3 - 17.7 g/dL 14.2   Hematocrit      40.0 - 53.0 % 41.5   MCV      78 - 100 fL 92   MCH      26.5 - 33.0 pg 31.5   MCHC      31.5 - 36.5 g/dL 34.2   RDW      10.0 - 15.0 % 12.0   Platelet Count      150 - 450 10e3/uL 223   % Neutrophils      % 77   % Lymphocytes      % 16   % Monocytes      % 6   % Eosinophils      % 0   % Basophils      % 1   % Immature Granulocytes      % 0   NRBCs per 100  WBC      <1 /100 0   Absolute Neutrophils      1.6 - 8.3 10e3/uL 4.6   Absolute Lymphocytes      0.8 - 5.3 10e3/uL 1.0   Absolute Monocytes      0.0 - 1.3 10e3/uL 0.3   Absolute Eosinophils      0.0 - 0.7 10e3/uL 0.0   Absolute Basophils      0.0 - 0.2 10e3/uL 0.0   Absolute Immature Granulocytes      <=0.4 10e3/uL 0.0   Absolute NRBCs      10e3/uL 0.0   Cholesterol      <200 mg/dL 210 (H)   Triglycerides      <150 mg/dL 104   HDL Cholesterol      >=40 mg/dL 52   LDL Cholesterol Calculated      <=100 mg/dL 137 (H)   Non HDL Cholesterol      <130 mg/dL 158 (H)   Patient Fasting > 8hrs?       No   Creatinine      0.66 - 1.25 mg/dL 0.87   GFR Estimate      >60 mL/min/1.73m2 >90   Sed Rate      0 - 15 mm/hr 5   Vitamin D Deficiency screening      20 - 75 ug/L 41   DNA-ds      <10.0 IU/mL 23.0 (H)   CRP Inflammation      0.0 - 8.0 mg/L <2.9   Complement C3      81 - 157 mg/dL 94   Complement C4      13 - 39 mg/dL 16   AST      0 - 45 U/L 20   Albumin      3.4 - 5.0 g/dL 4.3   ALT      0 - 70 U/L 39       Ph.E:        Constitutional: WD/WN. Pleasant. In no acute distress.   Eyes: EOM intact, sclera anicteric, conj not injected  MS: No active synovitis or joint tenderness  Skin: No skin rash  Neuro: A&O x 3. Grossly non focal  Psych: NL affect and mood    Assessment/ plan:    1-Spondyloarthropathy with enthesopathy Dx 2/2019 on SSZ since 2/2019. He is HLA-B27 negative. Has fh/o psoriasis. He fits this diagnosis very well and already responds to SSZ. His inflammatory arthritis was under good control at initial visit, but in 2/2020, had flare ups of achilles tendonitis which started post flu. I increased sulfasalazine to 3 tabs in the morning, 2 tabs in the evening with food in 2/2020 which helped significantly. In 6/2021, given episodes of joint pain, swelling and for better control of arthritis, recommended to increase SSZ to 3 gram a day. He is on celebrex prn, not much help.    In 5/2022,  mg bid was added to SSZ  1.5 gr bid, as he reported worsened migratory arthralgia, it has helped but joint pain is still present. HCQ peak benefit is 6 months, will give it more time to show benefit. Will check labs, will monitor closely for lupus given + BEST, +anti-DNA. HCQ could help to prevent progression to lupus. Advised him to try tylenol.    Stable 4/2022 labs.      Today 1/19/2023: improved migratory arthralgia after adding HCQ. Will continue HCQ+SSZ. Last labs in 9/2022 showed no protein in urine but ur pr/cr=0.69 plus high CRP, mild increased anti-DNA, mild leukopenia, but he had infection, will re-check labs.    2-SSZ monitoring. Labs q6mo. 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 He does not have lupus and nothing on his exam or hx is suggestive of lupus. I'll monitor it for now.    4-Low vit D in the past. S/p replacement.     5-HCQ monitoring. Recommend yearly eye exam        Orders Placed This Encounter   Procedures     ALT     Albumin level     AST     Creatinine     Complement C4     Complement C3     CRP inflammation     DNA double stranded antibodies     Erythrocyte sedimentation rate auto     UA with Microscopic reflex to Culture     Protein  random urine     Creatinine random urine     CBC with Platelets & Differential     Today's plan:    Labs this month    Return video in 6 months    Jovani Luna MD

## 2023-01-19 NOTE — PROGRESS NOTES
Emerson is a 47 year old who is being evaluated via a billable video visit.      How would you like to obtain your AVS? MyChart  If the video visit is dropped, the invitation should be resent by: Text to cell phone: [phone]  Will anyone else be joining your video visit? Zaira Cedillo on 1/19/2023 at 2:49 PM      Video-Visit Details    Type of service:  Video Visit     Originating Location (pt. Location): Home    Distant Location (provider location):  On-site  Platform used for Video Visit: [facility]       Rheumatology F/U Virtual Visit Note    Reason for visit: +anti-DNA, peripheral SpA on SSZ+HCQ    Initial visit date: 2/14/2020    Last seen: 9/23/2022    DOS: 1/19/2023    HPI from initial visit:    Sabino Cardona is a 43 year old  male with fh/o psoriasis and RA, who was referred to our clinic for evaluation and management of possible SLE given +anti-DNA. His wife is a transplant surgeon here at the  and they say the actual reason for referral is to establish/transfer care as they just moved to [place].      Today, he is feeling well.      They moved back from [place] to [place] about 1.5 month ago.      He reports having degenerative arthritis since age 18. Has chronic low back pain with worsening in AM and worsening with inactivity. Allergies in Spring makes it worse.      He started to have heel and ankle pain in Feb 2019, also knees and elbows. That was stressful time of life looking for moving back. Also has 3 small children (one 3 yo and two 1 yo twins) which keep them busy.      Both heels were red and blaching and his wife thought it was gout.      His brother has gout and is almost 49 yo.      He saw a rheumatologist at [facility]. 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 His wife questioned the diagnosis given lack of lupus symptoms.      Then he had x-rays of spine which showed lots of bone spurs.    He was  diagnosed with OA, possible reactive arthritis. No h/o uveitis, urethritis, STDs. Has h/o food poisenings. Was put on SSZ in 2/2019, the dose was increased to current dose of 1 gr bid, he tolerates it well with no toxicity. SSZ has helped and he has no current heel inflammation or recent flare ups.      Has fatigue but because of having 3 small children, it's hard for him to say if fatigue was related to inflammation.    ROS is positive for intermittent CP for years, ibuprofen helps. Prednisone 10 mg every day did not help in the past.    Gets cold sores. No photosensitivity. Gets migraines. No weight loss. No Raynaud's.      2/14/2020: No fatigue, skin rashes, oral ulcers.    Has mild achy knees.    About 1.5 months ago, started having flare up of achilles tendonitis in both feet, worse in AM and at night. They turn red and swollen.      7/10/2020: At last visit in 2/2020, increased SSZ from 2 to 2.5 gr/day given active achilles tendonitis, also prescribed celebrex 100 mg bid prn. Change in SSZ has helped and he no longer needs to take celebrex (last use 6 wk go). Tolerates SSZ at higher dose well. No flare ups since last visit.    No symptoms and no complaints today. Felling very well. No skin rashes, oral ulcers, CP or SOB or fatigue.    Had flu in 1/2020, after that got the flu shot.     6/28/2021: Hands, feet swell up 2 times a week x 1 hr, it gets better after moving around. No heel pain/swelling.    Has 3 small children, physical job. Fully vaccinated, no SE. Got pfizer.    Over last couple of months, has been good on taking her SSZ 5 tabs a day everyday. Noticed more sx including CP by missing some doses. No SSZ SEs. Rarely takes the celebrex, if notices sore feet x 2 days in a row, he takes it. It does not help as much.    9/23/2022: Emerson presents for follow up. He reports worsening migratory arthralgia without joint swelling or stiffness over past few months, this is better and less frequent after adding  HCQ in 5/2022 but still it is presents. No other complaints. Tolerates HCQ well.      During the day he is usually good, at night migratory pain comes back affecting shoulders (R shoulder more consistent) and hips. His pain is tolerable most days but sometimes,  keeps him up.    No am stiffness.    Advil works better than celebrex.    If misses SSZ x 3-4 days, pain recurs.      Today 1/19/2023: Tolerates HCQ well, migratory arthralgia affecting large joints is much improved, reports occasional pain, takes ibuprofen prn. tolerates HCQ, thinks it has helped.      ROS:  A comprehensive ROS was done, positives are per HPI.      Past Medical History:   Diagnosis Date     Biceps tendinopathy      GERD (gastroesophageal reflux disease)      Mucocele of lip      Spondyloarthropathy      Past Surgical History:   Procedure Laterality Date     APPENDECTOMY       COLONOSCOPY N/A 1/3/2022    Procedure: COLONOSCOPY, WITH POLYPECTOMY;  Surgeon: Ingrid Keyes MD;  Location: [facility] OR     HERNIA REPAIR, INGUINAL RT/LT      left     Family History   Problem Relation Age of Onset     Hypertension Mother      Hypertension Father      Asthma Father      Diabetes Father      Psoriasis Father      Hypertension Maternal Grandmother         RA     Diabetes Maternal Grandmother      Hypertension Maternal Grandfather      Cancer Maternal Grandfather         stomach     Cancer Paternal Grandfather         lung-smoker     Asthma Sister      Anesthesia Reaction No family hx of      Deep Vein Thrombosis (DVT) No family hx of      Glaucoma No family hx of      Macular Degeneration No family hx of      Social History     Socioeconomic History     Marital status:      Spouse name: Not on file     Number of children: 3     Years of education: Not on file     Highest education level: Not on file   Occupational History     Occupation: [employer]   Tobacco Use     Smoking status: Never     Smokeless tobacco: Former     Types: Chew     Quit date:  5/15/2010     Tobacco comments:     chew 9 yr   Substance and Sexual Activity     Alcohol use: Yes     Alcohol/week: 1.7 standard drinks     Types: 2 Standard drinks or equivalent per week     Drug use: No     Sexual activity: Yes     Partners: Female   Other Topics Concern      Service Not Asked     Blood Transfusions Not Asked     Caffeine Concern Not Asked     Occupational Exposure Not Asked     Hobby Hazards Not Asked     Sleep Concern Not Asked     Stress Concern Not Asked     Weight Concern Not Asked     Special Diet Not Asked     Back Care Not Asked     Exercise Yes     Comment: run, weights     Bike Helmet Not Asked     Seat Belt Not Asked     Self-Exams Not Asked   Social History Narrative     Not on file     Social Determinants of Health     Financial Resource Strain: Not on file   Food Insecurity: Not on file   Transportation Needs: Not on file   Physical Activity: Not on file   Stress: Not on file   Social Connections: Not on file   Intimate Partner Violence: Not on file   Housing Stability: Not on file     Patient Active Problem List   Diagnosis     Esophageal reflux     Biceps tendinopathy     Wart     Mucocele of lip     Sore throat     Allergies   Allergen Reactions     Nkda [No Known Drug Allergies]      Seasonal Allergies Other (See Comments)     Sneezing, running nose, itchy eyes       Outpatient Encounter Medications as of 1/19/2023   Medication Sig Dispense Refill     hydroxychloroquine (PLAQUENIL) 200 MG tablet Take 1 tablet (200 mg) by mouth 2 times daily Annual Plaquenil toxicity eye screening required. 60 tablet 11     sulfaSALAzine (AZULFIDINE) 500 MG tablet Take 3 tablets (1,500 mg) by mouth 2 times daily 540 tablet 0     sulfaSALAzine (AZULFIDINE) 500 MG tablet Take 3 tablets (1,500 mg) by mouth 2 times daily 540 tablet 1     Vitamin D3 (CHOLECALCIFEROL) 25 mcg (1000 units) tablet Take by mouth every morning        celecoxib (CELEBREX) 100 MG capsule Take 1 capsule (100 mg) by  mouth 2 times daily as needed for moderate pain Please keep your appointment on 9/29/22, thank you. (Patient not taking: Reported on 11/9/2022) 60 capsule 0     No facility-administered encounter medications on file as of 1/19/2023.               His records were reviewed.      2/2019: +BEST 1:40 mixed homogenous and nucleolar pattern, positive anti-DNA 17 with NL being up to 4. Neg anti-Sm, RNP, SCL-70, SSA, SSB, ALEXANDREA-1, CHROMATIN, RF, anti-CCP. NL ESR. NL SUA 5.2. Neg HLA-B27.  NL TSH, C3, C4. Unremarkable CMP. Neg U/A. LOW vit D 27.    2/2019: Advanced degenerative changes of L spine and moderate degenerative changes of T spine on x-rays.  Component      Latest Ref Rng & Units 1/17/2020   WBC      4.0 - 11.0 10e9/L 3.6 (L)   RBC Count      4.4 - 5.9 10e12/L 4.52   Hemoglobin      13.3 - 17.7 g/dL 13.8   Hematocrit      40.0 - 53.0 % 41.1   MCV      78 - 100 fl 91   MCH      26.5 - 33.0 pg 30.5   MCHC      31.5 - 36.5 g/dL 33.6   RDW      10.0 - 15.0 % 12.0   Platelet Count      150 - 450 10e9/L 258   Diff Method       Automated Method   % Neutrophils      % 55.5   % Lymphocytes      % 30.4   % Monocytes      % 11.0   % Eosinophils      % 2.5   % Basophils      % 0.3   % Immature Granulocytes      % 0.3   Nucleated RBCs      0 /100 0   Absolute Neutrophil      1.6 - 8.3 10e9/L 2.0   Absolute Lymphocytes      0.8 - 5.3 10e9/L 1.1   Absolute Monocytes      0.0 - 1.3 10e9/L 0.4   Absolute Eosinophils      0.0 - 0.7 10e9/L 0.1   Absolute Basophils      0.0 - 0.2 10e9/L 0.0   Abs Immature Granulocytes      0 - 0.4 10e9/L 0.0   Absolute Nucleated RBC       0.0   Creatinine      0.66 - 1.25 mg/dL 0.83   GFR Estimate      >60 mL/min/1.73:m2 >90   GFR Estimate If Black      >60 mL/min/1.73:m2 >90   Vitamin D Deficiency screening      20 - 75 ug/L 27   Sed Rate      0 - 15 mm/h 8   CRP Inflammation      0.0 - 8.0 mg/L <2.9   AST      0 - 45 U/L 19   Albumin      3.4 - 5.0 g/dL 3.9   ALT      0 - 70 U/L 36   Complement C3       81 - 157 mg/dL 118   Complement C4      13 - 39 mg/dL 25   DNA-ds      <10 IU/mL 16 (H)     Component      Latest Ref Rng & Units 1/18/2021   WBC      4.0 - 11.0 10e9/L 5.2   RBC Count      4.4 - 5.9 10e12/L 4.68   Hemoglobin      13.3 - 17.7 g/dL 14.7   Hematocrit      40.0 - 53.0 % 42.7   MCV      78 - 100 fl 91   MCH      26.5 - 33.0 pg 31.4   MCHC      31.5 - 36.5 g/dL 34.4   RDW      10.0 - 15.0 % 11.9   Platelet Count      150 - 450 10e9/L 241   % Neutrophils      % 63.3   % Lymphocytes      % 27.0   % Monocytes      % 8.3   % Eosinophils      % 1.0   % Basophils      % 0.4   Absolute Neutrophil      1.6 - 8.3 10e9/L 3.3   Absolute Lymphocytes      0.8 - 5.3 10e9/L 1.4   Absolute Monocytes      0.0 - 1.3 10e9/L 0.4   Absolute Eosinophils      0.0 - 0.7 10e9/L 0.1   Absolute Basophils      0.0 - 0.2 10e9/L 0.0   Diff Method       Automated Method   Creatinine      0.66 - 1.25 mg/dL 0.86   GFR Estimate      >60 mL/min/1.73:m2 >90   GFR Estimate If Black      >60 mL/min/1.73:m2 >90   AST      0 - 45 U/L 23   Albumin      3.4 - 5.0 g/dL 4.3   ALT      0 - 70 U/L 42     Component      Latest Ref Rng & Units 4/13/2022   WBC      4.0 - 11.0 10e3/uL 6.0   RBC Count      4.40 - 5.90 10e6/uL 4.51   Hemoglobin      13.3 - 17.7 g/dL 14.2   Hematocrit      40.0 - 53.0 % 41.5   MCV      78 - 100 fL 92   MCH      26.5 - 33.0 pg 31.5   MCHC      31.5 - 36.5 g/dL 34.2   RDW      10.0 - 15.0 % 12.0   Platelet Count      150 - 450 10e3/uL 223   % Neutrophils      % 77   % Lymphocytes      % 16   % Monocytes      % 6   % Eosinophils      % 0   % Basophils      % 1   % Immature Granulocytes      % 0   NRBCs per 100 WBC      <1 /100 0   Absolute Neutrophils      1.6 - 8.3 10e3/uL 4.6   Absolute Lymphocytes      0.8 - 5.3 10e3/uL 1.0   Absolute Monocytes      0.0 - 1.3 10e3/uL 0.3   Absolute Eosinophils      0.0 - 0.7 10e3/uL 0.0   Absolute Basophils      0.0 - 0.2 10e3/uL 0.0   Absolute Immature Granulocytes      <=0.4 10e3/uL 0.0    Absolute NRBCs      10e3/uL 0.0   Cholesterol      <200 mg/dL 210 (H)   Triglycerides      <150 mg/dL 104   HDL Cholesterol      >=40 mg/dL 52   LDL Cholesterol Calculated      <=100 mg/dL 137 (H)   Non HDL Cholesterol      <130 mg/dL 158 (H)   Patient Fasting > 8hrs?       No   Creatinine      0.66 - 1.25 mg/dL 0.87   GFR Estimate      >60 mL/min/1.73m2 >90   Sed Rate      0 - 15 mm/hr 5   Vitamin D Deficiency screening      20 - 75 ug/L 41   DNA-ds      <10.0 IU/mL 23.0 (H)   CRP Inflammation      0.0 - 8.0 mg/L <2.9   Complement C3      81 - 157 mg/dL 94   Complement C4      13 - 39 mg/dL 16   AST      0 - 45 U/L 20   Albumin      3.4 - 5.0 g/dL 4.3   ALT      0 - 70 U/L 39       Ph.E:        Constitutional: WD/WN. Pleasant. In no acute distress.   Eyes: EOM intact, sclera anicteric, conj not injected  MS: No active synovitis or joint tenderness  Skin: No skin rash  Neuro: A&O x 3. Grossly non focal  Psych: NL affect and mood    Assessment/ plan:    1-Spondyloarthropathy with enthesopathy Dx 2/2019 on SSZ since 2/2019. He is HLA-B27 negative. Has fh/o psoriasis. He fits this diagnosis very well and already responds to SSZ. His inflammatory arthritis was under good control at initial visit, but in 2/2020, had flare ups of achilles tendonitis which started post flu. I increased sulfasalazine to 3 tabs in the morning, 2 tabs in the evening with food in 2/2020 which helped significantly. In 6/2021, given episodes of joint pain, swelling and for better control of arthritis, recommended to increase SSZ to 3 gram a day. He is on celebrex prn, not much help.    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 HCQ peak benefit is 6 months, will give it more time to show benefit. Will check labs, will monitor closely for lupus given + BEST, +anti-DNA. HCQ could help to prevent progression to lupus. Advised him to try  tylenol.    Stable 4/2022 labs.      Today 1/19/2023: improved migratory arthralgia after adding HCQ. Will continue HCQ+SSZ. Last labs in 9/2022 showed no protein in urine but ur pr/cr=0.69 plus high CRP, mild increased anti-DNA, mild leukopenia, but he had infection, will re-check labs.    2-SSZ monitoring. Labs q6mo. 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 He does not have lupus and nothing on his exam or hx is suggestive of lupus. I'll monitor it for now.    4-Low vit D in the past. S/p replacement.     5-HCQ monitoring. Recommend yearly eye exam        Orders Placed This Encounter   Procedures     ALT     Albumin level     AST     Creatinine     Complement C4     Complement C3     CRP inflammation     DNA double stranded antibodies     Erythrocyte sedimentation rate auto     UA with Microscopic reflex to Culture     Protein  random urine     Creatinine random urine     CBC with Platelets & Differential     Today's plan:    Labs this month    Return video in 6 months    Jovani Luna MD

## 2023-01-20 ENCOUNTER — TELEPHONE (OUTPATIENT)
Dept: RHEUMATOLOGY | Facility: CLINIC | Age: 48
End: 2023-01-20
Payer: COMMERCIAL

## 2023-03-09 ENCOUNTER — LAB (OUTPATIENT)
Dept: LAB | Facility: CLINIC | Age: 48
End: 2023-03-09
Payer: COMMERCIAL

## 2023-03-09 DIAGNOSIS — Z51.81 MEDICATION MONITORING ENCOUNTER: ICD-10-CM

## 2023-03-09 DIAGNOSIS — M47.819 SPONDYLOARTHROPATHY: ICD-10-CM

## 2023-03-09 DIAGNOSIS — M19.90 INFLAMMATORY ARTHRITIS: ICD-10-CM

## 2023-03-09 LAB
ALBUMIN MFR UR ELPH: 29.8 MG/DL (ref 1–14)
ALBUMIN SERPL-MCNC: 4 G/DL (ref 3.4–5)
ALBUMIN UR-MCNC: NEGATIVE MG/DL
ALT SERPL W P-5'-P-CCNC: 31 U/L (ref 0–70)
APPEARANCE UR: CLEAR
AST SERPL W P-5'-P-CCNC: 18 U/L (ref 0–45)
BASOPHILS # BLD AUTO: 0 10E3/UL (ref 0–0.2)
BASOPHILS NFR BLD AUTO: 1 %
BILIRUB UR QL STRIP: NEGATIVE
COLOR UR AUTO: YELLOW
CREAT SERPL-MCNC: 0.79 MG/DL (ref 0.66–1.25)
CREAT UR-MCNC: 45 MG/DL
CRP SERPL-MCNC: 7.3 MG/L (ref 0–8)
EOSINOPHIL # BLD AUTO: 0 10E3/UL (ref 0–0.7)
EOSINOPHIL NFR BLD AUTO: 0 %
ERYTHROCYTE [DISTWIDTH] IN BLOOD BY AUTOMATED COUNT: 11.7 % (ref 10–15)
ERYTHROCYTE [SEDIMENTATION RATE] IN BLOOD BY WESTERGREN METHOD: 5 MM/HR (ref 0–15)
GFR SERPL CREATININE-BSD FRML MDRD: >90 ML/MIN/1.73M2
GLUCOSE UR STRIP-MCNC: NEGATIVE MG/DL
HCT VFR BLD AUTO: 39.6 % (ref 40–53)
HGB BLD-MCNC: 13.4 G/DL (ref 13.3–17.7)
HGB UR QL STRIP: NEGATIVE
IMM GRANULOCYTES # BLD: 0 10E3/UL
IMM GRANULOCYTES NFR BLD: 0 %
KETONES UR STRIP-MCNC: NEGATIVE MG/DL
LEUKOCYTE ESTERASE UR QL STRIP: NEGATIVE
LYMPHOCYTES # BLD AUTO: 0.8 10E3/UL (ref 0.8–5.3)
LYMPHOCYTES NFR BLD AUTO: 26 %
MCH RBC QN AUTO: 31.9 PG (ref 26.5–33)
MCHC RBC AUTO-ENTMCNC: 33.8 G/DL (ref 31.5–36.5)
MCV RBC AUTO: 94 FL (ref 78–100)
MONOCYTES # BLD AUTO: 0.3 10E3/UL (ref 0–1.3)
MONOCYTES NFR BLD AUTO: 9 %
NEUTROPHILS # BLD AUTO: 1.8 10E3/UL (ref 1.6–8.3)
NEUTROPHILS NFR BLD AUTO: 64 %
NITRATE UR QL: NEGATIVE
PH UR STRIP: 7 [PH] (ref 5–7)
PLATELET # BLD AUTO: 213 10E3/UL (ref 150–450)
PROT/CREAT 24H UR: 0.66 MG/MG CR (ref 0–0.2)
RBC # BLD AUTO: 4.2 10E6/UL (ref 4.4–5.9)
RBC #/AREA URNS AUTO: NORMAL /HPF
SP GR UR STRIP: 1.01 (ref 1–1.03)
UROBILINOGEN UR STRIP-ACNC: 0.2 E.U./DL
WBC # BLD AUTO: 2.9 10E3/UL (ref 4–11)
WBC #/AREA URNS AUTO: NORMAL /HPF

## 2023-03-09 PROCEDURE — 82565 ASSAY OF CREATININE: CPT

## 2023-03-09 PROCEDURE — 85652 RBC SED RATE AUTOMATED: CPT

## 2023-03-09 PROCEDURE — 86160 COMPLEMENT ANTIGEN: CPT | Mod: 59

## 2023-03-09 PROCEDURE — 86225 DNA ANTIBODY NATIVE: CPT

## 2023-03-09 PROCEDURE — 84450 TRANSFERASE (AST) (SGOT): CPT

## 2023-03-09 PROCEDURE — 85025 COMPLETE CBC W/AUTO DIFF WBC: CPT

## 2023-03-09 PROCEDURE — 84156 ASSAY OF PROTEIN URINE: CPT

## 2023-03-09 PROCEDURE — 86160 COMPLEMENT ANTIGEN: CPT

## 2023-03-09 PROCEDURE — 84460 ALANINE AMINO (ALT) (SGPT): CPT

## 2023-03-09 PROCEDURE — 81001 URINALYSIS AUTO W/SCOPE: CPT

## 2023-03-09 PROCEDURE — 82040 ASSAY OF SERUM ALBUMIN: CPT

## 2023-03-09 PROCEDURE — 86140 C-REACTIVE PROTEIN: CPT

## 2023-03-09 PROCEDURE — 36415 COLL VENOUS BLD VENIPUNCTURE: CPT

## 2023-03-13 LAB
C3 SERPL-MCNC: 90 MG/DL (ref 81–157)
C4 SERPL-MCNC: 18 MG/DL (ref 13–39)
DSDNA AB SER-ACNC: 21 IU/ML

## 2023-03-14 DIAGNOSIS — D72.819 LEUKOPENIA, UNSPECIFIED TYPE: Primary | ICD-10-CM

## 2023-03-22 DIAGNOSIS — M47.819 SPONDYLOARTHROPATHY: ICD-10-CM

## 2023-03-24 RX ORDER — SULFASALAZINE 500 MG/1
1500 TABLET, DELAYED RELEASE ORAL 2 TIMES DAILY
Qty: 540 TABLET | Refills: 0 | Status: SHIPPED | OUTPATIENT
Start: 2023-03-24 | End: 2023-07-12

## 2023-07-09 DIAGNOSIS — M47.819 SPONDYLOARTHROPATHY: ICD-10-CM

## 2023-07-12 ENCOUNTER — TELEPHONE (OUTPATIENT)
Dept: RHEUMATOLOGY | Facility: CLINIC | Age: 48
End: 2023-07-12
Payer: COMMERCIAL

## 2023-07-12 DIAGNOSIS — M19.90 INFLAMMATORY ARTHRITIS: Primary | ICD-10-CM

## 2023-07-12 RX ORDER — SULFASALAZINE 500 MG/1
1500 TABLET, DELAYED RELEASE ORAL 2 TIMES DAILY
Qty: 540 TABLET | Refills: 1 | Status: SHIPPED | OUTPATIENT
Start: 2023-07-12 | End: 2024-02-08

## 2023-07-12 NOTE — TELEPHONE ENCOUNTER
Medication/Dose: sulfaSALAzine ER (AZULFIDINE EN) 500 MG EC tablet    Last Written : 3/24/23  Last Quantity: 540, # refills: 0  Last Office Visit :  1/19/23  Pending appointment: Not on file     WBC   Date Value Ref Range Status   01/18/2021 5.2 4.0 - 11.0 10e9/L Final     WBC Count   Date Value Ref Range Status   03/09/2023 2.9 (L) 4.0 - 11.0 10e3/uL Final     RBC Count   Date Value Ref Range Status   03/09/2023 4.20 (L) 4.40 - 5.90 10e6/uL Final   01/18/2021 4.68 4.4 - 5.9 10e12/L Final     Hemoglobin   Date Value Ref Range Status   03/09/2023 13.4 13.3 - 17.7 g/dL Final   01/18/2021 14.7 13.3 - 17.7 g/dL Final     Hematocrit   Date Value Ref Range Status   03/09/2023 39.6 (L) 40.0 - 53.0 % Final   01/18/2021 42.7 40.0 - 53.0 % Final     MCV   Date Value Ref Range Status   03/09/2023 94 78 - 100 fL Final   01/18/2021 91 78 - 100 fl Final     MCH   Date Value Ref Range Status   03/09/2023 31.9 26.5 - 33.0 pg Final   01/18/2021 31.4 26.5 - 33.0 pg Final     MCHC   Date Value Ref Range Status   03/09/2023 33.8 31.5 - 36.5 g/dL Final   01/18/2021 34.4 31.5 - 36.5 g/dL Final     RDW   Date Value Ref Range Status   03/09/2023 11.7 10.0 - 15.0 % Final   01/18/2021 11.9 10.0 - 15.0 % Final     Platelet Count   Date Value Ref Range Status   03/09/2023 213 150 - 450 10e3/uL Final   01/18/2021 241 150 - 450 10e9/L Final     AST   Date Value Ref Range Status   03/09/2023 18 0 - 45 U/L Final   01/18/2021 23 0 - 45 U/L Final     ALT   Date Value Ref Range Status   03/09/2023 31 0 - 70 U/L Final   01/18/2021 42 0 - 70 U/L Final     Creatinine   Date Value Ref Range Status   03/09/2023 0.79 0.66 - 1.25 mg/dL Final   01/18/2021 0.86 0.66 - 1.25 mg/dL Final     Albumin   Date Value Ref Range Status   03/09/2023 4.0 3.4 - 5.0 g/dL Final   01/18/2021 4.3 3.4 - 5.0 g/dL Final     CRP Inflammation   Date Value Ref Range Status   03/09/2023 7.3 0.0 - 8.0 mg/L Final   01/17/2020 <2.9 0.0 - 8.0 mg/L Final     No components found for:  ESR    Prescription set up and routed to provider per Refill Protocol and Routed to scheduling team as pt was to have follow-up in 6 months.    MAGDI DavidsonN, RN  [facility] Refill Team

## 2023-07-12 NOTE — TELEPHONE ENCOUNTER
Pt was contacted and scheduled for next available video visit for 9/28/23.  Pt was added to the wait list for sooner openings.    Pt states if there is an issue sending in refills, please contact him back to let him know.

## 2023-07-17 ENCOUNTER — HOSPITAL ENCOUNTER (EMERGENCY)
Facility: CLINIC | Age: 48
Discharge: HOME | End: 2023-07-17
Attending: EMERGENCY MEDICINE | Admitting: EMERGENCY MEDICINE
Payer: COMMERCIAL

## 2023-07-17 ENCOUNTER — APPOINTMENT (OUTPATIENT)
Dept: GENERAL RADIOLOGY | Facility: CLINIC | Age: 48
End: 2023-07-17
Attending: EMERGENCY MEDICINE
Payer: COMMERCIAL

## 2023-07-17 VITALS
OXYGEN SATURATION: 97 % | DIASTOLIC BLOOD PRESSURE: 85 MMHG | HEART RATE: 80 BPM | RESPIRATION RATE: 16 BRPM | TEMPERATURE: 98.1 F | SYSTOLIC BLOOD PRESSURE: 134 MMHG

## 2023-07-17 DIAGNOSIS — S61.111A: ICD-10-CM

## 2023-07-17 DIAGNOSIS — S61.011A THUMB LACERATION, RIGHT, INITIAL ENCOUNTER: Primary | ICD-10-CM

## 2023-07-17 PROCEDURE — 99283 EMERGENCY DEPT VISIT LOW MDM: CPT | Mod: 25

## 2023-07-17 PROCEDURE — 90715 TDAP VACCINE 7 YRS/> IM: CPT | Performed by: EMERGENCY MEDICINE

## 2023-07-17 PROCEDURE — 250N000011 HC RX IP 250 OP 636

## 2023-07-17 PROCEDURE — 250N000011 HC RX IP 250 OP 636: Performed by: EMERGENCY MEDICINE

## 2023-07-17 PROCEDURE — 73140 X-RAY EXAM OF FINGER(S): CPT | Mod: 26 | Performed by: RADIOLOGY

## 2023-07-17 PROCEDURE — 90471 IMMUNIZATION ADMIN: CPT | Performed by: EMERGENCY MEDICINE

## 2023-07-17 PROCEDURE — 99283 EMERGENCY DEPT VISIT LOW MDM: CPT | Performed by: EMERGENCY MEDICINE

## 2023-07-17 PROCEDURE — 73140 X-RAY EXAM OF FINGER(S): CPT | Mod: RT

## 2023-07-17 PROCEDURE — 12001 RPR S/N/AX/GEN/TRNK 2.5CM/<: CPT

## 2023-07-17 RX ORDER — BUPIVACAINE HYDROCHLORIDE 5 MG/ML
2 INJECTION, SOLUTION EPIDURAL; INTRACAUDAL ONCE
Status: COMPLETED | OUTPATIENT
Start: 2023-07-17 | End: 2023-07-17

## 2023-07-17 RX ORDER — BUPIVACAINE HYDROCHLORIDE 5 MG/ML
INJECTION, SOLUTION EPIDURAL; INTRACAUDAL
Status: DISCONTINUED
Start: 2023-07-17 | End: 2023-07-17 | Stop reason: HOSPADM

## 2023-07-17 RX ADMIN — BUPIVACAINE HYDROCHLORIDE 5 MG: 5 INJECTION, SOLUTION EPIDURAL; INTRACAUDAL at 14:49

## 2023-07-17 RX ADMIN — CLOSTRIDIUM TETANI TOXOID ANTIGEN (FORMALDEHYDE INACTIVATED), CORYNEBACTERIUM DIPHTHERIAE TOXOID ANTIGEN (FORMALDEHYDE INACTIVATED), BORDETELLA PERTUSSIS TOXOID ANTIGEN (GLUTARALDEHYDE INACTIVATED), BORDETELLA PERTUSSIS FILAMENTOUS HEMAGGLUTININ ANTIGEN (FORMALDEHYDE INACTIVATED), BORDETELLA PERTUSSIS PERTACTIN ANTIGEN, AND BORDETELLA PERTUSSIS FIMBRIAE 2/3 ANTIGEN 0.5 ML: 5; 2; 2.5; 5; 3; 5 INJECTION, SUSPENSION INTRAMUSCULAR at 15:23

## 2023-07-17 ASSESSMENT — ACTIVITIES OF DAILY LIVING (ADL): ADLS_ACUITY_SCORE: 35

## 2023-07-17 NOTE — ED TRIAGE NOTES
Lac to R thumb from table saw  Happened around noon  Not on thinners  Last tetanus >5 years      Triage Assessment     Row Name 07/17/23 1348       Triage Assessment (Adult)    Airway WDL WDL       Respiratory WDL    Respiratory WDL WDL       Skin Circulation/Temperature WDL    Skin Circulation/Temperature WDL WDL       Cardiac WDL    Cardiac WDL WDL       Peripheral/Neurovascular WDL    Peripheral Neurovascular WDL WDL       Cognitive/Neuro/Behavioral WDL    Cognitive/Neuro/Behavioral WDL WDL

## 2023-07-17 NOTE — ED PROVIDER NOTES
History     Chief Complaint   Patient presents with     Laceration     HPI  Sabino Cardona is a 48 year old male who presents emerged part with a laceration to the tip of his right thumb from a table saw this morning.  The patient states his last tetanus was 6 years ago.  He denies any other injuries.    I have reviewed the Medications, Allergies, Past Medical and Surgical History, and Social History in the flux [employer] system.    Past Medical History:   Diagnosis Date     Biceps tendinopathy      GERD (gastroesophageal reflux disease)      Mucocele of lip      Spondyloarthropathy        Past Surgical History:   Procedure Laterality Date     APPENDECTOMY       COLONOSCOPY N/A 1/3/2022    Procedure: COLONOSCOPY, WITH POLYPECTOMY;  Surgeon: Ingrid Keyes MD;  Location: [facility] OR     HERNIA REPAIR, INGUINAL RT/LT      left           Dose / Directions   celecoxib 100 MG capsule  Commonly known as: celeBREX  Used for: Spondyloarthropathy      Dose: 100 mg  Take 1 capsule (100 mg) by mouth 2 times daily as needed for moderate pain Please keep your appointment on 9/29/22, thank you.  Quantity: 60 capsule  Refills: 0     hydroxychloroquine 200 MG tablet  Commonly known as: PLAQUENIL  Used for: Inflammatory arthritis      Dose: 200 mg  Take 1 tablet (200 mg) by mouth 2 times daily Annual Plaquenil toxicity eye screening required.  Quantity: 180 tablet  Refills: 3     * sulfaSALAzine 500 MG tablet  Commonly known as: AZULFIDINE  Used for: Positive double stranded DNA antibody test      Dose: 1,500 mg  Take 3 tablets (1,500 mg) by mouth 2 times daily  Quantity: 540 tablet  Refills: 0     * sulfaSALAzine 500 MG tablet  Commonly known as: AZULFIDINE  Used for: Positive double stranded DNA antibody test      Dose: 1,500 mg  Take 3 tablets (1,500 mg) by mouth 2 times daily  Quantity: 540 tablet  Refills: 1     * sulfaSALAzine  MG EC tablet  Commonly known as: AZULFIDINE EN  Used for: Spondyloarthropathy      Dose: 1,500  mg  Take 3 tablets (1,500 mg) by mouth 2 times daily Labs required every 6 months while taking this medication. Hold for signs of infection, and seek medical attention. Please call to scheduled follow-up appointment, overdue.  Quantity: 540 tablet  Refills: 1     Vitamin D3 25 mcg (1000 units) tablet  Commonly known as: CHOLECALCIFEROL      Take by mouth every morning  Refills: 0         * This list has 3 medication(s) that are the same as other medications prescribed for you. Read the directions carefully, and ask your doctor or other care provider to review them with you.                Past medical history, past surgical history, medications, and allergies were reviewed with the patient. Additional pertinent items: None    Family History   Problem Relation Age of Onset     Hypertension Mother      Hypertension Father      Asthma Father      Diabetes Father      Psoriasis Father      Hypertension Maternal Grandmother         RA     Diabetes Maternal Grandmother      Hypertension Maternal Grandfather      Cancer Maternal Grandfather         stomach     Cancer Paternal Grandfather         lung-smoker     Asthma Sister      Anesthesia Reaction No family hx of      Deep Vein Thrombosis (DVT) No family hx of      Glaucoma No family hx of      Macular Degeneration No family hx of        Social History     Tobacco Use     Smoking status: Never     Smokeless tobacco: Former     Types: Chew     Quit date: 5/15/2010     Tobacco comments:     chew 9 yr   Substance Use Topics     Alcohol use: Yes     Alcohol/week: 1.7 standard drinks of alcohol     Types: 2 Standard drinks or equivalent per week     Social history was reviewed with the patient. Additional pertinent items: None    Allergies   Allergen Reactions     Nkda [No Known Drug Allergy]      Seasonal Allergies Other (See Comments)     Sneezing, running nose, itchy eyes       Review of Systems  A medically appropriate review of systems was performed with pertinent  positives and negatives noted in the HPI, and all other systems negative. and A complete review of systems was performed with pertinent positives and negatives noted in the HPI, and all other systems negative.    Physical Exam   BP: 134/85  Pulse: 83  Temp: 98.1  F (36.7  C)  Resp: 16  SpO2: 93 %      Physical Exam  Vitals and nursing note reviewed.   Constitutional:       Comments: Conversant pleasant holding the dressing over the tip of his right thumb and is nontoxic in appearance.   HENT:      Head: Atraumatic.   Eyes:      Extraocular Movements: Extraocular movements intact.      Pupils: Pupils are equal, round, and reactive to light.   Pulmonary:      Effort: No respiratory distress.   Musculoskeletal:      Comments: Patient with an obvious skin defect to the tip of his right thumb that does involve the nailbed   Skin:     Comments: See musculoskeletal exam   Neurological:      General: No focal deficit present.      Mental Status: He is alert and oriented to person, place, and time.   Psychiatric:         Mood and Affect: Mood normal.         ED Course        Procedures         Medications   bupivacaine (MARCAINE) 0.5% preservative free injection (5 mg Intradermal $Given by Other 7/17/23 7457)   Tdap (tetanus-diphtheria-acell pertussis) (ADACEL) injection 0.5 mL (0.5 mLs Intramuscular $Given 7/17/23 1529)     Patient had a right thumb digital block performed and repair performed by plastic surgery.  I refer you to their note for a more complete procedure note.      Postrepair the patient had a thumb x-ray done which revealed no bony defect.      Critical care was not performed.     Medical Decision Making  The patient's presentation was of low complexity (an acute and uncomplicated illness or injury).    The patient's evaluation involved:  discussion of management or test interpretation with another health professional (Plastic surgery)    The patient's management necessitated moderate risk (a decision  regarding minor procedure (laceration repair) with risk factors of Infection).        Assessments & Plan (with Medical Decision Making)     I have reviewed the nursing notes.    Medications   bupivacaine (MARCAINE) 0.5% preservative free injection (5 mg Intradermal $Given by Other 7/17/23 7016)   Tdap (tetanus-diphtheria-acell pertussis) (ADACEL) injection 0.5 mL (0.5 mLs Intramuscular $Given 7/17/23 1526)        I have reviewed the findings, diagnosis, and plan with the patient and his wife who is a plastic surgeon here at the [facility].        Final diagnoses:   Thumb laceration, right, initial encounter     Please follow up as directed.      DEVAN QUINTERO MD    7/17/2023   [facility] EMERGENCY DEPARTMENT     Devan Quintero MD  07/17/23 3206

## 2023-07-18 NOTE — CONSULTS
Plastic Surgery Consult  & Procedure Note  07/17/2023       Chief complaint/Reason for consult: right thumb laceration.     Assessment/Plan   Sabino Cardona is a 48 year old year with pmhx of lupus who suffered from a table saw injury to the right thumb. This obliquely oriented injury involved the distal right thumb involving the nail plate centrally and the soft pulp. An approximately 1cm flap of tissue remained that was  reapproximated with chromic suture after meticulous irrigation. X-ray shows no bony involvement.     -Continue bacitracin BID to thumb wound  -Wrap in light dressing (cover with adaptic, cling, and Kerlix for comfort)  -Follow up with hand surgery   -Patients [place]. Patient understood that he needs to keep hand clean and away from lake water.    Discussed with staff Dr. Jacob Johns   Plastic and Reconstructive Surgery        HPI:  Sabino Cardona is a 48-year-old male with past medical history of lupus, who presents to the ED after a table saw injury to the right thumb.  Patient states that his glove got caught in the saw and may have pulled his thumb into the saw. Initially there was bleeding and pain. He states that sensation remained to the thumb. Patient's spouse accompanied patient and is a [employer] provider that asked plastic surgery to assist in care with ED.       PAST MEDICAL HISTORY:   Past Medical History:   Diagnosis Date     Biceps tendinopathy      GERD (gastroesophageal reflux disease)      Mucocele of lip      Spondyloarthropathy        SURGICAL HISTORY:   Past Surgical History:   Procedure Laterality Date     APPENDECTOMY       COLONOSCOPY N/A 1/3/2022    Procedure: COLONOSCOPY, WITH POLYPECTOMY;  Surgeon: Ingrid Keyes MD;  Location: [facility] OR     HERNIA REPAIR, INGUINAL RT/LT      left       ALLERGIES:      Allergies   Allergen Reactions     Nkda [No Known Drug Allergy]      Seasonal Allergies Other  (See Comments)     Sneezing, running nose, itchy eyes       MEDICATIONS:  (Not in a hospital admission)  Sulfasalazine   Hydrochloroquine     SOCIAL HISTORY:   Social History     Socioeconomic History     Marital status:      Number of children: 3   Occupational History     Occupation: [employer]   Tobacco Use     Smoking status: Never     Smokeless tobacco: Former     Types: Chew     Quit date: 5/15/2010     Tobacco comments:     chew 9 yr   Substance and Sexual Activity     Alcohol use: Yes     Alcohol/week: 1.7 standard drinks of alcohol     Types: 2 Standard drinks or equivalent per week     Drug use: No     Sexual activity: Yes     Partners: Female   Other Topics Concern     Exercise Yes     Comment: run, weights       REVIEW OF SYSTEMS: Negative other than what's stated in HPI    Objective:  Temp:  [98.1  F (36.7  C)] 98.1  F (36.7  C)  Pulse:  [80-83] 80  Resp:  [16] 16  BP: (134)/(85) 134/85  SpO2:  [93 %-97 %] 97 %    No intake/output data recorded.      Physical Exam:   Gen: Awake, alert, NAD  Resp: NLB on RA  Ext: WWP, no edema.   Right Hand: Right thumb with flexion, extension intact. SILT globally around pulp. No significant bleeding. No palpable bone. Injury remains confined to soft tissue pulp and nail plate/bed centrally of the thumb.      Labs:  No lab results found in last 7 days.    No lab results found in last 7 days.    Lab Results   Component Value Date    ALBUMIN 4.0 03/09/2023    ALBUMIN 4.3 01/18/2021        Imaging:  Reviewed     Procedure Note: Digital nerve block using half percent Marcaine was performed on the right thumb.  After adequate anesthesia thorough irrigation using Betadine and 500 mL saline was performed. Subsequently three 5-0 chromic sutures were placed to re-approximate a flap of tissue left from the injury to the distal thumb. Bacitracin, Adaptic, and cling dressings were placed around the thumb.

## 2023-07-30 NOTE — PROGRESS NOTES
HPI:    Emerson returns for routine follow up. He was in the ED 7/17/2023 for tip of R thumb laceration. He had a plastic surgery/ortho had evaluation at this time as well. Overall doing well. He states his R thumb has no problems. He continues to exercise w/o problems. He has dermatology scheduled for 11/2023. No other HEENT, cardiopulmonary, abdominal, , neurological, systemic, psychiatric, lymphatic, endocrine, vascular complaints.     Past Medical History:   Diagnosis Date    Biceps tendinopathy     GERD (gastroesophageal reflux disease)     Mucocele of lip     Spondyloarthropathy      ,  Past Surgical History:   Procedure Laterality Date    APPENDECTOMY      COLONOSCOPY N/A 1/3/2022    Procedure: COLONOSCOPY, WITH POLYPECTOMY;  Surgeon: Ingrid Keyse MD;  Location: [facility] OR    HERNIA REPAIR, INGUINAL RT/LT      left     PE:    Vitals noted, gen, nad, cooperative, alert, neck supple nl rom, lungs with good air movement, RRR, S1, S2, no MRG, abdomen, no acute findings. Grossly normal neurological exam. R thumb with dressing in place.     Results for orders placed or performed in visit on 07/31/23   ALT     Status: Normal   Result Value Ref Range    ALT 38 0 - 70 U/L   Albumin level     Status: Normal   Result Value Ref Range    Albumin 4.7 3.5 - 5.2 g/dL   AST     Status: Normal   Result Value Ref Range    AST 29 0 - 45 U/L   Creatinine     Status: Normal   Result Value Ref Range    Creatinine 0.88 0.67 - 1.17 mg/dL    GFR Estimate >90 >60 mL/min/1.73m2   CRP inflammation     Status: Normal   Result Value Ref Range    CRP Inflammation <3.00 <5.00 mg/L   Erythrocyte sedimentation rate auto     Status: Normal   Result Value Ref Range    Erythrocyte Sedimentation Rate 2 0 - 20 mm/hr   UA with Microscopic reflex to Culture     Status: Abnormal    Specimen: Urine, NOS   Result Value Ref Range    Color Urine Yellow Colorless, Straw, Light Yellow, Yellow    Appearance Urine Clear Clear    Glucose Urine Negative  Negative mg/dL    Bilirubin Urine Negative Negative    Ketones Urine Negative Negative mg/dL    Specific Gravity Urine 1.024 1.003 - 1.035    Blood Urine Negative Negative    pH Urine 6.0 5.0 - 7.0    Protein Albumin Urine 10 (A) Negative mg/dL    Urobilinogen Urine Normal Normal, 2.0 mg/dL    Nitrite Urine Negative Negative    Leukocyte Esterase Urine Negative Negative    Mucus Urine Present (A) None Seen /LPF    RBC Urine 2 <=2 /HPF    WBC Urine 0 <=5 /HPF    Narrative    Urine Culture not indicated   Protein  random urine     Status: Abnormal   Result Value Ref Range    Total Protein Urine mg/dL 44.1 (H)   mg/dL    Total Protein UR MG/MG CR 0.30 (H) 0.00 - 0.20 mg/mg Cr    Creatinine Urine mg/dL 147.0 mg/dL   Lipid panel reflex to direct LDL Fasting     Status: Abnormal   Result Value Ref Range    Cholesterol 173 <200 mg/dL    Triglycerides 63 <150 mg/dL    Direct Measure HDL 53 >=40 mg/dL    LDL Cholesterol Calculated 107 (H) <=100 mg/dL    Non HDL Cholesterol 120 <130 mg/dL    Narrative    Cholesterol  Desirable:  <200 mg/dL    Triglycerides  Normal:  Less than 150 mg/dL  Borderline High:  150-199 mg/dL  High:  200-499 mg/dL  Very High:  Greater than or equal to 500 mg/dL    Direct Measure HDL  Female:  Greater than or equal to 50 mg/dL   Male:  Greater than or equal to 40 mg/dL    LDL Cholesterol  Desirable:  <100mg/dL  Above Desirable:  100-129 mg/dL   Borderline High:  130-159 mg/dL   High:  160-189 mg/dL   Very High:  >= 190 mg/dL    Non HDL Cholesterol  Desirable:  130 mg/dL  Above Desirable:  130-159 mg/dL  Borderline High:  160-189 mg/dL  High:  190-219 mg/dL  Very High:  Greater than or equal to 220 mg/dL   PSA, screen     Status: Normal   Result Value Ref Range    Prostate Specific Antigen Screen 1.44 0.00 - 3.50 ng/mL    Narrative    This result is obtained using the Roche Elecsys total PSA method on the oscar e411 immunoassay analyzer. Results obtained with different assay methods or kits cannot be  used interchangeably.   CBC with platelets and differential     Status: Abnormal   Result Value Ref Range    WBC Count 3.2 (L) 4.0 - 11.0 10e3/uL    RBC Count 4.51 4.40 - 5.90 10e6/uL    Hemoglobin 14.1 13.3 - 17.7 g/dL    Hematocrit 42.3 40.0 - 53.0 %    MCV 94 78 - 100 fL    MCH 31.3 26.5 - 33.0 pg    MCHC 33.3 31.5 - 36.5 g/dL    RDW 12.1 10.0 - 15.0 %    Platelet Count 216 150 - 450 10e3/uL    % Neutrophils 60 %    % Lymphocytes 24 %    % Monocytes 11 %    % Eosinophils 4 %    % Basophils 1 %    % Immature Granulocytes 0 %    NRBCs per 100 WBC 0 <1 /100    Absolute Neutrophils 1.9 1.6 - 8.3 10e3/uL    Absolute Lymphocytes 0.8 0.8 - 5.3 10e3/uL    Absolute Monocytes 0.4 0.0 - 1.3 10e3/uL    Absolute Eosinophils 0.1 0.0 - 0.7 10e3/uL    Absolute Basophils 0.0 0.0 - 0.2 10e3/uL    Absolute Immature Granulocytes 0.0 <=0.4 10e3/uL    Absolute NRBCs 0.0 10e3/uL   CBC with Platelets & Differential     Status: Abnormal    Narrative    The following orders were created for panel order CBC with Platelets & Differential.  Procedure                               Abnormality         Status                     ---------                               -----------         ------                     CBC with platelets and d...[[id number]]  Abnormal            Final result                 Please view results for these tests on the individual orders.     A/P:    1. Immunizations; Pfizer COVID vaccine x 4 (bi-valent 11/10/2022). Tdap 7/17/2023. He is 48 now and recommend Shingrix vaccine series.   2. He had Rheumatology follow up with Dr. Luna 1/19/2023 for psoriasis and RA/spondyloarrthroapthy On Sulfasalazine and PRN  Celebrex. Labs were done 3/9/2023  He is Plaquenil and has an ophthalmology appt. On 12/8/2023.  He is aware that Plaquenil is also a photosensitizer, he is outside fishing.   3. Colonoscopy 1/3/2022 and repeat in 7-10 years   4. Mild elevated LDL: Lipids 4/13/2022  and HDL 52. Ordered future 7/30/2023.   5.  Dermatology;  for skin check seen 9/23/2022 and next visit 11/8/2023  6. PT R shoulder 12/30/2021  7. Vitamin D level checked 4/13/2022 normal at 41 and reordered today 7/31/2023.   8. PSA next year at age 48 ordered. no early family h/o prostate cancer   9. R thumb laceration; he will follow up as needed. No current complaints.           30 minutes spent on the date of the encounter doing chart review, history and exam, documentation and further activities as noted above exclusive of procedures and other billable interpretations

## 2023-07-31 ENCOUNTER — OFFICE VISIT (OUTPATIENT)
Dept: INTERNAL MEDICINE | Facility: CLINIC | Age: 48
End: 2023-07-31
Payer: COMMERCIAL

## 2023-07-31 ENCOUNTER — LAB (OUTPATIENT)
Dept: LAB | Facility: CLINIC | Age: 48
End: 2023-07-31
Payer: COMMERCIAL

## 2023-07-31 VITALS
SYSTOLIC BLOOD PRESSURE: 131 MMHG | OXYGEN SATURATION: 95 % | HEART RATE: 64 BPM | WEIGHT: 196.3 LBS | BODY MASS INDEX: 25.19 KG/M2 | DIASTOLIC BLOOD PRESSURE: 87 MMHG | HEIGHT: 74 IN

## 2023-07-31 DIAGNOSIS — M19.90 INFLAMMATORY ARTHRITIS: ICD-10-CM

## 2023-07-31 DIAGNOSIS — Z13.220 SCREENING CHOLESTEROL LEVEL: ICD-10-CM

## 2023-07-31 DIAGNOSIS — E55.9 VITAMIN D DEFICIENCY: ICD-10-CM

## 2023-07-31 DIAGNOSIS — Z12.5 SCREENING PSA (PROSTATE SPECIFIC ANTIGEN): ICD-10-CM

## 2023-07-31 DIAGNOSIS — Z13.220 SCREENING CHOLESTEROL LEVEL: Primary | ICD-10-CM

## 2023-07-31 LAB
ALBUMIN MFR UR ELPH: 44.1 MG/DL
ALBUMIN SERPL BCG-MCNC: 4.7 G/DL (ref 3.5–5.2)
ALBUMIN UR-MCNC: 10 MG/DL
ALT SERPL W P-5'-P-CCNC: 38 U/L (ref 0–70)
APPEARANCE UR: CLEAR
AST SERPL W P-5'-P-CCNC: 29 U/L (ref 0–45)
BASOPHILS # BLD AUTO: 0 10E3/UL (ref 0–0.2)
BASOPHILS NFR BLD AUTO: 1 %
BILIRUB UR QL STRIP: NEGATIVE
CHOLEST SERPL-MCNC: 173 MG/DL
COLOR UR AUTO: YELLOW
CREAT SERPL-MCNC: 0.88 MG/DL (ref 0.67–1.17)
CREAT UR-MCNC: 147 MG/DL
CRP SERPL-MCNC: <3 MG/L
DEPRECATED CALCIDIOL+CALCIFEROL SERPL-MC: 32 UG/L (ref 20–75)
EOSINOPHIL # BLD AUTO: 0.1 10E3/UL (ref 0–0.7)
EOSINOPHIL NFR BLD AUTO: 4 %
ERYTHROCYTE [DISTWIDTH] IN BLOOD BY AUTOMATED COUNT: 12.1 % (ref 10–15)
ERYTHROCYTE [SEDIMENTATION RATE] IN BLOOD BY WESTERGREN METHOD: 2 MM/HR (ref 0–20)
GFR SERPL CREATININE-BSD FRML MDRD: >90 ML/MIN/1.73M2
GLUCOSE UR STRIP-MCNC: NEGATIVE MG/DL
HCT VFR BLD AUTO: 42.3 % (ref 40–53)
HDLC SERPL-MCNC: 53 MG/DL
HGB BLD-MCNC: 14.1 G/DL (ref 13.3–17.7)
HGB UR QL STRIP: NEGATIVE
IMM GRANULOCYTES # BLD: 0 10E3/UL
IMM GRANULOCYTES NFR BLD: 0 %
KETONES UR STRIP-MCNC: NEGATIVE MG/DL
LDLC SERPL CALC-MCNC: 107 MG/DL
LEUKOCYTE ESTERASE UR QL STRIP: NEGATIVE
LYMPHOCYTES # BLD AUTO: 0.8 10E3/UL (ref 0.8–5.3)
LYMPHOCYTES NFR BLD AUTO: 24 %
MCH RBC QN AUTO: 31.3 PG (ref 26.5–33)
MCHC RBC AUTO-ENTMCNC: 33.3 G/DL (ref 31.5–36.5)
MCV RBC AUTO: 94 FL (ref 78–100)
MONOCYTES # BLD AUTO: 0.4 10E3/UL (ref 0–1.3)
MONOCYTES NFR BLD AUTO: 11 %
MUCOUS THREADS #/AREA URNS LPF: PRESENT /LPF
NEUTROPHILS # BLD AUTO: 1.9 10E3/UL (ref 1.6–8.3)
NEUTROPHILS NFR BLD AUTO: 60 %
NITRATE UR QL: NEGATIVE
NONHDLC SERPL-MCNC: 120 MG/DL
NRBC # BLD AUTO: 0 10E3/UL
NRBC BLD AUTO-RTO: 0 /100
PH UR STRIP: 6 [PH] (ref 5–7)
PLATELET # BLD AUTO: 216 10E3/UL (ref 150–450)
PROT/CREAT 24H UR: 0.3 MG/MG CR (ref 0–0.2)
PSA SERPL DL<=0.01 NG/ML-MCNC: 1.44 NG/ML (ref 0–3.5)
RBC # BLD AUTO: 4.51 10E6/UL (ref 4.4–5.9)
RBC URINE: 2 /HPF
SP GR UR STRIP: 1.02 (ref 1–1.03)
TRIGL SERPL-MCNC: 63 MG/DL
UROBILINOGEN UR STRIP-MCNC: NORMAL MG/DL
WBC # BLD AUTO: 3.2 10E3/UL (ref 4–11)
WBC URINE: 0 /HPF

## 2023-07-31 PROCEDURE — 99000 SPECIMEN HANDLING OFFICE-LAB: CPT | Performed by: PATHOLOGY

## 2023-07-31 PROCEDURE — 36415 COLL VENOUS BLD VENIPUNCTURE: CPT | Performed by: PATHOLOGY

## 2023-07-31 PROCEDURE — 86160 COMPLEMENT ANTIGEN: CPT | Performed by: INTERNAL MEDICINE

## 2023-07-31 PROCEDURE — 85652 RBC SED RATE AUTOMATED: CPT | Performed by: PATHOLOGY

## 2023-07-31 PROCEDURE — 86225 DNA ANTIBODY NATIVE: CPT | Performed by: INTERNAL MEDICINE

## 2023-07-31 PROCEDURE — 85025 COMPLETE CBC W/AUTO DIFF WBC: CPT | Performed by: PATHOLOGY

## 2023-07-31 PROCEDURE — 84450 TRANSFERASE (AST) (SGOT): CPT | Performed by: PATHOLOGY

## 2023-07-31 PROCEDURE — 84460 ALANINE AMINO (ALT) (SGPT): CPT | Performed by: PATHOLOGY

## 2023-07-31 PROCEDURE — 82306 VITAMIN D 25 HYDROXY: CPT | Performed by: INTERNAL MEDICINE

## 2023-07-31 PROCEDURE — 99396 PREV VISIT EST AGE 40-64: CPT | Performed by: INTERNAL MEDICINE

## 2023-07-31 PROCEDURE — 84156 ASSAY OF PROTEIN URINE: CPT | Performed by: PATHOLOGY

## 2023-07-31 PROCEDURE — 82565 ASSAY OF CREATININE: CPT | Performed by: PATHOLOGY

## 2023-07-31 PROCEDURE — 80061 LIPID PANEL: CPT | Performed by: PATHOLOGY

## 2023-07-31 PROCEDURE — 82040 ASSAY OF SERUM ALBUMIN: CPT | Performed by: PATHOLOGY

## 2023-07-31 PROCEDURE — G0103 PSA SCREENING: HCPCS | Performed by: PATHOLOGY

## 2023-07-31 PROCEDURE — 86140 C-REACTIVE PROTEIN: CPT | Performed by: PATHOLOGY

## 2023-07-31 PROCEDURE — 81001 URINALYSIS AUTO W/SCOPE: CPT | Performed by: PATHOLOGY

## 2023-07-31 NOTE — NURSING NOTE
Sabino Castillo David is a 48 year old male patient that presents today in clinic for the following:    Chief Complaint   Patient presents with    Physical     The patient's allergies and medications were reviewed as noted. A set of vitals were recorded as noted without incident. The patient does not have any other questions for the provider.    Carmen Lennon, EMT at 7:22 AM on 7/31/2023

## 2023-08-01 ENCOUNTER — TELEPHONE (OUTPATIENT)
Dept: INTERNAL MEDICINE | Facility: CLINIC | Age: 48
End: 2023-08-01
Payer: COMMERCIAL

## 2023-08-01 LAB
C3 SERPL-MCNC: 93 MG/DL (ref 81–157)
C4 SERPL-MCNC: 16 MG/DL (ref 13–39)
DSDNA AB SER-ACNC: 24 IU/ML

## 2023-08-03 ENCOUNTER — VIRTUAL VISIT (OUTPATIENT)
Dept: RHEUMATOLOGY | Facility: CLINIC | Age: 48
End: 2023-08-03
Attending: INTERNAL MEDICINE
Payer: COMMERCIAL

## 2023-08-03 VITALS — HEIGHT: 74 IN | BODY MASS INDEX: 25.03 KG/M2 | WEIGHT: 195 LBS

## 2023-08-03 DIAGNOSIS — M19.90 INFLAMMATORY ARTHRITIS: Primary | ICD-10-CM

## 2023-08-03 DIAGNOSIS — Z51.81 MEDICATION MONITORING ENCOUNTER: ICD-10-CM

## 2023-08-03 DIAGNOSIS — R76.8 POSITIVE DOUBLE STRANDED DNA ANTIBODY TEST: ICD-10-CM

## 2023-08-03 DIAGNOSIS — D72.819 LEUKOPENIA, UNSPECIFIED TYPE: ICD-10-CM

## 2023-08-03 PROCEDURE — 99214 OFFICE O/P EST MOD 30 MIN: CPT | Mod: VID | Performed by: INTERNAL MEDICINE

## 2023-08-03 ASSESSMENT — PAIN SCALES - GENERAL: PAINLEVEL: NO PAIN (0)

## 2023-08-03 NOTE — PROGRESS NOTES
Virtual Visit Details    Type of service:  Video Visit 12:50-1:05 pm      Video-Visit Details    Type of service:  Video Visit     Originating Location (pt. Location): Home    Distant Location (provider location):  On-site  Platform used for Video Visit: [facility] F/U Virtual Visit Note    Reason for visit: +anti-DNA, peripheral SpA on SSZ+HCQ    Initial visit date: 2/14/2020    Last seen: 1/19/2023    DOS: 8/3/2023    HPI from initial visit:    Sabino Cardona is a 43 year old  male with fh/o psoriasis and RA, who was referred to our clinic for evaluation and management of possible SLE given +anti-DNA. His wife is a transplant surgeon here at the  and they say the actual reason for referral is to establish/transfer care as they just moved to [place].      Today, he is feeling well.      They moved back from [place] to [place] about 1.5 month ago.      He reports having degenerative arthritis since age 18. Has chronic low back pain with worsening in AM and worsening with inactivity. Allergies in Spring makes it worse.      He started to have heel and ankle pain in Feb 2019, also knees and elbows. That was stressful time of life looking for moving back. Also has 3 small children (one 5 yo and two 3 yo twins) which keep them busy.      Both heels were red and blaching and his wife thought it was gout.      His brother has gout and is almost 51 yo.      He saw a rheumatologist at [facility]. 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 His wife questioned the diagnosis given lack of lupus symptoms.      Then he had x-rays of spine which showed lots of bone spurs.    He was diagnosed with OA, possible reactive arthritis. No h/o uveitis, urethritis, STDs. Has h/o food poisenings. Was put on SSZ in 2/2019, the dose was increased to current dose of 1 gr bid, he tolerates it well with no toxicity. SSZ has helped and he has no  current heel inflammation or recent flare ups.      Has fatigue but because of having 3 small children, it's hard for him to say if fatigue was related to inflammation.    ROS is positive for intermittent CP for years, ibuprofen helps. Prednisone 10 mg every day did not help in the past.    Gets cold sores. No photosensitivity. Gets migraines. No weight loss. No Raynaud's.      2/14/2020: No fatigue, skin rashes, oral ulcers.    Has mild achy knees.    About 1.5 months ago, started having flare up of achilles tendonitis in both feet, worse in AM and at night. They turn red and swollen.      7/10/2020: At last visit in 2/2020, increased SSZ from 2 to 2.5 gr/day given active achilles tendonitis, also prescribed celebrex 100 mg bid prn. Change in SSZ has helped and he no longer needs to take celebrex (last use 6 wk go). Tolerates SSZ at higher dose well. No flare ups since last visit.    No symptoms and no complaints today. Felling very well. No skin rashes, oral ulcers, CP or SOB or fatigue.    Had flu in 1/2020, after that got the flu shot.     6/28/2021: Hands, feet swell up 2 times a week x 1 hr, it gets better after moving around. No heel pain/swelling.    Has 3 small children, physical job. Fully vaccinated, no SE. Got pfizer.    Over last couple of months, has been good on taking her SSZ 5 tabs a day everyday. Noticed more sx including CP by missing some doses. No SSZ SEs. Rarely takes the celebrex, if notices sore feet x 2 days in a row, he takes it. It does not help as much.    9/23/2022: Emerson presents for follow up. 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 No other complaints. Tolerates HCQ well.      During the day he is usually good, at night migratory pain comes back affecting shoulders (R shoulder more consistent) and hips. His pain is tolerable most days but  sometimes,  keeps him up.    No am stiffness.    Advil works better than celebrex.    If misses SSZ x 3-4 days, pain recurs.      1/19/2023: Tolerates HCQ well, migratory arthralgia affecting large joints is much improved, reports occasional pain, takes ibuprofen prn. tolerates HCQ, thinks it has helped.    Today 8/3/2023: Ongoing migratory arthralgia but tolerable.      Reports every other month flare ups of joint pain, lasts 2-3 weeks, no triggers. Sometimes takes advil which helps, sometimes it goes away by its own, sometimes shoulders, sometimes hands and sometimes back. Noticed fingers and hands swell up. It moves from joint to joint. Spring time is worse, pain is pretty consistent. With some flare ups, gets sharp CP. Pain is tolerable. Celebrex does not work.    No skin rash, oral ulcers, CP, SOB.    No flares today.        ROS:  A comprehensive ROS was done, positives are per HPI.      Past Medical History:   Diagnosis Date     Biceps tendinopathy      GERD (gastroesophageal reflux disease)      Mucocele of lip      Spondyloarthropathy      Past Surgical History:   Procedure Laterality Date     APPENDECTOMY       COLONOSCOPY N/A 1/3/2022    Procedure: COLONOSCOPY, WITH POLYPECTOMY;  Surgeon: Ingrid Keyes MD;  Location: [facility] OR     HERNIA REPAIR, INGUINAL RT/LT      left     Family History   Problem Relation Age of Onset     Hypertension Mother      Hypertension Father      Asthma Father      Diabetes Father      Psoriasis Father      Hypertension Maternal Grandmother         RA     Diabetes Maternal Grandmother      Hypertension Maternal Grandfather      Cancer Maternal Grandfather         stomach     Cancer Paternal Grandfather         lung-smoker     Asthma Sister      Anesthesia Reaction No family hx of      Deep Vein Thrombosis (DVT) No family hx of      Glaucoma No family hx of      Macular Degeneration No family hx of      Social History     Socioeconomic History     Marital status:       Spouse name: Not on file     Number of children: 3     Years of education: Not on file     Highest education level: Not on file   Occupational History     Occupation: [employer]   Tobacco Use     Smoking status: Never     Smokeless tobacco: Former     Types: Chew     Quit date: 5/15/2010     Tobacco comments:     chew 9 yr   Substance and Sexual Activity     Alcohol use: Yes     Alcohol/week: 1.7 standard drinks of alcohol     Types: 2 Standard drinks or equivalent per week     Drug use: No     Sexual activity: Yes     Partners: Female   Other Topics Concern      Service Not Asked     Blood Transfusions Not Asked     Caffeine Concern Not Asked     Occupational Exposure Not Asked     Hobby Hazards Not Asked     Sleep Concern Not Asked     Stress Concern Not Asked     Weight Concern Not Asked     Special Diet Not Asked     Back Care Not Asked     Exercise Yes     Comment: run, weights     Bike Helmet Not Asked     Seat Belt Not Asked     Self-Exams Not Asked   Social History Narrative     Not on file     Social Determinants of Health     Financial Resource Strain: Not on file   Food Insecurity: Not on file   Transportation Needs: Not on file   Physical Activity: Not on file   Stress: Not on file   Social Connections: Not on file   Intimate Partner Violence: Not on file   Housing Stability: Not on file     Patient Active Problem List   Diagnosis     Esophageal reflux     Biceps tendinopathy     Wart     Mucocele of lip     Sore throat     Allergies   Allergen Reactions     Nkda [No Known Drug Allergy]      Seasonal Allergies Other (See Comments)     Sneezing, running nose, itchy eyes       Outpatient Encounter Medications as of 8/3/2023   Medication Sig Dispense Refill     hydroxychloroquine (PLAQUENIL) 200 MG tablet Take 1 tablet (200 mg) by mouth 2 times daily Annual Plaquenil toxicity eye screening required. 180 tablet 3     sulfaSALAzine ER (AZULFIDINE EN) 500 MG EC tablet Take 3 tablets (1,500 mg) by mouth  2 times daily Labs required every 6 months while taking this medication. Hold for signs of infection, and seek medical attention. Please call to scheduled follow-up appointment, overdue. 540 tablet 1     celecoxib (CELEBREX) 100 MG capsule Take 1 capsule (100 mg) by mouth 2 times daily as needed for moderate pain Please keep your appointment on 9/29/22, thank you. (Patient not taking: Reported on 11/9/2022) 60 capsule 0     sulfaSALAzine (AZULFIDINE) 500 MG tablet Take 3 tablets (1,500 mg) by mouth 2 times daily (Patient not taking: Reported on 8/3/2023) 540 tablet 0     sulfaSALAzine (AZULFIDINE) 500 MG tablet Take 3 tablets (1,500 mg) by mouth 2 times daily (Patient not taking: Reported on 8/3/2023) 540 tablet 1     Vitamin D3 (CHOLECALCIFEROL) 25 mcg (1000 units) tablet Take by mouth every morning  (Patient not taking: Reported on 7/31/2023)       No facility-administered encounter medications on file as of 8/3/2023.               His records were reviewed.      2/2019: +BEST 1:40 mixed homogenous and nucleolar pattern, positive anti-DNA 17 with NL being up to 4. Neg anti-Sm, RNP, SCL-70, SSA, SSB, ALEXANDREA-1, CHROMATIN, RF, anti-CCP. NL ESR. NL SUA 5.2. Neg HLA-B27.  NL TSH, C3, C4. Unremarkable CMP. Neg U/A. LOW vit D 27.    2/2019: Advanced degenerative changes of L spine and moderate degenerative changes of T spine on x-rays.  Component      Latest Ref Rng & Units 1/17/2020   WBC      4.0 - 11.0 10e9/L 3.6 (L)   RBC Count      4.4 - 5.9 10e12/L 4.52   Hemoglobin      13.3 - 17.7 g/dL 13.8   Hematocrit      40.0 - 53.0 % 41.1   MCV      78 - 100 fl 91   MCH      26.5 - 33.0 pg 30.5   MCHC      31.5 - 36.5 g/dL 33.6   RDW      10.0 - 15.0 % 12.0   Platelet Count      150 - 450 10e9/L 258   Diff Method       Automated Method   % Neutrophils      % 55.5   % Lymphocytes      % 30.4   % Monocytes      % 11.0   % Eosinophils      % 2.5   % Basophils      % 0.3   % Immature Granulocytes      % 0.3   Nucleated RBCs      0  /100 0   Absolute Neutrophil      1.6 - 8.3 10e9/L 2.0   Absolute Lymphocytes      0.8 - 5.3 10e9/L 1.1   Absolute Monocytes      0.0 - 1.3 10e9/L 0.4   Absolute Eosinophils      0.0 - 0.7 10e9/L 0.1   Absolute Basophils      0.0 - 0.2 10e9/L 0.0   Abs Immature Granulocytes      0 - 0.4 10e9/L 0.0   Absolute Nucleated RBC       0.0   Creatinine      0.66 - 1.25 mg/dL 0.83   GFR Estimate      >60 mL/min/1.73:m2 >90   GFR Estimate If Black      >60 mL/min/1.73:m2 >90   Vitamin D Deficiency screening      20 - 75 ug/L 27   Sed Rate      0 - 15 mm/h 8   CRP Inflammation      0.0 - 8.0 mg/L <2.9   AST      0 - 45 U/L 19   Albumin      3.4 - 5.0 g/dL 3.9   ALT      0 - 70 U/L 36   Complement C3      81 - 157 mg/dL 118   Complement C4      13 - 39 mg/dL 25   DNA-ds      <10 IU/mL 16 (H)     Component      Latest Ref Rng & Units 1/18/2021   WBC      4.0 - 11.0 10e9/L 5.2   RBC Count      4.4 - 5.9 10e12/L 4.68   Hemoglobin      13.3 - 17.7 g/dL 14.7   Hematocrit      40.0 - 53.0 % 42.7   MCV      78 - 100 fl 91   MCH      26.5 - 33.0 pg 31.4   MCHC      31.5 - 36.5 g/dL 34.4   RDW      10.0 - 15.0 % 11.9   Platelet Count      150 - 450 10e9/L 241   % Neutrophils      % 63.3   % Lymphocytes      % 27.0   % Monocytes      % 8.3   % Eosinophils      % 1.0   % Basophils      % 0.4   Absolute Neutrophil      1.6 - 8.3 10e9/L 3.3   Absolute Lymphocytes      0.8 - 5.3 10e9/L 1.4   Absolute Monocytes      0.0 - 1.3 10e9/L 0.4   Absolute Eosinophils      0.0 - 0.7 10e9/L 0.1   Absolute Basophils      0.0 - 0.2 10e9/L 0.0   Diff Method       Automated Method   Creatinine      0.66 - 1.25 mg/dL 0.86   GFR Estimate      >60 mL/min/1.73:m2 >90   GFR Estimate If Black      >60 mL/min/1.73:m2 >90   AST      0 - 45 U/L 23   Albumin      3.4 - 5.0 g/dL 4.3   ALT      0 - 70 U/L 42     Component      Latest Ref Rng & Units 4/13/2022   WBC      4.0 - 11.0 10e3/uL 6.0   RBC Count      4.40 - 5.90 10e6/uL 4.51   Hemoglobin      13.3 - 17.7 g/dL  14.2   Hematocrit      40.0 - 53.0 % 41.5   MCV      78 - 100 fL 92   MCH      26.5 - 33.0 pg 31.5   MCHC      31.5 - 36.5 g/dL 34.2   RDW      10.0 - 15.0 % 12.0   Platelet Count      150 - 450 10e3/uL 223   % Neutrophils      % 77   % Lymphocytes      % 16   % Monocytes      % 6   % Eosinophils      % 0   % Basophils      % 1   % Immature Granulocytes      % 0   NRBCs per 100 WBC      <1 /100 0   Absolute Neutrophils      1.6 - 8.3 10e3/uL 4.6   Absolute Lymphocytes      0.8 - 5.3 10e3/uL 1.0   Absolute Monocytes      0.0 - 1.3 10e3/uL 0.3   Absolute Eosinophils      0.0 - 0.7 10e3/uL 0.0   Absolute Basophils      0.0 - 0.2 10e3/uL 0.0   Absolute Immature Granulocytes      <=0.4 10e3/uL 0.0   Absolute NRBCs      10e3/uL 0.0   Cholesterol      <200 mg/dL 210 (H)   Triglycerides      <150 mg/dL 104   HDL Cholesterol      >=40 mg/dL 52   LDL Cholesterol Calculated      <=100 mg/dL 137 (H)   Non HDL Cholesterol      <130 mg/dL 158 (H)   Patient Fasting > 8hrs?       No   Creatinine      0.66 - 1.25 mg/dL 0.87   GFR Estimate      >60 mL/min/1.73m2 >90   Sed Rate      0 - 15 mm/hr 5   Vitamin D Deficiency screening      20 - 75 ug/L 41   DNA-ds      <10.0 IU/mL 23.0 (H)   CRP Inflammation      0.0 - 8.0 mg/L <2.9   Complement C3      81 - 157 mg/dL 94   Complement C4      13 - 39 mg/dL 16   AST      0 - 45 U/L 20   Albumin      3.4 - 5.0 g/dL 4.3   ALT      0 - 70 U/L 39     Component      Latest Ref [facility] 7/31/2023  7:56 AM   WBC      4.0 - 11.0 10e3/uL 3.2 (L)    RBC Count      4.40 - 5.90 10e6/uL 4.51    Hemoglobin      13.3 - 17.7 g/dL 14.1    Hematocrit      40.0 - 53.0 % 42.3    MCV      78 - 100 fL 94    MCH      26.5 - 33.0 pg 31.3    MCHC      31.5 - 36.5 g/dL 33.3    RDW      10.0 - 15.0 % 12.1    Platelet Count      150 - 450 10e3/uL 216    % Neutrophils      % 60    % Lymphocytes      % 24    % Monocytes      % 11    % Eosinophils      % 4    % Basophils      % 1    % Immature Granulocytes      % 0    NRBCs  per 100 WBC      <1 /100 0    Absolute Neutrophils      1.6 - 8.3 10e3/uL 1.9    Absolute Lymphocytes      0.8 - 5.3 10e3/uL 0.8    Absolute Monocytes      0.0 - 1.3 10e3/uL 0.4    Absolute Eosinophils      0.0 - 0.7 10e3/uL 0.1    Absolute Basophils      0.0 - 0.2 10e3/uL 0.0    Absolute Immature Granulocytes      <=0.4 10e3/uL 0.0    Absolute NRBCs      10e3/uL 0.0    Color Urine      Colorless, Straw, Light Yellow, Yellow     Appearance Urine      Clear     Glucose Urine      Negative mg/dL    Bilirubin Urine      Negative     Ketones Urine      Negative mg/dL    Specific Gravity Urine      1.003 - 1.035     Blood Urine      Negative     pH Urine      5.0 - 7.0     Protein Albumin Urine      Negative mg/dL    Urobilinogen mg/dL      Normal, 2.0 mg/dL    Nitrite Urine      Negative     Leukocyte Esterase Urine      Negative     Mucus Urine      None Seen /LPF    RBC Urine      <=2 /HPF    WBC Urine      <=5 /HPF    Cholesterol      <200 mg/dL 173    Triglycerides      <150 mg/dL 63    HDL Cholesterol      >=40 mg/dL 53    LDL Cholesterol Calculated      <=100 mg/dL 107 (H)    Non HDL Cholesterol      <130 mg/dL 120    Total Protein Urine mg/dL        mg/dL    Total Protein UR MG/MG CR      0.00 - 0.20 mg/mg Cr    Creatinine Urine      mg/dL    Creatinine      0.67 - 1.17 mg/dL 0.88    GFR Estimate      >60 mL/min/1.73m2 >90    ALT      0 - 70 U/L 38    Albumin      3.5 - 5.2 g/dL 4.7    AST      0 - 45 U/L 29    Complement C4      13 - 39 mg/dL 16    Complement C3      81 - 157 mg/dL 93    CRP Inflammation      <5.00 mg/L <3.00    DNA-ds      <10.0 IU/mL 24.0 (H)    Sed Rate      0 - 20 mm/hr 2    PSA Tumor Marker      0.00 - 3.50 ng/mL 1.44    Vitamin D Deficiency screening      20 - 75 ug/L 32      Component      Latest Ref [facility] 7/31/2023  7:57 AM   WBC      4.0 - 11.0 10e3/uL    RBC Count      4.40 - 5.90 10e6/uL    Hemoglobin      13.3 - 17.7 g/dL    Hematocrit      40.0 - 53.0 %    MCV      78 - 100 fL     MCH      26.5 - 33.0 pg    MCHC      31.5 - 36.5 g/dL    RDW      10.0 - 15.0 %    Platelet Count      150 - 450 10e3/uL    % Neutrophils      %    % Lymphocytes      %    % Monocytes      %    % Eosinophils      %    % Basophils      %    % Immature Granulocytes      %    NRBCs per 100 WBC      <1 /100    Absolute Neutrophils      1.6 - 8.3 10e3/uL    Absolute Lymphocytes      0.8 - 5.3 10e3/uL    Absolute Monocytes      0.0 - 1.3 10e3/uL    Absolute Eosinophils      0.0 - 0.7 10e3/uL    Absolute Basophils      0.0 - 0.2 10e3/uL    Absolute Immature Granulocytes      <=0.4 10e3/uL    Absolute NRBCs      10e3/uL    Color Urine      Colorless, Straw, Light Yellow, Yellow  Yellow    Appearance Urine      Clear  Clear    Glucose Urine      Negative mg/dL Negative    Bilirubin Urine      Negative  Negative    Ketones Urine      Negative mg/dL Negative    Specific Gravity Urine      1.003 - 1.035  1.024    Blood Urine      Negative  Negative    pH Urine      5.0 - 7.0  6.0    Protein Albumin Urine      Negative mg/dL 10 !    Urobilinogen mg/dL      Normal, 2.0 mg/dL Normal    Nitrite Urine      Negative  Negative    Leukocyte Esterase Urine      Negative  Negative    Mucus Urine      None Seen /LPF Present !    RBC Urine      <=2 /HPF 2    WBC Urine      <=5 /HPF 0    Cholesterol      <200 mg/dL    Triglycerides      <150 mg/dL    HDL Cholesterol      >=40 mg/dL    LDL Cholesterol Calculated      <=100 mg/dL    Non HDL Cholesterol      <130 mg/dL    Total Protein Urine mg/dL        mg/dL 44.1 (H)    Total Protein UR MG/MG CR      0.00 - 0.20 mg/mg Cr 0.30 (H)    Creatinine Urine      mg/dL 147.0    Creatinine      0.67 - 1.17 mg/dL    GFR Estimate      >60 mL/min/1.73m2    ALT      0 - 70 U/L    Albumin      3.5 - 5.2 g/dL    AST      0 - 45 U/L    Complement C4      13 - 39 mg/dL    Complement C3      81 - 157 mg/dL    CRP Inflammation      <5.00 mg/L    DNA-ds      <10.0 IU/mL    Sed Rate      0 - 20 mm/hr    PSA  Tumor Marker      0.00 - 3.50 ng/mL    Vitamin D Deficiency screening      20 - 75 ug/L       Legend:  (L) Low  (H) High  ! Abnormal    Ph.E:        Constitutional: WD/WN. Pleasant. In no acute distress.   Eyes: EOM intact, sclera anicteric, conj not injected  MS: No active synovitis   Skin: No skin rash  Neuro: A&O x 3. Grossly non focal  Psych: NL affect and mood    Assessment/ plan:    1-Spondyloarthropathy with enthesopathy Dx 2/2019 on SSZ since 2/2019. He is HLA-B27 negative. Has fh/o psoriasis. He fits this diagnosis very well and already responds to SSZ. His inflammatory arthritis was under good control at initial visit, but in 2/2020, had flare ups of achilles tendonitis which started post flu. I increased sulfasalazine to 3 tabs in the morning, 2 tabs in the evening with food in 2/2020 which helped significantly. In 6/2021, given episodes of joint pain, swelling and for better control of arthritis, recommended to increase SSZ to 3 gram a day. He is on celebrex prn, not much help.    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 HCQ peak benefit is 6 months, will give it more time to show benefit. Will check labs, will monitor closely for lupus given + BEST, +anti-DNA. HCQ could help to prevent progression to lupus. Advised him to try tylenol.    Stable 4/2022 labs.      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 Will continue HCQ+SSZ. Last labs in 9/2022 showed no protein in urine but ur pr/cr=0.69 plus high CRP, mild increased anti-DNA, mild leukopenia, but he had infection, will re-check labs.    2-SSZ monitoring. Labs q6mo. 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 He does not have lupus and nothing on his exam or hx is suggestive of lupus. I'll monitor it for now.    4-Low vit D in the past. S/p replacement.     5-HCQ monitoring. Recommend yearly eye exam        Orders Placed This  Encounter   Procedures     ALT     Albumin level     AST     Creatinine     Complement C4     Complement C3     CRP inflammation     DNA double stranded antibodies     Erythrocyte sedimentation rate auto     UA with Microscopic reflex to Culture     Protein  random urine     Creatinine random urine     CBC with Platelets & Differential     Today's plan:    Labs this month    Return video in 6 months      Today 8/3/2023: Ongoing migratory arthralgia which is tolerable and not and enough to add another immunosuppressive drug like methotrexate. Will continue with  mg a day which requires yearly eye exam, and SSZ 3 gr a day, which requires q6mo monitoring labs. Leukopenia and urine protein in 7/2023 have improved, will continue to monitor. Anti-DNA is only slightly elevated. Has not met SLE criteria yet. Discussed doing labs with next flare.      Plan:    Labs as needed with flare ups    Return in 6 months (in person)    Jovani Luna MD

## 2023-08-03 NOTE — LETTER
8/3/2023       RE: Sabino Cardona  [address]  [facility] [address]     Dear Colleague,    Thank you for referring your patient, Sabino Cardona, to the [facility]HE[facility] [place] at [facility]. Please see a copy of my visit note below.    Virtual Visit Details    Type of service:  Video Visit 12:50-1:05 pm      Video-Visit Details    Type of service:  Video Visit     Originating Location (pt. Location): Home    Distant Location (provider location):  On-site  Platform used for Video Visit: [facility]       Rheumatology F/U Virtual Visit Note    Reason for visit: +anti-DNA, peripheral SpA on SSZ+HCQ    Initial visit date: 2/14/2020    Last seen: 1/19/2023    DOS: 8/3/2023    HPI from initial visit:    Sabino Cardona is a 43 year old  male with fh/o psoriasis and RA, who was referred to our clinic for evaluation and management of possible SLE given +anti-DNA. His wife is a transplant surgeon here at the  and they say the actual reason for referral is to establish/transfer care as they just moved to [place].      Today, he is feeling well.      They moved back from [place] to [place] about 1.5 month ago.      He reports having degenerative arthritis since age 18. Has chronic low back pain with worsening in AM and worsening with inactivity. Allergies in Spring makes it worse.      He started to have heel and ankle pain in Feb 2019, also knees and elbows. That was stressful time of life looking for moving back. Also has 3 small children (one 3 yo and two 3 yo twins) which keep them busy.      Both heels were red and blaching and his wife thought it was gout.      His brother has gout and is almost 49 yo.      He saw a rheumatologist at [facility]. 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 His wife questioned the diagnosis given lack of lupus symptoms.      Then  he had x-rays of spine which showed lots of bone spurs.    He was diagnosed with OA, possible reactive arthritis. No h/o uveitis, urethritis, STDs. Has h/o food poisenings. Was put on SSZ in 2/2019, the dose was increased to current dose of 1 gr bid, he tolerates it well with no toxicity. SSZ has helped and he has no current heel inflammation or recent flare ups.      Has fatigue but because of having 3 small children, it's hard for him to say if fatigue was related to inflammation.    ROS is positive for intermittent CP for years, ibuprofen helps. Prednisone 10 mg every day did not help in the past.    Gets cold sores. No photosensitivity. Gets migraines. No weight loss. No Raynaud's.      2/14/2020: No fatigue, skin rashes, oral ulcers.    Has mild achy knees.    About 1.5 months ago, started having flare up of achilles tendonitis in both feet, worse in AM and at night. They turn red and swollen.      7/10/2020: At last visit in 2/2020, increased SSZ from 2 to 2.5 gr/day given active achilles tendonitis, also prescribed celebrex 100 mg bid prn. Change in SSZ has helped and he no longer needs to take celebrex (last use 6 wk go). Tolerates SSZ at higher dose well. No flare ups since last visit.    No symptoms and no complaints today. Felling very well. No skin rashes, oral ulcers, CP or SOB or fatigue.    Had flu in 1/2020, after that got the flu shot.     6/28/2021: Hands, feet swell up 2 times a week x 1 hr, it gets better after moving around. No heel pain/swelling.    Has 3 small children, physical job. Fully vaccinated, no SE. Got pfizer.    Over last couple of months, has been good on taking her SSZ 5 tabs a day everyday. Noticed more sx including CP by missing some doses. No SSZ SEs. Rarely takes the celebrex, if notices sore feet x 2 days in a row, he takes it. It does not help as much.    9/23/2022: Emerson presents for follow up. 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 No other complaints. Tolerates HCQ well.      During the day he is usually good, at night migratory pain comes back affecting shoulders (R shoulder more consistent) and hips. His pain is tolerable most days but sometimes,  keeps him up.    No am stiffness.    Advil works better than celebrex.    If misses SSZ x 3-4 days, pain recurs.      1/19/2023: Tolerates HCQ well, migratory arthralgia affecting large joints is much improved, reports occasional pain, takes ibuprofen prn. tolerates HCQ, thinks it has helped.    Today 8/3/2023: Ongoing migratory arthralgia but tolerable.      Reports every other month flare ups of joint pain, lasts 2-3 weeks, no triggers. Sometimes takes advil which helps, sometimes it goes away by its own, sometimes shoulders, sometimes hands and sometimes back. Noticed fingers and hands swell up. It moves from joint to joint. Spring time is worse, pain is pretty consistent. With some flare ups, gets sharp CP. Pain is tolerable. Celebrex does not work.    No skin rash, oral ulcers, CP, SOB.    No flares today.        ROS:  A comprehensive ROS was done, positives are per HPI.      Past Medical History:   Diagnosis Date    Biceps tendinopathy     GERD (gastroesophageal reflux disease)     Mucocele of lip     Spondyloarthropathy      Past Surgical History:   Procedure Laterality Date    APPENDECTOMY      COLONOSCOPY N/A 1/3/2022    Procedure: COLONOSCOPY, WITH POLYPECTOMY;  Surgeon: Ingrid Keyes MD;  Location: [facility] OR    HERNIA REPAIR, INGUINAL RT/LT      left     Family History   Problem Relation Age of Onset    Hypertension Mother     Hypertension Father     Asthma Father     Diabetes Father     Psoriasis Father     Hypertension Maternal Grandmother         RA    Diabetes Maternal Grandmother     Hypertension Maternal Grandfather     Cancer Maternal Grandfather         stomach    Cancer  Paternal Grandfather         lung-smoker    Asthma Sister     Anesthesia Reaction No family hx of     Deep Vein Thrombosis (DVT) No family hx of     Glaucoma No family hx of     Macular Degeneration No family hx of      Social History     Socioeconomic History    Marital status:      Spouse name: Not on file    Number of children: 3    Years of education: Not on file    Highest education level: Not on file   Occupational History    Occupation: [employer]   Tobacco Use    Smoking status: Never    Smokeless tobacco: Former     Types: Chew     Quit date: 5/15/2010    Tobacco comments:     chew 9 yr   Substance and Sexual Activity    Alcohol use: Yes     Alcohol/week: 1.7 standard drinks of alcohol     Types: 2 Standard drinks or equivalent per week    Drug use: No    Sexual activity: Yes     Partners: Female   Other Topics Concern     Service Not Asked    Blood Transfusions Not Asked    Caffeine Concern Not Asked    Occupational Exposure Not Asked    Hobby Hazards Not Asked    Sleep Concern Not Asked    Stress Concern Not Asked    Weight Concern Not Asked    Special Diet Not Asked    Back Care Not Asked    Exercise Yes     Comment: run, weights    Bike Helmet Not Asked    Seat Belt Not Asked    Self-Exams Not Asked   Social History Narrative    Not on file     Social Determinants of Health     Financial Resource Strain: Not on file   Food Insecurity: Not on file   Transportation Needs: Not on file   Physical Activity: Not on file   Stress: Not on file   Social Connections: Not on file   Intimate Partner Violence: Not on file   Housing Stability: Not on file     Patient Active Problem List   Diagnosis    Esophageal reflux    Biceps tendinopathy    Wart    Mucocele of lip    Sore throat     Allergies   Allergen Reactions    Nkda [No Known Drug Allergy]     Seasonal Allergies Other (See Comments)     Sneezing, running nose, itchy eyes       Outpatient Encounter Medications as of 8/3/2023   Medication Sig  Dispense Refill    hydroxychloroquine (PLAQUENIL) 200 MG tablet Take 1 tablet (200 mg) by mouth 2 times daily Annual Plaquenil toxicity eye screening required. 180 tablet 3    sulfaSALAzine ER (AZULFIDINE EN) 500 MG EC tablet Take 3 tablets (1,500 mg) by mouth 2 times daily Labs required every 6 months while taking this medication. Hold for signs of infection, and seek medical attention. Please call to scheduled follow-up appointment, overdue. 540 tablet 1    celecoxib (CELEBREX) 100 MG capsule Take 1 capsule (100 mg) by mouth 2 times daily as needed for moderate pain Please keep your appointment on 9/29/22, thank you. (Patient not taking: Reported on 11/9/2022) 60 capsule 0    sulfaSALAzine (AZULFIDINE) 500 MG tablet Take 3 tablets (1,500 mg) by mouth 2 times daily (Patient not taking: Reported on 8/3/2023) 540 tablet 0    sulfaSALAzine (AZULFIDINE) 500 MG tablet Take 3 tablets (1,500 mg) by mouth 2 times daily (Patient not taking: Reported on 8/3/2023) 540 tablet 1    Vitamin D3 (CHOLECALCIFEROL) 25 mcg (1000 units) tablet Take by mouth every morning  (Patient not taking: Reported on 7/31/2023)       No facility-administered encounter medications on file as of 8/3/2023.               His records were reviewed.      2/2019: +BEST 1:40 mixed homogenous and nucleolar pattern, positive anti-DNA 17 with NL being up to 4. Neg anti-Sm, RNP, SCL-70, SSA, SSB, ALEXANDREA-1, CHROMATIN, RF, anti-CCP. NL ESR. NL SUA 5.2. Neg HLA-B27.  NL TSH, C3, C4. Unremarkable CMP. Neg U/A. LOW vit D 27.    2/2019: Advanced degenerative changes of L spine and moderate degenerative changes of T spine on x-rays.  Component      Latest Ref Rng & Units 1/17/2020   WBC      4.0 - 11.0 10e9/L 3.6 (L)   RBC Count      4.4 - 5.9 10e12/L 4.52   Hemoglobin      13.3 - 17.7 g/dL 13.8   Hematocrit      40.0 - 53.0 % 41.1   MCV      78 - 100 fl 91   MCH      26.5 - 33.0 pg 30.5   MCHC      31.5 - 36.5 g/dL 33.6   RDW      10.0 - 15.0 % 12.0   Platelet Count       150 - 450 10e9/L 258   Diff Method       Automated Method   % Neutrophils      % 55.5   % Lymphocytes      % 30.4   % Monocytes      % 11.0   % Eosinophils      % 2.5   % Basophils      % 0.3   % Immature Granulocytes      % 0.3   Nucleated RBCs      0 /100 0   Absolute Neutrophil      1.6 - 8.3 10e9/L 2.0   Absolute Lymphocytes      0.8 - 5.3 10e9/L 1.1   Absolute Monocytes      0.0 - 1.3 10e9/L 0.4   Absolute Eosinophils      0.0 - 0.7 10e9/L 0.1   Absolute Basophils      0.0 - 0.2 10e9/L 0.0   Abs Immature Granulocytes      0 - 0.4 10e9/L 0.0   Absolute Nucleated RBC       0.0   Creatinine      0.66 - 1.25 mg/dL 0.83   GFR Estimate      >60 mL/min/1.73:m2 >90   GFR Estimate If Black      >60 mL/min/1.73:m2 >90   Vitamin D Deficiency screening      20 - 75 ug/L 27   Sed Rate      0 - 15 mm/h 8   CRP Inflammation      0.0 - 8.0 mg/L <2.9   AST      0 - 45 U/L 19   Albumin      3.4 - 5.0 g/dL 3.9   ALT      0 - 70 U/L 36   Complement C3      81 - 157 mg/dL 118   Complement C4      13 - 39 mg/dL 25   DNA-ds      <10 IU/mL 16 (H)     Component      Latest Ref Rng & Units 1/18/2021   WBC      4.0 - 11.0 10e9/L 5.2   RBC Count      4.4 - 5.9 10e12/L 4.68   Hemoglobin      13.3 - 17.7 g/dL 14.7   Hematocrit      40.0 - 53.0 % 42.7   MCV      78 - 100 fl 91   MCH      26.5 - 33.0 pg 31.4   MCHC      31.5 - 36.5 g/dL 34.4   RDW      10.0 - 15.0 % 11.9   Platelet Count      150 - 450 10e9/L 241   % Neutrophils      % 63.3   % Lymphocytes      % 27.0   % Monocytes      % 8.3   % Eosinophils      % 1.0   % Basophils      % 0.4   Absolute Neutrophil      1.6 - 8.3 10e9/L 3.3   Absolute Lymphocytes      0.8 - 5.3 10e9/L 1.4   Absolute Monocytes      0.0 - 1.3 10e9/L 0.4   Absolute Eosinophils      0.0 - 0.7 10e9/L 0.1   Absolute Basophils      0.0 - 0.2 10e9/L 0.0   Diff Method       Automated Method   Creatinine      0.66 - 1.25 mg/dL 0.86   GFR Estimate      >60 mL/min/1.73:m2 >90   GFR Estimate If Black      >60  mL/min/1.73:m2 >90   AST      0 - 45 U/L 23   Albumin      3.4 - 5.0 g/dL 4.3   ALT      0 - 70 U/L 42     Component      Latest Ref Rng & Units 4/13/2022   WBC      4.0 - 11.0 10e3/uL 6.0   RBC Count      4.40 - 5.90 10e6/uL 4.51   Hemoglobin      13.3 - 17.7 g/dL 14.2   Hematocrit      40.0 - 53.0 % 41.5   MCV      78 - 100 fL 92   MCH      26.5 - 33.0 pg 31.5   MCHC      31.5 - 36.5 g/dL 34.2   RDW      10.0 - 15.0 % 12.0   Platelet Count      150 - 450 10e3/uL 223   % Neutrophils      % 77   % Lymphocytes      % 16   % Monocytes      % 6   % Eosinophils      % 0   % Basophils      % 1   % Immature Granulocytes      % 0   NRBCs per 100 WBC      <1 /100 0   Absolute Neutrophils      1.6 - 8.3 10e3/uL 4.6   Absolute Lymphocytes      0.8 - 5.3 10e3/uL 1.0   Absolute Monocytes      0.0 - 1.3 10e3/uL 0.3   Absolute Eosinophils      0.0 - 0.7 10e3/uL 0.0   Absolute Basophils      0.0 - 0.2 10e3/uL 0.0   Absolute Immature Granulocytes      <=0.4 10e3/uL 0.0   Absolute NRBCs      10e3/uL 0.0   Cholesterol      <200 mg/dL 210 (H)   Triglycerides      <150 mg/dL 104   HDL Cholesterol      >=40 mg/dL 52   LDL Cholesterol Calculated      <=100 mg/dL 137 (H)   Non HDL Cholesterol      <130 mg/dL 158 (H)   Patient Fasting > 8hrs?       No   Creatinine      0.66 - 1.25 mg/dL 0.87   GFR Estimate      >60 mL/min/1.73m2 >90   Sed Rate      0 - 15 mm/hr 5   Vitamin D Deficiency screening      20 - 75 ug/L 41   DNA-ds      <10.0 IU/mL 23.0 (H)   CRP Inflammation      0.0 - 8.0 mg/L <2.9   Complement C3      81 - 157 mg/dL 94   Complement C4      13 - 39 mg/dL 16   AST      0 - 45 U/L 20   Albumin      3.4 - 5.0 g/dL 4.3   ALT      0 - 70 U/L 39     Component      Latest Ref Rng 7/31/2023  7:56 AM   WBC      4.0 - 11.0 10e3/uL 3.2 (L)    RBC Count      4.40 - 5.90 10e6/uL 4.51    Hemoglobin      13.3 - 17.7 g/dL 14.1    Hematocrit      40.0 - 53.0 % 42.3    MCV      78 - 100 fL 94    MCH      26.5 - 33.0 pg 31.3    MCHC      31.5 -  36.5 g/dL 33.3    RDW      10.0 - 15.0 % 12.1    Platelet Count      150 - 450 10e3/uL 216    % Neutrophils      % 60    % Lymphocytes      % 24    % Monocytes      % 11    % Eosinophils      % 4    % Basophils      % 1    % Immature Granulocytes      % 0    NRBCs per 100 WBC      <1 /100 0    Absolute Neutrophils      1.6 - 8.3 10e3/uL 1.9    Absolute Lymphocytes      0.8 - 5.3 10e3/uL 0.8    Absolute Monocytes      0.0 - 1.3 10e3/uL 0.4    Absolute Eosinophils      0.0 - 0.7 10e3/uL 0.1    Absolute Basophils      0.0 - 0.2 10e3/uL 0.0    Absolute Immature Granulocytes      <=0.4 10e3/uL 0.0    Absolute NRBCs      10e3/uL 0.0    Color Urine      Colorless, Straw, Light Yellow, Yellow     Appearance Urine      Clear     Glucose Urine      Negative mg/dL    Bilirubin Urine      Negative     Ketones Urine      Negative mg/dL    Specific Gravity Urine      1.003 - 1.035     Blood Urine      Negative     pH Urine      5.0 - 7.0     Protein Albumin Urine      Negative mg/dL    Urobilinogen mg/dL      Normal, 2.0 mg/dL    Nitrite Urine      Negative     Leukocyte Esterase Urine      Negative     Mucus Urine      None Seen /LPF    RBC Urine      <=2 /HPF    WBC Urine      <=5 /HPF    Cholesterol      <200 mg/dL 173    Triglycerides      <150 mg/dL 63    HDL Cholesterol      >=40 mg/dL 53    LDL Cholesterol Calculated      <=100 mg/dL 107 (H)    Non HDL Cholesterol      <130 mg/dL 120    Total Protein Urine mg/dL        mg/dL    Total Protein UR MG/MG CR      0.00 - 0.20 mg/mg Cr    Creatinine Urine      mg/dL    Creatinine      0.67 - 1.17 mg/dL 0.88    GFR Estimate      >60 mL/min/1.73m2 >90    ALT      0 - 70 U/L 38    Albumin      3.5 - 5.2 g/dL 4.7    AST      0 - 45 U/L 29    Complement C4      13 - 39 mg/dL 16    Complement C3      81 - 157 mg/dL 93    CRP Inflammation      <5.00 mg/L <3.00    DNA-ds      <10.0 IU/mL 24.0 (H)    Sed Rate      0 - 20 mm/hr 2    PSA Tumor Marker      0.00 - 3.50 ng/mL 1.44    Vitamin D  Deficiency screening      20 - 75 ug/L 32      Component      Latest Ref [facility] 7/31/2023  7:57 AM   WBC      4.0 - 11.0 10e3/uL    RBC Count      4.40 - 5.90 10e6/uL    Hemoglobin      13.3 - 17.7 g/dL    Hematocrit      40.0 - 53.0 %    MCV      78 - 100 fL    MCH      26.5 - 33.0 pg    MCHC      31.5 - 36.5 g/dL    RDW      10.0 - 15.0 %    Platelet Count      150 - 450 10e3/uL    % Neutrophils      %    % Lymphocytes      %    % Monocytes      %    % Eosinophils      %    % Basophils      %    % Immature Granulocytes      %    NRBCs per 100 WBC      <1 /100    Absolute Neutrophils      1.6 - 8.3 10e3/uL    Absolute Lymphocytes      0.8 - 5.3 10e3/uL    Absolute Monocytes      0.0 - 1.3 10e3/uL    Absolute Eosinophils      0.0 - 0.7 10e3/uL    Absolute Basophils      0.0 - 0.2 10e3/uL    Absolute Immature Granulocytes      <=0.4 10e3/uL    Absolute NRBCs      10e3/uL    Color Urine      Colorless, Straw, Light Yellow, Yellow  Yellow    Appearance Urine      Clear  Clear    Glucose Urine      Negative mg/dL Negative    Bilirubin Urine      Negative  Negative    Ketones Urine      Negative mg/dL Negative    Specific Gravity Urine      1.003 - 1.035  1.024    Blood Urine      Negative  Negative    pH Urine      5.0 - 7.0  6.0    Protein Albumin Urine      Negative mg/dL 10 !    Urobilinogen mg/dL      Normal, 2.0 mg/dL Normal    Nitrite Urine      Negative  Negative    Leukocyte Esterase Urine      Negative  Negative    Mucus Urine      None Seen /LPF Present !    RBC Urine      <=2 /HPF 2    WBC Urine      <=5 /HPF 0    Cholesterol      <200 mg/dL    Triglycerides      <150 mg/dL    HDL Cholesterol      >=40 mg/dL    LDL Cholesterol Calculated      <=100 mg/dL    Non HDL Cholesterol      <130 mg/dL    Total Protein Urine mg/dL        mg/dL 44.1 (H)    Total Protein UR MG/MG CR      0.00 - 0.20 mg/mg Cr 0.30 (H)    Creatinine Urine      mg/dL 147.0    Creatinine      0.67 - 1.17 mg/dL    GFR Estimate      >60  mL/min/1.73m2    ALT      0 - 70 U/L    Albumin      3.5 - 5.2 g/dL    AST      0 - 45 U/L    Complement C4      13 - 39 mg/dL    Complement C3      81 - 157 mg/dL    CRP Inflammation      <5.00 mg/L    DNA-ds      <10.0 IU/mL    Sed Rate      0 - 20 mm/hr    PSA Tumor Marker      0.00 - 3.50 ng/mL    Vitamin D Deficiency screening      20 - 75 ug/L       Legend:  (L) Low  (H) High  ! Abnormal    Ph.E:        Constitutional: WD/WN. Pleasant. In no acute distress.   Eyes: EOM intact, sclera anicteric, conj not injected  MS: No active synovitis   Skin: No skin rash  Neuro: A&O x 3. Grossly non focal  Psych: NL affect and mood    Assessment/ plan:    1-Spondyloarthropathy with enthesopathy Dx 2/2019 on SSZ since 2/2019. He is HLA-B27 negative. Has fh/o psoriasis. He fits this diagnosis very well and already responds to SSZ. His inflammatory arthritis was under good control at initial visit, but in 2/2020, had flare ups of achilles tendonitis which started post flu. I increased sulfasalazine to 3 tabs in the morning, 2 tabs in the evening with food in 2/2020 which helped significantly. In 6/2021, given episodes of joint pain, swelling and for better control of arthritis, recommended to increase SSZ to 3 gram a day. He is on celebrex prn, not much help.    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 HCQ peak benefit is 6 months, will give it more time to show benefit. Will check labs, will monitor closely for lupus given + BEST, +anti-DNA. HCQ could help to prevent progression to lupus. Advised him to try tylenol.    Stable 4/2022 labs.      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 Will continue HCQ+SSZ. Last labs in 9/2022 showed no protein in urine but ur pr/cr=0.69 plus high CRP, mild increased anti-DNA, mild leukopenia, but he had infection, will re-check labs.    2-SSZ monitoring. Labs q6mo.  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 He does not have lupus and nothing on his exam or hx is suggestive of lupus. I'll monitor it for now.    4-Low vit D in the past. S/p replacement.     5-HCQ monitoring. Recommend yearly eye exam        Orders Placed This Encounter   Procedures    ALT    Albumin level    AST    Creatinine    Complement C4    Complement C3    CRP inflammation    DNA double stranded antibodies    Erythrocyte sedimentation rate auto    UA with Microscopic reflex to Culture    Protein  random urine    Creatinine random urine    CBC with Platelets & Differential     Today's plan:    Labs this month    Return video in 6 months      Today 8/3/2023: Ongoing migratory arthralgia which is tolerable and not and enough to add another immunosuppressive drug like methotrexate. Will continue with  mg a day which requires yearly eye exam, and SSZ 3 gr a day, which requires q6mo monitoring labs. Leukopenia and urine protein in 7/2023 have improved, will continue to monitor. Anti-DNA is only slightly elevated. Has not met SLE criteria yet. Discussed doing labs with next flare.      Plan:    Labs as needed with flare ups    Return in 6 months (in person)    Jovani Luna MD

## 2023-08-03 NOTE — NURSING NOTE
Is the patient currently in the state of [place]? YES    Visit mode:VIDEO    If the visit is dropped, the patient can be reconnected by: VIDEO VISIT: Text to cell phone: [phone]    Will anyone else be joining the visit? NO    How would you like to obtain your AVS? MyChart    Are changes needed to the allergy or medication list? NO    Reason for visit: RECHECK    Medication and allergies have been reviewed.     Gagandeep Wheatley, VF

## 2023-08-18 ENCOUNTER — TELEPHONE (OUTPATIENT)
Dept: RHEUMATOLOGY | Facility: CLINIC | Age: 48
End: 2023-08-18
Payer: COMMERCIAL

## 2023-11-08 ENCOUNTER — OFFICE VISIT (OUTPATIENT)
Dept: DERMATOLOGY | Facility: CLINIC | Age: 48
End: 2023-11-08
Payer: COMMERCIAL

## 2023-11-08 DIAGNOSIS — L82.1 SEBORRHEIC KERATOSES: ICD-10-CM

## 2023-11-08 DIAGNOSIS — D22.9 MULTIPLE BENIGN NEVI: ICD-10-CM

## 2023-11-08 DIAGNOSIS — D18.01 CHERRY ANGIOMA: ICD-10-CM

## 2023-11-08 DIAGNOSIS — L81.4 SOLAR LENTIGO: ICD-10-CM

## 2023-11-08 DIAGNOSIS — L40.0 PLAQUE PSORIASIS: ICD-10-CM

## 2023-11-08 DIAGNOSIS — D84.9 IMMUNOSUPPRESSED STATUS (H): Primary | ICD-10-CM

## 2023-11-08 PROCEDURE — 99214 OFFICE O/P EST MOD 30 MIN: CPT | Performed by: PHYSICIAN ASSISTANT

## 2023-11-08 NOTE — LETTER
11/8/2023         RE: Sabino Cardona  [address]  [facility] [address]        Dear Colleague,    Thank you for referring your patient, Sabino Cardona, to the [facility]. Please see a copy of my visit note below.    [facility] Dermatology Note  Encounter Date: Nov 8, 2023  Office Visit      Dermatology Problem List:  1. Relevant medical history: History of psoriasis and inflammatory arthritis, on Plaquenil  2. 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   - ABCDEs: Counseled ABCDEs of melanoma: Asymmetry, Border (irregularity), Color (not uniform, changes in color), Diameter (greater than 6 mm which is about the size of a pencil eraser), and Evolving (any changes in preexisting moles).  - Sun protection: Counseled SPF30+ sunscreen, UPF clothing, sun avoidance, tanning bed avoidance.      # 3 mm dark brown macule on the right dorsal foot   - Photo documented 11/9/22  - Unchanged today 11/8/23   - Will clinically monitor for changes.     # Multiple clinically benign nevi.    - No further intervention needed.   - Signs and Symptoms of non-melanoma skin cancer and ABCDEs of melanoma reviewed with patient. Patient encouraged to perform monthly self skin exams and educated on how to perform them. UV precautions reviewed with patient. Patient was asked about new or changing moles/lesions on body.   - Sunscreen: Apply 20 minutes prior to going outdoors and reapply every two hours, when wet or sweating. We recommend using an SPF 30 or higher, and to use one that is water resistant.        #  Seborrheic keratosis, non irritated.   - No further intervention needed.      # Solar lentigines.   - No further intervention needed.   - Sunscreen: Apply 20 minutes prior to going outdoors and reapply every two hours, when wet or sweating. We recommend using an SPF 30 or higher, and to use one that is water resistant.    Procedures Performed:   None    Follow-up: 1 year(s) in-person, or earlier for new or changing lesions    Staff and scribe:    Scribe disclosure:  I, Cecilia Hartman, am serving as a scribe to document services personally performed by Shanna Valle PA-C based on data collection and the provider's statements to me.      All risks, benefits and alternatives were discussed with patient.  Patient is in agreement and understands the assessment and plan.  All questions were answered.    Shanna Valle PA-C, [facility]  [facility]: Phone: [phone], Fax: [phone]  [facility]: Phone: [phone],  Fax: [phone]  [facility]: Phone: [phone], Fax: [phone]  ____________________________________________    CC: Skin Check (FBSE, No areas of concern.  No HX of skin cancer.  Family history of father with unknown type of skin cancer.  Monitor 3mm macule on right dorsal foot.  )      HPI:  Mr. Sabino Cardona is a 48 year old male who presents today as a return patient for FBSC.     Patient did not report any spots of concern. Is still currently taking Plaquenil. Tolerating well.     Patient is otherwise feeling well, without additional concerns.    Labs:  None     Physical Exam:  Vitals: There were no vitals taken for this visit.  SKIN: Full skin, which includes the head/face, both arms, chest, back, abdomen,both legs, genitalia and/or groin buttocks, digits and/or nails, was examined.   - Collins's skin type II, Has <100 nevi  - There are dome shaped bright red papules on the  trunk.   - Multiple regular brown pigmented macules and papules are identified on the trunk and extremities.   - Scattered brown macules on sun exposed areas.  - There are waxy stuck on tan to brown papules on the trunk.    - 3 mm dark brown macule on the right dorsal foot, unchanged   - No other lesions of concern on areas examined.     Medications:  Current Outpatient Medications   Medication     hydroxychloroquine (PLAQUENIL) 200 MG tablet     sulfaSALAzine ER (AZULFIDINE EN) 500 MG EC tablet     sulfaSALAzine (AZULFIDINE) 500 MG tablet     sulfaSALAzine (AZULFIDINE) 500 MG tablet     Vitamin D3 (CHOLECALCIFEROL) 25 mcg (1000 units) tablet     No current facility-administered medications for this visit.      Past Medical/Surgical History:   Patient Active Problem List   Diagnosis     Esophageal reflux     Biceps tendinopathy     Wart     Mucocele of lip     Sore throat     Past Medical History:   Diagnosis Date     Biceps tendinopathy      GERD (gastroesophageal reflux disease)      Mucocele of lip      Spondyloarthropathy                         Again, thank you for allowing me to participate in the care of your patient.        Sincerely,        Shanna Valle PA-C

## 2023-11-08 NOTE — NURSING NOTE
Sabino Cardona's goals for this visit include:   Chief Complaint   Patient presents with    Skin Check     FBSE, No areas of concern.  No HX of skin cancer.  Family history of father with unknown type of skin cancer.  Monitor 3mm macule on right dorsal foot.        He requests these members of his care team be copied on today's visit information:     PCP: Huan Das    Referring Provider:  Huan Das MD  [address] [place] [id number]    There were no vitals taken for this visit.    Do you need any medication refills at today's visit?     Belinda Casillas on 11/8/2023 at 7:41 AM

## 2023-11-08 NOTE — PROGRESS NOTES
[facility] Dermatology Note  Encounter Date: Nov 8, 2023  Office Visit      Dermatology Problem List:  1. Relevant medical history: History of psoriasis and inflammatory arthritis, on Plaquenil  2. 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   - ABCDEs: Counseled ABCDEs of melanoma: Asymmetry, Border (irregularity), Color (not uniform, changes in color), Diameter (greater than 6 mm which is about the size of a pencil eraser), and Evolving (any changes in preexisting moles).  - Sun protection: Counseled SPF30+ sunscreen, UPF clothing, sun avoidance, tanning bed avoidance.      # 3 mm dark brown macule on the right dorsal foot   - Photo documented 11/9/22  - Unchanged today 11/8/23   - Will clinically monitor for changes.     # Multiple clinically benign nevi.    - No further intervention needed.   - Signs and Symptoms of non-melanoma skin cancer and ABCDEs of melanoma reviewed with patient. Patient encouraged to perform monthly self skin exams and educated on how to perform them. UV precautions reviewed with patient. Patient was asked about new or changing moles/lesions on body.   - Sunscreen: Apply 20 minutes prior to going outdoors and reapply every two hours, when wet or sweating. We recommend using an SPF 30 or higher, and to use one that is water resistant.        # Seborrheic keratosis, non irritated.   - No further intervention needed.      # Solar lentigines.   - No further intervention needed.   - Sunscreen: Apply 20 minutes prior to going outdoors and reapply every two hours, when wet or sweating. We recommend using an SPF  30 or higher, and to use one that is water resistant.    Procedures Performed:   None    Follow-up: 1 year(s) in-person, or earlier for new or changing lesions    Staff and scribe:    Scribe disclosure:  I, Cecilia Hartman, am serving as a scribe to document services personally performed by Shanna Valle PA-C based on data collection and the provider's statements to me.      All risks, benefits and alternatives were discussed with patient.  Patient is in agreement and understands the assessment and plan.  All questions were answered.    Shanna Valle PA-C, MPAS  [facility]: Phone: [phone], Fax: [phone]  [facility]: Phone: [phone],  Fax: [phone]  [facility]: Phone: [phone], Fax: [phone]  ____________________________________________    CC: Skin Check (FBSE, No areas of concern.  No HX of skin cancer.  Family history of father with unknown type of skin cancer.  Monitor 3mm macule on right dorsal foot.  )      HPI:  Mr. Sabino Cardona is a 48 year old male who presents today as a return patient for FBSC.     Patient did not report any spots of concern. Is still currently taking Plaquenil. Tolerating well.     Patient is otherwise feeling well, without additional concerns.    Labs:  None     Physical Exam:  Vitals: There were no vitals taken for this visit.  SKIN: Full skin, which includes the head/face, both arms, chest, back, abdomen,both legs, genitalia and/or groin buttocks, digits and/or nails, was examined.   - Collins's skin type II, Has <100 nevi  - There are dome shaped bright red papules on the trunk.   - Multiple regular brown pigmented macules and papules are identified on the trunk and extremities.   - Scattered brown macules on sun exposed areas.  - There are waxy stuck on tan to brown papules on the trunk.    - 3 mm dark brown macule on the right dorsal  foot, unchanged   - No other lesions of concern on areas examined.     Medications:  Current Outpatient Medications   Medication    hydroxychloroquine (PLAQUENIL) 200 MG tablet    sulfaSALAzine ER (AZULFIDINE EN) 500 MG EC tablet    sulfaSALAzine (AZULFIDINE) 500 MG tablet    sulfaSALAzine (AZULFIDINE) 500 MG tablet    Vitamin D3 (CHOLECALCIFEROL) 25 mcg (1000 units) tablet     No current facility-administered medications for this visit.      Past Medical/Surgical History:   Patient Active Problem List   Diagnosis    Esophageal reflux    Biceps tendinopathy    Wart    Mucocele of lip    Sore throat     Past Medical History:   Diagnosis Date    Biceps tendinopathy     GERD (gastroesophageal reflux disease)     Mucocele of lip     Spondyloarthropathy

## 2023-11-17 ENCOUNTER — OFFICE VISIT (OUTPATIENT)
Dept: OPHTHALMOLOGY | Facility: CLINIC | Age: 48
End: 2023-11-17
Payer: COMMERCIAL

## 2023-11-17 DIAGNOSIS — Z79.899 LONG-TERM USE OF PLAQUENIL: Primary | ICD-10-CM

## 2023-11-17 DIAGNOSIS — H52.4 PRESBYOPIA: ICD-10-CM

## 2023-11-17 PROCEDURE — 92014 COMPRE OPH EXAM EST PT 1/>: CPT | Performed by: OPTOMETRIST

## 2023-11-17 ASSESSMENT — CONF VISUAL FIELD
OS_NORMAL: 1
OS_INFERIOR_NASAL_RESTRICTION: 0
OS_SUPERIOR_NASAL_RESTRICTION: 0
OD_SUPERIOR_TEMPORAL_RESTRICTION: 0
OD_INFERIOR_TEMPORAL_RESTRICTION: 0
OS_INFERIOR_TEMPORAL_RESTRICTION: 0
OS_SUPERIOR_TEMPORAL_RESTRICTION: 0
METHOD: COUNTING FINGERS
OD_NORMAL: 1
OD_SUPERIOR_NASAL_RESTRICTION: 0
OD_INFERIOR_NASAL_RESTRICTION: 0

## 2023-11-17 ASSESSMENT — SLIT LAMP EXAM - LIDS
COMMENTS: NORMAL
COMMENTS: NORMAL

## 2023-11-17 ASSESSMENT — VISUAL ACUITY
OS_SC: 20/20
METHOD: SNELLEN - LINEAR
OD_SC: 20/20
METHOD_MR: PT DEFERRED
OD_SC+: -1

## 2023-11-17 ASSESSMENT — CUP TO DISC RATIO
OS_RATIO: 0.4
OD_RATIO: 0.4

## 2023-11-17 ASSESSMENT — TONOMETRY
IOP_METHOD: ICARE
OD_IOP_MMHG: 13
OS_IOP_MMHG: 14

## 2023-11-17 ASSESSMENT — EXTERNAL EXAM - LEFT EYE: OS_EXAM: NORMAL

## 2023-11-17 ASSESSMENT — EXTERNAL EXAM - RIGHT EYE: OD_EXAM: NORMAL

## 2023-11-17 NOTE — NURSING NOTE
Chief Complaints and History of Present Illnesses   Patient presents with    Plaquenil Exam       Chief Complaint(s) and History of Present Illness(es)       Plaquenil Exam               Comments    Patient here for yearly plaquenil exam. No problems/concerns today. No glasses or ctl wear. No drop use.   Currently taking 400 mg of Plaquenil daily.                   Estefani East, COT

## 2023-11-17 NOTE — PROGRESS NOTES
Assessment/Plan  (Z79.899) Long-term use of Plaquenil  (primary encounter diagnosis)  Comment: Started May 2022, due for repeat imaging in 2027. No evidence of retinal toxicity.   Plan: Monitor annually with dilated exams until 2027 when additional imaging will be recommended. Return to clinic sooner if there are vision changes.     (H52.4) Presbyopia  Plan: Ok to use OTC reading glasses if needed.     Complete documentation of historical and exam elements from today's encounter can  be found in the full encounter summary report (not reduplicated in this progress  note). I personally obtained the chief complaint(s) and history of present illness. I  confirmed and edited as necessary the review of systems, past medical/surgical  history, family history, social history, and examination findings as documented by  others; and I examined the patient myself. I personally reviewed the relevant tests,  images, and reports as documented above. I formulated and edited as necessary the  assessment and plan and discussed the findings and management plan with the  patient and family.    Ramiro Bullock, OD

## 2024-01-21 DIAGNOSIS — M19.90 INFLAMMATORY ARTHRITIS: ICD-10-CM

## 2024-01-24 DIAGNOSIS — M19.90 INFLAMMATORY ARTHRITIS: ICD-10-CM

## 2024-01-24 RX ORDER — HYDROXYCHLOROQUINE SULFATE 200 MG/1
200 TABLET, FILM COATED ORAL 2 TIMES DAILY
Qty: 180 TABLET | Refills: 0 | OUTPATIENT
Start: 2024-01-24

## 2024-01-24 RX ORDER — HYDROXYCHLOROQUINE SULFATE 200 MG/1
200 TABLET, FILM COATED ORAL 2 TIMES DAILY
Qty: 180 TABLET | Refills: 3 | Status: SHIPPED | OUTPATIENT
Start: 2024-01-24

## 2024-01-31 NOTE — PROGRESS NOTES
HPI:    Last visit with us 7/31/2023 and additional information in that note. He started having upper respiratory sxs. Yesterday with a cough. No fever. He states he tested negative for COVID. He has some mild myalgias. No other systemic sxs. He states several months of worse reflux (more upper chest area). These sxs. Are worse at night when lying down. He is taking 20 mg of Nexium with some relief but his sxs are not gone. He has cut back on coffee and tomato based products with some reduction in his sxs. He remains active and denies any rest/exertional chest pain. No SOB. No other HEENT, cardiopulmonary, abdominal, , neurological, systemic, psychiatric, lymphatic, endocrine, vascular complaints.       Past Medical History:   Diagnosis Date    Biceps tendinopathy     GERD (gastroesophageal reflux disease)     Mucocele of lip     Spondyloarthropathy      Past Surgical History:   Procedure Laterality Date    APPENDECTOMY      COLONOSCOPY N/A 1/3/2022    Procedure: COLONOSCOPY, WITH POLYPECTOMY;  Surgeon: Ingrid Keyes MD;  Location: [facility] OR    HERNIA REPAIR, INGUINAL RT/LT      left     PE:    Vitals noted, gen, nad, cooperative, alert neck supple nl rom, lungs with good air movement, RRR, S1, S2, no MRG, abdomen, no acute findings Grossly normal neurological exam.   Results for orders placed or performed in visit on 02/01/24   Symptomatic Influenza A/B, RSV, & SARS-CoV2 PCR (COVID-19) Nose     Status: Abnormal    Specimen: Nose; Swab   Result Value Ref Range    Influenza A PCR Positive (A) Negative    Influenza B PCR Negative Negative    RSV PCR Negative Negative    SARS CoV2 PCR Negative Negative    Narrative    Testing was performed using the Xpert Xpress CoV2/Flu/RSV Assay on the [employer] GeneXpert Instrument. This test should be ordered for the detection of SARS-CoV-2, influenza, and RSV viruses in individuals who meet clinical and/or epidemiological criteria. Test performance is unknown in asymptomatic  patients. This test is for in vitro diagnostic use under the FDA EUA for laboratories certified under CLIA to perform high or moderate complexity testing. This test has not been FDA cleared or approved. A negative result does not rule out the presence of PCR inhibitors in the specimen or target RNA in concentration below the limit of detection for the assay. If only one viral target is positive but coinfection with multiple targets is suspected, the sample should be re-tested with another FDA cleared, approved, or authorized test, if coinfection would change clinical management. This test was validated by the [facility] [employer]. These laboratories are certified under the Clinical Laboratory Improvement Amendments of 1988 (CLIA-88) as qualified to perform high complexity laboratory testing.       A/P:    1. Immunizations; Pfizer COVID vaccine x 4 (bi-valent 11/10/2022). Tdap 7/17/2023. He is 48 now and recommend Shingrix vaccine series.   2. He had Rheumatology follow up with Dr. Luna 8/3/2023 for psoriasis and RA/spondyloarrthroapthy On Sulfasalazine and PRN  Celebrex. Labs were done 7/31/2023  He is Plaquenil and has an ophthalmology appt. On 7/18/2024  He is aware that Plaquenil is also a photosensitizer, he is outside fishing. He has a 2/9/2024 follow up with Dr. Pacheco.   3. Colonoscopy 1/3/2022 and repeat in 7-10 years   4. Lipids 7/31/2023; , HDL 53 and TG's 63.    5. Dermatology;  for skin check seen 11/8/2023 and next visit 11/13/2024.   6. PT R shoulder 12/30/2021  7. Vitamin D level checked 32 on 7/31/2023.    8. PSA was 1.44 on 7/31/2023. Ordered future on 1/31/2024  9. Worse reflux; ordered EGD and he will follow up with me  10. Ordered RSV/COVID/influenza testing today. Positive Influenza A; sent in Rx. For Tamiflu           30 minutes spent on the date of the encounter doing chart review, history and exam, documentation and further activities as noted above exclusive of procedures  and other billable interpretations

## 2024-02-01 ENCOUNTER — OFFICE VISIT (OUTPATIENT)
Dept: INTERNAL MEDICINE | Facility: CLINIC | Age: 49
End: 2024-02-01
Payer: COMMERCIAL

## 2024-02-01 ENCOUNTER — TELEPHONE (OUTPATIENT)
Dept: INTERNAL MEDICINE | Facility: CLINIC | Age: 49
End: 2024-02-01

## 2024-02-01 VITALS
BODY MASS INDEX: 25.35 KG/M2 | DIASTOLIC BLOOD PRESSURE: 83 MMHG | TEMPERATURE: 98.6 F | HEIGHT: 74 IN | HEART RATE: 87 BPM | SYSTOLIC BLOOD PRESSURE: 130 MMHG | OXYGEN SATURATION: 96 % | WEIGHT: 197.5 LBS

## 2024-02-01 DIAGNOSIS — Z12.5 SCREENING FOR PROSTATE CANCER: Primary | ICD-10-CM

## 2024-02-01 DIAGNOSIS — R05.9 COUGH, UNSPECIFIED TYPE: ICD-10-CM

## 2024-02-01 DIAGNOSIS — K21.00 GASTROESOPHAGEAL REFLUX DISEASE WITH ESOPHAGITIS, UNSPECIFIED WHETHER HEMORRHAGE: ICD-10-CM

## 2024-02-01 DIAGNOSIS — Z13.220 SCREENING CHOLESTEROL LEVEL: ICD-10-CM

## 2024-02-01 LAB
FLUAV RNA SPEC QL NAA+PROBE: POSITIVE
FLUBV RNA RESP QL NAA+PROBE: NEGATIVE
RSV RNA SPEC NAA+PROBE: NEGATIVE
SARS-COV-2 RNA RESP QL NAA+PROBE: NEGATIVE

## 2024-02-01 PROCEDURE — 99000 SPECIMEN HANDLING OFFICE-LAB: CPT | Performed by: PATHOLOGY

## 2024-02-01 PROCEDURE — 87637 SARSCOV2&INF A&B&RSV AMP PRB: CPT | Performed by: INTERNAL MEDICINE

## 2024-02-01 PROCEDURE — 99214 OFFICE O/P EST MOD 30 MIN: CPT | Performed by: INTERNAL MEDICINE

## 2024-02-01 RX ORDER — ESOMEPRAZOLE MAGNESIUM 40 MG/1
GRANULE, DELAYED RELEASE ORAL
COMMUNITY
Start: 2023-11-01

## 2024-02-01 RX ORDER — OSELTAMIVIR PHOSPHATE 75 MG/1
75 CAPSULE ORAL 2 TIMES DAILY
Qty: 10 CAPSULE | Refills: 0 | Status: SHIPPED | OUTPATIENT
Start: 2024-02-01 | End: 2024-02-06

## 2024-02-05 ENCOUNTER — LAB (OUTPATIENT)
Dept: LAB | Facility: CLINIC | Age: 49
End: 2024-02-05
Payer: COMMERCIAL

## 2024-02-05 DIAGNOSIS — Z13.220 SCREENING CHOLESTEROL LEVEL: ICD-10-CM

## 2024-02-05 DIAGNOSIS — M19.90 INFLAMMATORY ARTHRITIS: ICD-10-CM

## 2024-02-05 DIAGNOSIS — R76.8 POSITIVE DOUBLE STRANDED DNA ANTIBODY TEST: ICD-10-CM

## 2024-02-05 DIAGNOSIS — Z12.5 SCREENING FOR PROSTATE CANCER: ICD-10-CM

## 2024-02-05 DIAGNOSIS — Z51.81 MEDICATION MONITORING ENCOUNTER: ICD-10-CM

## 2024-02-05 DIAGNOSIS — D72.819 LEUKOPENIA, UNSPECIFIED TYPE: ICD-10-CM

## 2024-02-05 LAB
ALBUMIN MFR UR ELPH: 57.4 MG/DL
ALBUMIN SERPL BCG-MCNC: 4.4 G/DL (ref 3.5–5.2)
ALBUMIN UR-MCNC: NEGATIVE MG/DL
ALT SERPL W P-5'-P-CCNC: 27 U/L (ref 0–70)
APPEARANCE UR: CLEAR
AST SERPL W P-5'-P-CCNC: 23 U/L (ref 0–45)
BACTERIA #/AREA URNS HPF: ABNORMAL /HPF
BASOPHILS # BLD AUTO: 0 10E3/UL (ref 0–0.2)
BASOPHILS NFR BLD AUTO: 0 %
BILIRUB UR QL STRIP: NEGATIVE
CHOLEST SERPL-MCNC: 131 MG/DL
COLOR UR AUTO: YELLOW
CREAT SERPL-MCNC: 0.9 MG/DL (ref 0.67–1.17)
CREAT UR-MCNC: 191 MG/DL
CRP SERPL-MCNC: 21.7 MG/L
EGFRCR SERPLBLD CKD-EPI 2021: >90 ML/MIN/1.73M2
EOSINOPHIL # BLD AUTO: 0 10E3/UL (ref 0–0.7)
EOSINOPHIL NFR BLD AUTO: 0 %
ERYTHROCYTE [DISTWIDTH] IN BLOOD BY AUTOMATED COUNT: 11.8 % (ref 10–15)
ERYTHROCYTE [SEDIMENTATION RATE] IN BLOOD BY WESTERGREN METHOD: 8 MM/HR (ref 0–20)
FASTING STATUS PATIENT QL REPORTED: YES
GLUCOSE UR STRIP-MCNC: NEGATIVE MG/DL
HCT VFR BLD AUTO: 41.4 % (ref 40–53)
HDLC SERPL-MCNC: 34 MG/DL
HGB BLD-MCNC: 13.3 G/DL (ref 13.3–17.7)
HGB UR QL STRIP: NEGATIVE
IMM GRANULOCYTES # BLD: 0 10E3/UL
IMM GRANULOCYTES NFR BLD: 0 %
KETONES UR STRIP-MCNC: NEGATIVE MG/DL
LDLC SERPL CALC-MCNC: 85 MG/DL
LEUKOCYTE ESTERASE UR QL STRIP: NEGATIVE
LYMPHOCYTES # BLD AUTO: 0.7 10E3/UL (ref 0.8–5.3)
LYMPHOCYTES NFR BLD AUTO: 15 %
MCH RBC QN AUTO: 31.4 PG (ref 26.5–33)
MCHC RBC AUTO-ENTMCNC: 32.1 G/DL (ref 31.5–36.5)
MCV RBC AUTO: 98 FL (ref 78–100)
MONOCYTES # BLD AUTO: 0.3 10E3/UL (ref 0–1.3)
MONOCYTES NFR BLD AUTO: 7 %
MUCOUS THREADS #/AREA URNS LPF: PRESENT /LPF
NEUTROPHILS # BLD AUTO: 3.8 10E3/UL (ref 1.6–8.3)
NEUTROPHILS NFR BLD AUTO: 78 %
NITRATE UR QL: NEGATIVE
NONHDLC SERPL-MCNC: 97 MG/DL
PH UR STRIP: 6.5 [PH] (ref 5–7)
PLATELET # BLD AUTO: 165 10E3/UL (ref 150–450)
PROT/CREAT 24H UR: 0.3 MG/MG CR (ref 0–0.2)
PSA SERPL DL<=0.01 NG/ML-MCNC: 1.4 NG/ML (ref 0–3.5)
RBC # BLD AUTO: 4.23 10E6/UL (ref 4.4–5.9)
RBC #/AREA URNS AUTO: ABNORMAL /HPF
SP GR UR STRIP: 1.02 (ref 1–1.03)
SQUAMOUS #/AREA URNS AUTO: ABNORMAL /LPF
TRIGL SERPL-MCNC: 59 MG/DL
UROBILINOGEN UR STRIP-ACNC: 0.2 E.U./DL
WBC # BLD AUTO: 4.9 10E3/UL (ref 4–11)
WBC #/AREA URNS AUTO: ABNORMAL /HPF

## 2024-02-05 PROCEDURE — 36415 COLL VENOUS BLD VENIPUNCTURE: CPT

## 2024-02-05 PROCEDURE — 86140 C-REACTIVE PROTEIN: CPT

## 2024-02-05 PROCEDURE — 85025 COMPLETE CBC W/AUTO DIFF WBC: CPT

## 2024-02-05 PROCEDURE — 86160 COMPLEMENT ANTIGEN: CPT

## 2024-02-05 PROCEDURE — 84156 ASSAY OF PROTEIN URINE: CPT

## 2024-02-05 PROCEDURE — 82565 ASSAY OF CREATININE: CPT

## 2024-02-05 PROCEDURE — 84460 ALANINE AMINO (ALT) (SGPT): CPT

## 2024-02-05 PROCEDURE — 85652 RBC SED RATE AUTOMATED: CPT

## 2024-02-05 PROCEDURE — 81001 URINALYSIS AUTO W/SCOPE: CPT

## 2024-02-05 PROCEDURE — 82040 ASSAY OF SERUM ALBUMIN: CPT

## 2024-02-05 PROCEDURE — 84450 TRANSFERASE (AST) (SGOT): CPT

## 2024-02-05 PROCEDURE — G0103 PSA SCREENING: HCPCS

## 2024-02-05 PROCEDURE — 86225 DNA ANTIBODY NATIVE: CPT

## 2024-02-05 PROCEDURE — 80061 LIPID PANEL: CPT

## 2024-02-06 LAB
C3 SERPL-MCNC: 99 MG/DL (ref 81–157)
C4 SERPL-MCNC: 23 MG/DL (ref 13–39)
DSDNA AB SER-ACNC: 20 IU/ML

## 2024-02-09 ENCOUNTER — OFFICE VISIT (OUTPATIENT)
Dept: RHEUMATOLOGY | Facility: CLINIC | Age: 49
End: 2024-02-09
Attending: INTERNAL MEDICINE
Payer: COMMERCIAL

## 2024-02-09 VITALS — HEART RATE: 82 BPM | OXYGEN SATURATION: 95 % | DIASTOLIC BLOOD PRESSURE: 87 MMHG | SYSTOLIC BLOOD PRESSURE: 135 MMHG

## 2024-02-09 DIAGNOSIS — M47.819 SPONDYLOARTHROPATHY: Primary | ICD-10-CM

## 2024-02-09 DIAGNOSIS — M19.90 INFLAMMATORY ARTHRITIS: ICD-10-CM

## 2024-02-09 DIAGNOSIS — Z51.81 MEDICATION MONITORING ENCOUNTER: ICD-10-CM

## 2024-02-09 PROCEDURE — 99213 OFFICE O/P EST LOW 20 MIN: CPT | Performed by: INTERNAL MEDICINE

## 2024-02-09 PROCEDURE — 99214 OFFICE O/P EST MOD 30 MIN: CPT | Performed by: INTERNAL MEDICINE

## 2024-02-09 RX ORDER — SULFASALAZINE 500 MG/1
1500 TABLET ORAL 2 TIMES DAILY
Qty: 540 TABLET | Refills: 0 | OUTPATIENT
Start: 2024-02-09

## 2024-02-09 NOTE — PROGRESS NOTES
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 His wife is a transplant surgeon here at the  and they say the actual reason for referral is to establish/transfer care as they just moved to [place].      Today, he is feeling well.      They moved back from [place] to [place] about 1.5 month ago.      He reports having degenerative arthritis since age 18. Has chronic low back pain with worsening in AM and worsening with inactivity. Allergies in Spring makes it worse.      He started to have heel and ankle pain in Feb 2019, also knees and elbows. That was stressful time of life looking for moving back. Also has 3 small children (one 3 yo and two 3 yo twins) which keep them busy.      Both heels were red and blaching and his wife thought it was gout.      His brother has gout and is almost 49 yo.      He saw a rheumatologist at [facility]. 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 His wife questioned the diagnosis given lack of lupus symptoms.      Then he had x-rays of spine which showed lots of bone spurs.    He was diagnosed with OA, possible reactive arthritis. No h/o uveitis, urethritis, STDs. Has h/o food poisenings. Was put on SSZ in 2/2019, the dose was increased to current dose of 1 gr bid, he tolerates it well with no toxicity. SSZ has helped and he has no current heel inflammation or recent flare ups.      Has fatigue but because of having 3 small children, it's hard for him to say if fatigue was related to inflammation.    ROS is positive for intermittent CP for years, ibuprofen helps. Prednisone 10 mg every day did not help in  the past.    Gets cold sores. No photosensitivity. Gets migraines. No weight loss. No Raynaud's.      2/14/2020: No fatigue, skin rashes, oral ulcers.    Has mild achy knees.    About 1.5 months ago, started having flare up of achilles tendonitis in both feet, worse in AM and at night. They turn red and swollen.      7/10/2020: At last visit in 2/2020, increased SSZ from 2 to 2.5 gr/day given active achilles tendonitis, also prescribed celebrex 100 mg bid prn. Change in SSZ has helped and he no longer needs to take celebrex (last use 6 wk go). Tolerates SSZ at higher dose well. No flare ups since last visit.    No symptoms and no complaints today. Felling very well. No skin rashes, oral ulcers, CP or SOB or fatigue.    Had flu in 1/2020, after that got the flu shot.     6/28/2021: Hands, feet swell up 2 times a week x 1 hr, it gets better after moving around. No heel pain/swelling.    Has 3 small children, physical job. Fully vaccinated, no SE. Got pfizer.    Over last couple of months, has been good on taking her SSZ 5 tabs a day everyday. Noticed more sx including CP by missing some doses. No SSZ SEs. Rarely takes the celebrex, if notices sore feet x 2 days in a row, he takes it. It does not help as much.    9/23/2022: Emerson presents for follow up. 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 No other complaints. Tolerates HCQ well.      During the day he is usually good, at night migratory pain comes back affecting shoulders (R shoulder more consistent) and hips. His pain is tolerable most days but sometimes,  keeps him up.    No am stiffness.    Advil works better than celebrex.    If misses SSZ x 3-4 days, pain recurs.      1/19/2023: Tolerates HCQ well, migratory arthralgia affecting large joints is much improved, reports occasional pain, takes ibuprofen prn. tolerates  HCQ, thinks it has helped.    8/3/2023: Ongoing migratory arthralgia but tolerable.      Reports every other month flare ups of joint pain, lasts 2-3 weeks, no triggers. Sometimes takes advil which helps, sometimes it goes away by its own, sometimes shoulders, sometimes hands and sometimes back. Noticed fingers and hands swell up. It moves from joint to joint. Spring time is worse, pain is pretty consistent. With some flare ups, gets sharp CP. Pain is tolerable. Celebrex does not work.    No skin rash, oral ulcers, CP, SOB.    No flares today.      Today 2/9/2024:    -was doing very well, no flares with better control of joint pain after adding HCQ to SSZ till recent flu A on 2/1/2024, which triggered a flare of diffuse arthralgia and body ache along with headaches/cough, now is doing much better, leaving on a cruise trip, has mild residual cough, had flu shot this season. Had vivian flu. Took tylenol, advil.        ROS:  A comprehensive ROS was done, positives are per HPI.      Past Medical History:   Diagnosis Date     Biceps tendinopathy      GERD (gastroesophageal reflux disease)      Mucocele of lip      Spondyloarthropathy      Past Surgical History:   Procedure Laterality Date     APPENDECTOMY       COLONOSCOPY N/A 1/3/2022    Procedure: COLONOSCOPY, WITH POLYPECTOMY;  Surgeon: Ingrid Keyes MD;  Location: [facility] OR     HERNIA REPAIR, INGUINAL RT/LT      left     Family History   Problem Relation Age of Onset     Hypertension Mother      Hypertension Father      Asthma Father      Diabetes Father      Psoriasis Father      Hypertension Maternal Grandmother         RA     Diabetes Maternal Grandmother      Hypertension Maternal Grandfather      Cancer Maternal Grandfather         stomach     Cancer Paternal Grandfather         lung-smoker     Asthma Sister      Anesthesia Reaction No family hx of      Deep Vein Thrombosis (DVT) No family hx of      Glaucoma No family hx of      Macular Degeneration No family hx  of      Social History     Socioeconomic History     Marital status:      Spouse name: Not on file     Number of children: 3     Years of education: Not on file     Highest education level: Not on file   Occupational History     Occupation: [employer]   Tobacco Use     Smoking status: Never     Smokeless tobacco: Former     Types: Chew     Quit date: 5/15/2010     Tobacco comments:     chew 9 yr   Substance and Sexual Activity     Alcohol use: Yes     Alcohol/week: 1.7 standard drinks of alcohol     Types: 2 Standard drinks or equivalent per week     Drug use: No     Sexual activity: Yes     Partners: Female   Other Topics Concern      Service Not Asked     Blood Transfusions Not Asked     Caffeine Concern Not Asked     Occupational Exposure Not Asked     Hobby Hazards Not Asked     Sleep Concern Not Asked     Stress Concern Not Asked     Weight Concern Not Asked     Special Diet Not Asked     Back Care Not Asked     Exercise Yes     Comment: run, weights     Bike Helmet Not Asked     Seat Belt Not Asked     Self-Exams Not Asked   Social History Narrative     Not on file     Social Determinants of Health     Financial Resource Strain: Low Risk  (1/31/2024)    Financial Resource Strain      Within the past 12 months, have you or your family members you live with been unable to get utilities (heat, electricity) when it was really needed?: No   Food Insecurity: Low Risk  (1/31/2024)    Food Insecurity      Within the past 12 months, did you worry that your food would run out before you got money to buy more?: No      Within the past 12 months, did the food you bought just not last and you didn t have money to get more?: No   Transportation Needs: Low Risk  (1/31/2024)    Transportation Needs      Within the past 12 months, has lack of transportation kept you from medical appointments, getting your medicines, non-medical meetings or appointments, work, or from getting things that you need?: No   Physical  Activity: Not on file   Stress: Not on file   Social Connections: Not on file   Interpersonal Safety: Not on file   Housing Stability: Low Risk  (1/31/2024)    Housing Stability      Do you have housing? : Yes      Are you worried about losing your housing?: No     Patient Active Problem List   Diagnosis     Esophageal reflux     Biceps tendinopathy     Wart     Mucocele of lip     Sore throat     Allergies   Allergen Reactions     Nkda [No Known Drug Allergy]      Seasonal Allergies Other (See Comments)     Sneezing, running nose, itchy eyes       Outpatient Encounter Medications as of 2/9/2024   Medication Sig Dispense Refill     Esomeprazole Magnesium 40 MG PACK Pt taking 20 mg       hydroxychloroquine (PLAQUENIL) 200 MG tablet Take 1 tablet (200 mg) by mouth 2 times daily Annual Plaquenil toxicity eye screening required. 180 tablet 3     sulfaSALAzine ER (AZULFIDINE EN) 500 MG EC tablet Take 3 tablets (1,500 mg) by mouth 2 times daily Labs required every 6 months while taking this medication. Hold for signs of infection, and seek medical attention. Please call to scheduled follow-up appointment, overdue. 540 tablet 1     Vitamin D3 (CHOLECALCIFEROL) 25 mcg (1000 units) tablet Take by mouth every morning       No facility-administered encounter medications on file as of 2/9/2024.               His records were reviewed.      2/2019: +BEST 1:40 mixed homogenous and nucleolar pattern, positive anti-DNA 17 with NL being up to 4. Neg anti-Sm, RNP, SCL-70, SSA, SSB, ALEXANDREA-1, CHROMATIN, RF, anti-CCP. NL ESR. NL SUA 5.2. Neg HLA-B27.  NL TSH, C3, C4. Unremarkable CMP. Neg U/A. LOW vit D 27.    2/2019: Advanced degenerative changes of L spine and moderate degenerative changes of T spine on x-rays.  Component      Latest Ref Rng & Units 1/17/2020   WBC      4.0 - 11.0 10e9/L 3.6 (L)   RBC Count      4.4 - 5.9 10e12/L 4.52   Hemoglobin      13.3 - 17.7 g/dL 13.8   Hematocrit      40.0 - 53.0 % 41.1   MCV      78 - 100 fl 91    MCH      26.5 - 33.0 pg 30.5   MCHC      31.5 - 36.5 g/dL 33.6   RDW      10.0 - 15.0 % 12.0   Platelet Count      150 - 450 10e9/L 258   Diff Method       Automated Method   % Neutrophils      % 55.5   % Lymphocytes      % 30.4   % Monocytes      % 11.0   % Eosinophils      % 2.5   % Basophils      % 0.3   % Immature Granulocytes      % 0.3   Nucleated RBCs      0 /100 0   Absolute Neutrophil      1.6 - 8.3 10e9/L 2.0   Absolute Lymphocytes      0.8 - 5.3 10e9/L 1.1   Absolute Monocytes      0.0 - 1.3 10e9/L 0.4   Absolute Eosinophils      0.0 - 0.7 10e9/L 0.1   Absolute Basophils      0.0 - 0.2 10e9/L 0.0   Abs Immature Granulocytes      0 - 0.4 10e9/L 0.0   Absolute Nucleated RBC       0.0   Creatinine      0.66 - 1.25 mg/dL 0.83   GFR Estimate      >60 mL/min/1.73:m2 >90   GFR Estimate If Black      >60 mL/min/1.73:m2 >90   Vitamin D Deficiency screening      20 - 75 ug/L 27   Sed Rate      0 - 15 mm/h 8   CRP Inflammation      0.0 - 8.0 mg/L <2.9   AST      0 - 45 U/L 19   Albumin      3.4 - 5.0 g/dL 3.9   ALT      0 - 70 U/L 36   Complement C3      81 - 157 mg/dL 118   Complement C4      13 - 39 mg/dL 25   DNA-ds      <10 IU/mL 16 (H)     Component      Latest Ref Rng & Units 1/18/2021   WBC      4.0 - 11.0 10e9/L 5.2   RBC Count      4.4 - 5.9 10e12/L 4.68   Hemoglobin      13.3 - 17.7 g/dL 14.7   Hematocrit      40.0 - 53.0 % 42.7   MCV      78 - 100 fl 91   MCH      26.5 - 33.0 pg 31.4   MCHC      31.5 - 36.5 g/dL 34.4   RDW      10.0 - 15.0 % 11.9   Platelet Count      150 - 450 10e9/L 241   % Neutrophils      % 63.3   % Lymphocytes      % 27.0   % Monocytes      % 8.3   % Eosinophils      % 1.0   % Basophils      % 0.4   Absolute Neutrophil      1.6 - 8.3 10e9/L 3.3   Absolute Lymphocytes      0.8 - 5.3 10e9/L 1.4   Absolute Monocytes      0.0 - 1.3 10e9/L 0.4   Absolute Eosinophils      0.0 - 0.7 10e9/L 0.1   Absolute Basophils      0.0 - 0.2 10e9/L 0.0   Diff Method       Automated Method    Creatinine      0.66 - 1.25 mg/dL 0.86   GFR Estimate      >60 mL/min/1.73:m2 >90   GFR Estimate If Black      >60 mL/min/1.73:m2 >90   AST      0 - 45 U/L 23   Albumin      3.4 - 5.0 g/dL 4.3   ALT      0 - 70 U/L 42     Component      Latest Ref Rng & Units 4/13/2022   WBC      4.0 - 11.0 10e3/uL 6.0   RBC Count      4.40 - 5.90 10e6/uL 4.51   Hemoglobin      13.3 - 17.7 g/dL 14.2   Hematocrit      40.0 - 53.0 % 41.5   MCV      78 - 100 fL 92   MCH      26.5 - 33.0 pg 31.5   MCHC      31.5 - 36.5 g/dL 34.2   RDW      10.0 - 15.0 % 12.0   Platelet Count      150 - 450 10e3/uL 223   % Neutrophils      % 77   % Lymphocytes      % 16   % Monocytes      % 6   % Eosinophils      % 0   % Basophils      % 1   % Immature Granulocytes      % 0   NRBCs per 100 WBC      <1 /100 0   Absolute Neutrophils      1.6 - 8.3 10e3/uL 4.6   Absolute Lymphocytes      0.8 - 5.3 10e3/uL 1.0   Absolute Monocytes      0.0 - 1.3 10e3/uL 0.3   Absolute Eosinophils      0.0 - 0.7 10e3/uL 0.0   Absolute Basophils      0.0 - 0.2 10e3/uL 0.0   Absolute Immature Granulocytes      <=0.4 10e3/uL 0.0   Absolute NRBCs      10e3/uL 0.0   Cholesterol      <200 mg/dL 210 (H)   Triglycerides      <150 mg/dL 104   HDL Cholesterol      >=40 mg/dL 52   LDL Cholesterol Calculated      <=100 mg/dL 137 (H)   Non HDL Cholesterol      <130 mg/dL 158 (H)   Patient Fasting > 8hrs?       No   Creatinine      0.66 - 1.25 mg/dL 0.87   GFR Estimate      >60 mL/min/1.73m2 >90   Sed Rate      0 - 15 mm/hr 5   Vitamin D Deficiency screening      20 - 75 ug/L 41   DNA-ds      <10.0 IU/mL 23.0 (H)   CRP Inflammation      0.0 - 8.0 mg/L <2.9   Complement C3      81 - 157 mg/dL 94   Complement C4      13 - 39 mg/dL 16   AST      0 - 45 U/L 20   Albumin      3.4 - 5.0 g/dL 4.3   ALT      0 - 70 U/L 39     Component      Latest Ref [facility] 7/31/2023  7:56 AM   WBC      4.0 - 11.0 10e3/uL 3.2 (L)    RBC Count      4.40 - 5.90 10e6/uL 4.51    Hemoglobin      13.3 - 17.7 g/dL 14.1     Hematocrit      40.0 - 53.0 % 42.3    MCV      78 - 100 fL 94    MCH      26.5 - 33.0 pg 31.3    MCHC      31.5 - 36.5 g/dL 33.3    RDW      10.0 - 15.0 % 12.1    Platelet Count      150 - 450 10e3/uL 216    % Neutrophils      % 60    % Lymphocytes      % 24    % Monocytes      % 11    % Eosinophils      % 4    % Basophils      % 1    % Immature Granulocytes      % 0    NRBCs per 100 WBC      <1 /100 0    Absolute Neutrophils      1.6 - 8.3 10e3/uL 1.9    Absolute Lymphocytes      0.8 - 5.3 10e3/uL 0.8    Absolute Monocytes      0.0 - 1.3 10e3/uL 0.4    Absolute Eosinophils      0.0 - 0.7 10e3/uL 0.1    Absolute Basophils      0.0 - 0.2 10e3/uL 0.0    Absolute Immature Granulocytes      <=0.4 10e3/uL 0.0    Absolute NRBCs      10e3/uL 0.0    Color Urine      Colorless, Straw, Light Yellow, Yellow     Appearance Urine      Clear     Glucose Urine      Negative mg/dL    Bilirubin Urine      Negative     Ketones Urine      Negative mg/dL    Specific Gravity Urine      1.003 - 1.035     Blood Urine      Negative     pH Urine      5.0 - 7.0     Protein Albumin Urine      Negative mg/dL    Urobilinogen mg/dL      Normal, 2.0 mg/dL    Nitrite Urine      Negative     Leukocyte Esterase Urine      Negative     Mucus Urine      None Seen /LPF    RBC Urine      <=2 /HPF    WBC Urine      <=5 /HPF    Cholesterol      <200 mg/dL 173    Triglycerides      <150 mg/dL 63    HDL Cholesterol      >=40 mg/dL 53    LDL Cholesterol Calculated      <=100 mg/dL 107 (H)    Non HDL Cholesterol      <130 mg/dL 120    Total Protein Urine mg/dL        mg/dL    Total Protein UR MG/MG CR      0.00 - 0.20 mg/mg Cr    Creatinine Urine      mg/dL    Creatinine      0.67 - 1.17 mg/dL 0.88    GFR Estimate      >60 mL/min/1.73m2 >90    ALT      0 - 70 U/L 38    Albumin      3.5 - 5.2 g/dL 4.7    AST      0 - 45 U/L 29    Complement C4      13 - 39 mg/dL 16    Complement C3      81 - 157 mg/dL 93    CRP Inflammation      <5.00 mg/L <3.00    DNA-ds       <10.0 IU/mL 24.0 (H)    Sed Rate      0 - 20 mm/hr 2    PSA Tumor Marker      0.00 - 3.50 ng/mL 1.44    Vitamin D Deficiency screening      20 - 75 ug/L 32      Component      Latest Ref Rn 7/31/2023  7:57 AM   WBC      4.0 - 11.0 10e3/uL    RBC Count      4.40 - 5.90 10e6/uL    Hemoglobin      13.3 - 17.7 g/dL    Hematocrit      40.0 - 53.0 %    MCV      78 - 100 fL    MCH      26.5 - 33.0 pg    MCHC      31.5 - 36.5 g/dL    RDW      10.0 - 15.0 %    Platelet Count      150 - 450 10e3/uL    % Neutrophils      %    % Lymphocytes      %    % Monocytes      %    % Eosinophils      %    % Basophils      %    % Immature Granulocytes      %    NRBCs per 100 WBC      <1 /100    Absolute Neutrophils      1.6 - 8.3 10e3/uL    Absolute Lymphocytes      0.8 - 5.3 10e3/uL    Absolute Monocytes      0.0 - 1.3 10e3/uL    Absolute Eosinophils      0.0 - 0.7 10e3/uL    Absolute Basophils      0.0 - 0.2 10e3/uL    Absolute Immature Granulocytes      <=0.4 10e3/uL    Absolute NRBCs      10e3/uL    Color Urine      Colorless, Straw, Light Yellow, Yellow  Yellow    Appearance Urine      Clear  Clear    Glucose Urine      Negative mg/dL Negative    Bilirubin Urine      Negative  Negative    Ketones Urine      Negative mg/dL Negative    Specific Gravity Urine      1.003 - 1.035  1.024    Blood Urine      Negative  Negative    pH Urine      5.0 - 7.0  6.0    Protein Albumin Urine      Negative mg/dL 10 !    Urobilinogen mg/dL      Normal, 2.0 mg/dL Normal    Nitrite Urine      Negative  Negative    Leukocyte Esterase Urine      Negative  Negative    Mucus Urine      None Seen /LPF Present !    RBC Urine      <=2 /HPF 2    WBC Urine      <=5 /HPF 0    Cholesterol      <200 mg/dL    Triglycerides      <150 mg/dL    HDL Cholesterol      >=40 mg/dL    LDL Cholesterol Calculated      <=100 mg/dL    Non HDL Cholesterol      <130 mg/dL    Total Protein Urine mg/dL        mg/dL 44.1 (H)    Total Protein UR MG/MG CR      0.00 - 0.20 mg/mg Cr  0.30 (H)    Creatinine Urine      mg/dL 147.0    Creatinine      0.67 - 1.17 mg/dL    GFR Estimate      >60 mL/min/1.73m2    ALT      0 - 70 U/L    Albumin      3.5 - 5.2 g/dL    AST      0 - 45 U/L    Complement C4      13 - 39 mg/dL    Complement C3      81 - 157 mg/dL    CRP Inflammation      <5.00 mg/L    DNA-ds      <10.0 IU/mL    Sed Rate      0 - 20 mm/hr    PSA Tumor Marker      0.00 - 3.50 ng/mL    Vitamin D Deficiency screening      20 - 75 ug/L       Legend:  (L) Low  (H) High  ! Abnormal  Component      Latest Ref Rng 2/5/2024  8:34 AM 2/5/2024  8:38 AM   WBC      4.0 - 11.0 10e3/uL 4.9     RBC Count      4.40 - 5.90 10e6/uL 4.23 (L)     Hemoglobin      13.3 - 17.7 g/dL 13.3     Hematocrit      40.0 - 53.0 % 41.4     MCV      78 - 100 fL 98     MCH      26.5 - 33.0 pg 31.4     MCHC      31.5 - 36.5 g/dL 32.1     RDW      10.0 - 15.0 % 11.8     Platelet Count      150 - 450 10e3/uL 165     % Neutrophils      % 78     % Lymphocytes      % 15     % Monocytes      % 7     % Eosinophils      % 0     % Basophils      % 0     % Immature Granulocytes      % 0     Absolute Neutrophils      1.6 - 8.3 10e3/uL 3.8     Absolute Lymphocytes      0.8 - 5.3 10e3/uL 0.7 (L)     Absolute Monocytes      0.0 - 1.3 10e3/uL 0.3     Absolute Eosinophils      0.0 - 0.7 10e3/uL 0.0     Absolute Basophils      0.0 - 0.2 10e3/uL 0.0     Absolute Immature Granulocytes      <=0.4 10e3/uL 0.0     Color Urine      Colorless, Straw, Light Yellow, Yellow   Yellow    Appearance Urine      Clear   Clear    Glucose Urine      Negative mg/dL  Negative    Bilirubin Urine      Negative   Negative    Ketones Urine      Negative mg/dL  Negative    Specific Gravity Urine      1.003 - 1.035   1.020    Blood Urine      Negative   Negative    pH Urine      5.0 - 7.0   6.5    Protein Albumin Urine      Negative mg/dL  Negative    Urobilinogen Urine      0.2, 1.0 E.U./dL  0.2    Nitrite Urine      Negative   Negative    Leukocyte Esterase Urine       Negative   Negative    Cholesterol      <200 mg/dL 131     Triglycerides      <150 mg/dL 59     HDL Cholesterol      >=40 mg/dL 34 (L)     LDL Cholesterol Calculated      <=100 mg/dL 85     Non HDL Cholesterol      <130 mg/dL 97     Patient Fasting? Yes     Bacteria Urine      None Seen /HPF  Few !    RBC Urine      0-2 /HPF /HPF  0-2    WBC Urine      0-5 /HPF /HPF  0-5    Squamous Epithelial /LPF Urine      None Seen /LPF  Few !    Mucus Urine      None Seen /LPF  Present !    Total Protein Urine mg/dL        mg/dL  57.4    Total Protein Urine mg/mg Creat      0.00 - 0.20 mg/mg Cr  0.30 (H)    Creatinine Urine      mg/dL  191.0    Creatinine      0.67 - 1.17 mg/dL 0.90     GFR Estimate      >60 mL/min/1.73m2 >90     ALT      0 - 70 U/L 27     Albumin      3.5 - 5.2 g/dL 4.4     AST      0 - 45 U/L 23     Complement C4      13 - 39 mg/dL 23     Complement C3      81 - 157 mg/dL 99     CRP Inflammation      <5.00 mg/L 21.70 (H)     DNA-ds      <10.0 IU/mL 20.0 (H)     Sed Rate      0 - 20 mm/hr 8     PSA Tumor Marker      0.00 - 3.50 ng/mL 1.40        Legend:  (L) Low  (H) High  ! Abnormal      Ph.E:    /87   Pulse 82   SpO2 95%     Constitutional: WD/WN. Healthy looking. Pleasant. In no acute distress. Wearing a mask. Has occasional dry cough.  Eyes: EOM intact, sclera anicteric, conj not injected  HEENT: no oral ulcers or thrush  Chest: CTAB  CV: no M/R/G, RRR  Abdomen: soft, NT  MS: No active synovitis or joint tenderness  Skin: No skin rash  Neuro: A&O x 3. Grossly non focal  Psych: NL affect     Assessment/ plan:    1-Spondyloarthropathy with enthesopathy Dx 2/2019 on SSZ since 2/2019. He is HLA-B27 negative. Has fh/o psoriasis. He fits this diagnosis very well and already responds to SSZ. His inflammatory arthritis was under good control at initial visit, but in 2/2020, had flare ups of achilles tendonitis which started post flu. I increased sulfasalazine to 3 tabs in the morning, 2 tabs in the evening  with food in 2/2020 which helped significantly. In 6/2021, given episodes of joint pain, swelling and for better control of arthritis, recommended to increase SSZ to 3 gram a day. He is on celebrex prn, not much help.    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 HCQ peak benefit is 6 months, will give it more time to show benefit. Will check labs, will monitor closely for lupus given + BEST, +anti-DNA. HCQ could help to prevent progression to lupus. Advised him to try tylenol.    Stable 4/2022 labs.      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 Will continue HCQ+SSZ. Last labs in 9/2022 showed no protein in urine but ur pr/cr=0.69 plus high CRP, mild increased anti-DNA, mild leukopenia, but he had infection, will re-check labs.    2-SSZ monitoring. Labs q6mo. 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 He does not have lupus and nothing on his exam or hx is suggestive of lupus. I'll monitor it for now.    4-Low vit D in the past. S/p replacement.     5-HCQ monitoring. Recommend yearly eye exam          Today's plan:    Labs this month    Return video in 6 months      8/3/2023: Ongoing migratory arthralgia which is tolerable and not and enough to add another immunosuppressive drug like methotrexate. Will continue with  mg a day which requires yearly eye exam, and SSZ 3 gr a day, which requires q6mo monitoring labs. Leukopenia and urine protein in 7/2023 have improved, will continue to monitor. Anti-DNA is only slightly elevated. Has not met SLE criteria yet. Discussed doing labs with next flare.    Today 2/9/2024:    Well controlled inflammatory arthritis, stable labs on  mg bid+SSZ 1.5 gr bid. Recent elevated CRP could be explained by flu A on 2/1.     Will continue to monitor mild leukopenia, borderline + anti-DNA and intermittent mild degree of proteinuria. Not enough to call it  lupus.    Was advised to do yearly HCQ eye exam    Was advised to use sun screen during trips.      Plan:    Labs in 8/2024 then every 6 months    Return in a year (in person)    Jovani Luna MD        Orders Placed This Encounter   Procedures     ALT     Albumin level     AST     Creatinine     Complement C4     Complement C3     CRP inflammation     DNA double stranded antibodies     Erythrocyte sedimentation rate auto     UA with Microscopic reflex to Culture     Protein  random urine     Creatinine random urine     CBC with Platelets & Differential

## 2024-02-09 NOTE — LETTER
2/9/2024       RE: Sabino Cardona  [address]  [facility] [address]     Dear Colleague,    Thank you for referring your patient, Sabino Cardona, to the [facility] [place] at [facility]. Please see a copy of my visit note below.    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 His wife is a transplant surgeon here at the  and they say the actual reason for referral is to establish/transfer care as they just moved to [place].      Today, he is feeling well.      They moved back from [place] to [place] about 1.5 month ago.      He reports having degenerative arthritis since age 18. Has chronic low back pain with worsening in AM and worsening with inactivity. Allergies in Spring makes it worse.      He started to have heel and ankle pain in Feb 2019, also knees and elbows. That was stressful time of life looking for moving back. Also has 3 small children (one 5 yo and two 1 yo twins) which keep them busy.      Both heels were red and blaching and his wife thought it was gout.      His brother has gout and is almost 51 yo.      He saw a rheumatologist at [facility]. 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 His wife questioned the diagnosis given lack of lupus symptoms.      Then he had x-rays of spine which showed lots of bone spurs.    He was diagnosed with OA, possible reactive arthritis. No h/o uveitis, urethritis, STDs. Has h/o food poisenings. Was put on SSZ in 2/2019, the dose was increased to current dose of 1 gr bid, he tolerates it well with  no toxicity. SSZ has helped and he has no current heel inflammation or recent flare ups.      Has fatigue but because of having 3 small children, it's hard for him to say if fatigue was related to inflammation.    ROS is positive for intermittent CP for years, ibuprofen helps. Prednisone 10 mg every day did not help in the past.    Gets cold sores. No photosensitivity. Gets migraines. No weight loss. No Raynaud's.      2/14/2020: No fatigue, skin rashes, oral ulcers.    Has mild achy knees.    About 1.5 months ago, started having flare up of achilles tendonitis in both feet, worse in AM and at night. They turn red and swollen.      7/10/2020: At last visit in 2/2020, increased SSZ from 2 to 2.5 gr/day given active achilles tendonitis, also prescribed celebrex 100 mg bid prn. Change in SSZ has helped and he no longer needs to take celebrex (last use 6 wk go). Tolerates SSZ at higher dose well. No flare ups since last visit.    No symptoms and no complaints today. Felling very well. No skin rashes, oral ulcers, CP or SOB or fatigue.    Had flu in 1/2020, after that got the flu shot.     6/28/2021: Hands, feet swell up 2 times a week x 1 hr, it gets better after moving around. No heel pain/swelling.    Has 3 small children, physical job. Fully vaccinated, no SE. Got pfizer.    Over last couple of months, has been good on taking her SSZ 5 tabs a day everyday. Noticed more sx including CP by missing some doses. No SSZ SEs. Rarely takes the celebrex, if notices sore feet x 2 days in a row, he takes it. It does not help as much.    9/23/2022: Emerson presents for follow up. 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 No other complaints. Tolerates HCQ well.      During the day he is usually good, at night migratory pain comes back affecting shoulders (R shoulder more consistent) and  hips. His pain is tolerable most days but sometimes,  keeps him up.    No am stiffness.    Advil works better than celebrex.    If misses SSZ x 3-4 days, pain recurs.      1/19/2023: Tolerates HCQ well, migratory arthralgia affecting large joints is much improved, reports occasional pain, takes ibuprofen prn. tolerates HCQ, thinks it has helped.    8/3/2023: Ongoing migratory arthralgia but tolerable.      Reports every other month flare ups of joint pain, lasts 2-3 weeks, no triggers. Sometimes takes advil which helps, sometimes it goes away by its own, sometimes shoulders, sometimes hands and sometimes back. Noticed fingers and hands swell up. It moves from joint to joint. Spring time is worse, pain is pretty consistent. With some flare ups, gets sharp CP. Pain is tolerable. Celebrex does not work.    No skin rash, oral ulcers, CP, SOB.    No flares today.      Today 2/9/2024:    -was doing very well, no flares with better control of joint pain after adding HCQ to SSZ till recent flu A on 2/1/2024, which triggered a flare of diffuse arthralgia and body ache along with headaches/cough, now is doing much better, leaving on a cruise trip, has mild residual cough, had flu shot this season. Had vivian flu. Took tylenol, advil.        ROS:  A comprehensive ROS was done, positives are per HPI.      Past Medical History:   Diagnosis Date    Biceps tendinopathy     GERD (gastroesophageal reflux disease)     Mucocele of lip     Spondyloarthropathy      Past Surgical History:   Procedure Laterality Date    APPENDECTOMY      COLONOSCOPY N/A 1/3/2022    Procedure: COLONOSCOPY, WITH POLYPECTOMY;  Surgeon: Ingrid Keyes MD;  Location: [facility] OR    HERNIA REPAIR, INGUINAL RT/LT      left     Family History   Problem Relation Age of Onset    Hypertension Mother     Hypertension Father     Asthma Father     Diabetes Father     Psoriasis Father     Hypertension Maternal Grandmother         RA    Diabetes Maternal Grandmother      Hypertension Maternal Grandfather     Cancer Maternal Grandfather         stomach    Cancer Paternal Grandfather         lung-smoker    Asthma Sister     Anesthesia Reaction No family hx of     Deep Vein Thrombosis (DVT) No family hx of     Glaucoma No family hx of     Macular Degeneration No family hx of      Social History     Socioeconomic History    Marital status:      Spouse name: Not on file    Number of children: 3    Years of education: Not on file    Highest education level: Not on file   Occupational History    Occupation: [employer]   Tobacco Use    Smoking status: Never    Smokeless tobacco: Former     Types: Chew     Quit date: 5/15/2010    Tobacco comments:     chew 9 yr   Substance and Sexual Activity    Alcohol use: Yes     Alcohol/week: 1.7 standard drinks of alcohol     Types: 2 Standard drinks or equivalent per week    Drug use: No    Sexual activity: Yes     Partners: Female   Other Topics Concern     Service Not Asked    Blood Transfusions Not Asked    Caffeine Concern Not Asked    Occupational Exposure Not Asked    Hobby Hazards Not Asked    Sleep Concern Not Asked    Stress Concern Not Asked    Weight Concern Not Asked    Special Diet Not Asked    Back Care Not Asked    Exercise Yes     Comment: run, weights    Bike Helmet Not Asked    Seat Belt Not Asked    Self-Exams Not Asked   Social History Narrative    Not on file     Social Determinants of Health     Financial Resource Strain: Low Risk  (1/31/2024)    Financial Resource Strain     Within the past 12 months, have you or your family members you live with been unable to get utilities (heat, electricity) when it was really needed?: No   Food Insecurity: Low Risk  (1/31/2024)    Food Insecurity     Within the past 12 months, did you worry that your food would run out before you got money to buy more?: No     Within the past 12 months, did the food you bought just not last and you didn t have money to get more?: No    Transportation Needs: Low Risk  (1/31/2024)    Transportation Needs     Within the past 12 months, has lack of transportation kept you from medical appointments, getting your medicines, non-medical meetings or appointments, work, or from getting things that you need?: No   Physical Activity: Not on file   Stress: Not on file   Social Connections: Not on file   Interpersonal Safety: Not on file   Housing Stability: Low Risk  (1/31/2024)    Housing Stability     Do you have housing? : Yes     Are you worried about losing your housing?: No     Patient Active Problem List   Diagnosis    Esophageal reflux    Biceps tendinopathy    Wart    Mucocele of lip    Sore throat     Allergies   Allergen Reactions    Nkda [No Known Drug Allergy]     Seasonal Allergies Other (See Comments)     Sneezing, running nose, itchy eyes       Outpatient Encounter Medications as of 2/9/2024   Medication Sig Dispense Refill    Esomeprazole Magnesium 40 MG PACK Pt taking 20 mg      hydroxychloroquine (PLAQUENIL) 200 MG tablet Take 1 tablet (200 mg) by mouth 2 times daily Annual Plaquenil toxicity eye screening required. 180 tablet 3    sulfaSALAzine ER (AZULFIDINE EN) 500 MG EC tablet Take 3 tablets (1,500 mg) by mouth 2 times daily Labs required every 6 months while taking this medication. Hold for signs of infection, and seek medical attention. Please call to scheduled follow-up appointment, overdue. 540 tablet 1    Vitamin D3 (CHOLECALCIFEROL) 25 mcg (1000 units) tablet Take by mouth every morning       No facility-administered encounter medications on file as of 2/9/2024.               His records were reviewed.      2/2019: +BEST 1:40 mixed homogenous and nucleolar pattern, positive anti-DNA 17 with NL being up to 4. Neg anti-Sm, RNP, SCL-70, SSA, SSB, ALEXANDREA-1, CHROMATIN, RF, anti-CCP. NL ESR. NL SUA 5.2. Neg HLA-B27.  NL TSH, C3, C4. Unremarkable CMP. Neg U/A. LOW vit D 27.    2/2019: Advanced degenerative changes of L spine and moderate  degenerative changes of T spine on x-rays.  Component      Latest Ref Rng & Units 1/17/2020   WBC      4.0 - 11.0 10e9/L 3.6 (L)   RBC Count      4.4 - 5.9 10e12/L 4.52   Hemoglobin      13.3 - 17.7 g/dL 13.8   Hematocrit      40.0 - 53.0 % 41.1   MCV      78 - 100 fl 91   MCH      26.5 - 33.0 pg 30.5   MCHC      31.5 - 36.5 g/dL 33.6   RDW      10.0 - 15.0 % 12.0   Platelet Count      150 - 450 10e9/L 258   Diff Method       Automated Method   % Neutrophils      % 55.5   % Lymphocytes      % 30.4   % Monocytes      % 11.0   % Eosinophils      % 2.5   % Basophils      % 0.3   % Immature Granulocytes      % 0.3   Nucleated RBCs      0 /100 0   Absolute Neutrophil      1.6 - 8.3 10e9/L 2.0   Absolute Lymphocytes      0.8 - 5.3 10e9/L 1.1   Absolute Monocytes      0.0 - 1.3 10e9/L 0.4   Absolute Eosinophils      0.0 - 0.7 10e9/L 0.1   Absolute Basophils      0.0 - 0.2 10e9/L 0.0   Abs Immature Granulocytes      0 - 0.4 10e9/L 0.0   Absolute Nucleated RBC       0.0   Creatinine      0.66 - 1.25 mg/dL 0.83   GFR Estimate      >60 mL/min/1.73:m2 >90   GFR Estimate If Black      >60 mL/min/1.73:m2 >90   Vitamin D Deficiency screening      20 - 75 ug/L 27   Sed Rate      0 - 15 mm/h 8   CRP Inflammation      0.0 - 8.0 mg/L <2.9   AST      0 - 45 U/L 19   Albumin      3.4 - 5.0 g/dL 3.9   ALT      0 - 70 U/L 36   Complement C3      81 - 157 mg/dL 118   Complement C4      13 - 39 mg/dL 25   DNA-ds      <10 IU/mL 16 (H)     Component      Latest Ref Rng & Units 1/18/2021   WBC      4.0 - 11.0 10e9/L 5.2   RBC Count      4.4 - 5.9 10e12/L 4.68   Hemoglobin      13.3 - 17.7 g/dL 14.7   Hematocrit      40.0 - 53.0 % 42.7   MCV      78 - 100 fl 91   MCH      26.5 - 33.0 pg 31.4   MCHC      31.5 - 36.5 g/dL 34.4   RDW      10.0 - 15.0 % 11.9   Platelet Count      150 - 450 10e9/L 241   % Neutrophils      % 63.3   % Lymphocytes      % 27.0   % Monocytes      % 8.3   % Eosinophils      % 1.0   % Basophils      % 0.4   Absolute  Neutrophil      1.6 - 8.3 10e9/L 3.3   Absolute Lymphocytes      0.8 - 5.3 10e9/L 1.4   Absolute Monocytes      0.0 - 1.3 10e9/L 0.4   Absolute Eosinophils      0.0 - 0.7 10e9/L 0.1   Absolute Basophils      0.0 - 0.2 10e9/L 0.0   Diff Method       Automated Method   Creatinine      0.66 - 1.25 mg/dL 0.86   GFR Estimate      >60 mL/min/1.73:m2 >90   GFR Estimate If Black      >60 mL/min/1.73:m2 >90   AST      0 - 45 U/L 23   Albumin      3.4 - 5.0 g/dL 4.3   ALT      0 - 70 U/L 42     Component      Latest Ref Rng & Units 4/13/2022   WBC      4.0 - 11.0 10e3/uL 6.0   RBC Count      4.40 - 5.90 10e6/uL 4.51   Hemoglobin      13.3 - 17.7 g/dL 14.2   Hematocrit      40.0 - 53.0 % 41.5   MCV      78 - 100 fL 92   MCH      26.5 - 33.0 pg 31.5   MCHC      31.5 - 36.5 g/dL 34.2   RDW      10.0 - 15.0 % 12.0   Platelet Count      150 - 450 10e3/uL 223   % Neutrophils      % 77   % Lymphocytes      % 16   % Monocytes      % 6   % Eosinophils      % 0   % Basophils      % 1   % Immature Granulocytes      % 0   NRBCs per 100 WBC      <1 /100 0   Absolute Neutrophils      1.6 - 8.3 10e3/uL 4.6   Absolute Lymphocytes      0.8 - 5.3 10e3/uL 1.0   Absolute Monocytes      0.0 - 1.3 10e3/uL 0.3   Absolute Eosinophils      0.0 - 0.7 10e3/uL 0.0   Absolute Basophils      0.0 - 0.2 10e3/uL 0.0   Absolute Immature Granulocytes      <=0.4 10e3/uL 0.0   Absolute NRBCs      10e3/uL 0.0   Cholesterol      <200 mg/dL 210 (H)   Triglycerides      <150 mg/dL 104   HDL Cholesterol      >=40 mg/dL 52   LDL Cholesterol Calculated      <=100 mg/dL 137 (H)   Non HDL Cholesterol      <130 mg/dL 158 (H)   Patient Fasting > 8hrs?       No   Creatinine      0.66 - 1.25 mg/dL 0.87   GFR Estimate      >60 mL/min/1.73m2 >90   Sed Rate      0 - 15 mm/hr 5   Vitamin D Deficiency screening      20 - 75 ug/L 41   DNA-ds      <10.0 IU/mL 23.0 (H)   CRP Inflammation      0.0 - 8.0 mg/L <2.9   Complement C3      81 - 157 mg/dL 94   Complement C4      13 - 39  mg/dL 16   AST      0 - 45 U/L 20   Albumin      3.4 - 5.0 g/dL 4.3   ALT      0 - 70 U/L 39     Component      Latest Ref Rng 7/31/2023  7:56 AM   WBC      4.0 - 11.0 10e3/uL 3.2 (L)    RBC Count      4.40 - 5.90 10e6/uL 4.51    Hemoglobin      13.3 - 17.7 g/dL 14.1    Hematocrit      40.0 - 53.0 % 42.3    MCV      78 - 100 fL 94    MCH      26.5 - 33.0 pg 31.3    MCHC      31.5 - 36.5 g/dL 33.3    RDW      10.0 - 15.0 % 12.1    Platelet Count      150 - 450 10e3/uL 216    % Neutrophils      % 60    % Lymphocytes      % 24    % Monocytes      % 11    % Eosinophils      % 4    % Basophils      % 1    % Immature Granulocytes      % 0    NRBCs per 100 WBC      <1 /100 0    Absolute Neutrophils      1.6 - 8.3 10e3/uL 1.9    Absolute Lymphocytes      0.8 - 5.3 10e3/uL 0.8    Absolute Monocytes      0.0 - 1.3 10e3/uL 0.4    Absolute Eosinophils      0.0 - 0.7 10e3/uL 0.1    Absolute Basophils      0.0 - 0.2 10e3/uL 0.0    Absolute Immature Granulocytes      <=0.4 10e3/uL 0.0    Absolute NRBCs      10e3/uL 0.0    Color Urine      Colorless, Straw, Light Yellow, Yellow     Appearance Urine      Clear     Glucose Urine      Negative mg/dL    Bilirubin Urine      Negative     Ketones Urine      Negative mg/dL    Specific Gravity Urine      1.003 - 1.035     Blood Urine      Negative     pH Urine      5.0 - 7.0     Protein Albumin Urine      Negative mg/dL    Urobilinogen mg/dL      Normal, 2.0 mg/dL    Nitrite Urine      Negative     Leukocyte Esterase Urine      Negative     Mucus Urine      None Seen /LPF    RBC Urine      <=2 /HPF    WBC Urine      <=5 /HPF    Cholesterol      <200 mg/dL 173    Triglycerides      <150 mg/dL 63    HDL Cholesterol      >=40 mg/dL 53    LDL Cholesterol Calculated      <=100 mg/dL 107 (H)    Non HDL Cholesterol      <130 mg/dL 120    Total Protein Urine mg/dL        mg/dL    Total Protein UR MG/MG CR      0.00 - 0.20 mg/mg Cr    Creatinine Urine      mg/dL    Creatinine      0.67 - 1.17 mg/dL  0.88    GFR Estimate      >60 mL/min/1.73m2 >90    ALT      0 - 70 U/L 38    Albumin      3.5 - 5.2 g/dL 4.7    AST      0 - 45 U/L 29    Complement C4      13 - 39 mg/dL 16    Complement C3      81 - 157 mg/dL 93    CRP Inflammation      <5.00 mg/L <3.00    DNA-ds      <10.0 IU/mL 24.0 (H)    Sed Rate      0 - 20 mm/hr 2    PSA Tumor Marker      0.00 - 3.50 ng/mL 1.44    Vitamin D Deficiency screening      20 - 75 ug/L 32      Component      Latest Ref Rn 7/31/2023  7:57 AM   WBC      4.0 - 11.0 10e3/uL    RBC Count      4.40 - 5.90 10e6/uL    Hemoglobin      13.3 - 17.7 g/dL    Hematocrit      40.0 - 53.0 %    MCV      78 - 100 fL    MCH      26.5 - 33.0 pg    MCHC      31.5 - 36.5 g/dL    RDW      10.0 - 15.0 %    Platelet Count      150 - 450 10e3/uL    % Neutrophils      %    % Lymphocytes      %    % Monocytes      %    % Eosinophils      %    % Basophils      %    % Immature Granulocytes      %    NRBCs per 100 WBC      <1 /100    Absolute Neutrophils      1.6 - 8.3 10e3/uL    Absolute Lymphocytes      0.8 - 5.3 10e3/uL    Absolute Monocytes      0.0 - 1.3 10e3/uL    Absolute Eosinophils      0.0 - 0.7 10e3/uL    Absolute Basophils      0.0 - 0.2 10e3/uL    Absolute Immature Granulocytes      <=0.4 10e3/uL    Absolute NRBCs      10e3/uL    Color Urine      Colorless, Straw, Light Yellow, Yellow  Yellow    Appearance Urine      Clear  Clear    Glucose Urine      Negative mg/dL Negative    Bilirubin Urine      Negative  Negative    Ketones Urine      Negative mg/dL Negative    Specific Gravity Urine      1.003 - 1.035  1.024    Blood Urine      Negative  Negative    pH Urine      5.0 - 7.0  6.0    Protein Albumin Urine      Negative mg/dL 10 !    Urobilinogen mg/dL      Normal, 2.0 mg/dL Normal    Nitrite Urine      Negative  Negative    Leukocyte Esterase Urine      Negative  Negative    Mucus Urine      None Seen /LPF Present !    RBC Urine      <=2 /HPF 2    WBC Urine      <=5 /HPF 0    Cholesterol       <200 mg/dL    Triglycerides      <150 mg/dL    HDL Cholesterol      >=40 mg/dL    LDL Cholesterol Calculated      <=100 mg/dL    Non HDL Cholesterol      <130 mg/dL    Total Protein Urine mg/dL        mg/dL 44.1 (H)    Total Protein UR MG/MG CR      0.00 - 0.20 mg/mg Cr 0.30 (H)    Creatinine Urine      mg/dL 147.0    Creatinine      0.67 - 1.17 mg/dL    GFR Estimate      >60 mL/min/1.73m2    ALT      0 - 70 U/L    Albumin      3.5 - 5.2 g/dL    AST      0 - 45 U/L    Complement C4      13 - 39 mg/dL    Complement C3      81 - 157 mg/dL    CRP Inflammation      <5.00 mg/L    DNA-ds      <10.0 IU/mL    Sed Rate      0 - 20 mm/hr    PSA Tumor Marker      0.00 - 3.50 ng/mL    Vitamin D Deficiency screening      20 - 75 ug/L       Legend:  (L) Low  (H) High  ! Abnormal  Component      Latest Ref Rn 2/5/2024  8:34 AM 2/5/2024  8:38 AM   WBC      4.0 - 11.0 10e3/uL 4.9     RBC Count      4.40 - 5.90 10e6/uL 4.23 (L)     Hemoglobin      13.3 - 17.7 g/dL 13.3     Hematocrit      40.0 - 53.0 % 41.4     MCV      78 - 100 fL 98     MCH      26.5 - 33.0 pg 31.4     MCHC      31.5 - 36.5 g/dL 32.1     RDW      10.0 - 15.0 % 11.8     Platelet Count      150 - 450 10e3/uL 165     % Neutrophils      % 78     % Lymphocytes      % 15     % Monocytes      % 7     % Eosinophils      % 0     % Basophils      % 0     % Immature Granulocytes      % 0     Absolute Neutrophils      1.6 - 8.3 10e3/uL 3.8     Absolute Lymphocytes      0.8 - 5.3 10e3/uL 0.7 (L)     Absolute Monocytes      0.0 - 1.3 10e3/uL 0.3     Absolute Eosinophils      0.0 - 0.7 10e3/uL 0.0     Absolute Basophils      0.0 - 0.2 10e3/uL 0.0     Absolute Immature Granulocytes      <=0.4 10e3/uL 0.0     Color Urine      Colorless, Straw, Light Yellow, Yellow   Yellow    Appearance Urine      Clear   Clear    Glucose Urine      Negative mg/dL  Negative    Bilirubin Urine      Negative   Negative    Ketones Urine      Negative mg/dL  Negative    Specific Gravity Urine       1.003 - 1.035   1.020    Blood Urine      Negative   Negative    pH Urine      5.0 - 7.0   6.5    Protein Albumin Urine      Negative mg/dL  Negative    Urobilinogen Urine      0.2, 1.0 E.U./dL  0.2    Nitrite Urine      Negative   Negative    Leukocyte Esterase Urine      Negative   Negative    Cholesterol      <200 mg/dL 131     Triglycerides      <150 mg/dL 59     HDL Cholesterol      >=40 mg/dL 34 (L)     LDL Cholesterol Calculated      <=100 mg/dL 85     Non HDL Cholesterol      <130 mg/dL 97     Patient Fasting? Yes     Bacteria Urine      None Seen /HPF  Few !    RBC Urine      0-2 /HPF /HPF  0-2    WBC Urine      0-5 /HPF /HPF  0-5    Squamous Epithelial /LPF Urine      None Seen /LPF  Few !    Mucus Urine      None Seen /LPF  Present !    Total Protein Urine mg/dL        mg/dL  57.4    Total Protein Urine mg/mg Creat      0.00 - 0.20 mg/mg Cr  0.30 (H)    Creatinine Urine      mg/dL  191.0    Creatinine      0.67 - 1.17 mg/dL 0.90     GFR Estimate      >60 mL/min/1.73m2 >90     ALT      0 - 70 U/L 27     Albumin      3.5 - 5.2 g/dL 4.4     AST      0 - 45 U/L 23     Complement C4      13 - 39 mg/dL 23     Complement C3      81 - 157 mg/dL 99     CRP Inflammation      <5.00 mg/L 21.70 (H)     DNA-ds      <10.0 IU/mL 20.0 (H)     Sed Rate      0 - 20 mm/hr 8     PSA Tumor Marker      0.00 - 3.50 ng/mL 1.40        Legend:  (L) Low  (H) High  ! Abnormal      Ph.E:    /87   Pulse 82   SpO2 95%     Constitutional: WD/WN. Healthy looking. Pleasant. In no acute distress. Wearing a mask. Has occasional dry cough.  Eyes: EOM intact, sclera anicteric, conj not injected  HEENT: no oral ulcers or thrush  Chest: CTAB  CV: no M/R/G, RRR  Abdomen: soft, NT  MS: No active synovitis or joint tenderness  Skin: No skin rash  Neuro: A&O x 3. Grossly non focal  Psych: NL affect     Assessment/ plan:    1-Spondyloarthropathy with enthesopathy Dx 2/2019 on SSZ since 2/2019. He is HLA-B27 negative. Has fh/o psoriasis. He  fits this diagnosis very well and already responds to SSZ. His inflammatory arthritis was under good control at initial visit, but in 2/2020, had flare ups of achilles tendonitis which started post flu. I increased sulfasalazine to 3 tabs in the morning, 2 tabs in the evening with food in 2/2020 which helped significantly. In 6/2021, given episodes of joint pain, swelling and for better control of arthritis, recommended to increase SSZ to 3 gram a day. He is on celebrex prn, not much help.    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 HCQ peak benefit is 6 months, will give it more time to show benefit. Will check labs, will monitor closely for lupus given + BEST, +anti-DNA. HCQ could help to prevent progression to lupus. Advised him to try tylenol.    Stable 4/2022 labs.      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 Will continue HCQ+SSZ. Last labs in 9/2022 showed no protein in urine but ur pr/cr=0.69 plus high CRP, mild increased anti-DNA, mild leukopenia, but he had infection, will re-check labs.    2-SSZ monitoring. Labs q6mo. 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 He does not have lupus and nothing on his exam or hx is suggestive of lupus. I'll monitor it for now.    4-Low vit D in the past. S/p replacement.     5-HCQ monitoring. Recommend yearly eye exam          Today's plan:    Labs this month    Return video in 6 months      8/3/2023: Ongoing migratory arthralgia which is tolerable and not and enough to add another immunosuppressive drug like methotrexate. Will continue with  mg a day which requires yearly eye exam, and SSZ 3 gr a day, which requires q6mo monitoring labs. Leukopenia and urine protein in 7/2023 have improved, will continue to monitor. Anti-DNA is only slightly elevated. Has not met SLE criteria yet. Discussed doing labs with next flare.    Today  2/9/2024:    Well controlled inflammatory arthritis, stable labs on  mg bid+SSZ 1.5 gr bid. Recent elevated CRP could be explained by flu A on 2/1.     Will continue to monitor mild leukopenia, borderline + anti-DNA and intermittent mild degree of proteinuria. Not enough to call it lupus.    Was advised to do yearly HCQ eye exam    Was advised to use sun screen during trips.      Plan:    Labs in 8/2024 then every 6 months    Return in a year (in person)    Jovani Luna MD        Orders Placed This Encounter   Procedures    ALT    Albumin level    AST    Creatinine    Complement C4    Complement C3    CRP inflammation    DNA double stranded antibodies    Erythrocyte sedimentation rate auto    UA with Microscopic reflex to Culture    Protein  random urine    Creatinine random urine    CBC with Platelets & Differential

## 2024-02-26 ENCOUNTER — TELEPHONE (OUTPATIENT)
Dept: GASTROENTEROLOGY | Facility: CLINIC | Age: 49
End: 2024-02-26
Payer: COMMERCIAL

## 2024-02-26 ENCOUNTER — HOSPITAL ENCOUNTER (OUTPATIENT)
Facility: AMBULATORY SURGERY CENTER | Age: 49
End: 2024-02-26
Attending: INTERNAL MEDICINE
Payer: COMMERCIAL

## 2024-02-26 NOTE — TELEPHONE ENCOUNTER
"Endoscopy Scheduling Screen    Have you had a positive Covid test in the last 14 days?  No      Are you active on MyChart?   Yes      What insurance is in the chart?  Other:  [employer]      Ordering/Referring Provider: SHABNAM HERNANDEZ   (If ordering provider performs procedure, schedule with ordering provider unless otherwise instructed. )    BMI: Estimated body mass index is 25.36 kg/m  as calculated from the following:    Height as of 2/1/24: 1.88 m (6' 2\").    Weight as of 2/1/24: 89.6 kg (197 lb 8 oz).     Sedation Ordered  moderate sedation.   If patient BMI > 50 do not schedule in ASC.    If patient BMI > 45 [facility] not [facility].    Are you taking methadone or Suboxone?  No    Have you had difficulties, pain, or discomfort during past endoscopy procedures?  No      Are you taking any prescription medications for pain 3 or more times per week?   NO - No RN review required.      Do you have a history of malignant hyperthermia or adverse reaction to anesthesia?  No    (Females) Are you currently pregnant?          Have you been diagnosed or told you have pulmonary hypertension?   No      Do you have an LVAD?  No      Have you been told you have moderate to severe sleep apnea?  No      Have you been told you have COPD, asthma, or any other lung disease?  No      Do you have any heart conditions?  No       Have you ever had an organ transplant?   No      Have you ever had or are you awaiting a heart or lung transplant?   No      In the last 6 months have you had a stroke or transient ischemic attack (TIA aka \"mini stroke\")?   No      Have you been diagnosed with or been told you have cirrhosis of the liver?   No      Are you currently on dialysis?   No      Do you need assistance transferring?   No    BMI: Estimated body mass index is 25.36 kg/m  as calculated from the following:    Height as of 2/1/24: 1.88 m (6' 2\").    Weight as of 2/1/24: 89.6 kg (197 lb 8 oz).     Is patients BMI > 40 and scheduling location " [facility]?  No      Do you take an injectable medication for weight loss or diabetes (excluding insulin)?  No      Do you take the medication Naltrexone?  No      Do you take blood thinners?  No       Prep   Are you currently on dialysis or do you have chronic kidney disease?  No      Do you have a diagnosis of diabetes?  No      Do you have a diagnosis of cystic fibrosis (CF)?  No      On a regular basis do you go 3 -5 days between bowel movements?  Na/      BMI > 40?  No    Preferred Pharmacy:    Final Scheduling Details   Colonoscopy prep sent?  N/A    Procedure scheduled  Upper endoscopy (EGD)    Surgeon:  DORIS     Date of procedure:  4/12/24     Pre-OP / PAC:   No - Not required for this site.    Location  [facility] - Patient preference.    Sedation   Moderate Sedation - Per order.      Patient Reminders:   You will receive a call from a Nurse to review instructions and health history.  This assessment must be completed prior to your procedure.  Failure to complete the Nurse assessment may result in the procedure being cancelled.      On the day of your procedure, please designate an adult(s) who can drive you home stay with you for the next 24 hours. The medicines used in the exam will make you sleepy. You will not be able to drive.      You cannot take public transportation, ride share services, or non-medical taxi service without a responsible caregiver.  Medical transport services are allowed with the requirement that a responsible caregiver will receive you at your destination.  We require that drivers and caregivers are confirmed prior to your procedure.

## 2024-02-27 ENCOUNTER — PATIENT OUTREACH (OUTPATIENT)
Dept: GASTROENTEROLOGY | Facility: CLINIC | Age: 49
End: 2024-02-27
Payer: COMMERCIAL

## 2024-02-27 ENCOUNTER — TELEPHONE (OUTPATIENT)
Dept: GASTROENTEROLOGY | Facility: CLINIC | Age: 49
End: 2024-02-27
Payer: COMMERCIAL

## 2024-02-27 DIAGNOSIS — K21.00 GASTROESOPHAGEAL REFLUX DISEASE WITH ESOPHAGITIS, UNSPECIFIED WHETHER HEMORRHAGE: Primary | ICD-10-CM

## 2024-02-27 NOTE — TELEPHONE ENCOUNTER
----- Message from Yessi Shaw, RN sent at 2/27/2024  9:59 AM CST -----  Regarding: EGD  with MAC to schedule with Dr Christianson  Hi team    Please see the new order for EGD with MAC with Dr Christianson, he is requesting 15 mins of scope time for this procedure and ok'd for a Tuesday slot.    Pt's procedure with Dr Marsh can be cancelled when you schedule this with Dr Christianson. Pt is expecting scheduling call.    Thanks  Cherelle

## 2024-02-27 NOTE — TELEPHONE ENCOUNTER
Caller: Writer to patient.    Reason for Reschedule/Cancellation   (please be detailed, any staff messages or encounters to note?): Moved to be with Dr. Christianson.       Prior to reschedule please review:  Ordering Provider: Huan Das   Sedation Determined: MAC  Does patient have any ASC Exclusions, please identify?: No      Notes on Cancelled Procedure:  Procedure: Upper Endoscopy [EGD]   Date: 4/12/2024  Location: [facility]; [id number] [place] [address], [address]nd Floor, [place] [id number]   Surgeon: Maximo      Rescheduled: Yes,   Procedure: Upper Endoscopy [EGD]    Date: 3/19/2024   Location: [facility]; [address] [facility], [place] [address]    Surgeon: Sammy   Sedation Level Scheduled  MAC,  Reason for Sedation Level Updated order   Instructions updated and sent: Sherrillhart    Does patient need PAC or Pre -Op Rescheduled? : No       Did you cancel or rescheduled an EUS procedure? No.

## 2024-02-27 NOTE — TELEPHONE ENCOUNTER
Called patient to discuss EGD with MAC sedation planning with Dr Christianson. Dr Christianson in agreement, 15 mins scope time needed.     Pt currently scheduled in April with Dr Marsh, but this was in response to prior EGD ordered by Dr Ashford. New order placed for Dr Christianson to perform with MAC. Pt  in agreement with rescheduling with Dr Christianson.     Explained they can expect a call from  for date of procedure, will need a , someone to stay with them for 24 hours and should stay in town for 24 hours (within 45 min of Hospital) post procedure    Any recent Covid symptoms or positive covid test? none    Does patient have [employer] insurance?:[employer]    Med Review    Blood thinner -  none  ASA - none  Diabetic - none  Any meds by injection or mouth for weight loss or diabetes- none    Patient Education r/t procedure: mychart    Verbalized understanding of all instructions. All questions answered.     Procedure order placed, message routed to  Endo      Yessi Shaw, RN, BSN,   [facility]  Care coordinator

## 2024-03-06 ENCOUNTER — TELEPHONE (OUTPATIENT)
Dept: GASTROENTEROLOGY | Facility: CLINIC | Age: 49
End: 2024-03-06
Payer: COMMERCIAL

## 2024-03-06 NOTE — TELEPHONE ENCOUNTER
Pre assessment completed for upcoming procedure.      Procedure details:    Patient scheduled for Upper endoscopy (EGD) on 3.18.24.     Arrival time: 0815. Procedure time 0945    Pre op exam needed? N/A    Facility location: [facility]; [address], [address], [place] [address]    Sedation type: MAC    Indication for procedure: GERD    Designated  policy reviewed. Instructed to have someone stay 24 hours post procedure.       Chart review:     Electronic implanted devices? No    Recent diagnosis of diverticulitis within the last 6 weeks?  N/A    Diabetic? No      Medication review:    Anticoagulants? No    NSAIDS? No    Other medication HOLDING recommendations:  N/A      Prep for procedure:       Prep instructions sent via [employer] ([employer])     Reviewed procedure prep instructions.     Patient verbalized understanding and had no questions or concerns at this time.        Betty Tapia RN  Endoscopy Procedure Pre Assessment RN  [phone] option 4

## 2024-03-19 ENCOUNTER — HOSPITAL ENCOUNTER (OUTPATIENT)
Facility: CLINIC | Age: 49
Discharge: HOME | End: 2024-03-19
Attending: INTERNAL MEDICINE | Admitting: INTERNAL MEDICINE
Payer: COMMERCIAL

## 2024-03-19 ENCOUNTER — ANESTHESIA EVENT (OUTPATIENT)
Dept: GASTROENTEROLOGY | Facility: CLINIC | Age: 49
End: 2024-03-19
Payer: COMMERCIAL

## 2024-03-19 ENCOUNTER — ANESTHESIA (OUTPATIENT)
Dept: GASTROENTEROLOGY | Facility: CLINIC | Age: 49
End: 2024-03-19
Payer: COMMERCIAL

## 2024-03-19 VITALS
DIASTOLIC BLOOD PRESSURE: 79 MMHG | OXYGEN SATURATION: 95 % | RESPIRATION RATE: 16 BRPM | HEART RATE: 71 BPM | SYSTOLIC BLOOD PRESSURE: 107 MMHG | TEMPERATURE: 98 F

## 2024-03-19 LAB — UPPER GI ENDOSCOPY: NORMAL

## 2024-03-19 PROCEDURE — 43235 EGD DIAGNOSTIC BRUSH WASH: CPT | Performed by: ANESTHESIOLOGY

## 2024-03-19 PROCEDURE — 370N000017 HC ANESTHESIA TECHNICAL FEE, PER MIN: Performed by: INTERNAL MEDICINE

## 2024-03-19 PROCEDURE — 250N000009 HC RX 250: Performed by: NURSE ANESTHETIST, CERTIFIED REGISTERED

## 2024-03-19 PROCEDURE — 250N000011 HC RX IP 250 OP 636: Performed by: NURSE ANESTHETIST, CERTIFIED REGISTERED

## 2024-03-19 PROCEDURE — 43235 EGD DIAGNOSTIC BRUSH WASH: CPT | Performed by: INTERNAL MEDICINE

## 2024-03-19 PROCEDURE — 258N000003 HC RX IP 258 OP 636: Performed by: NURSE ANESTHETIST, CERTIFIED REGISTERED

## 2024-03-19 PROCEDURE — 43235 EGD DIAGNOSTIC BRUSH WASH: CPT | Performed by: NURSE ANESTHETIST, CERTIFIED REGISTERED

## 2024-03-19 RX ORDER — PROPOFOL 10 MG/ML
INJECTION, EMULSION INTRAVENOUS CONTINUOUS PRN
Status: DISCONTINUED | OUTPATIENT
Start: 2024-03-19 | End: 2024-03-19

## 2024-03-19 RX ORDER — ONDANSETRON 2 MG/ML
4 INJECTION INTRAMUSCULAR; INTRAVENOUS EVERY 6 HOURS PRN
Status: DISCONTINUED | OUTPATIENT
Start: 2024-03-19 | End: 2024-03-19 | Stop reason: HOSPADM

## 2024-03-19 RX ORDER — ONDANSETRON 4 MG/1
4 TABLET, ORALLY DISINTEGRATING ORAL EVERY 6 HOURS PRN
Status: DISCONTINUED | OUTPATIENT
Start: 2024-03-19 | End: 2024-03-19 | Stop reason: HOSPADM

## 2024-03-19 RX ORDER — OXYCODONE HYDROCHLORIDE 5 MG/1
5 TABLET ORAL
Status: DISCONTINUED | OUTPATIENT
Start: 2024-03-19 | End: 2024-03-19 | Stop reason: HOSPADM

## 2024-03-19 RX ORDER — OXYCODONE HYDROCHLORIDE 10 MG/1
10 TABLET ORAL
Status: DISCONTINUED | OUTPATIENT
Start: 2024-03-19 | End: 2024-03-19 | Stop reason: HOSPADM

## 2024-03-19 RX ORDER — FLUMAZENIL 0.1 MG/ML
0.2 INJECTION, SOLUTION INTRAVENOUS
Status: DISCONTINUED | OUTPATIENT
Start: 2024-03-19 | End: 2024-03-19 | Stop reason: HOSPADM

## 2024-03-19 RX ORDER — SODIUM CHLORIDE, SODIUM LACTATE, POTASSIUM CHLORIDE, CALCIUM CHLORIDE 600; 310; 30; 20 MG/100ML; MG/100ML; MG/100ML; MG/100ML
INJECTION, SOLUTION INTRAVENOUS CONTINUOUS PRN
Status: DISCONTINUED | OUTPATIENT
Start: 2024-03-19 | End: 2024-03-19

## 2024-03-19 RX ORDER — PROPOFOL 10 MG/ML
INJECTION, EMULSION INTRAVENOUS PRN
Status: DISCONTINUED | OUTPATIENT
Start: 2024-03-19 | End: 2024-03-19

## 2024-03-19 RX ORDER — NALOXONE HYDROCHLORIDE 0.4 MG/ML
0.2 INJECTION, SOLUTION INTRAMUSCULAR; INTRAVENOUS; SUBCUTANEOUS
Status: DISCONTINUED | OUTPATIENT
Start: 2024-03-19 | End: 2024-03-19 | Stop reason: HOSPADM

## 2024-03-19 RX ORDER — ONDANSETRON 2 MG/ML
4 INJECTION INTRAMUSCULAR; INTRAVENOUS EVERY 30 MIN PRN
Status: DISCONTINUED | OUTPATIENT
Start: 2024-03-19 | End: 2024-03-19 | Stop reason: HOSPADM

## 2024-03-19 RX ORDER — NALOXONE HYDROCHLORIDE 0.4 MG/ML
0.4 INJECTION, SOLUTION INTRAMUSCULAR; INTRAVENOUS; SUBCUTANEOUS
Status: DISCONTINUED | OUTPATIENT
Start: 2024-03-19 | End: 2024-03-19 | Stop reason: HOSPADM

## 2024-03-19 RX ORDER — LIDOCAINE HYDROCHLORIDE 20 MG/ML
INJECTION, SOLUTION INFILTRATION; PERINEURAL PRN
Status: DISCONTINUED | OUTPATIENT
Start: 2024-03-19 | End: 2024-03-19

## 2024-03-19 RX ORDER — ONDANSETRON 2 MG/ML
INJECTION INTRAMUSCULAR; INTRAVENOUS PRN
Status: DISCONTINUED | OUTPATIENT
Start: 2024-03-19 | End: 2024-03-19

## 2024-03-19 RX ORDER — LIDOCAINE 40 MG/G
CREAM TOPICAL
Status: DISCONTINUED | OUTPATIENT
Start: 2024-03-19 | End: 2024-03-19 | Stop reason: HOSPADM

## 2024-03-19 RX ORDER — ONDANSETRON 4 MG/1
4 TABLET, ORALLY DISINTEGRATING ORAL EVERY 30 MIN PRN
Status: DISCONTINUED | OUTPATIENT
Start: 2024-03-19 | End: 2024-03-19 | Stop reason: HOSPADM

## 2024-03-19 RX ORDER — PROCHLORPERAZINE MALEATE 5 MG
10 TABLET ORAL EVERY 6 HOURS PRN
Status: DISCONTINUED | OUTPATIENT
Start: 2024-03-19 | End: 2024-03-19 | Stop reason: HOSPADM

## 2024-03-19 RX ORDER — ONDANSETRON 2 MG/ML
4 INJECTION INTRAMUSCULAR; INTRAVENOUS
Status: DISCONTINUED | OUTPATIENT
Start: 2024-03-19 | End: 2024-03-19 | Stop reason: HOSPADM

## 2024-03-19 RX ORDER — NALOXONE HYDROCHLORIDE 0.4 MG/ML
0.1 INJECTION, SOLUTION INTRAMUSCULAR; INTRAVENOUS; SUBCUTANEOUS
Status: DISCONTINUED | OUTPATIENT
Start: 2024-03-19 | End: 2024-03-19 | Stop reason: HOSPADM

## 2024-03-19 RX ADMIN — PROPOFOL 150 MCG/KG/MIN: 10 INJECTION, EMULSION INTRAVENOUS at 10:52

## 2024-03-19 RX ADMIN — SODIUM CHLORIDE, POTASSIUM CHLORIDE, SODIUM LACTATE AND CALCIUM CHLORIDE: 600; 310; 30; 20 INJECTION, SOLUTION INTRAVENOUS at 10:50

## 2024-03-19 RX ADMIN — ONDANSETRON 4 MG: 2 INJECTION INTRAMUSCULAR; INTRAVENOUS at 11:04

## 2024-03-19 RX ADMIN — TOPICAL ANESTHETIC 1 EACH: 200 SPRAY DENTAL; PERIODONTAL at 10:51

## 2024-03-19 RX ADMIN — LIDOCAINE HYDROCHLORIDE 80 MG: 20 INJECTION, SOLUTION INFILTRATION; PERINEURAL at 10:52

## 2024-03-19 RX ADMIN — PROPOFOL 30 MG: 10 INJECTION, EMULSION INTRAVENOUS at 10:54

## 2024-03-19 RX ADMIN — PROPOFOL 20 MG: 10 INJECTION, EMULSION INTRAVENOUS at 10:56

## 2024-03-19 ASSESSMENT — ACTIVITIES OF DAILY LIVING (ADL)
ADLS_ACUITY_SCORE: 35

## 2024-03-19 NOTE — ANESTHESIA POSTPROCEDURE EVALUATION
Patient: Sabino Castillo Every    Procedure: Procedure(s):  Esophagoscopy, gastroscopy, duodenoscopy (EGD), combined       Anesthesia Type:  MAC    Note:  Disposition: Outpatient   Postop Pain Control: Uneventful            Sign Out: Well controlled pain   PONV: No   Neuro/Psych: Uneventful            Sign Out: Acceptable/Baseline neuro status   Airway/Respiratory: Uneventful            Sign Out: Acceptable/Baseline resp. status   CV/Hemodynamics: Uneventful            Sign Out: Acceptable CV status; No obvious hypovolemia; No obvious fluid overload   Other NRE: NONE   DID A NON-ROUTINE EVENT OCCUR? No           Last vitals:  Vitals Value Taken Time   BP 95/76 03/19/24 1115   Temp     Pulse     Resp 16 03/19/24 1115   SpO2 94 % 03/19/24 1121   Vitals shown include unfiled device data.    Electronically Signed By: Odalis Larson MD  March 19, 2024  11:22 AM

## 2024-03-19 NOTE — ANESTHESIA CARE TRANSFER NOTE
Patient: Sabino Castillo Every    Procedure: Procedure(s):  Esophagoscopy, gastroscopy, duodenoscopy (EGD), combined       Diagnosis: Gastroesophageal reflux disease with esophagitis, unspecified whether hemorrhage [K21.00]  Diagnosis Additional Information: No value filed.    Anesthesia Type:   MAC     Note:    Oropharynx: oropharynx clear of all foreign objects and spontaneously breathing  Level of Consciousness: awake  Oxygen Supplementation: room air    Independent Airway: airway patency satisfactory and stable  Dentition: dentition unchanged  Vital Signs Stable: post-procedure vital signs reviewed and stable  Report to RN Given: handoff report given  Patient transferred to: Phase II    Handoff Report: Identifed the Patient, Identified the Reponsible Provider, Reviewed the pertinent medical history, Discussed the surgical course, Reviewed Intra-OP anesthesia mangement and issues during anesthesia, Set expectations for post-procedure period and Allowed opportunity for questions and acknowledgement of understanding    Vitals:  Vitals Value Taken Time   BP 95/76 03/19/24 1115   Temp     Pulse     Resp     SpO2 93 % 03/19/24 1116   Vitals shown include unfiled device data.    Electronically Signed By: BAY Grande CRNA  March 19, 2024  11:17 AM

## 2024-03-19 NOTE — H&P
Gastroenterology Pre-op History and Physical    Sabino Cardona MRN# [id number]   Age: 48 year old YOB: 1975      Date of Surgery: 03/19/24  Location [facility]      Date of Exam 3/19/2024 Facility Same Day       Primary care provider: Huan Das         Chief Complaint and/or Reason for Procedure:   49 yo male with increasing issues with heartburn/reflux despite daily PPI. No dysphagia. This weekend had regurgitation with blood tinge. No odynophagia.            Past Medical and Surgical History:     Past Medical History:   Diagnosis Date    Biceps tendinopathy     GERD (gastroesophageal reflux disease)     Mucocele of lip     Spondyloarthropathy    Lupus or lupus-like syndrome.  Past Surgical History:   Procedure Laterality Date    APPENDECTOMY      COLONOSCOPY N/A 1/3/2022    Procedure: COLONOSCOPY, WITH POLYPECTOMY;  Surgeon: Ingrid Keyes MD;  Location: [facility] OR    HERNIA REPAIR, INGUINAL RT/LT      left            Medications (include herbals and vitamins):        Medications Prior to Admission   Medication Sig Dispense Refill Last Dose    Esomeprazole Magnesium 40 MG PACK Pt taking 20 mg   Past Week    hydroxychloroquine (PLAQUENIL) 200 MG tablet Take 1 tablet (200 mg) by mouth 2 times daily Annual Plaquenil toxicity eye screening required. 180 tablet 3 Past Week    sulfaSALAzine ER (AZULFIDINE EN) 500 MG EC tablet Take 3 tablets (1,500 mg) by mouth 2 times daily Labs required every 6 months while taking this medication. Hold for signs of infection, and seek medical attention. Please call to scheduled follow-up appointment, overdue. 540 tablet 1 Past Week    Vitamin D3 (CHOLECALCIFEROL) 25 mcg (1000 units) tablet Take by mouth every morning                Allergies:      Allergies   Allergen Reactions    Nkda [No Known Drug Allergy]     Seasonal Allergies Other (See Comments)     Sneezing, running nose, itchy eyes               Physical Exam:   All  vitals have been reviewed  Patient Vitals for the past 8 hrs:   BP Temp Pulse Resp SpO2   03/19/24 0900 125/85 98  F (36.7  C) 71 16 95 %     No intake/output data recorded.  Airway assessment:   Patient is able to open mouth wide  Patient is able to stick out tongue  Mallampatti classification: Class I (visualization of the soft palate, fauces, uvula, anterior and posterior pillars)}      Lungs:   No increased work of breathing, good air exchange, clear to auscultation bilaterally, no crackles or wheezing     Cardiovascular:   regular rate and rhythm and normal S1 and S2                 Anesthetic risk and/or ASA classification:     ASA 2    Kofi Christianson MD

## 2024-03-19 NOTE — ANESTHESIA PREPROCEDURE EVALUATION
Anesthesia Pre-Procedure Evaluation    Patient: Sabino Cardona   MRN: [id number] : 1975        Procedure : Procedure(s):  Esophagoscopy, gastroscopy, duodenoscopy (EGD), combined          Past Medical History:   Diagnosis Date    Biceps tendinopathy     GERD (gastroesophageal reflux disease)     Mucocele of lip     Spondyloarthropathy       Past Surgical History:   Procedure Laterality Date    APPENDECTOMY      COLONOSCOPY N/A 1/3/2022    Procedure: COLONOSCOPY, WITH POLYPECTOMY;  Surgeon: Ingrid Keyes MD;  Location: [facility] OR    HERNIA REPAIR, INGUINAL RT/LT      left      Allergies   Allergen Reactions    Nkda [No Known Drug Allergy]     Seasonal Allergies Other (See Comments)     Sneezing, running nose, itchy eyes      Social History     Tobacco Use    Smoking status: Never    Smokeless tobacco: Former     Types: Chew     Quit date: 5/15/2010    Tobacco comments:     chew 9 yr   Substance Use Topics    Alcohol use: Yes     Alcohol/week: 1.7 standard drinks of alcohol     Types: 2 Standard drinks or equivalent per week      Wt Readings from Last 1 Encounters:   24 89.6 kg (197 lb 8 oz)          Current Outpatient Medications   Medication Instructions    Esomeprazole Magnesium 40 MG PACK Pt taking 20 mg    hydroxychloroquine (PLAQUENIL) 200 mg, Oral, 2 TIMES DAILY, Annual Plaquenil toxicity eye screening required.    sulfaSALAzine ER (AZULFIDINE EN) 1,500 mg, Oral, 2 TIMES DAILY, Labs required every 6 months while taking this medication. Hold for signs of infection, and seek medical attention. Please call to scheduled follow-up appointment, overdue.    Vitamin D3 (CHOLECALCIFEROL) 25 mcg (1000 units) tablet Oral, EVERY MORNING       Anesthesia Evaluation   Pt has had prior anesthetic.         ROS/MED HX  ENT/Pulmonary:  - neg pulmonary ROS     Neurologic: Comment: Takes ibuprofen, tylenol migraine prn    (+)      migraines,                          Cardiovascular:  - neg cardiovascular ROS    "  METS/Exercise Tolerance: >4 METS    Hematologic:  - neg hematologic  ROS     Musculoskeletal: Comment: Spondyloarthropathy - followed by Dr. Luna. Taking sulfasalazine & celebrex.    Biceps tendinopathy        GI/Hepatic: Comment: Gastroesophageal reflux disease with esophagitis    (+) GERD, Asymptomatic on medication,                  Renal/Genitourinary:  - neg Renal ROS     Endo:  - neg endo ROS     Psychiatric/Substance Use:  - neg psychiatric ROS     Infectious Disease:  - neg infectious disease ROS     Malignancy:  - neg malignancy ROS     Other: Comment: Lupus           Physical Exam    Airway        Mallampati: I   TM distance: > 3 FB   Neck ROM: full   Mouth opening: > 3 cm    Respiratory Devices and Support         Dental       (+) Minor Abnormalities - some fillings, tiny chips      Cardiovascular   cardiovascular exam normal          Pulmonary   pulmonary exam normal                OUTSIDE LABS:  CBC:   Lab Results   Component Value Date    WBC 4.9 02/05/2024    WBC 3.2 (L) 07/31/2023    HGB 13.3 02/05/2024    HGB 14.1 07/31/2023    HCT 41.4 02/05/2024    HCT 42.3 07/31/2023     02/05/2024     07/31/2023     BMP:   Lab Results   Component Value Date     08/03/2012    POTASSIUM 4.4 08/03/2012    CHLORIDE 104 08/03/2012    CO2 26 08/03/2012    BUN 17 08/03/2012    CR 0.90 02/05/2024    CR 0.88 07/31/2023    GLC 88 07/08/2019    GLC 89 08/03/2012     COAGS: No results found for: \"PTT\", \"INR\", \"FIBR\"  POC: No results found for: \"BGM\", \"HCG\", \"HCGS\"  HEPATIC:   Lab Results   Component Value Date    ALBUMIN 4.4 02/05/2024    PROTTOTAL 7.1 08/03/2012    ALT 27 02/05/2024    AST 23 02/05/2024    ALKPHOS 83 08/03/2012    BILITOTAL 0.6 08/03/2012     OTHER:   Lab Results   Component Value Date    REGIS 8.5 08/03/2012    CRP 7.3 03/09/2023    SED 8 02/05/2024         Anesthesia Plan    ASA Status:  2    NPO Status:  NPO Appropriate    Anesthesia Type: MAC.     - Reason for MAC: immobility " needed, straight local not clinically adequate   Induction: Propofol.   Maintenance: TIVA.        Consents    Anesthesia Plan(s) and associated risks, benefits, and realistic alternatives discussed. Questions answered and patient/representative(s) expressed understanding.     - Discussed:     - Discussed with:  Patient      - Extended Intubation/Ventilatory Support Discussed: No.      - Patient is DNR/DNI Status: No     Use of blood products discussed: Yes.     - Discussed with: Patient.     - Consented: consented to blood products     Postoperative Care    Pain management: IV analgesics, Oral pain medications.   PONV prophylaxis: Ondansetron (or other 5HT-3)     Comments:               Odalis Larson MD    I have reviewed the pertinent notes and labs in the chart from the past 30 days and (re)examined the patient.  Any updates or changes from those notes are reflected in this note.

## 2024-07-24 ENCOUNTER — OFFICE VISIT (OUTPATIENT)
Dept: DERMATOLOGY | Facility: CLINIC | Age: 49
End: 2024-07-24
Payer: COMMERCIAL

## 2024-07-24 ENCOUNTER — LAB (OUTPATIENT)
Dept: LAB | Facility: CLINIC | Age: 49
End: 2024-07-24
Attending: INTERNAL MEDICINE
Payer: COMMERCIAL

## 2024-07-24 DIAGNOSIS — Z13.220 SCREENING CHOLESTEROL LEVEL: ICD-10-CM

## 2024-07-24 DIAGNOSIS — R05.9 COUGH, UNSPECIFIED TYPE: ICD-10-CM

## 2024-07-24 DIAGNOSIS — R21 RASH: ICD-10-CM

## 2024-07-24 DIAGNOSIS — Z84.0 FAMILY HISTORY OF PSORIASIS: Primary | ICD-10-CM

## 2024-07-24 DIAGNOSIS — D84.9 IMMUNOSUPPRESSED STATUS (H): ICD-10-CM

## 2024-07-24 DIAGNOSIS — K21.00 GASTROESOPHAGEAL REFLUX DISEASE WITH ESOPHAGITIS, UNSPECIFIED WHETHER HEMORRHAGE: ICD-10-CM

## 2024-07-24 DIAGNOSIS — L84 CLAVUS: ICD-10-CM

## 2024-07-24 DIAGNOSIS — Z12.5 SCREENING FOR PROSTATE CANCER: ICD-10-CM

## 2024-07-24 PROCEDURE — 99213 OFFICE O/P EST LOW 20 MIN: CPT | Performed by: PHYSICIAN ASSISTANT

## 2024-07-24 RX ORDER — TRIAMCINOLONE ACETONIDE 1 MG/G
OINTMENT TOPICAL 2 TIMES DAILY
Qty: 15 G | Refills: 0 | Status: SHIPPED | OUTPATIENT
Start: 2024-07-24 | End: 2024-09-09

## 2024-07-24 NOTE — LETTER
7/24/2024      Sabino Cardona  [address]  [facility] [address]      Dear Colleague,    Thank you for referring your patient, Sabino Cardona, to the [facility]. Please see a copy of my visit note below.    [facility] Note  Encounter Date: Jul 24, 2024  Office Visit       Dermatology Problem List:  1. Rash - ddx: PSO - trial of corticosteroids, if not improved will do bx  2. *LTM - 3 mm dark brown macule on the right dorsal foot  - Unchanged 11/8/23, Continue to monitor     PMHx: SLE given positive anti - DNA, inflammatory arthritis on HCQ 200mg po BID and sulfasalazine  FHx: Father with unknown type of skin cancer. 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 Wife is transplant surgeon at the .  ____________________________________________     Assessment & Plan:  # Immunosuppressed status, currently on Plaquenil for inflammatory arthritis and SLE  - ABCDEs: Counseled ABCDEs of melanoma: Asymmetry, Border (irregularity), Color (not uniform, changes in color), Diameter (greater than 6 mm which is about the size of a pencil eraser), and Evolving (any changes in preexisting moles).  - Sun protection: Counseled SPF30+ sunscreen, UPF clothing, sun avoidance, tanning bed avoidance.      # Rash vs other - ddx: PSO most likely diagnosis  - Discussed options going forward to determine diagnosis. Discussed option to biopsy or use steroid cream for a couple weeks and monitor.  - Start TMC 0.1% cream BID on two areas of concern.   - Consider biopsy if not resolved.     # Clavus, R plantar foot  - Start OTC corn pads  - edu on etiology    Procedures Performed:   None.    Follow-up: 6 week(s) in-person, or earlier for new or changing lesions    Staff and Scribe:     Scribe Disclosure:   JOSHUA SALDANA, am serving as a scribe; to document services personally  performed by Shanna Valle PA-C-based on data collection and the provider's statements to me.  Provider Disclosure:   The documentation recorded by the scribe accurately reflects the services I personally performed and the decisions made by me.    All risks, benefits and alternatives were discussed with patient.  Patient is in agreement and understands the assessment and plan.  All questions were answered.    Shanna Valle PA-C, MPAS  [facility]: Phone: [phone], Fax: [phone]  [facility]: Phone: [phone],  Fax: [phone]  [facility]: Phone: [phone], Fax: [phone]  _______________________________    CC: Consult (Itchy red spots on chest and scalp for about 6 weeks)    HPI:  Mr. Sabino Cardona is a(n) 49 year old male who presents today as a return patient for a rash. He mentions having itchy red spots on the scalp and neck. He reports a family history of psoriasis.  He mentions it is flaky at times.     Family history of psoriasis    Inquires about a wart looking spot that appeared on his R foot about 2 weeks ago with tenderness.   Patient is otherwise feeling well, without additional skin concerns.    Labs Reviewed:  N/A    Physical Exam:  Vitals: There were no vitals taken for this visit.  SKIN: Total skin excluding the undergarment areas was performed. The exam included the head/face, neck, both arms, chest, back, abdomen, both legs, digits and/or nails.   - R plantar foot, 4 mm hyperkeratotic papule with central pore and it is tender.  - quarter sized pink scaly plaque on the chest.  - 4-5 6 mm - 1 cm scaly pink macules.  - No other lesions of concern on areas examined.           Medications:  Current Outpatient Medications   Medication Sig Dispense Refill     triamcinolone (KENALOG) 0.1 % external ointment Apply topically 2 times daily 15 g 0     Esomeprazole Magnesium  40 MG PACK Pt taking 20 mg       hydroxychloroquine (PLAQUENIL) 200 MG tablet Take 1 tablet (200 mg) by mouth 2 times daily Annual Plaquenil toxicity eye screening required. 180 tablet 3     sulfaSALAzine ER (AZULFIDINE EN) 500 MG EC tablet Take 3 tablets (1,500 mg) by mouth 2 times daily Labs required every 6 months while taking this medication. Hold for signs of infection, and seek medical attention. Please call to scheduled follow-up appointment, overdue. 540 tablet 1     Vitamin D3 (CHOLECALCIFEROL) 25 mcg (1000 units) tablet Take by mouth every morning       No current facility-administered medications for this visit.      Past Medical History:   Patient Active Problem List   Diagnosis     Esophageal reflux     Biceps tendinopathy     Wart     Mucocele of lip     Sore throat     Past Medical History:   Diagnosis Date     Biceps tendinopathy      GERD (gastroesophageal reflux disease)      Mucocele of lip      Spondyloarthropathy          Again, thank you for allowing me to participate in the care of your patient.        Sincerely,        Shanna Valle PA-C

## 2024-07-24 NOTE — NURSING NOTE
Saibno Cardona's goals for this visit include:   Chief Complaint   Patient presents with    Consult     Itchy red spots on chest and scalp for about 6 weeks       He requests these members of his care team be copied on today's visit information: no    PCP: Huan Das    Referring Provider:  Referred Self, MD  No address on file    There were no vitals taken for this visit.    Do you need any medication refills at today's visit? No    Diamante Lepe LPN  a

## 2024-07-24 NOTE — PROGRESS NOTES
[facility] Dermatology Note  Encounter Date: Jul 24, 2024  Office Visit       Dermatology Problem List:  1. Rash - ddx: PSO - trial of corticosteroids, if not improved will do bx  2. *LTM - 3 mm dark brown macule on the right dorsal foot  - Unchanged 11/8/23, Continue to monitor     PMHx: SLE given positive anti - DNA, inflammatory arthritis on HCQ 200mg po BID and sulfasalazine  FHx: Father with unknown type of skin cancer. 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 Wife is transplant surgeon at the .  ____________________________________________     Assessment & Plan:  # Immunosuppressed status, currently on Plaquenil for inflammatory arthritis and SLE  - ABCDEs: Counseled ABCDEs of melanoma: Asymmetry, Border (irregularity), Color (not uniform, changes in color), Diameter (greater than 6 mm which is about the size of a pencil eraser), and Evolving (any changes in preexisting moles).  - Sun protection: Counseled SPF30+ sunscreen, UPF clothing, sun avoidance, tanning bed avoidance.      # Rash vs other - ddx: PSO most likely diagnosis  - Discussed options going forward to determine diagnosis. Discussed option to biopsy or use steroid cream for a couple weeks and monitor.  - Start TMC 0.1% cream BID on two areas of concern.   - Consider biopsy if not resolved.     # Clavus, R plantar foot  - Start OTC corn pads  - edu on etiology    Procedures Performed:   None.    Follow-up: 6 week(s) in-person, or earlier for new or changing lesions    Staff and Scribe:     Scribe Disclosure:   I, JOSHUA SIERRA, am serving as a scribe; to document services personally performed by Shanna Valle PA-C-based on data collection and the provider's statements to me.  Provider Disclosure:   The documentation recorded by the scribe accurately reflects the services I personally performed and the decisions made by  me.    All risks, benefits and alternatives were discussed with patient.  Patient is in agreement and understands the assessment and plan.  All questions were answered.    Shanna Valle PA-C, MPAS  [facility]: Phone: [phone], Fax: [phone]  [facility]: Phone: [phone],  Fax: [phone]  [facility]: Phone: [phone], Fax: [phone]  _______________________________    CC: Consult (Itchy red spots on chest and scalp for about 6 weeks)    HPI:  Mr. Sabino Cardona is a(n) 49 year old male who presents today as a return patient for a rash. He mentions having itchy red spots on the scalp and neck. He reports a family history of psoriasis.  He mentions it is flaky at times.     Family history of psoriasis    Inquires about a wart looking spot that appeared on his R foot about 2 weeks ago with tenderness.   Patient is otherwise feeling well, without additional skin concerns.    Labs Reviewed:  N/A    Physical Exam:  Vitals: There were no vitals taken for this visit.  SKIN: Total skin excluding the undergarment areas was performed. The exam included the head/face, neck, both arms, chest, back, abdomen, both legs, digits and/or nails.   - R plantar foot, 4 mm hyperkeratotic papule with central pore and it is tender.  - quarter sized pink scaly plaque on the chest.  - 4-5 6 mm - 1 cm scaly pink macules.  - No other lesions of concern on areas examined.           Medications:  Current Outpatient Medications   Medication Sig Dispense Refill    triamcinolone (KENALOG) 0.1 % external ointment Apply topically 2 times daily 15 g 0    Esomeprazole Magnesium 40 MG PACK Pt taking 20 mg      hydroxychloroquine (PLAQUENIL) 200 MG tablet Take 1 tablet (200 mg) by mouth 2 times daily Annual Plaquenil toxicity eye screening required. 180 tablet 3    sulfaSALAzine ER (AZULFIDINE EN) 500 MG EC tablet Take  3 tablets (1,500 mg) by mouth 2 times daily Labs required every 6 months while taking this medication. Hold for signs of infection, and seek medical attention. Please call to scheduled follow-up appointment, overdue. 540 tablet 1    Vitamin D3 (CHOLECALCIFEROL) 25 mcg (1000 units) tablet Take by mouth every morning       No current facility-administered medications for this visit.      Past Medical History:   Patient Active Problem List   Diagnosis    Esophageal reflux    Biceps tendinopathy    Wart    Mucocele of lip    Sore throat     Past Medical History:   Diagnosis Date    Biceps tendinopathy     GERD (gastroesophageal reflux disease)     Mucocele of lip     Spondyloarthropathy

## 2024-09-09 ENCOUNTER — OFFICE VISIT (OUTPATIENT)
Dept: DERMATOLOGY | Facility: CLINIC | Age: 49
End: 2024-09-09
Payer: COMMERCIAL

## 2024-09-09 DIAGNOSIS — R21 RASH: Primary | ICD-10-CM

## 2024-09-09 PROCEDURE — 99213 OFFICE O/P EST LOW 20 MIN: CPT | Performed by: PHYSICIAN ASSISTANT

## 2024-09-09 RX ORDER — TRIAMCINOLONE ACETONIDE 1 MG/G
OINTMENT TOPICAL 2 TIMES DAILY
Qty: 15 G | Refills: 2 | Status: SHIPPED | OUTPATIENT
Start: 2024-09-09

## 2024-09-09 RX ORDER — FLUOCINONIDE TOPICAL SOLUTION USP, 0.05% 0.5 MG/ML
SOLUTION TOPICAL 2 TIMES DAILY
Qty: 60 ML | Refills: 1 | Status: SHIPPED | OUTPATIENT
Start: 2024-09-09

## 2024-09-09 NOTE — PROGRESS NOTES
[facility] Dermatology Note  Encounter Date: Sep 9, 2024  Office Visit      Dermatology Problem List:  1. Rash - ddx: PSO - trial of corticosteroids, if not improved will do bx  - triamcinolone, fluocinonide  2. *LTM - 3 mm dark brown macule on the right dorsal foot  - Unchanged 11/8/23, Continue to monitor      PMHx: SLE given positive anti - DNA, inflammatory arthritis on HCQ 200mg po BID and sulfasalazine  FHx: Father with unknown type of skin cancer. 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 Wife is transplant surgeon at the .  ____________________________________________    Assessment & Plan:  1. Rash - ddx: PSO vs dermatitis vs psoriasiform drug eruption (plaquenil) - improved with use of triam for 7 days, has not returned for 3-4 weeks now  - improved/resolved  - continue triamcinolone for flares, refilled  - start fluocinonide 0.05% solution QHD for scalp pruritus, dos have mild erythematous patches here, less erythematous than previous photos  - photos from prior reviewed    Procedures Performed:   none    Follow-up: prn for new or changing lesions    Staff:     All risks, benefits and alternatives were discussed with patient.  Patient is in agreement and understands the assessment and plan.  All questions were answered.    Shanna Valle PA-C, MPAS  [facility]: Phone: [phone], Fax: [phone]  [facility]: Phone: [phone],  Fax: [phone]  [facility]: Phone: [phone], Fax: [phone]  ____________________________________________    CC: Psoriasis (6 week follow up /Chest is better /New spot by left ear Could this be from hydroxychloroquine? )      Reviewed patients past medical history and pertinent chart review prior to patient's visit today.     HPI:    Saibno Cardona is a 49 year old male who presents today as a return patient for rash follow up. Notes itching patch on chest resolved after using the triamcinolone daily for about 7 days. It has not come back and it has been about 2-3 weeks without any treatment. Scalp is still a little itchy, but he has not treated this area. Does have new small pink patch on the L lateral cheek, but started using riam there and it appears to be resolving as well. Patient curious if it is possible though could be from the plaquenil which he started 1 year ago. This rash begin 6mo ago.     Patient is otherwise feeling well, without additional concerns.    Labs:  none    Physical Exam:  Vitals: There were no vitals taken for this visit.  SKIN: Focused examination of face, scalp, neck and chest was performed.   - pink patches on vertex scalp - mild erythema, more mild than previous photos, minimal to no scale  - 6mm pink patch without scale on the L preauricular  - mid chest resolved, faint tan PIH present  - No other lesions of concern on areas examined.     Medications:  Current Outpatient Medications   Medication Sig Dispense Refill    triamcinolone (KENALOG) 0.1 % external ointment Apply topically 2 times daily 15 g 0    Esomeprazole Magnesium 40 MG PACK Pt taking 20 mg      hydroxychloroquine (PLAQUENIL) 200 MG tablet Take 1 tablet (200 mg) by mouth 2 times daily Annual Plaquenil toxicity eye screening required. 180 tablet 3    sulfaSALAzine ER (AZULFIDINE EN) 500 MG EC tablet Take 3 tablets (1,500 mg) by mouth 2 times daily Labs required every 6 months while taking this medication. Hold for signs of infection, and seek medical attention. Please call to scheduled follow-up appointment, overdue. 540 tablet 1    Vitamin D3 (CHOLECALCIFEROL) 25 mcg (1000 units) tablet Take by mouth every morning       No current facility-administered medications for this visit.      Past Medical/Surgical History:   Patient Active Problem List    Diagnosis    Esophageal reflux    Biceps tendinopathy    Wart    Mucocele of lip    Sore throat     Past Medical History:   Diagnosis Date    Biceps tendinopathy     GERD (gastroesophageal reflux disease)     Mucocele of lip     Spondyloarthropathy

## 2024-09-09 NOTE — LETTER
9/9/2024      Sabino Cardona  [address]  [place] [address]      Dear Colleague,    Thank you for referring your patient, Sabino Cardona, to the [facility] [place]. Please see a copy of my visit note below.    [facility] Dermatology Note  Encounter Date: Sep 9, 2024  Office Visit      Dermatology Problem List:  1. Rash - ddx: PSO - trial of corticosteroids, if not improved will do bx  - triamcinolone, fluocinonide  2. *LTM - 3 mm dark brown macule on the right dorsal foot  - Unchanged 11/8/23, Continue to monitor      PMHx: SLE given positive anti - DNA, inflammatory arthritis on HCQ 200mg po BID and sulfasalazine  FHx: Father with unknown type of skin cancer. Family history of psoriasis and RA  SHx: Works as a contractor, enjoys being outside frequently. Has twin boys (6 yo) and daughter (10 yo)]. Wife is transplant surgeon at the .  ____________________________________________    Assessment & Plan:  1. Rash - ddx: PSO vs dermatitis vs psoriasiform drug eruption (plaquenil) - improved with use of triam for 7 days, has not returned for 3-4 weeks now  - improved/resolved  - continue triamcinolone for flares, refilled  - start fluocinonide 0.05% solution QHD for scalp pruritus, dos have mild erythematous patches here, less erythematous than previous photos  - photos from prior reviewed    Procedures Performed:   none    Follow-up: prn for new or changing lesions    Staff:     All risks, benefits and alternatives were discussed with patient.  Patient is in agreement and understands the assessment and plan.  All questions were answered.    Shanna Valle PA-C, MPAS  [facility]: Phone: [phone], Fax: [phone]  [facility]: Phone: [phone],  Fax: [phone]  [facility] [place]: Phone: [phone], Fax:  [phone]  ____________________________________________    CC: Psoriasis (6 week follow up /Chest is better /New spot by left ear Could this be from hydroxychloroquine? )      Reviewed patients past medical history and pertinent chart review prior to patient's visit today.     HPI:  Mr. Sabino Cardona is a 49 year old male who presents today as a return patient for rash follow up. Notes itching patch on chest resolved after using the triamcinolone daily for about 7 days. It has not come back and it has been about 2-3 weeks without any treatment. Scalp is still a little itchy, but he has not treated this area. Does have new small pink patch on the L lateral cheek, but started using riam there and it appears to be resolving as well. Patient curious if it is possible though could be from the plaquenil which he started 1 year ago. This rash begin 6mo ago.     Patient is otherwise feeling well, without additional concerns.    Labs:  none    Physical Exam:  Vitals: There were no vitals taken for this visit.  SKIN: Focused examination of face, scalp, neck and chest was performed.   - pink patches on vertex scalp - mild erythema, more mild than previous photos, minimal to no scale  - 6mm pink patch without scale on the L preauricular  - mid chest resolved, faint tan PIH present  - No other lesions of concern on areas examined.     Medications:  Current Outpatient Medications   Medication Sig Dispense Refill     triamcinolone (KENALOG) 0.1 % external ointment Apply topically 2 times daily 15 g 0     Esomeprazole Magnesium 40 MG PACK Pt taking 20 mg       hydroxychloroquine (PLAQUENIL) 200 MG tablet Take 1 tablet (200 mg) by mouth 2 times daily Annual Plaquenil toxicity eye screening required. 180 tablet 3     sulfaSALAzine ER (AZULFIDINE EN) 500 MG EC tablet Take 3 tablets (1,500 mg) by mouth 2 times daily Labs required every 6 months while taking this medication. Hold for signs of infection, and seek medical  attention. Please call to scheduled follow-up appointment, overdue. 540 tablet 1     Vitamin D3 (CHOLECALCIFEROL) 25 mcg (1000 units) tablet Take by mouth every morning       No current facility-administered medications for this visit.      Past Medical/Surgical History:   Patient Active Problem List   Diagnosis     Esophageal reflux     Biceps tendinopathy     Wart     Mucocele of lip     Sore throat     Past Medical History:   Diagnosis Date     Biceps tendinopathy      GERD (gastroesophageal reflux disease)      Mucocele of lip      Spondyloarthropathy                         Again, thank you for allowing me to participate in the care of your patient.        Sincerely,        Shanna Valle PA-C

## 2024-09-09 NOTE — PATIENT INSTRUCTIONS
Proper skin care from [facility]:    -Eliminate harsh soaps as they strip the natural oils from the skin, often resulting in dry itchy skin ( i.e. Dial, Zest, Gamb[place] Spring)  -Use mild soaps such as Cetaphil or Dove Sensitive Skin in the shower. You do not need to use soap on arms, legs, and trunk every time you shower unless visibly soiled.   -Avoid hot or cold showers.  -After showering, lightly dry off and apply moisturizing within 2-3 minutes. This will help trap moisture in the skin.   -Aggressive use of a moisturizer at least 1-2 times a day to the entire body (including -Vanicream, Cetaphil, Aquaphor or Cerave) and moisturize hands after every washing.  -We recommend using moisturizers that come in a tub that needs to be scooped out, not a pump. This has more of an oil base. It will hold moisture in your skin much better than a water base moisturizer. The above recommended are non-pore clogging.      Wear a sunscreen with at least SPF 30 on your face, ears, neck and V of the chest daily. Wear sunscreen on other areas of the body if those areas are exposed to the sun throughout the day. Sunscreens can contain physical and/or chemical blockers. Physical blockers are less likely to clog pores, these include zinc oxide and titanium dioxide. Reapply every two hour and after swimming.     Sunscreen examples: https://www.ewg.org/sunscreen/    UV radiation  UVA radiation remains constant throughout the day and throughout the year. It is a longer wavelength than UVB and therefore penetrates deeper into the skin leading to immediate and delayed tanning, photoaging, and skin cancer. 70-80% of UVA and UVB radiation occurs between the hours of 10am-2pm.  UVB radiation  UVB radiation causes the most harmful effects and is more significant during the summer months. However, snow and ice can reflect UVB radiation leading to skin damage during the winter months as well. UVB radiation is responsible for tanning,  burning, inflammation, delayed erythema (pinkness), pigmentation (brown spots), and skin cancer.     I recommend self monthly full body exams and yearly full body exams with a dermatology provider. If you develop a new or changing lesion please follow up for examination. Most skin cancers are pink and scaly or pink and pearly. However, we do see blue/brown/black skin cancers.  Consider the ABCDEs of melanoma when giving yourself your monthly full body exam ( don't forget the groin, buttocks, feet, toes, etc). A-asymmetry, B-borders, C-color, D-diameter, E-elevation or evolving. If you see any of these changes please follow up in clinic. If you cannot see your back I recommend purchasing a hand held mirror to use with a larger wall mirror.       Checking for Skin Cancer  You can find cancer early by checking your skin each month. There are 3 kinds of skin cancer. They are melanoma, basal cell carcinoma, and squamous cell carcinoma. Doing monthly skin checks is the best way to find new marks or skin changes. Follow the instructions below for checking your skin.   The ABCDEs of checking moles for melanoma   Check your moles or growths for signs of melanoma using ABCDE:   Asymmetry: the sides of the mole or growth don t match  Border: the edges are ragged, notched, or blurred  Color: the color within the mole or growth varies  Diameter: the mole or growth is larger than 6 mm (size of a pencil eraser)  Evolving: the size, shape, or color of the mole or growth is changing (evolving is not shown in the images below)    Checking for other types of skin cancer  Basal cell carcinoma or squamous cell carcinoma have symptoms such as:     A spot or mole that looks different from all other marks on your skin  Changes in how an area feels, such as itching, tenderness, or pain  Changes in the skin's surface, such as oozing, bleeding, or scaliness  A sore that does not heal  New swelling or redness beyond the border of a  mole    Who s at risk?  Anyone can get skin cancer. But you are at greater risk if you have:   Fair skin, light-colored hair, or light-colored eyes  Many moles or abnormal moles on your skin  A history of sunburns from sunlight or tanning beds  A family history of skin cancer  A history of exposure to radiation or chemicals  A weakened immune system  If you have had skin cancer in the past, you are at risk for recurring skin cancer.   How to check your skin  Do your monthly skin checkups in front of a full-length mirror. Check all parts of your body, including your:   Head (ears, face, neck, and scalp)  Torso (front, back, and sides)  Arms (tops, undersides, upper, and lower armpits)  Hands (palms, backs, and fingers, including under the nails)  Buttocks and genitals  Legs (front, back, and sides)  Feet (tops, soles, toes, including under the nails, and between toes)  If you have a lot of moles, take digital photos of them each month. Make sure to take photos both up close and from a distance. These can help you see if any moles change over time.   Most skin changes are not cancer. But if you see any changes in your skin, call your doctor right away. Only he or she can diagnose a problem. If you have skin cancer, seeing your doctor can be the first step toward getting the treatment that could save your life.   [employer] last reviewed this educational content on 4/1/2019 2000-2020 [employer]. [address] [place] [address], [place] [address]. All rights reserved. This information is not intended as a substitute for professional medical care. Always follow your healthcare professional's instructions.       When should I call my doctor?  If you are worsening or not improving, please, contact us or seek urgent care as noted below.     Who should I call with questions (adults)?    [facility] [phone]  For urgent needs outside of business hours call the [facility] at  [phone] and ask for the dermatology resident on call to be paged  If this is a medical emergency and you are unable to reach an ER, Call 911      If you need a prescription refill, please contact your pharmacy. Refills are approved or denied by our Physicians during normal business hours, Monday through Fridays  Per office policy, refills will not be granted if you have not been seen within the past year (or sooner depending on your child's condition)

## 2024-09-14 ENCOUNTER — HEALTH MAINTENANCE LETTER (OUTPATIENT)
Age: 49
End: 2024-09-14

## 2024-09-19 ENCOUNTER — TELEPHONE (OUTPATIENT)
Dept: INTERNAL MEDICINE | Facility: CLINIC | Age: 49
End: 2024-09-19

## 2024-09-19 ENCOUNTER — VIRTUAL VISIT (OUTPATIENT)
Dept: INTERNAL MEDICINE | Facility: CLINIC | Age: 49
End: 2024-09-19
Payer: COMMERCIAL

## 2024-09-19 DIAGNOSIS — F41.9 ANXIETY: Primary | ICD-10-CM

## 2024-09-19 PROCEDURE — 99442 PR PHYSICIAN TELEPHONE EVALUATION 11-20 MIN: CPT | Mod: 93 | Performed by: INTERNAL MEDICINE

## 2024-09-19 RX ORDER — CITALOPRAM HYDROBROMIDE 10 MG/1
10 TABLET ORAL DAILY
Qty: 30 TABLET | Refills: 2 | Status: SHIPPED | OUTPATIENT
Start: 2024-09-19

## 2024-09-19 NOTE — PATIENT INSTRUCTIONS
Thank you for visiting the Primary Care Center today at the [facility]! The following is some information about our clinic:     Primary Care Center Frequently-Asked Questions    (1) How do I schedule appointments at the [facility]?     Primary Care--to schedule or make changes to an existing appointment, please call our primary care line at [phone].    Labs--to schedule a lab appointment at the [facility] you can use [employer] or call [phone]. If you have a [place] location that is closer to home, you can reach out to that location for scheduling options.     Imaging--if you need to schedule a CT, X-ray, MRI, ultrasound, or other imaging study you can call [phone] to schedule at the [facility] or any other [facility] imaging location.     Referrals--if a referral to another specialty was ordered you can expect a phone call from their scheduling team. If you have not heard from them in a week, please call us or send us a [employer] message to check the status or get a scheduling number. Please note that this only applies to internal [facility] referrals. If the referral is external you would need to contact their office for scheduling.     (2) I have a question about my visit, who do I contact?     You can call us at the primary care line at [phone] to ask questions about your visit. You can also send a secure message through [employer], which is reviewed by clinic staff. Please note that [employer] messages have a twenty-four to forty-eight business hour turnaround time and should not be used for urgent concerns.    (3) How will I get the results of my tests?    If you are signed up for [employer] all tests will be released to you within twenty-four hours of resulting. Please allow three to five days for your doctor to review your results and place a note interpreting the results. If you do not have [employer] you will receive your  results through mail seven to ten business days following the return of the tests. Please note that if there should be any urgent or concerning results that your doctor or their registered nurse will reach out to you the same day as the tests come back. If you have follow up questions about your results or would like to discuss the results in detail please schedule a follow up with your provider either in person or virtually.     (4) How do I get refills of my prescriptions?     You should always first contact your pharmacy for refills of your medications. If submitting a refill request on [employer], please be sure to submit the request only once--repeat requests can cause delays in refill. If you are requesting a NEW medication or a medication related to new symptoms you will need to schedule an appointment with a provider prior to approval. Please note: Routine medication refills have up to one to three business day turnaround whereas controlled substances refills have up to five to seven business day turnaround.    (5) I have new symptoms, what do I do?     If you are having an immediate medical emergency, you should dial 911 for assistance.   For anything urgent that needs to be seen within a few hours to one day you should visit a local urgent care for assistance.  For non-urgent symptoms that need to be seen within a few days to a week you can schedule with an available provider in primary care by going to [employer] or calling [phone].   If you are not sure how serious your symptoms are or you would like to receive medical advice you can always call [phone] to speak with a triage nurse.

## 2024-09-19 NOTE — PROGRESS NOTES
Phone call duration: 11 minutes  Signed Electronically by: Huan Das MD        Telephone visit    Mr. Castillo Every agrees to a telephone visit    He states more or less life long anxiety now a little worse as he gets older. He states troubling sleeping as well and sometimes is up at 3 AM. He has never taken any medications for this. He has not seen a Health Psychologist either. He is agreeable to starting low dose 10 mg of Celexa and placed a referral to Health Psychology today. Will have a telephone visit with me in 2 weeks to follow up on medications.    ROSE MARIE Das MD

## 2024-10-01 ENCOUNTER — MYC REFILL (OUTPATIENT)
Dept: RHEUMATOLOGY | Facility: CLINIC | Age: 49
End: 2024-10-01
Payer: COMMERCIAL

## 2024-10-01 DIAGNOSIS — M47.819 SPONDYLOARTHROPATHY: ICD-10-CM

## 2024-10-02 DIAGNOSIS — M47.819 SPONDYLOARTHROPATHY: ICD-10-CM

## 2024-10-02 RX ORDER — SULFASALAZINE 500 MG/1
1500 TABLET, DELAYED RELEASE ORAL 2 TIMES DAILY
Qty: 540 TABLET | Refills: 1 | Status: SHIPPED | OUTPATIENT
Start: 2024-10-02

## 2024-10-04 RX ORDER — SULFASALAZINE 500 MG/1
1500 TABLET, DELAYED RELEASE ORAL 2 TIMES DAILY
Qty: 540 TABLET | Refills: 1 | OUTPATIENT
Start: 2024-10-04

## 2024-10-08 ENCOUNTER — VIRTUAL VISIT (OUTPATIENT)
Dept: INTERNAL MEDICINE | Facility: CLINIC | Age: 49
End: 2024-10-08
Payer: COMMERCIAL

## 2024-10-08 ENCOUNTER — TELEPHONE (OUTPATIENT)
Dept: INTERNAL MEDICINE | Facility: CLINIC | Age: 49
End: 2024-10-08

## 2024-10-08 DIAGNOSIS — F41.9 ANXIETY: Primary | ICD-10-CM

## 2024-10-08 PROCEDURE — 99441 PR PHYSICIAN TELEPHONE EVALUATION 5-10 MIN: CPT | Mod: 93 | Performed by: INTERNAL MEDICINE

## 2024-10-08 RX ORDER — CITALOPRAM HYDROBROMIDE 20 MG/1
20 TABLET ORAL DAILY
Qty: 30 TABLET | Refills: 2 | Status: SHIPPED | OUTPATIENT
Start: 2024-10-08

## 2024-10-08 NOTE — PROGRESS NOTES
Phone call duration: 5 minutes  Signed Electronically by: Huan Das MD      Telephone visit     Ms. Castillo Every agrees to a telephone visit    See last telephone visit    He was started on Celexa and he has a 12/30/2024 Health Psychology appt. Scheduled. He denies any side effects from a 10 mg dose of Celexa. Will increase to 20 mg and another telephone visit in 2 weeks.     ROSE MARIE Das MD

## 2024-10-08 NOTE — PATIENT INSTRUCTIONS
Thank you for visiting the [facility] The following is some information about our clinic:     Primary Care Center Frequently-Asked Questions    (1) How do I schedule appointments at the [facility]?     Primary Care--to schedule or make changes to an existing appointment, please call our primary care line at [phone].    Labs--to schedule a lab appointment at the [facility] you can use eCardio or call [phone]. If you have a [place] location that is closer to home, you can reach out to that location for scheduling options.     Imaging--if you need to schedule a CT, X-ray, MRI, ultrasound, or other imaging study you can call [phone] to schedule at the [facility] or any other [facility] imaging location.     Referrals--if a referral to another specialty was ordered you can expect a phone call from their scheduling team. If you have not heard from them in a week, please call us or send us a eCardio message to check the status or get a scheduling number. Please note that this only applies to internal [facility] referrals. If the referral is external you would need to contact their office for scheduling.     (2) I have a question about my visit, who do I contact?     You can call us at the primary care line at [phone] to ask questions about your visit. You can also send a secure message through eCardio, which is reviewed by clinic staff. Please note that eCardio messages have a twenty-four to forty-eight business hour turnaround time and should not be used for urgent concerns.    (3) How will I get the results of my tests?    If you are signed up for SHARKMARXt all tests will be released to you within twenty-four hours of resulting. Please allow three to five days for your doctor to review your results and place a note interpreting the results. If you do not have YoungCrackshart you will receive your  results through mail seven to ten business days following the return of the tests. Please note that if there should be any urgent or concerning results that your doctor or their registered nurse will reach out to you the same day as the tests come back. If you have follow up questions about your results or would like to discuss the results in detail please schedule a follow up with your provider either in person or virtually.     (4) How do I get refills of my prescriptions?     You should always first contact your pharmacy for refills of your medications. If submitting a refill request on Ariagora, please be sure to submit the request only once--repeat requests can cause delays in refill. If you are requesting a NEW medication or a medication related to new symptoms you will need to schedule an appointment with a provider prior to approval. Please note: Routine medication refills have up to one to three business day turnaround whereas controlled substances refills have up to five to seven business day turnaround.    (5) I have new symptoms, what do I do?     If you are having an immediate medical emergency, you should dial 911 for assistance.   For anything urgent that needs to be seen within a few hours to one day you should visit a local urgent care for assistance.  For non-urgent symptoms that need to be seen within a few days to a week you can schedule with an available provider in primary care by going to [facility] or calling [phone].   If you are not sure how serious your symptoms are or you would like to receive medical advice you can always call [phone] to speak with a triage nurse.

## 2024-10-08 NOTE — TELEPHONE ENCOUNTER
Left Voicemail (1st Attempt) for the patient to call back and schedule the following:    Appointment type: TELEPHONE RETURN VISIT  Provider: PCP  Return date: 10/8/2024  Specialty phone number: [phone]  Additional appointment(s) needed: -  Additonal Notes: Schedule in 2 weeks for telephone visit add on per PCP can route to clinic coordinators for scheduling

## 2024-10-10 ENCOUNTER — TELEPHONE (OUTPATIENT)
Dept: INTERNAL MEDICINE | Facility: CLINIC | Age: 49
End: 2024-10-10
Payer: COMMERCIAL

## 2024-10-10 NOTE — TELEPHONE ENCOUNTER
Patient confirmed scheduled appointment:  Date: 10/23/24  Time: 12:00  Visit type: TELEPHONE RETURN  Provider: PCP    Additional notes: 2 WEEK FOLLOW UP PER PCP

## 2024-10-14 ENCOUNTER — TELEPHONE (OUTPATIENT)
Dept: RHEUMATOLOGY | Facility: CLINIC | Age: 49
End: 2024-10-14
Payer: COMMERCIAL

## 2024-10-14 NOTE — TELEPHONE ENCOUNTER
Sent Mychart (1st Attempt) and Patient Contacted for the patient to call back and schedule the following:    Appointment type: Return Rheumatology  Provider: Dr. Luna  Return date: Rescheduling 2/13 appointment  Specialty phone number: [phone]  Additional appointment(s) needed: NA  Additonal Notes: 1st attempt to reschedule 2/13 appointment

## 2024-10-17 NOTE — TELEPHONE ENCOUNTER
Patient confirmed scheduled appointment:  Date: February 20th, 2025  Time: 3pm  Visit type: Return Rheumatology Video  Provider: Dr. Luna  Location: [facility]  Testing/imaging: NA  Additional notes: Resched from 2/13    Patient confirmed scheduled appointment:  Date: April 18th, 2025  Time: 3:30p  Visit type: Return Rheumatology  Provider: Dr. Luna  Location: [facility]  Testing/imaging: NA  Additional notes: NA

## 2024-10-23 ENCOUNTER — VIRTUAL VISIT (OUTPATIENT)
Dept: INTERNAL MEDICINE | Facility: CLINIC | Age: 49
End: 2024-10-23
Payer: COMMERCIAL

## 2024-10-23 DIAGNOSIS — F41.9 ANXIETY: Primary | ICD-10-CM

## 2024-10-23 PROCEDURE — 99441 PR PHYSICIAN TELEPHONE EVALUATION 5-10 MIN: CPT | Mod: 93 | Performed by: INTERNAL MEDICINE

## 2024-10-23 NOTE — PROGRESS NOTES
Telephone visit    Mr. Castillo Every agrees to a telephone visit    Last telephone note from 10/8/2024     He is now on 20 mg of Celexa for anxiety/depression. He states maybe some beneficial effects of Celexa a little less anxious.     He has 12/30/2024 [facility] appt. Scheduled.     He states after increasing his dose of Celexa to 20 mg that he had has daily gastrointestinal upset and nausea. He has tried to take this at different times and this does note seem to make a difference.    He will discontinue Celexa and start low dose Lexapro 10 mg and telephone visit in 2-3 weeks.

## 2024-10-24 RX ORDER — ESCITALOPRAM OXALATE 10 MG/1
10 TABLET ORAL DAILY
Qty: 30 TABLET | Refills: 2 | Status: SHIPPED | OUTPATIENT
Start: 2024-10-24

## 2024-10-25 ENCOUNTER — TELEPHONE (OUTPATIENT)
Dept: INTERNAL MEDICINE | Facility: CLINIC | Age: 49
End: 2024-10-25
Payer: COMMERCIAL

## 2024-10-25 NOTE — TELEPHONE ENCOUNTER
Patient confirmed scheduled appointment:  Date: Nov 8th   Time: 8am  Visit type: Telephone Return  Provider: PCP  Additional notes: 2 week follow up per message

## 2024-10-31 ENCOUNTER — ALLIED HEALTH/NURSE VISIT (OUTPATIENT)
Dept: FAMILY MEDICINE | Facility: CLINIC | Age: 49
End: 2024-10-31
Payer: COMMERCIAL

## 2024-10-31 DIAGNOSIS — Z23 ENCOUNTER FOR IMMUNIZATION: Primary | ICD-10-CM

## 2024-10-31 PROCEDURE — 90471 IMMUNIZATION ADMIN: CPT

## 2024-10-31 PROCEDURE — 90673 RIV3 VACCINE NO PRESERV IM: CPT

## 2024-10-31 PROCEDURE — 99207 PR NO CHARGE NURSE ONLY: CPT

## 2024-10-31 PROCEDURE — 90480 ADMN SARSCOV2 VAC 1/ONLY CMP: CPT

## 2024-10-31 PROCEDURE — 90750 HZV VACC RECOMBINANT IM: CPT

## 2024-10-31 PROCEDURE — 90472 IMMUNIZATION ADMIN EACH ADD: CPT

## 2024-10-31 PROCEDURE — 91320 SARSCV2 VAC 30MCG TRS-SUC IM: CPT

## 2024-10-31 NOTE — PROGRESS NOTES
Prior to immunization administration, verified patients identity using patient s name and date of birth. Please see Immunization Activity for additional information.     Is the patient's temperature normal (100.5 or less)? {:[id number]}          10/31/2024   COVID   Have you had myocarditis or pericarditis (inflammation of or around the heart muscle) after getting a COVID-19 vaccine? No   Have you had a serious reaction to a COVID vaccine or something in a COVID vaccine, like polyethylene glycol (PEG) or polysorbate? No   Have you had multisystem inflammatory syndrome from COVID-19 in the past 90 days? No   Have you received a bone marrow transplant within the previous 3 months? No            Patient MEETS CRITERIA. PROCEED with vaccine administration.            10/31/2024   INFLUENZA   Would you like to receive the flu shot or the nasal flu vaccine today? Flu Shot   Have you had a serious reaction to a flu vaccine or something in a flu vaccine? No   Have you had Guillain-Houston syndrome within 6 weeks of getting a vaccine? No   Have you received a bone marrow transplant within the previous 6 months? No            Patient MEETS CRITERIA. PROCEED with vaccine administration.            10/31/2024   Zoster   Have you had a serious reaction to the shingles vaccine or something in the shingles vaccine? No   Do you have shingles now? No   Are you getting treatment for cancer, organ transplant, or bone marrow transplant? No   Do you have an autoimmune or inflammatory condition? !YES   Is the patient immunocompromised and wanting to complete the Shingles vaccine series in less than 2 months? No   Have you had Guillain-Houston syndrome within 6 weeks of getting a vaccine? No            DO NOT VACCINATE. Patient is NOT eligible for vaccination. Discuss with provider at upcoming appointment if they have questions.      Patient instructed to remain in clinic for {:127417} minutes afterwards, and to report any adverse reactions.       Link to Ancillary Visit Immunization Standing Orders SmartSet     Screening performed by Cammie Vail MA on 10/31/2024 at 1:23 PM.

## 2024-10-31 NOTE — PROGRESS NOTES
Prior to immunization administration, verified patients identity using patient s name and date of birth. Please see Immunization Activity for additional information.     Is the patient's temperature normal (100.5 or less)? Yes 97.9 Ordered by Mayelin Patel APRN CNP           10/31/2024   COVID   Have you had myocarditis or pericarditis (inflammation of or around the heart muscle) after getting a COVID-19 vaccine? No   Have you had a serious reaction to a COVID vaccine or something in a COVID vaccine, like polyethylene glycol (PEG) or polysorbate? No   Have you had multisystem inflammatory syndrome from COVID-19 in the past 90 days? No   Have you received a bone marrow transplant within the previous 3 months? No            Patient MEETS CRITERIA. PROCEED with vaccine administration.            10/31/2024   INFLUENZA   Would you like to receive the flu shot or the nasal flu vaccine today? Flu Shot   Have you had a serious reaction to a flu vaccine or something in a flu vaccine? No   Have you had Guillain-Turon syndrome within 6 weeks of getting a vaccine? No   Have you received a bone marrow transplant within the previous 6 months? No            Patient MEETS CRITERIA. PROCEED with vaccine administration.            10/31/2024   Zoster   Have you had a serious reaction to the shingles vaccine or something in the shingles vaccine? No   Do you have shingles now? No   Are you getting treatment for cancer, organ transplant, or bone marrow transplant? No   Do you have an autoimmune or inflammatory condition? !YES   Is the patient immunocompromised and wanting to complete the Shingles vaccine series in less than 2 months? No   Have you had Guillain-Turon syndrome within 6 weeks of getting a vaccine? No            DO NOT VACCINATE. Patient is NOT eligible for vaccination. Discuss with provider at upcoming appointment if they have questions. Ordered by Mayelin Patel APRN CNP      Patient instructed to remain  in clinic for 15 minutes afterwards, and to report any adverse reactions.      Link to Ancillary Visit Immunization Standing Orders SmartSet     Screening performed by Cammie Vail MA on 10/31/2024 at 1:27 PM.

## 2024-11-07 NOTE — PROGRESS NOTES
Phone call duration: 5 minutes  Signed Electronically by: Huan Das MD        Telephone visit    Mr. [place] agrees to a telephone visit    Last telephone visit with us was 10/23/2024    He has a 12/30/2024 Health Psychology visit scheduled.     He could not tolerate Celexa nor Lexapro.     Placed Adult Mental Health Referral to Psychiatry and will follow up.

## 2024-11-08 ENCOUNTER — VIRTUAL VISIT (OUTPATIENT)
Dept: INTERNAL MEDICINE | Facility: CLINIC | Age: 49
End: 2024-11-08
Payer: COMMERCIAL

## 2024-11-08 DIAGNOSIS — F32.A DEPRESSION, UNSPECIFIED DEPRESSION TYPE: Primary | ICD-10-CM

## 2024-11-08 PROCEDURE — 99441 PR PHYSICIAN TELEPHONE EVALUATION 5-10 MIN: CPT | Mod: 93 | Performed by: INTERNAL MEDICINE

## 2024-11-08 NOTE — PATIENT INSTRUCTIONS
Thank you for visiting the Primary Care Center today at the [facility]! The following is some information about our clinic:     Primary Care Center Frequently-Asked Questions    (1) How do I schedule appointments at the [facility]?     Primary Care--to schedule or make changes to an existing appointment, please call our primary care line at [phone].    Labs--to schedule a lab appointment at the [facility] you can use SportsBlogs or call [phone]. If you have a [place] location that is closer to home, you can reach out to that location for scheduling options.     Imaging--if you need to schedule a CT, X-ray, MRI, ultrasound, or other imaging study you can call [phone] to schedule at the [facility] or any other [facility] imaging location.     Referrals--if a referral to another specialty was ordered you can expect a phone call from their scheduling team. If you have not heard from them in a week, please call us or send us a SportsBlogs message to check the status or get a scheduling number. Please note that this only applies to internal [facility] referrals. If the referral is external you would need to contact their office for scheduling.     (2) I have a question about my visit, who do I contact?     You can call us at the primary care line at [phone] to ask questions about your visit. You can also send a secure message through SportsBlogs, which is reviewed by clinic staff. Please note that SportsBlogs messages have a twenty-four to forty-eight business hour turnaround time and should not be used for urgent concerns.    (3) How will I get the results of my tests?    If you are signed up for Weimit all tests will be released to you within twenty-four hours of resulting. Please allow three to five days for your doctor to review your results and place a note interpreting the results. If you do not have Yoopayhart you will receive your  results through mail seven to ten business days following the return of the tests. Please note that if there should be any urgent or concerning results that your doctor or their registered nurse will reach out to you the same day as the tests come back. If you have follow up questions about your results or would like to discuss the results in detail please schedule a follow up with your provider either in person or virtually.     (4) How do I get refills of my prescriptions?     You should always first contact your pharmacy for refills of your medications. If submitting a refill request on Adtuitive, please be sure to submit the request only once--repeat requests can cause delays in refill. If you are requesting a NEW medication or a medication related to new symptoms you will need to schedule an appointment with a provider prior to approval. Please note: Routine medication refills have up to one to three business day turnaround whereas controlled substances refills have up to five to seven business day turnaround.    (5) I have new symptoms, what do I do?     If you are having an immediate medical emergency, you should dial 911 for assistance.   For anything urgent that needs to be seen within a few hours to one day you should visit a local urgent care for assistance.  For non-urgent symptoms that need to be seen within a few days to a week you can schedule with an available provider in primary care by going to [employer] or calling [phone].   If you are not sure how serious your symptoms are or you would like to receive medical advice you can always call [phone] to speak with a triage nurse.

## 2024-11-13 ENCOUNTER — OFFICE VISIT (OUTPATIENT)
Dept: DERMATOLOGY | Facility: CLINIC | Age: 49
End: 2024-11-13
Attending: PHYSICIAN ASSISTANT
Payer: COMMERCIAL

## 2024-11-13 DIAGNOSIS — L81.4 LENTIGINES: ICD-10-CM

## 2024-11-13 DIAGNOSIS — D49.2 NEOPLASM OF UNSPECIFIED BEHAVIOR OF BONE, SOFT TISSUE, AND SKIN: ICD-10-CM

## 2024-11-13 DIAGNOSIS — D22.9 MULTIPLE BENIGN NEVI: ICD-10-CM

## 2024-11-13 DIAGNOSIS — R21 RASH: ICD-10-CM

## 2024-11-13 DIAGNOSIS — D18.01 CHERRY ANGIOMA: ICD-10-CM

## 2024-11-13 DIAGNOSIS — L82.1 SEBORRHEIC KERATOSIS: Primary | ICD-10-CM

## 2024-11-13 RX ORDER — FLUOCINONIDE TOPICAL SOLUTION USP, 0.05% 0.5 MG/ML
SOLUTION TOPICAL 2 TIMES DAILY
Qty: 60 ML | Refills: 5 | Status: SHIPPED | OUTPATIENT
Start: 2024-11-13

## 2024-11-13 ASSESSMENT — PAIN SCALES - GENERAL: PAINLEVEL_OUTOF10: NO PAIN (0)

## 2024-11-13 NOTE — NURSING NOTE
Sabino Cardona's goals for this visit include:   Chief Complaint   Patient presents with    Skin Check     Pt here for [facility]. No personal hx of skin cancer, father had unknown type of skin cancer. No areas of concern       He requests these members of his care team be copied on today's visit information:     PCP: Huan Das    Referring Provider:  Shanna Valle PA-C  [address] [place] [id number]    There were no vitals taken for this visit.    Do you need any medication refills at today's visit?     Randi Sosa on 11/13/2024 at 7:57 AM

## 2024-11-13 NOTE — PATIENT INSTRUCTIONS
Patient Education       Proper skin care from [facility]:    -Eliminate harsh soaps as they strip the natural oils from the skin, often resulting in dry itchy skin ( i.e. Dial, Zest, Arabic Spring)  -Use mild soaps such as Cetaphil or Dove Sensitive Skin in the shower. You do not need to use soap on arms, legs, and trunk every time you shower unless visibly soiled.   -Avoid hot or cold showers.  -After showering, lightly dry off and apply moisturizing within 2-3 minutes. This will help trap moisture in the skin.   -Aggressive use of a moisturizer at least 1-2 times a day to the entire body (including -Vanicream, Cetaphil, Aquaphor or Cerave) and moisturize hands after every washing.  -We recommend using moisturizers that come in a tub that needs to be scooped out, not a pump. This has more of an oil base. It will hold moisture in your skin much better than a water base moisturizer. The above recommended are non-pore clogging.      Wear a sunscreen with at least SPF 30 on your face, ears, neck and V of the chest daily. Wear sunscreen on other areas of the body if those areas are exposed to the sun throughout the day. Sunscreens can contain physical and/or chemical blockers. Physical blockers are less likely to clog pores, these include zinc oxide and titanium dioxide. Reapply every two hour and after swimming.     Sunscreen examples: https://www.ewg.org/sunscreen/    UV radiation  UVA radiation remains constant throughout the day and throughout the year. It is a longer wavelength than UVB and therefore penetrates deeper into the skin leading to immediate and delayed tanning, photoaging, and skin cancer. 70-80% of UVA and UVB radiation occurs between the hours of 10am-2pm.  UVB radiation  UVB radiation causes the most harmful effects and is more significant during the summer months. However, snow and ice can reflect UVB radiation leading to skin damage during the winter months as well. UVB radiation is  responsible for tanning, burning, inflammation, delayed erythema (pinkness), pigmentation (brown spots), and skin cancer.     I recommend self monthly full body exams and yearly full body exams with a dermatology provider. If you develop a new or changing lesion please follow up for examination. Most skin cancers are pink and scaly or pink and pearly. However, we do see blue/brown/black skin cancers.  Consider the ABCDEs of melanoma when giving yourself your monthly full body exam ( don't forget the groin, buttocks, feet, toes, etc). A-asymmetry, B-borders, C-color, D-diameter, E-elevation or evolving. If you see any of these changes please follow up in clinic. If you cannot see your back I recommend purchasing a hand held mirror to use with a larger wall mirror.       Checking for Skin Cancer  You can find cancer early by checking your skin each month. There are 3 kinds of skin cancer. They are melanoma, basal cell carcinoma, and squamous cell carcinoma. Doing monthly skin checks is the best way to find new marks or skin changes. Follow the instructions below for checking your skin.   The ABCDEs of checking moles for melanoma   Check your moles or growths for signs of melanoma using ABCDE:   Asymmetry: the sides of the mole or growth don t match  Border: the edges are ragged, notched, or blurred  Color: the color within the mole or growth varies  Diameter: the mole or growth is larger than 6 mm (size of a pencil eraser)  Evolving: the size, shape, or color of the mole or growth is changing (evolving is not shown in the images below)    Checking for other types of skin cancer  Basal cell carcinoma or squamous cell carcinoma have symptoms such as:     A spot or mole that looks different from all other marks on your skin  Changes in how an area feels, such as itching, tenderness, or pain  Changes in the skin's surface, such as oozing, bleeding, or scaliness  A sore that does not heal  New swelling or redness beyond  the border of a mole    Who s at risk?  Anyone can get skin cancer. But you are at greater risk if you have:   Fair skin, light-colored hair, or light-colored eyes  Many moles or abnormal moles on your skin  A history of sunburns from sunlight or tanning beds  A family history of skin cancer  A history of exposure to radiation or chemicals  A weakened immune system  If you have had skin cancer in the past, you are at risk for recurring skin cancer.   How to check your skin  Do your monthly skin checkups in front of a full-length mirror. Check all parts of your body, including your:   Head (ears, face, neck, and scalp)  Torso (front, back, and sides)  Arms (tops, undersides, upper, and lower armpits)  Hands (palms, backs, and fingers, including under the nails)  Buttocks and genitals  Legs (front, back, and sides)  Feet (tops, soles, toes, including under the nails, and between toes)  If you have a lot of moles, take digital photos of them each month. Make sure to take photos both up close and from a distance. These can help you see if any moles change over time.   Most skin changes are not cancer. But if you see any changes in your skin, call your doctor right away. Only he or she can diagnose a problem. If you have skin cancer, seeing your doctor can be the first step toward getting the treatment that could save your life.   [employer] last reviewed this educational content on 4/1/2019 2000-2020 [employer]. [address] [place] [address], [place] [address]. All rights reserved. This information is not intended as a substitute for professional medical care. Always follow your healthcare professional's instructions.        Wound Care After a Biopsy    What is a skin biopsy?  A skin biopsy allows the doctor to examine a very small piece of tissue under the microscope to determine the diagnosis and the best treatment for the skin condition. A local anesthetic (numbing medicine)  is injected with a very small  needle into the skin area to be tested. A small piece of skin is taken from the area. Sometimes a suture (stitch) is used.     What are the risks of a skin biopsy?  I will experience scar, bleeding, swelling, pain, crusting and redness. I may experience incomplete removal or recurrence. Risks of this procedure are excessive bleeding, bruising, infection, nerve damage, numbness, thick (hypertrophic or keloidal) scar and non-diagnostic biopsy.    How should I care for my wound for the first 24 hours?  Keep the wound dry and covered for 24 hours  If it bleeds, hold direct pressure on the area for 15 minutes. If bleeding does not stop then go to the emergency room  Avoid strenuous exercise the first 1-2 days or as your doctor instructs you    How should I care for the wound after 24 hours?  After 24 hours, remove the bandage  You may bathe or shower as normal  If you had a scalp biopsy, you can shampoo as usual and can use shower water to clean the biopsy site daily  Clean the wound twice a day with gentle soap and water  Do not scrub, be gentle  Apply white petroleum/Vaseline after cleaning the wound with a cotton swab or a clean finger, and keep the site covered with a Bandaid /bandage. Bandages are not necessary with a scalp biopsy  If you are unable to cover the site with a Bandaid /bandage, re-apply ointment 2-3 times a day to keep the site moist. Moisture will help with healing  Avoid strenuous activity for first 1-2 days  Avoid lakes, rivers, pools, and oceans until the stitches are removed or the site is healed    How do I clean my wound?  Wash hands thoroughly with soap or use hand  before all wound care  Clean the wound with gentle soap and water  Apply white petroleum/Vaseline  to wound after it is clean  Replace the Bandaid /bandage to keep the wound covered for the first few days or as instructed by your doctor  If you had a scalp biopsy, warm shower water to the area on a daily basis should  suffice    What should I use to clean my wound?   Cotton-tipped applicators (Qtips )  White petroleum jelly (Vaseline ). Use a clean new container and use Q-tips to apply.  Bandaids   as needed  Gentle soap     How should I care for my wound long term?  Do not get your wound dirty  Keep up with wound care for one week or until the area is healed.  A small scab will form and fall off by itself when the area is completely healed. The area will be red and will become pink in color as it heals. Sun protection is very important for how your scar will turn out. Sunscreen with an SPF 30 or greater is recommended once the area is healed.  If you have stitches, stitches need to be removed in 10 days. You may return to our clinic for this or you may have it done locally at your doctor s office.  You should have some soreness but it should be mild and slowly go away over several days. Talk to your doctor about using tylenol for pain,    When should I call my doctor?  If you have increased:   Pain or swelling  Pus or drainage (clear or slightly yellow drainage is ok)  Temperature over 100F  Spreading redness or warmth around wound    When will I hear about my results?  The biopsy results can take 2-3 weeks to come back. The clinic will call you with the results, send you a [employer] message, or have you schedule a follow-up clinic or phone time to discuss the results. Contact our clinics if you do not hear from us in 3 weeks.     Who should I call with questions?  [facility]: [phone]   [facility]: [phone]  For urgent needs outside of business hours call the [facility] at [phone] and ask for the dermatology resident on call

## 2024-11-13 NOTE — LETTER
11/13/2024      Sabino Cardona  [address]  [facility] [address]      Dear Colleague,    Thank you for referring your patient, Sabino Cardona, to the [facility]. Please see a copy of my visit note below.    [facility] Dermatology Note  Encounter Date: Nov 13, 2024  Office Visit      Dermatology Problem List:  # NUB, L lateral superior neck, s/p biopsy performed on 11/13/24  1. Rash - ddx: mild PSO of scalp and patch on mid upper chest - trial of corticosteroids, improves - then slowly recurs  - triamcinolone, fluocinonide for scalp  2. *LTM - 3 mm dark brown macule on the right dorsal foot  - Unchanged 11/13/24, continue to monitor  3. DF - R mid abdomen - new in past 6mo - will monitor     PMHx: SLE given positive anti - DNA, inflammatory arthritis - HCQ 200mg po BID and sulfasalazine  FHx: Father with unknown type of skin cancer. 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 Wife is transplant surgeon at the   ____________________________________________    Assessment & Plan:  # Neoplasm of unspecified behavior of the skin (D49.2) on the left lateral superior neck. The differential diagnosis includes.BCC vs inflammatory papule vs psoriasis vs other.   - Shave biopsy performed today, see procedure note below.   - Photographed on 11/13/24     # Presumed DF, R mid abdomen - onset 6mo ago  - Pink indurated papule, most consistent with dermatofibroma    # Rash - presumed most likely PSO, very mild - scalp is resolved, no active rash at this time with use of fluocinonide. Small erythematous patch on the mid upper chest that has flares  - continue fluocinonide prn for scalp flares - refilled today  - continue Triam for patch on chest    # Benign findings: multiple benign nevi, lentigines, cherry angiomas, SKs  - edu on benign etiology  - Signs and Symptoms of  non-melanoma skin cancer and ABCDEs of melanoma reviewed with patient. Patient encouraged to perform monthly self skin exams and educated on how to perform them. UV precautions reviewed with patient. Patient was asked about new or changing moles/lesions on body.   - Sunscreen: Apply 20 minutes prior to going outdoors and reapply every two hours, when wet or sweating. We recommend using an SPF 30 or higher, and to use one that is water resistant.     - RTC for changes     Procedures Performed:   - Shave biopsy procedure note, location(s): left lateral superior neck. After discussion of benefits and risks including but not limited to bleeding, infection, scar, incomplete removal, recurrence, and non-diagnostic biopsy, written consent and photographs were obtained. The area was cleaned with isopropyl alcohol. 0.5mL of 1% lidocaine with epinephrine was injected to obtain adequate anesthesia of lesion(s). Shave biopsy at site(s) performed. Hemostasis was achieved with aluminium chloride. Petrolatum ointment and a sterile dressing were applied. The patient tolerated the procedure and no complications were noted. The patient was provided with verbal and written post care instructions.      Follow-up: 1 year(s) in-person, or earlier for new or changing lesions     Staff and Scribe:     Scribe Disclosure:   I, Amy Busch, am serving as a scribe to document services personally performed by Shanna Valle PA-C -based on data collection and the provider's statements to me.     Provider Disclosure:  I agree with above history, review of systems, physical exam, and plan. I have reviewed the content of the documentation and have edited it as needed. I have personally performed the services documented here and the documentation accurately represents those services and the decisions I have made.        All risks, benefits and alternatives were discussed with patient.  Patient is in agreement and understands the assessment and  plan.  All questions were answered.    Shanna Valle PA-C, MPAS  [facility]: Phone: [phone], Fax: [phone]  [facility]: Phone: [phone],  Fax: [phone]  [facility] [place] [facility]: Phone: [phone], Fax: [phone]  ____________________________________________    CC: Skin Check (Pt here for FBSC. No personal hx of skin cancer, father had unknown type of skin cancer. No areas of concern)      Reviewed patients past medical history and pertinent chart review prior to patient's visit today.     HPI:  Mr. Sabino Cardona is a 49 year old male who presents today as a return patient for [facility]. There is a spot of concern on his chest that he reports has gone away in the past with topical treatment. Just recently returned so he used triam on it today. Scalp has improved significantly with fluocinonide. Notes a new spot on the abdomen next to the umbilicus which has been present about 6mo, asx.     Patient is otherwise feeling well, without additional concerns.    Labs:  N/A    Physical Exam:  Vitals: There were no vitals taken for this visit.  SKIN: Full skin, which includes the head/face, both arms, chest, back, abdomen,both legs, genitalia and/or groin buttocks, digits and/or nails, was examined.   - Collins's skin type II, <100 nevi  - There are dome shaped bright red papules on the trunk.   - Multiple regular brown pigmented macules and papules are identified on the trunk and extremities.   - Scattered brown macules on sun exposed areas.  - There are waxy stuck on tan to brown papules on the trunk.    - There is a 6mm pink shiny papule on the left superior lateral neck.  - There is a pink indurated papule on the R mid abdomen, most consistent with dermatofibroma.  - No other lesions of concern on areas examined.     Medications:  Current Outpatient Medications   Medication Sig Dispense Refill      Esomeprazole Magnesium 40 MG PACK Pt taking 20 mg       fluocinonide (LIDEX) 0.05 % external solution Apply topically 2 times daily. 60 mL 1     hydroxychloroquine (PLAQUENIL) 200 MG tablet Take 1 tablet (200 mg) by mouth 2 times daily Annual Plaquenil toxicity eye screening required. 180 tablet 3     sulfaSALAzine ER (AZULFIDINE EN) 500 MG EC tablet Take 3 tablets (1,500 mg) by mouth 2 times daily. Labs required every 6 months while taking this medication. Hold for signs of infection, and seek medical attention. Please call to scheduled follow-up appointment, overdue. 540 tablet 1     triamcinolone (KENALOG) 0.1 % external ointment Apply topically 2 times daily. 15 g 2     Vitamin D3 (CHOLECALCIFEROL) 25 mcg (1000 units) tablet Take by mouth every morning       citalopram (CELEXA) 10 MG tablet Take 1 tablet (10 mg) by mouth daily. 30 tablet 2     citalopram (CELEXA) 20 MG tablet Take 1 tablet (20 mg) by mouth daily. 30 tablet 2     escitalopram (LEXAPRO) 10 MG tablet Take 1 tablet (10 mg) by mouth daily. 30 tablet 2     No current facility-administered medications for this visit.      Past Medical/Surgical History:   Patient Active Problem List   Diagnosis     Esophageal reflux     Biceps tendinopathy     Wart     Mucocele of lip     Sore throat     Past Medical History:   Diagnosis Date     Arthritis 01/01/19     Biceps tendinopathy      GERD (gastroesophageal reflux disease)      Mucocele of lip      Spondyloarthropathy                          Again, thank you for allowing me to participate in the care of your patient.        Sincerely,        Shanna Valle PA-C

## 2024-11-13 NOTE — PROGRESS NOTES
[facility] Dermatology Note  Encounter Date: Nov 13, 2024  Office Visit      Dermatology Problem List:  # NUB, L lateral superior neck, s/p biopsy performed on 11/13/24  1. Rash - ddx: mild PSO of scalp and patch on mid upper chest - trial of corticosteroids, improves - then slowly recurs  - triamcinolone, fluocinonide for scalp  2. *LTM - 3 mm dark brown macule on the right dorsal foot  - Unchanged 11/13/24, continue to monitor  3. DF - R mid abdomen - new in past 6mo - will monitor     PMHx: SLE given positive anti - DNA, inflammatory arthritis - HCQ 200mg po BID and sulfasalazine  FHx: Father with unknown type of skin cancer. 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 Wife is transplant surgeon at the   ____________________________________________    Assessment & Plan:  # Neoplasm of unspecified behavior of the skin (D49.2) on the left lateral superior neck. The differential diagnosis includes.BCC vs inflammatory papule vs psoriasis vs other.   - Shave biopsy performed today, see procedure note below.   - Photographed on 11/13/24     # Presumed DF, R mid abdomen - onset 6mo ago  - Pink indurated papule, most consistent with dermatofibroma    # Rash - presumed most likely PSO, very mild - scalp is resolved, no active rash at this time with use of fluocinonide. Small erythematous patch on the mid upper chest that has flares  - continue fluocinonide prn for scalp flares - refilled today  - continue Triam for patch on chest    # Benign findings: multiple benign nevi, lentigines, cherry angiomas, SKs  - edu on benign etiology  - Signs and Symptoms of non-melanoma skin cancer and ABCDEs of melanoma reviewed with patient. Patient encouraged to perform monthly self skin exams and educated on how to perform them. UV precautions reviewed with patient. Patient was asked about new or changing moles/lesions on  body.   - Sunscreen: Apply 20 minutes prior to going outdoors and reapply every two hours, when wet or sweating. We recommend using an SPF 30 or higher, and to use one that is water resistant.     - RTC for changes     Procedures Performed:   - Shave biopsy procedure note, location(s): left lateral superior neck. After discussion of benefits and risks including but not limited to bleeding, infection, scar, incomplete removal, recurrence, and non-diagnostic biopsy, written consent and photographs were obtained. The area was cleaned with isopropyl alcohol. 0.5mL of 1% lidocaine with epinephrine was injected to obtain adequate anesthesia of lesion(s). Shave biopsy at site(s) performed. Hemostasis was achieved with aluminium chloride. Petrolatum ointment and a sterile dressing were applied. The patient tolerated the procedure and no complications were noted. The patient was provided with verbal and written post care instructions.      Follow-up: 1 year(s) in-person, or earlier for new or changing lesions     Staff and Scribe:     Scribe Disclosure:   I, Amy Busch, am serving as a scribe to document services personally performed by Shanna Valle PA-C -based on data collection and the provider's statements to me.     Provider Disclosure:  I agree with above history, review of systems, physical exam, and plan. I have reviewed the content of the documentation and have edited it as needed. I have personally performed the services documented here and the documentation accurately represents those services and the decisions I have made.        All risks, benefits and alternatives were discussed with patient.  Patient is in agreement and understands the assessment and plan.  All questions were answered.    Shanna Valle PA-C, [facility]  [facility]: Phone: [phone], Fax: [phone]  [facility]: Phone: [phone],  Fax:  [phone]  [facility]: Phone: [phone], Fax: [phone]  ____________________________________________    CC: Skin Check (Pt here for [facility]. No personal hx of skin cancer, father had unknown type of skin cancer. No areas of concern)      Reviewed patients past medical history and pertinent chart review prior to patient's visit today.     HPI:  Mr. Sabino Cardona is a 49 year old male who presents today as a return patient for [facility]. There is a spot of concern on his chest that he reports has gone away in the past with topical treatment. Just recently returned so he used triam on it today. Scalp has improved significantly with fluocinonide. Notes a new spot on the abdomen next to the umbilicus which has been present about 6mo, asx.     Patient is otherwise feeling well, without additional concerns.    Labs:  N/A    Physical Exam:  Vitals: There were no vitals taken for this visit.  SKIN: Full skin, which includes the head/face, both arms, chest, back, abdomen,both legs, genitalia and/or groin buttocks, digits and/or nails, was examined.   - Collins's skin type II, <100 nevi  - There are dome shaped bright red papules on the trunk.   - Multiple regular brown pigmented macules and papules are identified on the trunk and extremities.   - Scattered brown macules on sun exposed areas.  - There are waxy stuck on tan to brown papules on the trunk.    - There is a 6mm pink shiny papule on the left superior lateral neck.  - There is a pink indurated papule on the R mid abdomen, most consistent with dermatofibroma.  - No other lesions of concern on areas examined.     Medications:  Current Outpatient Medications   Medication Sig Dispense Refill    Esomeprazole Magnesium 40 MG PACK Pt taking 20 mg      fluocinonide (LIDEX) 0.05 % external solution Apply topically 2 times daily. 60 mL 1    hydroxychloroquine (PLAQUENIL) 200 MG tablet Take 1 tablet (200 mg) by mouth 2 times daily Annual  Plaquenil toxicity eye screening required. 180 tablet 3    sulfaSALAzine ER (AZULFIDINE EN) 500 MG EC tablet Take 3 tablets (1,500 mg) by mouth 2 times daily. Labs required every 6 months while taking this medication. Hold for signs of infection, and seek medical attention. Please call to scheduled follow-up appointment, overdue. 540 tablet 1    triamcinolone (KENALOG) 0.1 % external ointment Apply topically 2 times daily. 15 g 2    Vitamin D3 (CHOLECALCIFEROL) 25 mcg (1000 units) tablet Take by mouth every morning      citalopram (CELEXA) 10 MG tablet Take 1 tablet (10 mg) by mouth daily. 30 tablet 2    citalopram (CELEXA) 20 MG tablet Take 1 tablet (20 mg) by mouth daily. 30 tablet 2    escitalopram (LEXAPRO) 10 MG tablet Take 1 tablet (10 mg) by mouth daily. 30 tablet 2     No current facility-administered medications for this visit.      Past Medical/Surgical History:   Patient Active Problem List   Diagnosis    Esophageal reflux    Biceps tendinopathy    Wart    Mucocele of lip    Sore throat     Past Medical History:   Diagnosis Date    Arthritis 01/01/19    Biceps tendinopathy     GERD (gastroesophageal reflux disease)     Mucocele of lip     Spondyloarthropathy

## 2024-11-14 LAB
PATH REPORT.COMMENTS IMP SPEC: ABNORMAL
PATH REPORT.COMMENTS IMP SPEC: ABNORMAL
PATH REPORT.COMMENTS IMP SPEC: YES
PATH REPORT.FINAL DX SPEC: ABNORMAL
PATH REPORT.GROSS SPEC: ABNORMAL
PATH REPORT.MICROSCOPIC SPEC OTHER STN: ABNORMAL
PATH REPORT.RELEVANT HX SPEC: ABNORMAL

## 2024-11-15 ENCOUNTER — TELEPHONE (OUTPATIENT)
Dept: DERMATOLOGY | Facility: CLINIC | Age: 49
End: 2024-11-15
Payer: COMMERCIAL

## 2024-11-15 NOTE — TELEPHONE ENCOUNTER
Excision/Mohs previsit information                                                    Diagnosis: squamous cell carcinoma in situ  Site(s): left lateral superior neck    Is the surgical site below the waist?  NO  If yes, instruct the patient to purchase over the counter chlorhexidine surgical soap and wash all skin below the belly button twice before surgery    Allergies   Allergen Reactions    Nkda [No Known Drug Allergy]     Seasonal Allergies Other (See Comments)     Sneezing, running nose, itchy eyes       Review and update allergy and medication list    Do you take the following medications:  Coumadin, Eliquis, Pradaxa, Xarelto:  NO.  If on Coumadin, INR should be checked within 7 days of surgery.  Range should be 3.5 or less or within therapeutic range.    Do you currently or have you previously had any of the following conditions:  Hepatitis:  NO  HIV/AIDS:  NO  Prolonged bleeding or bleeding disorder:  NO  Pacemaker: NO  Defibrillator:  NO  History of artificial or heart valve replacement:  NO  Endocarditis (inflammation of the inner lining of the heart's chambers and valves):  NO  Have you ever had a prosthetic joint infection:  NO  Pregnant or Breastfeeding:  N/A  Mobility device (wheelchair, transfer difficulty): NO    Important Reminders:                                                      -For Mohs, notify patient they may be here through the lunch hour.  Be prepared to have down time and we recommend they bring a book or something to do.  -Ok to take all of their medications as prescribed  -Notify patient to eat prior to appointment.  The medication is more likely to cause you to feel jittery if you have an empty stomach.  -If face is being treated, please come with a make-up free face  -Avoid wearing earrings and necklaces  -If scalp is being treated, please come with clean hair free from product  -Patient will not be able to get the site wet for 48 hrs  -No submerging wound in standing water (lake,  pool, bathtub, hot tub) for 2 weeks  -No physical activity for 48 hrs (further restrictions will be discussed by MD at time of visit)    If any positives, send to RN for further review  Africa Wilson RN

## 2024-11-15 NOTE — TELEPHONE ENCOUNTER
"2 Result Notes       Component  Ref Range & Units  Resulting Agency   Case Report   Surgical Pathology Report                         Case: [id number]                                   Authorizing Provider:  Shanna Vlale PA-C     Collected:           11/13/2024 08:11 AM           Ordering Location:     [facility]   Received:            11/13/2024 10:04 AM                                  [place]                                                                   Pathologist:           Janes Oswald MD                                                       Specimen:    Skin, L lateral superior neck                                                              Final Diagnosis   Left lateral superior neck:  - Squamous cell carcinoma in situ - (see description)     Electronically signed by Janes Oswald MD on 11/14/2024 at 11:09 PM   Clinical Information  [facility]   The patient is a 49 year old male.    Gross Description  [facility]   A(1). Skin, L lateral superior neck:  The specimen is received in formalin with proper patient identification, labeled \"L lateral superior neck\".  The specimen consists of a 1.0 x 0.6 cm irregular skin shave.  The epidermis displays a 1.0 x 0.6 cm ill-defined, tan-white lesion.  The resection margin is inked blue, specimen is sectioned into 4 pieces, and is submitted entirely in cassette A1.              Microscopic Description  [facility]   The specimen exhibits epidermal hyperplasia with full-thickness keratinocyte atypia and suprabasal mitoses.     MCRS  N/A Yes Abnormal  [facility]   Performing Labs  [facility]   The technical component of this testing was completed at [facility].     Stain controls for all stains resulted within this report have been reviewed and show appropriate reactivity.              Specimen Collected: 11/13/24  8:11 AM Last Resulted: " 11/14/24 11:09 PM       Order Details        View Encounter        Lab and Collection Details        Routing        Result History     View All Conversations on this Encounter     Scans on Order [id number]    Document on 11/14/2024 11:09 PM by Janes Oswald MD         Result Care Coordination      Result Notes     Africa Wilson RN  11/15/2024  9:50 AM CST Back to Top      Called and talked with patient. Discussed pathology results. Patient had no questions at this time regarding diagnosis. Consultation is scheduled for 1/6/25 at 11:15 am with Kimo. Mohs scheduled for 1/20/25 at 8:00 am with Kimo.  Checklist complete, all negative. Reviewed important reminders with patient.     Africa Wilson RN on 11/15/2024 at 9:50 AM    Shanna Valle PA-C  11/15/2024  7:57 AM CST       [facility] - will need MMS - please refer to derm surg

## 2024-12-30 ENCOUNTER — VIRTUAL VISIT (OUTPATIENT)
Dept: PSYCHOLOGY | Facility: CLINIC | Age: 49
End: 2024-12-30
Attending: INTERNAL MEDICINE
Payer: COMMERCIAL

## 2024-12-30 DIAGNOSIS — F41.1 GENERALIZED ANXIETY DISORDER: Primary | ICD-10-CM

## 2024-12-30 PROCEDURE — 90791 PSYCH DIAGNOSTIC EVALUATION: CPT | Mod: 95

## 2024-12-30 ASSESSMENT — ANXIETY QUESTIONNAIRES
6. BECOMING EASILY ANNOYED OR IRRITABLE: SEVERAL DAYS
1. FEELING NERVOUS, ANXIOUS, OR ON EDGE: SEVERAL DAYS
2. NOT BEING ABLE TO STOP OR CONTROL WORRYING: SEVERAL DAYS
7. FEELING AFRAID AS IF SOMETHING AWFUL MIGHT HAPPEN: NOT AT ALL
4. TROUBLE RELAXING: NOT AT ALL
GAD7 TOTAL SCORE: 4
IF YOU CHECKED OFF ANY PROBLEMS ON THIS QUESTIONNAIRE, HOW DIFFICULT HAVE THESE PROBLEMS MADE IT FOR YOU TO DO YOUR WORK, TAKE CARE OF THINGS AT HOME, OR GET ALONG WITH OTHER PEOPLE: NOT DIFFICULT AT ALL
8. IF YOU CHECKED OFF ANY PROBLEMS, HOW DIFFICULT HAVE THESE MADE IT FOR YOU TO DO YOUR WORK, TAKE CARE OF THINGS AT HOME, OR GET ALONG WITH OTHER PEOPLE?: NOT DIFFICULT AT ALL
5. BEING SO RESTLESS THAT IT IS HARD TO SIT STILL: NOT AT ALL
GAD7 TOTAL SCORE: 0
GAD7 TOTAL SCORE: 0
7. FEELING AFRAID AS IF SOMETHING AWFUL MIGHT HAPPEN: NOT AT ALL
3. WORRYING TOO MUCH ABOUT DIFFERENT THINGS: SEVERAL DAYS

## 2024-12-30 ASSESSMENT — PATIENT HEALTH QUESTIONNAIRE - PHQ9
10. IF YOU CHECKED OFF ANY PROBLEMS, HOW DIFFICULT HAVE THESE PROBLEMS MADE IT FOR YOU TO DO YOUR WORK, TAKE CARE OF THINGS AT HOME, OR GET ALONG WITH OTHER PEOPLE: NOT DIFFICULT AT ALL
SUM OF ALL RESPONSES TO PHQ QUESTIONS 1-9: 0

## 2024-12-30 ASSESSMENT — COLUMBIA-SUICIDE SEVERITY RATING SCALE - C-SSRS
1. HAVE YOU WISHED YOU WERE DEAD OR WISHED YOU COULD GO TO SLEEP AND NOT WAKE UP?: NO
6. HAVE YOU EVER DONE ANYTHING, STARTED TO DO ANYTHING, OR PREPARED TO DO ANYTHING TO END YOUR LIFE?: NO
ATTEMPT LIFETIME: NO
2. HAVE YOU ACTUALLY HAD ANY THOUGHTS OF KILLING YOURSELF?: NO
TOTAL  NUMBER OF INTERRUPTED ATTEMPTS LIFETIME: NO
TOTAL  NUMBER OF ABORTED OR SELF INTERRUPTED ATTEMPTS LIFETIME: NO

## 2024-12-30 NOTE — PROGRESS NOTES
"    [facility]       PATIENT'S NAME: Sabino Cardona  PREFERRED NAME: Emerson  PRONOUNS: he/him/his     MRN: [id number]  : 1975  ADDRESS: [address] [place] [id number]  ACCT. NUMBER:  [id number]  DATE OF SERVICE: 24  START TIME: 11:36 AM  END TIME: 12:33 PM  PREFERRED PHONE: [phone]  May we leave a program related message: Yes  EMERGENCY CONTACT: was not obtained.  SERVICE MODALITY:  Video Visit:      Provider verified identity through the following two step process.  Patient provided:  Patient  and Patient address    Telemedicine Visit: The patient's condition can be safely assessed and treated via synchronous audio and visual telemedicine encounter.      Reason for Telemedicine Visit: Patient has requested telehealth visit    Originating Site (Patient Location): Patient's other His Car    Distant Site (Provider Location): [facility] [place] [facility]    Consent:  The patient/guardian has verbally consented to: the potential risks and benefits of telemedicine (video visit) versus in person care; bill my insurance or make self-payment for services provided; and responsibility for payment of non-covered services.     Patient would like the video invitation sent by:  Text to cell phone: [phone]    Mode of Communication:  Video Conference [facility]    Distant Location (Provider):  On-site    As the provider I attest to compliance with applicable laws and regulations related to telemedicine.    UNIVERSAL ADULT Mental Health DIAGNOSTIC ASSESSMENT    Identifying Information:  Patient is a 49 year old,   individual.  Patient was referred for an assessment by referring provider.  Patient attended the session alone.    Chief Complaint:   The reason for seeking services at this time is: \" Anxiety.\" Emerson is coming to therapy to learn skills to address his anxiety, over thinking, worries, and disrupted sleep. The problem(s) " "began in childhood. Patient has attempted to resolve these concerns in the past through medication trails .    Social/Family History:  Patient was born and raised in  [place], [place] .  Patient has not moved during childhood.  They were raised by biological parents.  Parents stayed . He is the middle child with an older brother and a younger sister.    Patient reported that their childhood was \"good, it was happy, we played a lot of sports.  Patient described their current relationships with family of origin as \"good with my parents, we talk a few times a week, very close with my brother and I was close with my sister but that has changed over the years.\" He reports some strain with his sister over the past few years.      The patient describes their cultural background as White.  Cultural influences and impact on patient's life structure, values, norms, and healthcare: None Identified.  Contextual influences on patient's health include: None Identified.  Cultural, Contextual, and socioeconomic factors do not affect the patient's access to services.  These factors will be addressed in the Preliminary Treatment plan.  Patient identified their preferred language to be English. Patient reported they do not  need the assistance of an  or other support involved in therapy.     Patient reported had no significant delays in developmental tasks.   Patient's highest education level was graduate school. Patient identified the following learning problems:  dyslexia .  Modifications will not be used to assist communication in therapy.  Patient reports they are  able to understand written materials.    Patient reported the following relationship history 1 marriage.  Patient's current relationship status is  for 13 years.   Patient identified their sexual orientation as heterosexual.  Patient reported having three child(carlita). Patient identified partner, parents, siblings, and friends as part of their support " system.  Patient identified the quality of these relationships as stable and meaningful.     Patient's current living/housing situation involves staying in own home/apartment.  They live with his wife and their three children, daughter- 10 and  and they report that housing is stable.     Patient is currently employed full time and reports they are able to function appropriately at work..  Patient reports their finances are obtained through employment.  Patient does not identify finances as a current stressor.      Patient reported that they have not been involved with the legal system.  Patient denies being on probation / parole / under the jurisdiction of the court.    Patient's Strengths and Limitations:  Patient identified the following strengths or resources that will help them succeed in treatment: friends / good social support, family support, insight, positive work environment, motivation, and work ethic. Things that may interfere with the patient's success in treatment include: none identified.     Assessments:  The following assessments were completed by patient for this visit:  PHQ2:       11/8/2024     7:43 AM 10/8/2024    12:20 PM 9/19/2024    12:15 PM 9/9/2024     8:24 AM 7/24/2024    11:48 AM 8/3/2023    12:06 PM 12/9/2022     7:55 AM   PHQ-2 ( 1999 [employer])   Q1: Little interest or pleasure in doing things 0 0 0 0 0 0 0   Q2: Feeling down, depressed or hopeless 0 0 0 0 0 0 0   PHQ-2 Score 0 0 0 0 0 0 0     PHQ9:       12/30/2024     6:42 AM   PHQ-9 SCORE   PHQ-9 Total Score MyChart 0   PHQ-9 Total Score 4     GAD2:        No data to display              GAD7:       12/30/2024     6:43 AM   DUSTY-7 SCORE   Total Score 0 (minimal anxiety)   Total Score 4     CAGE-AID:       12/30/2024    11:00 AM   CAGE-AID Total Score   Total Score 0     PROMIS 10-Global Health (all questions and answers displayed):       12/30/2024    11:00 AM   PROMIS 10   In general, would you say your health is: 3   In general, would  you say your quality of life is: 5   In general, how would you rate your physical health? 4   In general, how would you rate your mental health, including your mood and your ability to think? 3   In general, how would you rate your satisfaction with your social activities and relationships? 2   In general, please rate how well you carry out your usual social activities and roles. (This includes activities at home, at work and in your community, and responsibilities as a parent, child, spouse, employee, friend, etc.) 3   To what extent are you able to carry out your everyday physical activities such as walking, climbing stairs, carrying groceries, or moving a chair? 5   In the past 7 days, how often have you been bothered by emotional problems such as feeling anxious, depressed, or irritable? 3   In the past 7 days, how would you rate your fatigue on average? 2   In the past 7 days, how would you rate your pain on average, where 0 means no pain, and 10 means worst imaginable pain? 1   Global Mental Health Score 13   Global Physical Health Score 17   PROMIS TOTAL - SUBSCORES 30     [place] Suicide Severity Rating Scale (Lifetime/Recent)      12/30/2024     2:17 PM   [place] Suicide Severity Rating (Lifetime/Recent)   Q1 Wish to be Dead (Lifetime) N   Q2 Non-Specific Active Suicidal Thoughts (Lifetime) N   Actual Attempt (Lifetime) N   Has subject engaged in non-suicidal self-injurious behavior? (Lifetime) N   Interrupted Attempts (Lifetime) N   Aborted or Self-Interrupted Attempt (Lifetime) N   Preparatory Acts or Behavior (Lifetime) N   Calculated C-SSRS Risk Score (Lifetime/Recent) No Risk Indicated       Personal and Family Medical History:  Patient does report a family history of mental health concerns.  Patient reports family history includes Asthma in his father, sister, and sister; Cancer in his maternal grandfather and paternal grandfather; Diabetes in his father and maternal grandmother; Hypertension in his  father, maternal grandfather, maternal grandmother, and mother; Psoriasis in his father..     Patient does not report Mental Health Diagnosis or Treatment.      Patient has had a physical exam to rule out medical causes for current symptoms.  Date of last physical exam was within the past year. Client was encouraged to follow up with PCP if symptoms were to develop. The patient has a [place] Primary Care Provider, who is named Huan Das.  Patient reports the following current medical concerns: Lupus, possible skin cancer .  Patient reports pain concerns including due to his Lupus.  Patient does not want help addressing pain concerns..   There are not significant appetite / nutritional concerns / weight changes. These may include: no concerns. Patient reports the following sleep concerns:  Early awakening middle of the night waking up.   Patient does not report a history of head injury / trauma / cognitive impairment.      Patient reports current meds as:   Current Outpatient Medications   Medication Sig Dispense Refill    citalopram (CELEXA) 10 MG tablet Take 1 tablet (10 mg) by mouth daily. 30 tablet 2    citalopram (CELEXA) 20 MG tablet Take 1 tablet (20 mg) by mouth daily. 30 tablet 2    escitalopram (LEXAPRO) 10 MG tablet Take 1 tablet (10 mg) by mouth daily. 30 tablet 2    Esomeprazole Magnesium 40 MG PACK Pt taking 20 mg      fluocinonide (LIDEX) 0.05 % external solution Apply topically 2 times daily. 60 mL 5    hydroxychloroquine (PLAQUENIL) 200 MG tablet Take 1 tablet (200 mg) by mouth 2 times daily Annual Plaquenil toxicity eye screening required. 180 tablet 3    sulfaSALAzine ER (AZULFIDINE EN) 500 MG EC tablet Take 3 tablets (1,500 mg) by mouth 2 times daily. Labs required every 6 months while taking this medication. Hold for signs of infection, and seek medical attention. Please call to scheduled follow-up appointment, overdue. 540 tablet 1    triamcinolone (KENALOG) 0.1 % external ointment  Apply topically 2 times daily. 15 g 2    Vitamin D3 (CHOLECALCIFEROL) 25 mcg (1000 units) tablet Take by mouth every morning       No current facility-administered medications for this visit.       Medication Adherence:  Patient reports taking psychiatric medications as prescribed.    Patient Allergies:    Allergies   Allergen Reactions    Nkda [No Known Drug Allergy]     Seasonal Allergies Other (See Comments)     Sneezing, running nose, itchy eyes       Medical History:    Past Medical History:   Diagnosis Date    Arthritis 01/01/19    Biceps tendinopathy     GERD (gastroesophageal reflux disease)     Mucocele of lip     Spondyloarthropathy          Current Mental Status Exam:   Appearance:  Appropriate    Eye Contact:  Good   Psychomotor:  Normal       Gait / station:  no problem  Attitude / Demeanor: Cooperative  Friendly  Speech      Rate / Production: Normal/ Responsive      Volume:  Normal       Language:  intact and no problems  Mood:   Normal  Affect:   Appropriate    Thought Content: Clear   Thought Process: Coherent  Goal Directed  Logical       Associations: No loosening of associations  Insight:   Good   Judgment:  Intact   Orientation:  All  Attention/concentration: Good    Substance Use:   Patient did not report a family history of substance use concerns; see medical history section for details.  Patient has not received chemical dependency treatment in the past.  Patient has not ever been to detox.      Patient is not currently receiving any chemical dependency treatment. Patient reported the following problems as a result of their substance use:  None.    Patient having alcohol socially monthly, having two drinks when he does drink. Patient reports having 3 cups of coffee a day.     Substance Use: No symptoms    Based on the CAGE score of 0 and clinical interview there  are not indications of drug or alcohol abuse.    Significant Losses / Trauma / Abuse / Neglect Issues:   Patient   did not serve in  the .  There are not indications or report of significant loss, trauma, abuse or neglect issues related to: are no indications and client denies any losses, trauma, abuse, or neglect concerns.  Concerns for possible neglect are not present.     Safety Assessment:   Patient denies current homicidal ideation and behaviors.  Patient denies current self-injurious ideation and behaviors.    Patient denied risk behaviors associated with substance use.   Patient denies any high risk behaviors associated with mental health symptoms.  Patient reports the following current concerns for their personal safety: None.  Patient reports there are   firearms in the house.       The firearms are secured in a locked space.    History of Safety Concerns:  Patient denied a history of homicidal ideation.     Patient denied a history of personal safety concerns.    Patient denied a history of assaultive behaviors.    Patient denied a history of sexual assault behaviors.     Patient denied a history of risk behaviors associated with substance use.  Patient denies any history of high risk behaviors associated with mental health symptoms.  Patient reports the following protective factors: hopeful, access to and engagement with healthcare, current engagement in treatment and/or motivation to establish therapeutic relationship, strong bond to family unit, community, job, school, etc, supportive social network or family, and responsibilities to others    Risk Plan:  See Recommendations for Safety and Risk Management Plan    Review of Symptoms per patient report:   Depression: Change in energy level and Change in sleep  [place]:  No Symptoms  Psychosis: No Symptoms  Anxiety: Excessive worry, Nervousness, Physical complaints, such as headaches, stomachaches, muscle tension, Sleep disturbance, Ruminations, and Irritability  Panic:  No symptoms  Post Traumatic Stress Disorder:  No Symptoms   Eating Disorder: No Symptoms  ADD / ADHD:  No  symptoms  Conduct Disorder: No symptoms  Autism Spectrum Disorder: No symptoms  Obsessive Compulsive Disorder: No Symptoms    Patient reports the following compulsive behaviors and treatment history: None.      Diagnostic Criteria:   Generalized Anxiety Disorder  A. Excessive anxiety and worry about a number of events or activities (such as work or school performance).   B. The person finds it difficult to control the worry.  C. Select 3 or more symptoms (required for diagnosis). Only one item is required in children.   - Restlessness or feeling keyed up or on edge.    - Being easily fatigued.    - Irritability.    - Muscle tension.    - Sleep disturbance (difficulty falling or staying asleep, or restless unsatisfying sleep).   D. The focus of the anxiety and worry is not confined to features of an Axis I disorder.  E. The anxiety, worry, or physical symptoms cause clinically significant distress or impairment in social, occupational, or other important areas of functioning.   F. The disturbance is not due to the direct physiological effects of a substance (e.g., a drug of abuse, a medication) or a general medical condition (e.g., hyperthyroidism) and does not occur exclusively during a Mood Disorder, a Psychotic Disorder, or a Pervasive Developmental Disorder.    Functional Status:  Patient reports the following functional impairments:  relationship(s), self-care, social interactions, and work / vocational responsibilities.     Nonprogrammatic care:  Patient is requesting basic services to address current mental health concerns.    Clinical Summary:  1. 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  2. Principal DSM5 Diagnoses  (Sustained by DSM5 Criteria Listed Above):   300.02 (F41.1) Generalized Anxiety Disorder.  3. Other Diagnoses that is relevant to services:   None.  4. Provisional Diagnosis:  None  .  5. Prognosis: Return to Baseline Functioning and Expect Improvement.  6. Likely consequences of symptoms if not treated: Decline in functioning.  7. Client strengths include:  caring, creative, educated, employed, goal-focused, insightful, intelligent, motivated, open to learning, open to suggestions / feedback, responsible parent, support of family, friends and providers, wants to learn, and willing to ask questions .     Recommendations:     1. Plan for Safety and Risk Management:   Safety and Risk: Recommended that patient call 911 or go to the local ED should there be a change in any of these risk factors.          Report to child / adult protection services was NA.     2. Patient's identified mental health concerns with a cultural influence will be addressed by exploring and acknowledging the biopsychosocial factors that have directly impacted every aspect of their life and their mental health and incorporating the values they hold into treatment .     3. Initial Treatment will focus on:    Anxiety - to decrease the frequency and duration of their anxious thoughts and feelings and increase coping skills     4. Resources/Service Plan:    services are not indicated.   Modifications to assist communication are not indicated.   Additional disability accommodations are not indicated.      5. Collaboration:   Collaboration / coordination of treatment will be initiated with the following  support professionals: None.      6.  Referrals:   The following referral(s) will be initiated: None.       A Release of Information has been obtained for the following: N/A.     Clinical Substantiation/medical necessity for the above recommendations:  N/A.    7. STEPHEN:    STEPHEN:  Discussed the general effects of drugs and alcohol on health and well-being. Provider gave patient printed information about the  effects of chemical use on their health and well being. Recommendations:  Continue to limit use.     8.  Records:   These were not available for review at time of assessment.   Information in this assessment was obtained from the medical record and  provided by patient who is a good historian.    Patient will have open access to their mental health medical record.    9.   Interactive Complexity: No    10. Safety Plan: None    Provider Name/ Credentials:  STEPHANIE Lala  December 30, 2024

## 2025-01-02 ASSESSMENT — ANXIETY QUESTIONNAIRES: GAD7 TOTAL SCORE: 0

## 2025-01-02 ASSESSMENT — PATIENT HEALTH QUESTIONNAIRE - PHQ9: SUM OF ALL RESPONSES TO PHQ QUESTIONS 1-9: 4

## 2025-01-06 ENCOUNTER — VIRTUAL VISIT (OUTPATIENT)
Dept: DERMATOLOGY | Facility: CLINIC | Age: 50
End: 2025-01-06
Payer: COMMERCIAL

## 2025-01-06 DIAGNOSIS — D09.9 SQUAMOUS CELL CARCINOMA IN SITU: Primary | ICD-10-CM

## 2025-01-06 PROCEDURE — 98013 SYNCH AUDIO-ONLY EST LOW 20: CPT | Performed by: DERMATOLOGY

## 2025-01-06 NOTE — LETTER
1/6/2025      Sabino Cardona  [address]  [place] [address]      Dear Colleague,    Thank you for referring your patient, Sabino Cardona, to the [facility]. Please see a copy of my visit note below.    [facility] Dermatology Note  Encounter Date: Jan 6, 2025  Store-and-Forward and Telephone. Location of teledermatologist: [facility].  Start time: 11:03 am . End time: 11:08 am.    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 Rash - ddx: mild PSO of scalp and patch on mid upper chest - trial of corticosteroids, improves - then slowly recurs  - triamcinolone, fluocinonide for scalp  3. *LTM - 3 mm dark brown macule on the right dorsal foot  - Unchanged 11/13/24, continue to monitor  4. DF - R mid abdomen - new in past 6mo - will monitor     PMHx: SLE given positive anti - DNA, inflammatory arthritis - HCQ 200mg po BID and sulfasalazine  FHx: Father with unknown type of skin cancer. 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 Wife is transplant surgeon at the     CC: Consult (left lateral superior neck. SCCis)      Subjective: Sabino Cardona is a 49 year old male who presents today for Mohs micrographic surgery consultation for a recent diagnosis of skin cancer.  - Skin cancer(s): SCCis  - Location(s): left lateral superior neck  - no other concerns today         Objective:   Skin: Focused examination of the left lateral superior neck within the teledermatology photograph(s) on 11/13/2024 was performed.   - left lateral superior neck, pink papule      Path report:   Left lateral superior neck:  - Squamous cell carcinoma in situ - (see description)      Assessment and Plan:     1. Plan  for Mohs micrographic surgery for skin cancer(s) above:  *Review lab result(s): Dermpath report   - We discussed the nature of the diagnosis/condition above. We discussed the treatment options, including the risks benefits and expectations of these options. We recommend micrographic surgery as the most effective and most tissue sparing option for treatment, and the patient agrees to proceed with this.  The patient is aware of the risks, benefits and expectations of this procedure. The patient will be scheduled for this procedure, if not already done so.  - We anticipate the following closure type: Linear closure, such as complex linear closure (CLC)    The patient was discussed with and evaluated by attending physician, Patel Malin DO.    Rosibel Patino PA-C  [facility]   Dermatology     Staff Physician Comments:   I saw the photos and evaluated the patient with the physician assistant (Rosibel Boswell PA-C) and I agree with the assessment and plan. I was present for the key portions of the telephone call.    Patel Malin DO    Department of Dermatology  [facility]: Phone: [phone], Fax:[phone]  [facility]: Phone: [phone], Fax: [phone]      Again, thank you for allowing me to participate in the care of your patient.        Sincerely,      Patel Malin MD    Electronically signed

## 2025-01-06 NOTE — NURSING NOTE
Sabino Cardona's goals for this visit include:   Chief Complaint   Patient presents with    Consult     left lateral superior neck. SCCis       He requests these members of his care team be copied on today's visit information:     PCP: Huan Das    Referring Provider:  Referred Self, MD  No address on file    There were no vitals taken for this visit.    Do you need any medication refills at today's visit?         Jacqueline Belle EMT        Teledermatology Nurse Call Patients:     Are you in the [facility] at the time of the encounter? yes    Today's visit will be billed to you and your insurance.    A teledermatology visit is not as thorough as an in-person visit and the quality of the photograph sent may not be of the same quality as that taken by the dermatology clinic.

## 2025-01-06 NOTE — PROGRESS NOTES
[facility] Dermatology Note  Encounter Date: Jan 6, 2025  Store-and-Forward and Telephone. Location of teledermatologist: [facility].  Start time: 11:03 am . End time: 11:08 am.    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 Rash - ddx: mild PSO of scalp and patch on mid upper chest - trial of corticosteroids, improves - then slowly recurs  - triamcinolone, fluocinonide for scalp  3. *LTM - 3 mm dark brown macule on the right dorsal foot  - Unchanged 11/13/24, continue to monitor  4. DF - R mid abdomen - new in past 6mo - will monitor     PMHx: SLE given positive anti - DNA, inflammatory arthritis - HCQ 200mg po BID and sulfasalazine  FHx: Father with unknown type of skin cancer. 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 Wife is transplant surgeon at the     CC: Consult (left lateral superior neck. SCCis)      Subjective: Sabino DILL Jonathan Hernandez is a 49 year old male who presents today for Mohs micrographic surgery consultation for a recent diagnosis of skin cancer.  - Skin cancer(s): SCCis  - Location(s): left lateral superior neck  - no other concerns today         Objective:   Skin: Focused examination of the left lateral superior neck within the teledermatology photograph(s) on 11/13/2024 was performed.   - left lateral superior neck, pink papule      Path report:   Left lateral superior neck:  - Squamous cell carcinoma in situ - (see description)      Assessment and Plan:     1. Plan for Mohs micrographic surgery for skin cancer(s) above:  *Review lab result(s): Dermpath report   - We discussed the nature of the diagnosis/condition above. We discussed the treatment options, including the risks benefits and expectations of  these options. We recommend micrographic surgery as the most effective and most tissue sparing option for treatment, and the patient agrees to proceed with this.  The patient is aware of the risks, benefits and expectations of this procedure. The patient will be scheduled for this procedure, if not already done so.  - We anticipate the following closure type: Linear closure, such as complex linear closure (CLC)    The patient was discussed with and evaluated by attending physician, Patel Malin DO.    Rosibel Patino PA-C  [facility]   Dermatology     Staff Physician Comments:   I saw the photos and evaluated the patient with the physician assistant (Rosibel Boswell PA-C) and I agree with the assessment and plan. I was present for the key portions of the telephone call.    Patel Malin DO    Department of Dermatology  [facility] [place]: Phone: [phone], Fax:[phone]  [facility]: Phone: [phone], Fax: [phone]

## 2025-01-20 ENCOUNTER — OFFICE VISIT (OUTPATIENT)
Dept: DERMATOLOGY | Facility: CLINIC | Age: 50
End: 2025-01-20
Payer: COMMERCIAL

## 2025-01-20 VITALS — HEART RATE: 84 BPM | SYSTOLIC BLOOD PRESSURE: 136 MMHG | DIASTOLIC BLOOD PRESSURE: 98 MMHG | OXYGEN SATURATION: 97 %

## 2025-01-20 DIAGNOSIS — D04.4 SQUAMOUS CELL CARCINOMA IN SITU (SCCIS) OF SKIN OF NECK: Primary | ICD-10-CM

## 2025-01-20 PROCEDURE — 12042 INTMD RPR N-HF/GENIT2.6-7.5: CPT | Performed by: DERMATOLOGY

## 2025-01-20 PROCEDURE — 17311 MOHS 1 STAGE H/N/HF/G: CPT | Performed by: DERMATOLOGY

## 2025-01-20 ASSESSMENT — PAIN SCALES - GENERAL: PAINLEVEL_OUTOF10: NO PAIN (0)

## 2025-01-20 NOTE — LETTER
1/20/2025      Sabino Cardona  [address]  [place] [address]      Dear Colleague,    Thank you for referring your patient, Sabino Cardona, to the [facility]. Please see a copy of my visit note below.    The following medication was given:     MEDICATION:  Lidocaine with epinephrine 1% 1:[id number]  ROUTE: SQ  SITE: see procedure note  DOSE: 5 mL  LOT #: [id number]  : [employer]  EXPIRATION DATE: 06-  NDC#: 25398-646-82  Was there drug waste? Yes, 1 mL  Multi-dose vial: Yes    Africa Wilson RN  January 20, 2025       Vaseline and pressure dressing applied to Mohs site on left lateral superior neck.  Wound care instructions reviewed with patient and AVS provided.  Patient verbalized understanding.  Patient will follow up for suture removal: No, patient will remove sutures in 7 days at home.  No further questions or concerns at this time.     [facility] Dermatologic Surgery Clinic [facility] Procedure Note    Dermatology Problem List:    1. Rash - ddx: mild PSO of scalp and patch on mid upper chest - trial of corticosteroids, improves - then slowly recurs  - triamcinolone, fluocinonide for scalp  2. *LTM - 3 mm dark brown macule on the right dorsal foot  - Unchanged 11/13/24, continue to monitor  3. DF - R mid abdomen - new in past 6mo - will monitor  4. NMSC  - SCCIS, L lateral superior neck, s/p Mohs and linear repair 1/20/25     PMHx: SLE given positive anti - DNA, inflammatory arthritis - HCQ 200mg po BID and sulfasalazine  FHx: Father with unknown type of skin cancer. 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 Wife is transplant surgeon at the   ____________________________________________    Date of Service:  Jan 20, 2025  Surgery: Mohs micrographic surgery    Case 1  Repair Type: intermediate  Repair Size: 4.1 cm  Suture Material: 4-0 mnocryl,  prolene 6-0  Tumor Type: SCCIS - Squamous cell carcinoma in situ  Location: L lateral superior neck  Derm-Path Accession #: [id number]  PreOp Size: 1.6 x 1/0 cm  PostOp Size: 2.0 x 1.5 cm  Mohs Accession #: [id number]  Level of Defect: fascia    Procedure:  We discussed the principles of treatment and most likely complications including scarring, bleeding, infection, swelling, pain, crusting, nerve damage, large wound,  incomplete excision, wound dehiscence,  nerve damage, recurrence, and a second procedure may be recommended to obtain the best cosmetic or functional result.    Informed consent was obtained and the patient underwent the procedure as follows:  The patient was placed supine on the operating table.  The cancer was identified, outlined with a marker, and verified by the patient.  The entire surgical field was prepped with Hibiclens.  The surgical site was anesthetized using Lidocaine 1% with epi 1:100,000.      The area of clinically apparent tumor was debulked. The layer of tissue was then surgically excised using a #15 blade and was then transferred onto a specimen sheet maintaining the orientation of the specimen. Hemostasis was obtained using heat cautery. The wound site was then covered with a dressing while the tissue samples were processed for examination.    The excised tissue was transported to the Mohs histology laboratory maintaining the tissue orientation.  The tissue specimen was relaxed so that the entire surgical margin was in a a single horizontal plane for sectioning and inked for precise mapping.  A precise reference map was drawn to reflect the sectioning of the specimen, colored inking of the margins, and orientation on the patient. The tissue was processed using horizontal sectioning of the base and continuous peripheral margins.  The histopathologic sections were reviewed in conjunction with the reference map.    Total blocks: 1    Total slides:  1    There were no cancer cells  visualized on examination, therefore Mohs surgery was complete.      Reconstruction: Intermediate Linear Closure      The patient was taken to the operative suite and placed supine on the operating room table. The defect was identified. Appropriate markings were made with a marking pen to plan the repair. The area was infiltrated with Lidocaine 1% with epi 1:100,000 and prepped with Hibiclens and draped with sterile towels.     The wound was debeveled and undermined widely. Cones were excised within relaxed skin tension lines on both sides of the defect. Hemostasis was obtained using heat cautery. The deeper layers of subcutaneous and superficial (nonmuscle) fascia tissues were then approximated using monocryl 4-0 sutures (deep layer suturing). The wound edges were then approximated; additional buried sutures were placed in a similar fashion where needed. prolene 6-0 simple running (superficial layer suturing) were carefully placed for maximum eversion and meticulous approximation.      Estimated blood loss was less than 10 ml for all surgical sites. A sterile pressure dressing was applied and wound care instructions, with a written handout, were given. The patient was discharged from the Dermatologic Surgery Center alert and ambulatory.    The patient elected for pathology results to automatically release and understands that the clinical staff will contact them as soon as possible to notify them of the results.    Repair Size: 4.1 cm    The wound was cleansed with saline and ointment was applied along the wound surface.     A sterile pressure dressing was applied.  Wound care instructions were given verbally and in writing.  The patient left the operating suite in stable condition.  Patient was informed that additional refinement of the resulting surgical scar may be used as a second stage of this reconstruction.     The attending surgeon was present for the entire procedure.     Staff Involved:  Staff  Only    Provider Disclosure:   The documentation recorded by the scribe accurately reflects the services I personally performed and the decisions made by me. I personally performed the procedures today.      Patel Malin DO    Department of Dermatology  [facility]: Phone: [phone], Fax:[phone]  [facility]: Phone: [phone], Fax: [phone]    Care and Laboratory Testing Performed at:  [facility]   Dermatology Clinic  [address] [place] [address]      Again, thank you for allowing me to participate in the care of your patient.        Sincerely,    Patel Malin MD    Electronically signed

## 2025-01-20 NOTE — PROGRESS NOTES
The following medication was given:     MEDICATION:  Lidocaine with epinephrine 1% 1:[id number]  ROUTE: SQ  SITE: see procedure note  DOSE: 5 mL  LOT #: [id number]  : [employer]  EXPIRATION DATE: 06-  NDC#: 26621-462-47  Was there drug waste? Yes, 1 mL  Multi-dose vial: Yes    Africa Wilson RN  January 20, 2025       Vaseline and pressure dressing applied to Mohs site on left lateral superior neck.  Wound care instructions reviewed with patient and AVS provided.  Patient verbalized understanding.  Patient will follow up for suture removal: No, patient will remove sutures in 7 days at home.  No further questions or concerns at this time.

## 2025-01-20 NOTE — PATIENT INSTRUCTIONS
Caring for your skin after surgery    For the first 48 hours after your surgery:  Leave the pressure dressing on and keep it dry. If it should come loose, you may re-tape it, but do not take it off.  Relax and take it easy.  Do not do any vigorous exercise, heavy lifting or bending forward. This could cause the wound to bleed.  If the wound is on your head, sleeping with your head elevated for the first few nights will help with swelling and bleeding. (Use linens/pillow cases that would be ok to get blood on in the event there is some oozing from the bandage).  Post-operative pain is usually mild.  You may alternate between 1000 mg of Tylenol (acetaminophen) and 400 mg of Ibuprofen every 4 hours.  This means, for example, that you could take the followin,000 mg of Tylenol, followed 4 hours later by 400 mg Ibuprofen, followed 4 hours later with 1,000 mg of Tylenol, and so forth.  Do not take more than 4,000 mg of acetaminophen in a 24-hour period or 3200 mg of Ibuprofen in a 24-hr period.    Avoid alcohol and vitamin E as these may increase your tendency to bleed.  Apply an ice pack for 20 min every 2-3 hours while awake.  This may help reduce swelling, bruising, and pain.  Make sure the ice pack is waterproof so that the pressure bandage doesn't get wet.  You may see a small amount of drainage or blood on your pressure bandage. This is normal. However:  If drainage or bleeding continues or saturates the bandage, you will need to apply firm pressure over the bandage with a clean washcloth for 15 minutes.    Remove the saturated bandage.  If bleeding has stopped, apply Vaseline over the suture line and cover with a non-stick bandage.  To add some pressure over the wound, fold a piece of gauze and tape over the area.  If bleeding continues after applying pressure for 15 minutes, apply an ice pack with gentle pressure to the bandaged area for another 15 minutes.  If bleeding still continues, call our office or go to  the nearest emergency room.    48 Hours After Surgery:  Remove the bandage.  If it seems sticky or too difficult to get off, you may need to soak it off in the shower.  Wash wound with a mild soap and water.  Use caution when washing the wound, be gentle and do not let the forceful shower stream hit the wound directly.  Pat dry.  Apply Vaseline or Aquaphor ointment (from a new container or tube) over the suture line with a Q-tip.  Cover the site with a bandage.  Do this daily until the sutures have been removed in 7 days.     What to expect:  The first couple of days your wound may be tender and may bleed slightly when doing wound care.  There may be swelling and bruising around the wound, especially if it is near the eyes. For your comfort, you may apply ice or cold compresses to the area.  The area around your wound may be numb for several weeks or even months.  You may experience periodic sharp pain or mild itching around the wound as it heals.   The suture line will look dark pink at first and the edges of the wound will be reddened. This will lighten up each day.    Call Us If:  You have bleeding that will not stop after applying pressure and ice.  You have pain that is not controlled with Tylenol and Ibuprofen.  You have signs or symptoms of an infection such as fever over 100 degrees Fahrenheit, redness, swelling, or warmth spreading from the wound, increasing pain after the first 48 hours, or white/yellow/green drainage from the wound (may or may not have a foul odor).    [facility], Dermatology [phone].  Ask to speak with the staff in [place].  For urgent needs outside of business hours call the [facility] at [phone] and ask to speak with/page the dermatology resident on call.

## 2025-01-20 NOTE — PROGRESS NOTES
[facility] Dermatologic Surgery Clinic [place] Procedure Note    Dermatology Problem List:    1. Rash - ddx: mild PSO of scalp and patch on mid upper chest - trial of corticosteroids, improves - then slowly recurs  - triamcinolone, fluocinonide for scalp  2. *LTM - 3 mm dark brown macule on the right dorsal foot  - Unchanged 11/13/24, continue to monitor  3. DF - R mid abdomen - new in past 6mo - will monitor  4. NMSC  - SCCIS, L lateral superior neck, s/p Mohs and linear repair 1/20/25     PMHx: SLE given positive anti - DNA, inflammatory arthritis - HCQ 200mg po BID and sulfasalazine  FHx: Father with unknown type of skin cancer. 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 Wife is transplant surgeon at the   ____________________________________________    Date of Service:  Jan 20, 2025  Surgery: Mohs micrographic surgery    Case 1  Repair Type: intermediate  Repair Size: 4.1 cm  Suture Material: 4-0 mnocryl, prolene 6-0  Tumor Type: SCCIS - Squamous cell carcinoma in situ  Location: L lateral superior neck  Derm-Path Accession #: [id number]  PreOp Size: 1.6 x 1/0 cm  PostOp Size: 2.0 x 1.5 cm  Mohs Accession #: [id number]  Level of Defect: fascia    Procedure:  We discussed the principles of treatment and most likely complications including scarring, bleeding, infection, swelling, pain, crusting, nerve damage, large wound,  incomplete excision, wound dehiscence,  nerve damage, recurrence, and a second procedure may be recommended to obtain the best cosmetic or functional result.    Informed consent was obtained and the patient underwent the procedure as follows:  The patient was placed supine on the operating table.  The cancer was identified, outlined with a marker, and verified by the patient.  The entire surgical field was prepped with Hibiclens.  The surgical site was anesthetized using Lidocaine 1% with  epi 1:100,000.      The area of clinically apparent tumor was debulked. The layer of tissue was then surgically excised using a #15 blade and was then transferred onto a specimen sheet maintaining the orientation of the specimen. Hemostasis was obtained using heat cautery. The wound site was then covered with a dressing while the tissue samples were processed for examination.    The excised tissue was transported to the Mohs histology laboratory maintaining the tissue orientation.  The tissue specimen was relaxed so that the entire surgical margin was in a a single horizontal plane for sectioning and inked for precise mapping.  A precise reference map was drawn to reflect the sectioning of the specimen, colored inking of the margins, and orientation on the patient. The tissue was processed using horizontal sectioning of the base and continuous peripheral margins.  The histopathologic sections were reviewed in conjunction with the reference map.    Total blocks: 1    Total slides:  1    There were no cancer cells visualized on examination, therefore Mohs surgery was complete.      Reconstruction: Intermediate Linear Closure      The patient was taken to the operative suite and placed supine on the operating room table. The defect was identified. Appropriate markings were made with a marking pen to plan the repair. The area was infiltrated with Lidocaine 1% with epi 1:100,000 and prepped with Hibiclens and draped with sterile towels.     The wound was debeveled and undermined widely. Cones were excised within relaxed skin tension lines on both sides of the defect. Hemostasis was obtained using heat cautery. The deeper layers of subcutaneous and superficial (nonmuscle) fascia tissues were then approximated using monocryl 4-0 sutures (deep layer suturing). The wound edges were then approximated; additional buried sutures were placed in a similar fashion where needed. prolene 6-0 simple running (superficial layer  suturing) were carefully placed for maximum eversion and meticulous approximation.      Estimated blood loss was less than 10 ml for all surgical sites. A sterile pressure dressing was applied and wound care instructions, with a written handout, were given. The patient was discharged from the Dermatologic Surgery Center alert and ambulatory.    The patient elected for pathology results to automatically release and understands that the clinical staff will contact them as soon as possible to notify them of the results.    Repair Size: 4.1 cm    The wound was cleansed with saline and ointment was applied along the wound surface.     A sterile pressure dressing was applied.  Wound care instructions were given verbally and in writing.  The patient left the operating suite in stable condition.  Patient was informed that additional refinement of the resulting surgical scar may be used as a second stage of this reconstruction.     The attending surgeon was present for the entire procedure.     Staff Involved:  Staff Only    Provider Disclosure:   The documentation recorded by the scribe accurately reflects the services I personally performed and the decisions made by me. I personally performed the procedures today.      Patel Malin DO    Department of Dermatology  [facility]: Phone: [phone], Fax:[phone]  [facility]: Phone: [phone], Fax: [phone]    Care and Laboratory Testing Performed at:  [facility]   Dermatology Clinic  [address] [place] [address]

## 2025-01-21 ENCOUNTER — VIRTUAL VISIT (OUTPATIENT)
Dept: PSYCHOLOGY | Facility: CLINIC | Age: 50
End: 2025-01-21
Payer: COMMERCIAL

## 2025-01-21 ENCOUNTER — TELEPHONE (OUTPATIENT)
Dept: DERMATOLOGY | Facility: CLINIC | Age: 50
End: 2025-01-21
Payer: COMMERCIAL

## 2025-01-21 DIAGNOSIS — F41.1 GENERALIZED ANXIETY DISORDER: Primary | ICD-10-CM

## 2025-01-21 PROCEDURE — 90837 PSYTX W PT 60 MINUTES: CPT | Mod: 95

## 2025-01-21 NOTE — PROGRESS NOTES
[facility]                                     Progress Note    Patient Name: Sabino Castillo Every  Date: 1/21/2025         Service Type: Individual      Session Start Time: 8:30 AM  Session End Time: 9:27 AM     Session Length: 57 Minutes    Session #: 2    Attendees: Client attended alone    Service Modality:  Video Visit:      Provider verified identity through the following two step process.  Patient provided:  Patient is known previously to provider    Telemedicine Visit: The patient's condition can be safely assessed and treated via synchronous audio and visual telemedicine encounter.      Reason for Telemedicine Visit: Patient has requested telehealth visit    Originating Site (Patient Location): Patient's home    Distant Site (Provider Location): Provider Remote Setting- Home Office    Consent:  The patient/guardian has verbally consented to: the potential risks and benefits of telemedicine (video visit) versus in person care; bill my insurance or make self-payment for services provided; and responsibility for payment of non-covered services.     Patient would like the video invitation sent by:  My Chart    Mode of Communication:  [facility]    Distant Location (Provider):  Off-site    As the provider I attest to compliance with applicable laws and regulations related to telemedicine.    DATA  Extended Session (53+ minutes): PROLONGED SERVICE IN THE OUTPATIENT SETTING REQUIRING DIRECT (FACE-TO-FACE) PATIENT CONTACT BEYOND THE USUAL SERVICE:    - Patient's presenting concerns require more intensive intervention than could be completed within the usual service  Interactive Complexity: No  Crisis: No      Progress Since Last Session (Related to Symptoms / Goals / Homework):   Symptoms: No change Continued anxiety, worry, rumination, stress, and irritation    Homework:  Newly Established      Episode of Care Goals: No improvement - PREPARATION (Decided to change -  considering how); Intervened by negotiating a change plan and determining options / strategies for behavior change, identifying triggers, exploring social supports, and working towards setting a date to begin behavior change     Current / Ongoing Stressors and Concerns:   Emerson is coming to therapy to learn skills to address his anxiety, over thinking, worries, and disrupted sleep.  He reports increased stress and anxiety has resulted in him feeling more irritable and short with his family.     Treatment Objective(s) Addressed in This Session:   practice deep breathing at least twice a day       Intervention:   Therapist utilized reflected listening as patient gave brief reflection of the past  few weeks. Patient reported continued anxiety, worry, rumination, stress, and irritation. Therapist supported patient as he processed and validated patient. Therapist utilized Motivational Interviewing:  Assess and address ambivalence towards change and readiness and willingness to change, evoke change talk, challenge sustain talk, point out discrepancies, explore ambivalence towards change and roadblocks. Therapist introduced mindfulness and diaphragmatic breathing, 4-7-8.    Assessments completed prior to visit:  The following assessments were completed by patient for this visit:  PHQ2:       1/20/2025     7:16 PM 1/6/2025    10:49 AM 11/8/2024     7:43 AM 10/8/2024    12:20 PM 9/19/2024    12:15 PM 9/9/2024     8:24 AM 7/24/2024    11:48 AM   PHQ-2 ( 1999 [employer])   Q1: Little interest or pleasure in doing things 0 0 0 0 0 0 0   Q2: Feeling down, depressed or hopeless 0 0 0 0 0 0 0   PHQ-2 Score 0  0 0 0 0 0 0   Q1: Little interest or pleasure in doing things Not at all         Q2: Feeling down, depressed or hopeless Not at all         PHQ-2 Score 0             Patient-reported     PHQ9:       12/30/2024     6:42 AM   PHQ-9 SCORE   PHQ-9 Total Score MyChart 0   PHQ-9 Total Score 4     GAD2:       1/20/2025     7:17 PM    DUSTY-2   Feeling nervous, anxious, or on edge 1   Not being able to stop or control worrying 1   DUSTY-2 Total Score 2        Patient-reported     GAD7:       12/30/2024     6:43 AM   DUSTY-7 SCORE   Total Score 0 (minimal anxiety)   Total Score 4     PROMIS 10-Global Health (all questions and answers displayed):       12/30/2024    11:00 AM   PROMIS 10   In general, would you say your health is: 3   In general, would you say your quality of life is: 5   In general, how would you rate your physical health? 4   In general, how would you rate your mental health, including your mood and your ability to think? 3   In general, how would you rate your satisfaction with your social activities and relationships? 2   In general, please rate how well you carry out your usual social activities and roles. (This includes activities at home, at work and in your community, and responsibilities as a parent, child, spouse, employee, friend, etc.) 3   To what extent are you able to carry out your everyday physical activities such as walking, climbing stairs, carrying groceries, or moving a chair? 5   In the past 7 days, how often have you been bothered by emotional problems such as feeling anxious, depressed, or irritable? 3   In the past 7 days, how would you rate your fatigue on average? 2   In the past 7 days, how would you rate your pain on average, where 0 means no pain, and 10 means worst imaginable pain? 1   Global Mental Health Score 13   Global Physical Health Score 17   PROMIS TOTAL - SUBSCORES 30         ASSESSMENT: Current Emotional / Mental Status (status of significant symptoms):   Risk status (Self / Other harm or suicidal ideation)   Patient denies current fears or concerns for personal safety.   Patient denies current or recent suicidal ideation or behaviors.   Patient denies current or recent homicidal ideation or behaviors.   Patient denies current or recent self injurious behavior or ideation.   Patient denies other  safety concerns.   Patient reports there has been no change in risk factors since their last session.     Patient reports there has been no change in protective factors since their last session.     Recommended that patient call 911 or go to the local ED should there be a change in any of these risk factors     Appearance:   Appropriate    Eye Contact:   Good    Psychomotor Behavior: Normal    Attitude:   Cooperative  Interested Pleasant   Orientation:   All   Speech    Rate / Production: Normal/ Responsive    Volume:  Normal    Mood:    Anxious  Normal   Affect:    Appropriate    Thought Content:  Clear    Thought Form:  Coherent  Goal Directed  Logical    Insight:    Good      Medication Review:   No current psychiatric medications prescribed     Medication Compliance:   NA     Changes in Health Issues:   None reported     Chemical Use Review:   Substance Use: Chemical use reviewed, no active concerns identified      Tobacco Use: No current tobacco use.      Diagnosis:  1. Generalized anxiety disorder        Collateral Reports Completed:   Not Applicable    PLAN: (Patient Tasks / Therapist Tasks / Other)  Emerson will practice 4-7-8 breathing daily.    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   PROMIS (reviewed every 90 days):     Referral / Collaboration:  Referral to another professional/service is not indicated at this time..    Anticipated number of session for this episode of care: 9-12 sessions  Anticipation frequency of  session: Every other week  Anticipated Duration of each session: 38-52 minutes  Treatment plan will be reviewed in 90 days or when goals have been changed.       MeasurableTreatment Goal(s) related to diagnosis / functional impairment(s)  Goal 1: Patient will become more aware of their anxiety and utilize the skills taught to decrease their anxiety symptoms.2    I will know I've met my goal when I have less anxiety, worry, can do the things I need to and have better sleep.      Objective #A (Patient Action)    Patient will  use at least 4 coping skills for anxiety management in the next 12 weeks. .  Status: New - Date: 1/21/2025      Intervention(s)  Therapist will   teach coping skills and assign homework of practicing these. .    Objective #B  Patient will  use cognitive strategies identified in therapy to challenge anxious thoughts. Patient will engage in activities that are anxiety producing for them, slowly increasing their tolerance for new activities. Patient will identify and recognize past negative experiences and subsequent beliefs they hold that contribute to their anxiety .  Status: New - Date: 1/21/2025      Intervention(s)  Therapist will  teach cognitive behavioral skills and engage client in Socratic dialogue around distorted thinking.  Therapist will provide educational materials on CBT .    Objective #C  Patient will   implement self-care into their routine to decrease anxiety, focusing on sleep hygiene, nutrition, movement, regular engagement in mindful activity, and regular socialization.  .  Status: New - Date: 1/21/2025      Intervention(s)  Therapist will  provide psychoeducation around the benefit of self-care and help client identify mindfulness based activities that may work for their life. .    Patient has reviewed and agreed to the above plan.      STEPHANIE Ponce  January 21, 2025

## 2025-01-21 NOTE — TELEPHONE ENCOUNTER
Sabino is 1 day s/p Mohs on left lateral superior neck.    What are you doing to manage your pain?  He denies pain.  He took Ibuprofen before bed last night.    Have you had any noticeable bleeding through the bandage?  No, dressing is dry and intact.    Do you have any concerns?  No     Wound care directions reviewed.  Patient declined a post op appointment.  Next skin check has been scheduled.     Ingrid Ernst RN

## 2025-01-30 ENCOUNTER — VIRTUAL VISIT (OUTPATIENT)
Dept: PSYCHOLOGY | Facility: CLINIC | Age: 50
End: 2025-01-30
Payer: COMMERCIAL

## 2025-01-30 DIAGNOSIS — F41.1 GENERALIZED ANXIETY DISORDER: Primary | ICD-10-CM

## 2025-01-30 NOTE — PROGRESS NOTES
[facility]                                     Progress Note    Patient Name: Sabino Castillo Every  Date: 1/30/2025         Service Type: Individual      Session Start Time: 7:34 AM  Session End Time: 8:26 AM     Session Length: 52 Minutes    Session #: 3    Attendees: Client attended alone    Service Modality:  Video Visit:      Provider verified identity through the following two step process.  Patient provided:  Patient is known previously to provider    Telemedicine Visit: The patient's condition can be safely assessed and treated via synchronous audio and visual telemedicine encounter.      Reason for Telemedicine Visit: Patient has requested telehealth visit    Originating Site (Patient Location): Patient's home    Distant Site (Provider Location): Provider Remote Setting- Home Office    Consent:  The patient/guardian has verbally consented to: the potential risks and benefits of telemedicine (video visit) versus in person care; bill my insurance or make self-payment for services provided; and responsibility for payment of non-covered services.     Patient would like the video invitation sent by:  My Chart    Mode of [facility]:  [facility]    Distant Location (Provider):  Off-site    As the provider I attest to compliance with applicable laws and regulations related to telemedicine.    DATA  Extended Session (53+ minutes): No  Interactive Complexity: No  Crisis: No      Progress Since Last Session (Related to Symptoms / Goals / Homework):   Symptoms: No change continued anxiety, worry, rumination, stress, irritation, interrupted sleep    Homework: Achieved / completed to satisfaction      Episode of Care Goals: No improvement - PREPARATION (Decided to change - considering how); Intervened by negotiating a change plan and determining options / strategies for behavior change, identifying triggers, exploring social supports, and working towards setting a date to begin  Stable on Flonase - refill today.  SE / warning signs relayed.   behavior change     Current / Ongoing Stressors and Concerns:   Emerson is coming to therapy to learn skills to address his anxiety, over thinking, worries, and disrupted sleep.  He reports increased stress and anxiety has resulted in him feeling more irritable and short with his family.     Treatment Objective(s) Addressed in This Session:   practice deep breathing at least twice a day       Intervention:   Therapist utilized reflected listening as patient gave brief reflection of the past  few weeks. Patient reported continued anxiety, worry, rumination, stress, irritation, interrupted sleep. He processed how his stress and anxiety have impacting his sleep.  Therapist supported patient as he processed and validated patient. Therapist engaged in cognitive restructuring/ reframing, looked at cognitive distortions and challenged distorted thoughts. Therapist introduced and practiced Body Scan. Therapist introduced window of tolerance and  of the brain.     Assessments completed prior to visit:  The following assessments were completed by patient for this visit:  PHQ2:       1/20/2025     7:16 PM 1/6/2025    10:49 AM 11/8/2024     7:43 AM 10/8/2024    12:20 PM 9/19/2024    12:15 PM 9/9/2024     8:24 AM 7/24/2024    11:48 AM   PHQ-2 ( 1999 [employer])   Q1: Little interest or pleasure in doing things 0 0 0 0 0 0 0   Q2: Feeling down, depressed or hopeless 0 0 0 0 0 0 0   PHQ-2 Score 0  0 0 0 0 0 0   Q1: Little interest or pleasure in doing things Not at all         Q2: Feeling down, depressed or hopeless Not at all         PHQ-2 Score 0             Patient-reported     PHQ9:       12/30/2024     6:42 AM   PHQ-9 SCORE   PHQ-9 Total Score MyChart 0   PHQ-9 Total Score 4     GAD2:       1/20/2025     7:17 PM   DUSTY-2   Feeling nervous, anxious, or on edge 1   Not being able to stop or control worrying 1   DUSTY-2 Total Score 2        Patient-reported     GAD7:       12/30/2024     6:43 AM   DUSTY-7 SCORE   Total Score 0  (minimal anxiety)   Total Score 4     PROMIS 10-Global Health (all questions and answers displayed):       12/30/2024    11:00 AM   PROMIS 10   In general, would you say your health is: 3   In general, would you say your quality of life is: 5   In general, how would you rate your physical health? 4   In general, how would you rate your mental health, including your mood and your ability to think? 3   In general, how would you rate your satisfaction with your social activities and relationships? 2   In general, please rate how well you carry out your usual social activities and roles. (This includes activities at home, at work and in your community, and responsibilities as a parent, child, spouse, employee, friend, etc.) 3   To what extent are you able to carry out your everyday physical activities such as walking, climbing stairs, carrying groceries, or moving a chair? 5   In the past 7 days, how often have you been bothered by emotional problems such as feeling anxious, depressed, or irritable? 3   In the past 7 days, how would you rate your fatigue on average? 2   In the past 7 days, how would you rate your pain on average, where 0 means no pain, and 10 means worst imaginable pain? 1   Global Mental Health Score 13   Global Physical Health Score 17   PROMIS TOTAL - SUBSCORES 30         ASSESSMENT: Current Emotional / Mental Status (status of significant symptoms):   Risk status (Self / Other harm or suicidal ideation)   Patient denies current fears or concerns for personal safety.   Patient denies current or recent suicidal ideation or behaviors.   Patient denies current or recent homicidal ideation or behaviors.   Patient denies current or recent self injurious behavior or ideation.   Patient denies other safety concerns.   Patient reports there has been no change in risk factors since their last session.     Patient reports there has been no change in protective factors since their last session.     Recommended  that patient call 911 or go to the local ED should there be a change in any of these risk factors     Appearance:   Appropriate    Eye Contact:   Good    Psychomotor Behavior: Normal    Attitude:   Cooperative  Interested Pleasant   Orientation:   All   Speech    Rate / Production: Normal/ Responsive    Volume:  Normal    Mood:    Normal Tired   Affect:    Appropriate    Thought Content:  Clear    Thought Form:  Coherent  Goal Directed  Logical    Insight:    Good      Medication Review:   No current psychiatric medications prescribed     Medication Compliance:   NA     Changes in Health Issues:   None reported     Chemical Use Review:   Substance Use: Chemical use reviewed, no active concerns identified      Tobacco Use: No current tobacco use.      Diagnosis:  1. Generalized anxiety disorder        Collateral Reports Completed:   Not Applicable    PLAN: (Patient Tasks / Therapist Tasks / Other)  Emerson will practice breathing and body scan daily.    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   PROMIS (reviewed every 90 days):     Referral / Collaboration:  Referral to another professional/service is not indicated at this time..    Anticipated number of session for this episode of care: 9-12 sessions  Anticipation frequency of session: Every other week  Anticipated Duration of each session: 38-52 minutes  Treatment plan will be reviewed in 90 days or when goals have been changed.       MeasurableTreatment Goal(s) related to diagnosis /  functional impairment(s)  Goal 1: Patient will become more aware of their anxiety and utilize the skills taught to decrease their anxiety symptoms.2    I will know I've met my goal when I have less anxiety, worry, can do the things I need to and have better sleep.      Objective #A (Patient Action)    Patient will  use at least 4 coping skills for anxiety management in the next 12 weeks. .  Status: New - Date: 1/21/2025      Intervention(s)  Therapist will   teach coping skills and assign homework of practicing these. .    Objective #B  Patient will  use cognitive strategies identified in therapy to challenge anxious thoughts. Patient will engage in activities that are anxiety producing for them, slowly increasing their tolerance for new activities. Patient will identify and recognize past negative experiences and subsequent beliefs they hold that contribute to their anxiety .  Status: New - Date: 1/21/2025      Intervention(s)  Therapist will  teach cognitive behavioral skills and engage client in Socratic dialogue around distorted thinking.  Therapist will provide educational materials on CBT .    Objective #C  Patient will   implement self-care into their routine to decrease anxiety, focusing on sleep hygiene, nutrition, movement, regular engagement in mindful activity, and regular socialization.  .  Status: New - Date: 1/21/2025      Intervention(s)  Therapist will  provide psychoeducation around the benefit of self-care and help client identify mindfulness based activities that may work for their life. .    Patient has reviewed and agreed to the above plan.      STEPHANIE Ponce  January 21, 2025

## 2025-02-04 ENCOUNTER — TELEPHONE (OUTPATIENT)
Dept: RHEUMATOLOGY | Facility: CLINIC | Age: 50
End: 2025-02-04
Payer: COMMERCIAL

## 2025-02-04 NOTE — TELEPHONE ENCOUNTER
Left Voicemail (1st Attempt) and Sent Mychart (1st Attempt) for the patient to call back and schedule the following:    Appointment type: Return Rheumatology  Provider: Dr. Luna  Return date: Rescheduling 4/18 appt to next available and add to wait list  Specialty phone number: [phone]  Additional appointment(s) needed: NA  Additonal Notes: Rescheduling 4/18 appt to next available and add to wait list. 1st attempt

## 2025-02-20 ENCOUNTER — VIRTUAL VISIT (OUTPATIENT)
Dept: RHEUMATOLOGY | Facility: CLINIC | Age: 50
End: 2025-02-20
Attending: INTERNAL MEDICINE
Payer: COMMERCIAL

## 2025-02-20 VITALS — WEIGHT: 195 LBS | HEIGHT: 74 IN | BODY MASS INDEX: 25.03 KG/M2

## 2025-02-20 DIAGNOSIS — M19.90 INFLAMMATORY ARTHRITIS: ICD-10-CM

## 2025-02-20 DIAGNOSIS — M47.819 SPONDYLOARTHROPATHY: ICD-10-CM

## 2025-02-20 DIAGNOSIS — Z51.81 MEDICATION MONITORING ENCOUNTER: Primary | ICD-10-CM

## 2025-02-20 PROCEDURE — G2211 COMPLEX E/M VISIT ADD ON: HCPCS | Performed by: INTERNAL MEDICINE

## 2025-02-20 PROCEDURE — 98006 SYNCH AUDIO-VIDEO EST MOD 30: CPT | Performed by: INTERNAL MEDICINE

## 2025-02-20 ASSESSMENT — PAIN SCALES - GENERAL: PAINLEVEL_OUTOF10: NO PAIN (0)

## 2025-02-20 NOTE — LETTER
2/20/2025       RE: Sabino Cardona  [address]  [facility] [address]     Dear Colleague,    Thank you for referring your patient, Sabino Cardona, to the [facility] [place] at [facility]. Please see a copy of my visit note below.    Virtual Visit Details    Type of service:  Video Visit     3:05-3:16 pm    Originating Location (pt. 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 His wife is a transplant surgeon here at the  and they say the actual reason for referral is to establish/transfer care as they just moved to [place].      Today, he is feeling well.      They moved back from [place] to [place] about 1.5 month ago.      He reports having degenerative arthritis since age 18. Has chronic low back pain with worsening in AM and worsening with inactivity. Allergies in Spring makes it worse.      He started to have heel and ankle pain in Feb 2019, also knees and elbows. That was stressful time of life looking for moving back. Also has 3 small children (one 5 yo and two 3 yo twins) which keep them busy.      Both heels were red and blaching and his wife thought it was gout.      His brother has gout and is almost 49 yo.      He saw a rheumatologist at [facility]. 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 His wife questioned the diagnosis given lack of lupus symptoms.      Then he had x-rays of spine which showed lots of bone spurs.    He was  diagnosed with OA, possible reactive arthritis. No h/o uveitis, urethritis, STDs. Has h/o food poisenings. Was put on SSZ in 2/2019, the dose was increased to current dose of 1 gr bid, he tolerates it well with no toxicity. SSZ has helped and he has no current heel inflammation or recent flare ups.      Has fatigue but because of having 3 small children, it's hard for him to say if fatigue was related to inflammation.    ROS is positive for intermittent CP for years, ibuprofen helps. Prednisone 10 mg every day did not help in the past.    Gets cold sores. No photosensitivity. Gets migraines. No weight loss. No Raynaud's.      2/14/2020: No fatigue, skin rashes, oral ulcers.    Has mild achy knees.    About 1.5 months ago, started having flare up of achilles tendonitis in both feet, worse in AM and at night. They turn red and swollen.      7/10/2020: At last visit in 2/2020, increased SSZ from 2 to 2.5 gr/day given active achilles tendonitis, also prescribed celebrex 100 mg bid prn. Change in SSZ has helped and he no longer needs to take celebrex (last use 6 wk go). Tolerates SSZ at higher dose well. No flare ups since last visit.    No symptoms and no complaints today. Felling very well. No skin rashes, oral ulcers, CP or SOB or fatigue.    Had flu in 1/2020, after that got the flu shot.     6/28/2021: Hands, feet swell up 2 times a week x 1 hr, it gets better after moving around. No heel pain/swelling.    Has 3 small children, physical job. Fully vaccinated, no SE. Got pfizer.    Over last couple of months, has been good on taking her SSZ 5 tabs a day everyday. Noticed more sx including CP by missing some doses. No SSZ SEs. Rarely takes the celebrex, if notices sore feet x 2 days in a row, he takes it. It does not help as much.    9/23/2022: Emerson presents for follow up. He reports worsening migratory arthralgia without joint swelling or stiffness over past few months, this is better and less frequent after adding  HCQ in 5/2022 but still it is presents. No other complaints. Tolerates HCQ well.      During the day he is usually good, at night migratory pain comes back affecting shoulders (R shoulder more consistent) and hips. His pain is tolerable most days but sometimes,  keeps him up.    No am stiffness.    Advil works better than celebrex.    If misses SSZ x 3-4 days, pain recurs.      1/19/2023: Tolerates HCQ well, migratory arthralgia affecting large joints is much improved, reports occasional pain, takes ibuprofen prn. tolerates HCQ, thinks it has helped.    8/3/2023: Ongoing migratory arthralgia but tolerable.      Reports every other month flare ups of joint pain, lasts 2-3 weeks, no triggers. Sometimes takes advil which helps, sometimes it goes away by its own, sometimes shoulders, sometimes hands and sometimes back. Noticed fingers and hands swell up. It moves from joint to joint. Spring time is worse, pain is pretty consistent. With some flare ups, gets sharp CP. Pain is tolerable. Celebrex does not work.    No skin rash, oral ulcers, CP, SOB.    No flares today.      2/9/2024:    -was doing very well, no flares with better control of joint pain after adding HCQ to SSZ till recent flu A on 2/1/2024, which triggered a flare of diffuse arthralgia and body ache along with headaches/cough, now is doing much better, leaving on a cruise trip, has mild residual cough, had flu shot this season. Had vivian flu. Took tylenol, advil.        Today 2/20/2025:    -doing very well today, no complaints    -since last visit, has developed scalp psoriasis, manageable with topical, seeing derm, psoriasis got a little worse on recent trip to [place]    -last arthritis flare was at the end of Oct when he got shingrix vaccine, flu and covid vaccine on the same day, it was a bad flare for about 3 wk, managed by NSIADs, tylenol    ROS:  A comprehensive ROS was done, positives are per HPI.      Past Medical History:   Diagnosis Date      Arthritis 01/01/19     Biceps tendinopathy      GERD (gastroesophageal reflux disease)      Mucocele of lip      Spondyloarthropathy      Past Surgical History:   Procedure Laterality Date     APPENDECTOMY       COLONOSCOPY N/A 1/3/2022    Procedure: COLONOSCOPY, WITH POLYPECTOMY;  Surgeon: Ingrid Keyes MD;  Location: [facility] OR     ESOPHAGOSCOPY, GASTROSCOPY, DUODENOSCOPY (EGD), COMBINED N/A 3/19/2024    Procedure: Esophagoscopy, gastroscopy, duodenoscopy (EGD), combined;  Surgeon: Kofi Christianson MD;  Location: [facility]     HERNIA REPAIR, INGUINAL RT/LT      left     Family History   Problem Relation Age of Onset     Hypertension Mother      Hypertension Father      Asthma Father      Diabetes Father      Psoriasis Father      Hypertension Maternal Grandmother         RA     Diabetes Maternal Grandmother      Hypertension Maternal Grandfather      Cancer Maternal Grandfather         stomach     Cancer Paternal Grandfather         lung-smoker     Asthma Sister      Asthma Sister      Anesthesia Reaction No family hx of      Deep Vein Thrombosis (DVT) No family hx of      Glaucoma No family hx of      Macular Degeneration No family hx of      Social History     Socioeconomic History     Marital status:      Spouse name: Not on file     Number of children: 3     Years of education: Not on file     Highest education level: Not on file   Occupational History     Occupation: [employer]   Tobacco Use     Smoking status: Never     Smokeless tobacco: Former     Types: Chew     Quit date: 4/1/2010     Tobacco comments:     chew 9 yr   Vaping Use     Vaping status: Never Used   Substance and Sexual Activity     Alcohol use: Yes     Alcohol/week: 1.7 standard drinks of alcohol     Types: 2 Standard drinks or equivalent per week     Drug use: No     Sexual activity: Yes     Partners: Female   Other Topics Concern      Service Not Asked     Blood Transfusions Not Asked     Caffeine Concern Not Asked      Occupational Exposure Not Asked     Hobby Hazards Not Asked     Sleep Concern Not Asked     Stress Concern Not Asked     Weight Concern Not Asked     Special Diet Not Asked     Back Care Not Asked     Exercise Yes     Comment: run, weights     Bike Helmet Not Asked     Seat Belt Not Asked     Self-Exams Not Asked   Social History Narrative     Not on file     Social Drivers of Health     Financial Resource Strain: Low Risk  (9/17/2024)    Financial Resource Strain      Within the past 12 months, have you or your family members you live with been unable to get utilities (heat, electricity) when it was really needed?: No   Food Insecurity: Low Risk  (9/17/2024)    Food Insecurity      Within the past 12 months, did you worry that your food would run out before you got money to buy more?: No      Within the past 12 months, did the food you bought just not last and you didn t have money to get more?: No   Transportation Needs: Low Risk  (9/17/2024)    Transportation Needs      Within the past 12 months, has lack of transportation kept you from medical appointments, getting your medicines, non-medical meetings or appointments, work, or from getting things that you need?: No   Physical Activity: Not on file   Stress: Not on file   Social Connections: Not on file   Interpersonal Safety: Not on file   Housing Stability: Low Risk  (9/17/2024)    Housing Stability      Do you have housing? : Yes      Are you worried about losing your housing?: No     Patient Active Problem List   Diagnosis     Esophageal reflux     Biceps tendinopathy     Wart     Mucocele of lip     Sore throat     Allergies   Allergen Reactions     Nkda [No Known Drug Allergy]      Seasonal Allergies Other (See Comments)     Sneezing, running nose, itchy eyes       Outpatient Encounter Medications as of 2/20/2025   Medication Sig Dispense Refill     fluocinonide (LIDEX) 0.05 % external solution Apply topically 2 times daily. 60 mL 5     hydroxychloroquine  (PLAQUENIL) 200 MG tablet Take 1 tablet (200 mg) by mouth 2 times daily Annual Plaquenil toxicity eye screening required. 180 tablet 3     sulfaSALAzine ER (AZULFIDINE EN) 500 MG EC tablet Take 3 tablets (1,500 mg) by mouth 2 times daily. Labs required every 6 months while taking this medication. Hold for signs of infection, and seek medical attention. Please call to scheduled follow-up appointment, overdue. 540 tablet 1     triamcinolone (KENALOG) 0.1 % external ointment Apply topically 2 times daily. 15 g 2     Vitamin D3 (CHOLECALCIFEROL) 25 mcg (1000 units) tablet Take by mouth every morning       No facility-administered encounter medications on file as of 2/20/2025.               His records were reviewed.      2/2019: +BEST 1:40 mixed homogenous and nucleolar pattern, positive anti-DNA 17 with NL being up to 4. Neg anti-Sm, RNP, SCL-70, SSA, SSB, ALEXANDREA-1, CHROMATIN, RF, anti-CCP. NL ESR. NL SUA 5.2. Neg HLA-B27.  NL TSH, C3, C4. Unremarkable CMP. Neg U/A. LOW vit D 27.    2/2019: Advanced degenerative changes of L spine and moderate degenerative changes of T spine on x-rays.  Component      Latest Ref Rng & Units 1/17/2020   WBC      4.0 - 11.0 10e9/L 3.6 (L)   RBC Count      4.4 - 5.9 10e12/L 4.52   Hemoglobin      13.3 - 17.7 g/dL 13.8   Hematocrit      40.0 - 53.0 % 41.1   MCV      78 - 100 fl 91   MCH      26.5 - 33.0 pg 30.5   MCHC      31.5 - 36.5 g/dL 33.6   RDW      10.0 - 15.0 % 12.0   Platelet Count      150 - 450 10e9/L 258   Diff Method       Automated Method   % Neutrophils      % 55.5   % Lymphocytes      % 30.4   % Monocytes      % 11.0   % Eosinophils      % 2.5   % Basophils      % 0.3   % Immature Granulocytes      % 0.3   Nucleated RBCs      0 /100 0   Absolute Neutrophil      1.6 - 8.3 10e9/L 2.0   Absolute Lymphocytes      0.8 - 5.3 10e9/L 1.1   Absolute Monocytes      0.0 - 1.3 10e9/L 0.4   Absolute Eosinophils      0.0 - 0.7 10e9/L 0.1   Absolute Basophils      0.0 - 0.2 10e9/L 0.0   Abs  Immature Granulocytes      0 - 0.4 10e9/L 0.0   Absolute Nucleated RBC       0.0   Creatinine      0.66 - 1.25 mg/dL 0.83   GFR Estimate      >60 mL/min/1.73:m2 >90   GFR Estimate If Black      >60 mL/min/1.73:m2 >90   Vitamin D Deficiency screening      20 - 75 ug/L 27   Sed Rate      0 - 15 mm/h 8   CRP Inflammation      0.0 - 8.0 mg/L <2.9   AST      0 - 45 U/L 19   Albumin      3.4 - 5.0 g/dL 3.9   ALT      0 - 70 U/L 36   Complement C3      81 - 157 mg/dL 118   Complement C4      13 - 39 mg/dL 25   DNA-ds      <10 IU/mL 16 (H)     Component      Latest Ref Rng & Units 1/18/2021   WBC      4.0 - 11.0 10e9/L 5.2   RBC Count      4.4 - 5.9 10e12/L 4.68   Hemoglobin      13.3 - 17.7 g/dL 14.7   Hematocrit      40.0 - 53.0 % 42.7   MCV      78 - 100 fl 91   MCH      26.5 - 33.0 pg 31.4   MCHC      31.5 - 36.5 g/dL 34.4   RDW      10.0 - 15.0 % 11.9   Platelet Count      150 - 450 10e9/L 241   % Neutrophils      % 63.3   % Lymphocytes      % 27.0   % Monocytes      % 8.3   % Eosinophils      % 1.0   % Basophils      % 0.4   Absolute Neutrophil      1.6 - 8.3 10e9/L 3.3   Absolute Lymphocytes      0.8 - 5.3 10e9/L 1.4   Absolute Monocytes      0.0 - 1.3 10e9/L 0.4   Absolute Eosinophils      0.0 - 0.7 10e9/L 0.1   Absolute Basophils      0.0 - 0.2 10e9/L 0.0   Diff Method       Automated Method   Creatinine      0.66 - 1.25 mg/dL 0.86   GFR Estimate      >60 mL/min/1.73:m2 >90   GFR Estimate If Black      >60 mL/min/1.73:m2 >90   AST      0 - 45 U/L 23   Albumin      3.4 - 5.0 g/dL 4.3   ALT      0 - 70 U/L 42     Component      Latest Ref Rng & Units 4/13/2022   WBC      4.0 - 11.0 10e3/uL 6.0   RBC Count      4.40 - 5.90 10e6/uL 4.51   Hemoglobin      13.3 - 17.7 g/dL 14.2   Hematocrit      40.0 - 53.0 % 41.5   MCV      78 - 100 fL 92   MCH      26.5 - 33.0 pg 31.5   MCHC      31.5 - 36.5 g/dL 34.2   RDW      10.0 - 15.0 % 12.0   Platelet Count      150 - 450 10e3/uL 223   % Neutrophils      % 77   % Lymphocytes       % 16   % Monocytes      % 6   % Eosinophils      % 0   % Basophils      % 1   % Immature Granulocytes      % 0   NRBCs per 100 WBC      <1 /100 0   Absolute Neutrophils      1.6 - 8.3 10e3/uL 4.6   Absolute Lymphocytes      0.8 - 5.3 10e3/uL 1.0   Absolute Monocytes      0.0 - 1.3 10e3/uL 0.3   Absolute Eosinophils      0.0 - 0.7 10e3/uL 0.0   Absolute Basophils      0.0 - 0.2 10e3/uL 0.0   Absolute Immature Granulocytes      <=0.4 10e3/uL 0.0   Absolute NRBCs      10e3/uL 0.0   Cholesterol      <200 mg/dL 210 (H)   Triglycerides      <150 mg/dL 104   HDL Cholesterol      >=40 mg/dL 52   LDL Cholesterol Calculated      <=100 mg/dL 137 (H)   Non HDL Cholesterol      <130 mg/dL 158 (H)   Patient Fasting > 8hrs?       No   Creatinine      0.66 - 1.25 mg/dL 0.87   GFR Estimate      >60 mL/min/1.73m2 >90   Sed Rate      0 - 15 mm/hr 5   Vitamin D Deficiency screening      20 - 75 ug/L 41   DNA-ds      <10.0 IU/mL 23.0 (H)   CRP Inflammation      0.0 - 8.0 mg/L <2.9   Complement C3      81 - 157 mg/dL 94   Complement C4      13 - 39 mg/dL 16   AST      0 - 45 U/L 20   Albumin      3.4 - 5.0 g/dL 4.3   ALT      0 - 70 U/L 39     Component      Latest Ref [facility] 7/31/2023  7:56 AM   WBC      4.0 - 11.0 10e3/uL 3.2 (L)    RBC Count      4.40 - 5.90 10e6/uL 4.51    Hemoglobin      13.3 - 17.7 g/dL 14.1    Hematocrit      40.0 - 53.0 % 42.3    MCV      78 - 100 fL 94    MCH      26.5 - 33.0 pg 31.3    MCHC      31.5 - 36.5 g/dL 33.3    RDW      10.0 - 15.0 % 12.1    Platelet Count      150 - 450 10e3/uL 216    % Neutrophils      % 60    % Lymphocytes      % 24    % Monocytes      % 11    % Eosinophils      % 4    % Basophils      % 1    % Immature Granulocytes      % 0    NRBCs per 100 WBC      <1 /100 0    Absolute Neutrophils      1.6 - 8.3 10e3/uL 1.9    Absolute Lymphocytes      0.8 - 5.3 10e3/uL 0.8    Absolute Monocytes      0.0 - 1.3 10e3/uL 0.4    Absolute Eosinophils      0.0 - 0.7 10e3/uL 0.1    Absolute Basophils       0.0 - 0.2 10e3/uL 0.0    Absolute Immature Granulocytes      <=0.4 10e3/uL 0.0    Absolute NRBCs      10e3/uL 0.0    Color Urine      Colorless, Straw, Light Yellow, Yellow     Appearance Urine      Clear     Glucose Urine      Negative mg/dL    Bilirubin Urine      Negative     Ketones Urine      Negative mg/dL    Specific Gravity Urine      1.003 - 1.035     Blood Urine      Negative     pH Urine      5.0 - 7.0     Protein Albumin Urine      Negative mg/dL    Urobilinogen mg/dL      Normal, 2.0 mg/dL    Nitrite Urine      Negative     Leukocyte Esterase Urine      Negative     Mucus Urine      None Seen /LPF    RBC Urine      <=2 /HPF    WBC Urine      <=5 /HPF    Cholesterol      <200 mg/dL 173    Triglycerides      <150 mg/dL 63    HDL Cholesterol      >=40 mg/dL 53    LDL Cholesterol Calculated      <=100 mg/dL 107 (H)    Non HDL Cholesterol      <130 mg/dL 120    Total Protein Urine mg/dL        mg/dL    Total Protein UR MG/MG CR      0.00 - 0.20 mg/mg Cr    Creatinine Urine      mg/dL    Creatinine      0.67 - 1.17 mg/dL 0.88    GFR Estimate      >60 mL/min/1.73m2 >90    ALT      0 - 70 U/L 38    Albumin      3.5 - 5.2 g/dL 4.7    AST      0 - 45 U/L 29    Complement C4      13 - 39 mg/dL 16    Complement C3      81 - 157 mg/dL 93    CRP Inflammation      <5.00 mg/L <3.00    DNA-ds      <10.0 IU/mL 24.0 (H)    Sed Rate      0 - 20 mm/hr 2    PSA Tumor Marker      0.00 - 3.50 ng/mL 1.44    Vitamin D Deficiency screening      20 - 75 ug/L 32      Component      Latest Ref [facility] 7/31/2023  7:57 AM   WBC      4.0 - 11.0 10e3/uL    RBC Count      4.40 - 5.90 10e6/uL    Hemoglobin      13.3 - 17.7 g/dL    Hematocrit      40.0 - 53.0 %    MCV      78 - 100 fL    MCH      26.5 - 33.0 pg    MCHC      31.5 - 36.5 g/dL    RDW      10.0 - 15.0 %    Platelet Count      150 - 450 10e3/uL    % Neutrophils      %    % Lymphocytes      %    % Monocytes      %    % Eosinophils      %    % Basophils      %    % Immature  Granulocytes      %    NRBCs per 100 WBC      <1 /100    Absolute Neutrophils      1.6 - 8.3 10e3/uL    Absolute Lymphocytes      0.8 - 5.3 10e3/uL    Absolute Monocytes      0.0 - 1.3 10e3/uL    Absolute Eosinophils      0.0 - 0.7 10e3/uL    Absolute Basophils      0.0 - 0.2 10e3/uL    Absolute Immature Granulocytes      <=0.4 10e3/uL    Absolute NRBCs      10e3/uL    Color Urine      Colorless, Straw, Light Yellow, Yellow  Yellow    Appearance Urine      Clear  Clear    Glucose Urine      Negative mg/dL Negative    Bilirubin Urine      Negative  Negative    Ketones Urine      Negative mg/dL Negative    Specific Gravity Urine      1.003 - 1.035  1.024    Blood Urine      Negative  Negative    pH Urine      5.0 - 7.0  6.0    Protein Albumin Urine      Negative mg/dL 10 !    Urobilinogen mg/dL      Normal, 2.0 mg/dL Normal    Nitrite Urine      Negative  Negative    Leukocyte Esterase Urine      Negative  Negative    Mucus Urine      None Seen /LPF Present !    RBC Urine      <=2 /HPF 2    WBC Urine      <=5 /HPF 0    Cholesterol      <200 mg/dL    Triglycerides      <150 mg/dL    HDL Cholesterol      >=40 mg/dL    LDL Cholesterol Calculated      <=100 mg/dL    Non HDL Cholesterol      <130 mg/dL    Total Protein Urine mg/dL        mg/dL 44.1 (H)    Total Protein UR MG/MG CR      0.00 - 0.20 mg/mg Cr 0.30 (H)    Creatinine Urine      mg/dL 147.0    Creatinine      0.67 - 1.17 mg/dL    GFR Estimate      >60 mL/min/1.73m2    ALT      0 - 70 U/L    Albumin      3.5 - 5.2 g/dL    AST      0 - 45 U/L    Complement C4      13 - 39 mg/dL    Complement C3      81 - 157 mg/dL    CRP Inflammation      <5.00 mg/L    DNA-ds      <10.0 IU/mL    Sed Rate      0 - 20 mm/hr    PSA Tumor Marker      0.00 - 3.50 ng/mL    Vitamin D Deficiency screening      20 - 75 ug/L       Legend:  (L) Low  (H) High  ! Abnormal  Component      Latest Ref Rng 2/5/2024  8:34 AM 2/5/2024  8:38 AM   WBC      4.0 - 11.0 10e3/uL 4.9     RBC Count       4.40 - 5.90 10e6/uL 4.23 (L)     Hemoglobin      13.3 - 17.7 g/dL 13.3     Hematocrit      40.0 - 53.0 % 41.4     MCV      78 - 100 fL 98     MCH      26.5 - 33.0 pg 31.4     MCHC      31.5 - 36.5 g/dL 32.1     RDW      10.0 - 15.0 % 11.8     Platelet Count      150 - 450 10e3/uL 165     % Neutrophils      % 78     % Lymphocytes      % 15     % Monocytes      % 7     % Eosinophils      % 0     % Basophils      % 0     % Immature Granulocytes      % 0     Absolute Neutrophils      1.6 - 8.3 10e3/uL 3.8     Absolute Lymphocytes      0.8 - 5.3 10e3/uL 0.7 (L)     Absolute Monocytes      0.0 - 1.3 10e3/uL 0.3     Absolute Eosinophils      0.0 - 0.7 10e3/uL 0.0     Absolute Basophils      0.0 - 0.2 10e3/uL 0.0     Absolute Immature Granulocytes      <=0.4 10e3/uL 0.0     Color Urine      Colorless, Straw, Light Yellow, Yellow   Yellow    Appearance Urine      Clear   Clear    Glucose Urine      Negative mg/dL  Negative    Bilirubin Urine      Negative   Negative    Ketones Urine      Negative mg/dL  Negative    Specific Gravity Urine      1.003 - 1.035   1.020    Blood Urine      Negative   Negative    pH Urine      5.0 - 7.0   6.5    Protein Albumin Urine      Negative mg/dL  Negative    Urobilinogen Urine      0.2, 1.0 E.U./dL  0.2    Nitrite Urine      Negative   Negative    Leukocyte Esterase Urine      Negative   Negative    Cholesterol      <200 mg/dL 131     Triglycerides      <150 mg/dL 59     HDL Cholesterol      >=40 mg/dL 34 (L)     LDL Cholesterol Calculated      <=100 mg/dL 85     Non HDL Cholesterol      <130 mg/dL 97     Patient Fasting? Yes     Bacteria Urine      None Seen /HPF  Few !    RBC Urine      0-2 /HPF /HPF  0-2    WBC Urine      0-5 /HPF /HPF  0-5    Squamous Epithelial /LPF Urine      None Seen /LPF  Few !    Mucus Urine      None Seen /LPF  Present !    Total Protein Urine mg/dL        mg/dL  57.4    Total Protein Urine mg/mg Creat      0.00 - 0.20 mg/mg Cr  0.30 (H)    Creatinine Urine       "mg/dL  191.0    Creatinine      0.67 - 1.17 mg/dL 0.90     GFR Estimate      >60 mL/min/1.73m2 >90     ALT      0 - 70 U/L 27     Albumin      3.5 - 5.2 g/dL 4.4     AST      0 - 45 U/L 23     Complement C4      13 - 39 mg/dL 23     Complement C3      81 - 157 mg/dL 99     CRP Inflammation      <5.00 mg/L 21.70 (H)     DNA-ds      <10.0 IU/mL 20.0 (H)     Sed Rate      0 - 20 mm/hr 8     PSA Tumor Marker      0.00 - 3.50 ng/mL 1.40        Legend:  (L) Low  (H) High  ! Abnormal      Ph.E:    Ht 1.88 m (6' 2\")   Wt 88.5 kg (195 lb)   BMI 25.04 kg/m      Constitutional: WD/WN. Healthy looking. Pleasant. In no acute distress.   Eyes: EOM intact, sclera anicteric, conj not injected  MS: No active synovitis   Skin: No skin rash   Neuro: A&O x 3. Grossly non focal  Psych: NL affect     Assessment/ plan:    1-Spondyloarthropathy with enthesopathy Dx 2/2019 on SSZ since 2/2019. He is HLA-B27 negative. Has fh/o psoriasis. He fits this diagnosis very well and already responds to SSZ. His inflammatory arthritis was under good control at initial visit, but in 2/2020, had flare ups of achilles tendonitis which started post flu. I increased sulfasalazine to 3 tabs in the morning, 2 tabs in the evening with food in 2/2020 which helped significantly. In 6/2021, given episodes of joint pain, swelling and for better control of arthritis, recommended to increase SSZ to 3 gram a day. He is on celebrex prn, not much help.    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 HCQ peak benefit is 6 months, will give it more time to show benefit. Will check labs, will monitor closely for lupus given + BEST, +anti-DNA. HCQ could help to prevent progression to lupus. Advised him to try tylenol.    Stable 4/2022 labs.      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 Will continue HCQ+SSZ. Last labs in 9/2022 showed no protein in " urine but ur pr/cr=0.69 plus high CRP, mild increased anti-DNA, mild leukopenia, but he had infection, will re-check labs.    2-SSZ monitoring. Labs q6mo. 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 He does not have lupus and nothing on his exam or hx is suggestive of lupus. I'll monitor it for now.    4-Low vit D in the past. S/p replacement.     5-HCQ monitoring. Recommend yearly eye exam          Today's plan:    Labs this month    Return video in 6 months      8/3/2023: Ongoing migratory arthralgia which is tolerable and not and enough to add another immunosuppressive drug like methotrexate. Will continue with  mg a day which requires yearly eye exam, and SSZ 3 gr a day, which requires q6mo monitoring labs. Leukopenia and urine protein in 7/2023 have improved, will continue to monitor. Anti-DNA is only slightly elevated. Has not met SLE criteria yet. Discussed doing labs with next flare.    2/9/2024:    Well controlled inflammatory arthritis, stable labs on  mg bid+SSZ 1.5 gr bid. Recent elevated CRP could be explained by flu A on 2/1.     Will continue to monitor mild leukopenia, borderline + anti-DNA and intermittent mild degree of proteinuria. Not enough to call it lupus.    Was advised to do yearly HCQ eye exam    Was advised to use sun screen during trips.      Plan:    Labs in 8/2024 then every 6 months    Return in a year (in person)        Today 2/20/2025:    Stable IA, continue same regimen, labs q6mo (will get outside lab report), yearly eye exam.    Plan:    Labs this month then every 6 months    Keep Aug appointment        TT 30 min was spent on date of the encounter doing chart review, history and exam, documentation and further activities as noted above. Any prior notes, outside records, laboratory results, and imaging studies were reviewed if relevant.      The longitudinal plan of care for the diagnosis(es)/condition(s) as documented were addressed during this visit. Due to the added  complexity in care, I will continue to support Emerson in the subsequent management and with ongoing continuity of care.      Jovani Luna MD        Orders Placed This Encounter   Procedures     ALT     Albumin level     AST     Creatinine     Complement C4     Complement C3     CRP inflammation     DNA double stranded antibodies     Erythrocyte sedimentation rate auto     UA with Microscopic reflex to Culture     Protein  random urine     Creatinine random urine     CBC with Platelets & Differential         Again, thank you for allowing me to participate in the care of your patient.      Sincerely,    Jovani Luna MD

## 2025-02-20 NOTE — PROGRESS NOTES
Virtual Visit Details    Type of service:  Video Visit     3:05-3:16 pm    Originating Location (pt. 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 His wife is a transplant surgeon here at the  and they say the actual reason for referral is to establish/transfer care as they just moved to [place].      Today, he is feeling well.      They moved back from [place] to [place] about 1.5 month ago.      He reports having degenerative arthritis since age 18. Has chronic low back pain with worsening in AM and worsening with inactivity. Allergies in Spring makes it worse.      He started to have heel and ankle pain in Feb 2019, also knees and elbows. That was stressful time of life looking for moving back. Also has 3 small children (one 5 yo and two 3 yo twins) which keep them busy.      Both heels were red and blaching and his wife thought it was gout.      His brother has gout and is almost 49 yo.      He saw a rheumatologist at [facility]. 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 His wife questioned the diagnosis given lack of lupus symptoms.      Then he had x-rays of spine which showed lots of bone spurs.    He was diagnosed with OA, possible reactive arthritis. No h/o uveitis, urethritis, STDs. Has h/o food poisenings. Was put on SSZ in 2/2019, the dose was increased to current dose of 1 gr bid, he tolerates it well with no toxicity. SSZ has helped and he has no current heel inflammation or recent flare ups.      Has fatigue but  because of having 3 small children, it's hard for him to say if fatigue was related to inflammation.    ROS is positive for intermittent CP for years, ibuprofen helps. Prednisone 10 mg every day did not help in the past.    Gets cold sores. No photosensitivity. Gets migraines. No weight loss. No Raynaud's.      2/14/2020: No fatigue, skin rashes, oral ulcers.    Has mild achy knees.    About 1.5 months ago, started having flare up of achilles tendonitis in both feet, worse in AM and at night. They turn red and swollen.      7/10/2020: At last visit in 2/2020, increased SSZ from 2 to 2.5 gr/day given active achilles tendonitis, also prescribed celebrex 100 mg bid prn. Change in SSZ has helped and he no longer needs to take celebrex (last use 6 wk go). Tolerates SSZ at higher dose well. No flare ups since last visit.    No symptoms and no complaints today. Felling very well. No skin rashes, oral ulcers, CP or SOB or fatigue.    Had flu in 1/2020, after that got the flu shot.     6/28/2021: Hands, feet swell up 2 times a week x 1 hr, it gets better after moving around. No heel pain/swelling.    Has 3 small children, physical job. Fully vaccinated, no SE. Got pfizer.    Over last couple of months, has been good on taking her SSZ 5 tabs a day everyday. Noticed more sx including CP by missing some doses. No SSZ SEs. Rarely takes the celebrex, if notices sore feet x 2 days in a row, he takes it. It does not help as much.    9/23/2022: Emerson presents for follow up. 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 No other complaints. Tolerates HCQ well.      During the day he is usually good, at night migratory pain comes back affecting shoulders (R shoulder more consistent) and hips. His pain is tolerable most days but sometimes,  keeps him up.    No am stiffness.    Advil works better  than celebrex.    If misses SSZ x 3-4 days, pain recurs.      1/19/2023: Tolerates HCQ well, migratory arthralgia affecting large joints is much improved, reports occasional pain, takes ibuprofen prn. tolerates HCQ, thinks it has helped.    8/3/2023: Ongoing migratory arthralgia but tolerable.      Reports every other month flare ups of joint pain, lasts 2-3 weeks, no triggers. Sometimes takes advil which helps, sometimes it goes away by its own, sometimes shoulders, sometimes hands and sometimes back. Noticed fingers and hands swell up. It moves from joint to joint. Spring time is worse, pain is pretty consistent. With some flare ups, gets sharp CP. Pain is tolerable. Celebrex does not work.    No skin rash, oral ulcers, CP, SOB.    No flares today.      2/9/2024:    -was doing very well, no flares with better control of joint pain after adding HCQ to SSZ till recent flu A on 2/1/2024, which triggered a flare of diffuse arthralgia and body ache along with headaches/cough, now is doing much better, leaving on a cruise trip, has mild residual cough, had flu shot this season. Had vivian flu. Took tylenol, advil.        Today 2/20/2025:    -doing very well today, no complaints    -since last visit, has developed scalp psoriasis, manageable with topical, seeing derm, psoriasis got a little worse on recent trip to [place]    -last arthritis flare was at the end of Oct when he got shingrix vaccine, flu and covid vaccine on the same day, it was a bad flare for about 3 wk, managed by NSIADs, tylenol    ROS:  A comprehensive ROS was done, positives are per HPI.      Past Medical History:   Diagnosis Date    Arthritis 01/01/19    Biceps tendinopathy     GERD (gastroesophageal reflux disease)     Mucocele of lip     Spondyloarthropathy      Past Surgical History:   Procedure Laterality Date    APPENDECTOMY      COLONOSCOPY N/A 1/3/2022    Procedure: COLONOSCOPY, WITH POLYPECTOMY;  Surgeon: Ingrid Keyes MD;  Location: [facility] [place] [place] OR     ESOPHAGOSCOPY, GASTROSCOPY, DUODENOSCOPY (EGD), COMBINED N/A 3/19/2024    Procedure: Esophagoscopy, gastroscopy, duodenoscopy (EGD), combined;  Surgeon: Kofi Christianson MD;  Location: [facility]    HERNIA REPAIR, INGUINAL RT/LT      left     Family History   Problem Relation Age of Onset    Hypertension Mother     Hypertension Father     Asthma Father     Diabetes Father     Psoriasis Father     Hypertension Maternal Grandmother         RA    Diabetes Maternal Grandmother     Hypertension Maternal Grandfather     Cancer Maternal Grandfather         stomach    Cancer Paternal Grandfather         lung-smoker    Asthma Sister     Asthma Sister     Anesthesia Reaction No family hx of     Deep Vein Thrombosis (DVT) No family hx of     Glaucoma No family hx of     Macular Degeneration No family hx of      Social History     Socioeconomic History    Marital status:      Spouse name: Not on file    Number of children: 3    Years of education: Not on file    Highest education level: Not on file   Occupational History    Occupation: [employer]   Tobacco Use    Smoking status: Never    Smokeless tobacco: Former     Types: Chew     Quit date: 4/1/2010    Tobacco comments:     chew 9 yr   Vaping Use    Vaping status: Never Used   Substance and Sexual Activity    Alcohol use: Yes     Alcohol/week: 1.7 standard drinks of alcohol     Types: 2 Standard drinks or equivalent per week    Drug use: No    Sexual activity: Yes     Partners: Female   Other Topics Concern     Service Not Asked    Blood Transfusions Not Asked    Caffeine Concern Not Asked    Occupational Exposure Not Asked    Hobby Hazards Not Asked    Sleep Concern Not Asked    Stress Concern Not Asked    Weight Concern Not Asked    Special Diet Not Asked    Back Care Not Asked    Exercise Yes     Comment: run, weights    Bike Helmet Not Asked    Seat Belt Not Asked    Self-Exams Not Asked   Social History Narrative    Not on file     Social Drivers of  Health     Financial Resource Strain: Low Risk  (9/17/2024)    Financial Resource Strain     Within the past 12 months, have you or your family members you live with been unable to get utilities (heat, electricity) when it was really needed?: No   Food Insecurity: Low Risk  (9/17/2024)    Food Insecurity     Within the past 12 months, did you worry that your food would run out before you got money to buy more?: No     Within the past 12 months, did the food you bought just not last and you didn t have money to get more?: No   Transportation Needs: Low Risk  (9/17/2024)    Transportation Needs     Within the past 12 months, has lack of transportation kept you from medical appointments, getting your medicines, non-medical meetings or appointments, work, or from getting things that you need?: No   Physical Activity: Not on file   Stress: Not on file   Social Connections: Not on file   Interpersonal Safety: Not on file   Housing Stability: Low Risk  (9/17/2024)    Housing Stability     Do you have housing? : Yes     Are you worried about losing your housing?: No     Patient Active Problem List   Diagnosis    Esophageal reflux    Biceps tendinopathy    Wart    Mucocele of lip    Sore throat     Allergies   Allergen Reactions    Nkda [No Known Drug Allergy]     Seasonal Allergies Other (See Comments)     Sneezing, running nose, itchy eyes       Outpatient Encounter Medications as of 2/20/2025   Medication Sig Dispense Refill    fluocinonide (LIDEX) 0.05 % external solution Apply topically 2 times daily. 60 mL 5    hydroxychloroquine (PLAQUENIL) 200 MG tablet Take 1 tablet (200 mg) by mouth 2 times daily Annual Plaquenil toxicity eye screening required. 180 tablet 3    sulfaSALAzine ER (AZULFIDINE EN) 500 MG EC tablet Take 3 tablets (1,500 mg) by mouth 2 times daily. Labs required every 6 months while taking this medication. Hold for signs of infection, and seek medical attention. Please call to scheduled follow-up  appointment, overdue. 540 tablet 1    triamcinolone (KENALOG) 0.1 % external ointment Apply topically 2 times daily. 15 g 2    Vitamin D3 (CHOLECALCIFEROL) 25 mcg (1000 units) tablet Take by mouth every morning       No facility-administered encounter medications on file as of 2/20/2025.               His records were reviewed.      2/2019: +BEST 1:40 mixed homogenous and nucleolar pattern, positive anti-DNA 17 with NL being up to 4. Neg anti-Sm, RNP, SCL-70, SSA, SSB, ALEXANDREA-1, CHROMATIN, RF, anti-CCP. NL ESR. NL SUA 5.2. Neg HLA-B27.  NL TSH, C3, C4. Unremarkable CMP. Neg U/A. LOW vit D 27.    2/2019: Advanced degenerative changes of L spine and moderate degenerative changes of T spine on x-rays.  Component      Latest Ref Rng & Units 1/17/2020   WBC      4.0 - 11.0 10e9/L 3.6 (L)   RBC Count      4.4 - 5.9 10e12/L 4.52   Hemoglobin      13.3 - 17.7 g/dL 13.8   Hematocrit      40.0 - 53.0 % 41.1   MCV      78 - 100 fl 91   MCH      26.5 - 33.0 pg 30.5   MCHC      31.5 - 36.5 g/dL 33.6   RDW      10.0 - 15.0 % 12.0   Platelet Count      150 - 450 10e9/L 258   Diff Method       Automated Method   % Neutrophils      % 55.5   % Lymphocytes      % 30.4   % Monocytes      % 11.0   % Eosinophils      % 2.5   % Basophils      % 0.3   % Immature Granulocytes      % 0.3   Nucleated RBCs      0 /100 0   Absolute Neutrophil      1.6 - 8.3 10e9/L 2.0   Absolute Lymphocytes      0.8 - 5.3 10e9/L 1.1   Absolute Monocytes      0.0 - 1.3 10e9/L 0.4   Absolute Eosinophils      0.0 - 0.7 10e9/L 0.1   Absolute Basophils      0.0 - 0.2 10e9/L 0.0   Abs Immature Granulocytes      0 - 0.4 10e9/L 0.0   Absolute Nucleated RBC       0.0   Creatinine      0.66 - 1.25 mg/dL 0.83   GFR Estimate      >60 mL/min/1.73:m2 >90   GFR Estimate If Black      >60 mL/min/1.73:m2 >90   Vitamin D Deficiency screening      20 - 75 ug/L 27   Sed Rate      0 - 15 mm/h 8   CRP Inflammation      0.0 - 8.0 mg/L <2.9   AST      0 - 45 U/L 19   Albumin      3.4 - 5.0  g/dL 3.9   ALT      0 - 70 U/L 36   Complement C3      81 - 157 mg/dL 118   Complement C4      13 - 39 mg/dL 25   DNA-ds      <10 IU/mL 16 (H)     Component      Latest Ref Rng & Units 1/18/2021   WBC      4.0 - 11.0 10e9/L 5.2   RBC Count      4.4 - 5.9 10e12/L 4.68   Hemoglobin      13.3 - 17.7 g/dL 14.7   Hematocrit      40.0 - 53.0 % 42.7   MCV      78 - 100 fl 91   MCH      26.5 - 33.0 pg 31.4   MCHC      31.5 - 36.5 g/dL 34.4   RDW      10.0 - 15.0 % 11.9   Platelet Count      150 - 450 10e9/L 241   % Neutrophils      % 63.3   % Lymphocytes      % 27.0   % Monocytes      % 8.3   % Eosinophils      % 1.0   % Basophils      % 0.4   Absolute Neutrophil      1.6 - 8.3 10e9/L 3.3   Absolute Lymphocytes      0.8 - 5.3 10e9/L 1.4   Absolute Monocytes      0.0 - 1.3 10e9/L 0.4   Absolute Eosinophils      0.0 - 0.7 10e9/L 0.1   Absolute Basophils      0.0 - 0.2 10e9/L 0.0   Diff Method       Automated Method   Creatinine      0.66 - 1.25 mg/dL 0.86   GFR Estimate      >60 mL/min/1.73:m2 >90   GFR Estimate If Black      >60 mL/min/1.73:m2 >90   AST      0 - 45 U/L 23   Albumin      3.4 - 5.0 g/dL 4.3   ALT      0 - 70 U/L 42     Component      Latest Ref Rng & Units 4/13/2022   WBC      4.0 - 11.0 10e3/uL 6.0   RBC Count      4.40 - 5.90 10e6/uL 4.51   Hemoglobin      13.3 - 17.7 g/dL 14.2   Hematocrit      40.0 - 53.0 % 41.5   MCV      78 - 100 fL 92   MCH      26.5 - 33.0 pg 31.5   MCHC      31.5 - 36.5 g/dL 34.2   RDW      10.0 - 15.0 % 12.0   Platelet Count      150 - 450 10e3/uL 223   % Neutrophils      % 77   % Lymphocytes      % 16   % Monocytes      % 6   % Eosinophils      % 0   % Basophils      % 1   % Immature Granulocytes      % 0   NRBCs per 100 WBC      <1 /100 0   Absolute Neutrophils      1.6 - 8.3 10e3/uL 4.6   Absolute Lymphocytes      0.8 - 5.3 10e3/uL 1.0   Absolute Monocytes      0.0 - 1.3 10e3/uL 0.3   Absolute Eosinophils      0.0 - 0.7 10e3/uL 0.0   Absolute Basophils      0.0 - 0.2 10e3/uL 0.0    Absolute Immature Granulocytes      <=0.4 10e3/uL 0.0   Absolute NRBCs      10e3/uL 0.0   Cholesterol      <200 mg/dL 210 (H)   Triglycerides      <150 mg/dL 104   HDL Cholesterol      >=40 mg/dL 52   LDL Cholesterol Calculated      <=100 mg/dL 137 (H)   Non HDL Cholesterol      <130 mg/dL 158 (H)   Patient Fasting > 8hrs?       No   Creatinine      0.66 - 1.25 mg/dL 0.87   GFR Estimate      >60 mL/min/1.73m2 >90   Sed Rate      0 - 15 mm/hr 5   Vitamin D Deficiency screening      20 - 75 ug/L 41   DNA-ds      <10.0 IU/mL 23.0 (H)   CRP Inflammation      0.0 - 8.0 mg/L <2.9   Complement C3      81 - 157 mg/dL 94   Complement C4      13 - 39 mg/dL 16   AST      0 - 45 U/L 20   Albumin      3.4 - 5.0 g/dL 4.3   ALT      0 - 70 U/L 39     Component      Latest Ref [facility] 7/31/2023  7:56 AM   WBC      4.0 - 11.0 10e3/uL 3.2 (L)    RBC Count      4.40 - 5.90 10e6/uL 4.51    Hemoglobin      13.3 - 17.7 g/dL 14.1    Hematocrit      40.0 - 53.0 % 42.3    MCV      78 - 100 fL 94    MCH      26.5 - 33.0 pg 31.3    MCHC      31.5 - 36.5 g/dL 33.3    RDW      10.0 - 15.0 % 12.1    Platelet Count      150 - 450 10e3/uL 216    % Neutrophils      % 60    % Lymphocytes      % 24    % Monocytes      % 11    % Eosinophils      % 4    % Basophils      % 1    % Immature Granulocytes      % 0    NRBCs per 100 WBC      <1 /100 0    Absolute Neutrophils      1.6 - 8.3 10e3/uL 1.9    Absolute Lymphocytes      0.8 - 5.3 10e3/uL 0.8    Absolute Monocytes      0.0 - 1.3 10e3/uL 0.4    Absolute Eosinophils      0.0 - 0.7 10e3/uL 0.1    Absolute Basophils      0.0 - 0.2 10e3/uL 0.0    Absolute Immature Granulocytes      <=0.4 10e3/uL 0.0    Absolute NRBCs      10e3/uL 0.0    Color Urine      Colorless, Straw, Light Yellow, Yellow     Appearance Urine      Clear     Glucose Urine      Negative mg/dL    Bilirubin Urine      Negative     Ketones Urine      Negative mg/dL    Specific Gravity Urine      1.003 - 1.035     Blood Urine      Negative      pH Urine      5.0 - 7.0     Protein Albumin Urine      Negative mg/dL    Urobilinogen mg/dL      Normal, 2.0 mg/dL    Nitrite Urine      Negative     Leukocyte Esterase Urine      Negative     Mucus Urine      None Seen /LPF    RBC Urine      <=2 /HPF    WBC Urine      <=5 /HPF    Cholesterol      <200 mg/dL 173    Triglycerides      <150 mg/dL 63    HDL Cholesterol      >=40 mg/dL 53    LDL Cholesterol Calculated      <=100 mg/dL 107 (H)    Non HDL Cholesterol      <130 mg/dL 120    Total Protein Urine mg/dL        mg/dL    Total Protein UR MG/MG CR      0.00 - 0.20 mg/mg Cr    Creatinine Urine      mg/dL    Creatinine      0.67 - 1.17 mg/dL 0.88    GFR Estimate      >60 mL/min/1.73m2 >90    ALT      0 - 70 U/L 38    Albumin      3.5 - 5.2 g/dL 4.7    AST      0 - 45 U/L 29    Complement C4      13 - 39 mg/dL 16    Complement C3      81 - 157 mg/dL 93    CRP Inflammation      <5.00 mg/L <3.00    DNA-ds      <10.0 IU/mL 24.0 (H)    Sed Rate      0 - 20 mm/hr 2    PSA Tumor Marker      0.00 - 3.50 ng/mL 1.44    Vitamin D Deficiency screening      20 - 75 ug/L 32      Component      Latest Ref [facility] 7/31/2023  7:57 AM   WBC      4.0 - 11.0 10e3/uL    RBC Count      4.40 - 5.90 10e6/uL    Hemoglobin      13.3 - 17.7 g/dL    Hematocrit      40.0 - 53.0 %    MCV      78 - 100 fL    MCH      26.5 - 33.0 pg    MCHC      31.5 - 36.5 g/dL    RDW      10.0 - 15.0 %    Platelet Count      150 - 450 10e3/uL    % Neutrophils      %    % Lymphocytes      %    % Monocytes      %    % Eosinophils      %    % Basophils      %    % Immature Granulocytes      %    NRBCs per 100 WBC      <1 /100    Absolute Neutrophils      1.6 - 8.3 10e3/uL    Absolute Lymphocytes      0.8 - 5.3 10e3/uL    Absolute Monocytes      0.0 - 1.3 10e3/uL    Absolute Eosinophils      0.0 - 0.7 10e3/uL    Absolute Basophils      0.0 - 0.2 10e3/uL    Absolute Immature Granulocytes      <=0.4 10e3/uL    Absolute NRBCs      10e3/uL    Color Urine      Colorless,  Straw, Light Yellow, Yellow  Yellow    Appearance Urine      Clear  Clear    Glucose Urine      Negative mg/dL Negative    Bilirubin Urine      Negative  Negative    Ketones Urine      Negative mg/dL Negative    Specific Gravity Urine      1.003 - 1.035  1.024    Blood Urine      Negative  Negative    pH Urine      5.0 - 7.0  6.0    Protein Albumin Urine      Negative mg/dL 10 !    Urobilinogen mg/dL      Normal, 2.0 mg/dL Normal    Nitrite Urine      Negative  Negative    Leukocyte Esterase Urine      Negative  Negative    Mucus Urine      None Seen /LPF Present !    RBC Urine      <=2 /HPF 2    WBC Urine      <=5 /HPF 0    Cholesterol      <200 mg/dL    Triglycerides      <150 mg/dL    HDL Cholesterol      >=40 mg/dL    LDL Cholesterol Calculated      <=100 mg/dL    Non HDL Cholesterol      <130 mg/dL    Total Protein Urine mg/dL        mg/dL 44.1 (H)    Total Protein UR MG/MG CR      0.00 - 0.20 mg/mg Cr 0.30 (H)    Creatinine Urine      mg/dL 147.0    Creatinine      0.67 - 1.17 mg/dL    GFR Estimate      >60 mL/min/1.73m2    ALT      0 - 70 U/L    Albumin      3.5 - 5.2 g/dL    AST      0 - 45 U/L    Complement C4      13 - 39 mg/dL    Complement C3      81 - 157 mg/dL    CRP Inflammation      <5.00 mg/L    DNA-ds      <10.0 IU/mL    Sed Rate      0 - 20 mm/hr    PSA Tumor Marker      0.00 - 3.50 ng/mL    Vitamin D Deficiency screening      20 - 75 ug/L       Legend:  (L) Low  (H) High  ! Abnormal  Component      Latest Ref [facility] 2/5/2024  8:34 AM 2/5/2024  8:38 AM   WBC      4.0 - 11.0 10e3/uL 4.9     RBC Count      4.40 - 5.90 10e6/uL 4.23 (L)     Hemoglobin      13.3 - 17.7 g/dL 13.3     Hematocrit      40.0 - 53.0 % 41.4     MCV      78 - 100 fL 98     MCH      26.5 - 33.0 pg 31.4     MCHC      31.5 - 36.5 g/dL 32.1     RDW      10.0 - 15.0 % 11.8     Platelet Count      150 - 450 10e3/uL 165     % Neutrophils      % 78     % Lymphocytes      % 15     % Monocytes      % 7     % Eosinophils      % 0     %  Basophils      % 0     % Immature Granulocytes      % 0     Absolute Neutrophils      1.6 - 8.3 10e3/uL 3.8     Absolute Lymphocytes      0.8 - 5.3 10e3/uL 0.7 (L)     Absolute Monocytes      0.0 - 1.3 10e3/uL 0.3     Absolute Eosinophils      0.0 - 0.7 10e3/uL 0.0     Absolute Basophils      0.0 - 0.2 10e3/uL 0.0     Absolute Immature Granulocytes      <=0.4 10e3/uL 0.0     Color Urine      Colorless, Straw, Light Yellow, Yellow   Yellow    Appearance Urine      Clear   Clear    Glucose Urine      Negative mg/dL  Negative    Bilirubin Urine      Negative   Negative    Ketones Urine      Negative mg/dL  Negative    Specific Gravity Urine      1.003 - 1.035   1.020    Blood Urine      Negative   Negative    pH Urine      5.0 - 7.0   6.5    Protein Albumin Urine      Negative mg/dL  Negative    Urobilinogen Urine      0.2, 1.0 E.U./dL  0.2    Nitrite Urine      Negative   Negative    Leukocyte Esterase Urine      Negative   Negative    Cholesterol      <200 mg/dL 131     Triglycerides      <150 mg/dL 59     HDL Cholesterol      >=40 mg/dL 34 (L)     LDL Cholesterol Calculated      <=100 mg/dL 85     Non HDL Cholesterol      <130 mg/dL 97     Patient Fasting? Yes     Bacteria Urine      None Seen /HPF  Few !    RBC Urine      0-2 /HPF /HPF  0-2    WBC Urine      0-5 /HPF /HPF  0-5    Squamous Epithelial /LPF Urine      None Seen /LPF  Few !    Mucus Urine      None Seen /LPF  Present !    Total Protein Urine mg/dL        mg/dL  57.4    Total Protein Urine mg/mg Creat      0.00 - 0.20 mg/mg Cr  0.30 (H)    Creatinine Urine      mg/dL  191.0    Creatinine      0.67 - 1.17 mg/dL 0.90     GFR Estimate      >60 mL/min/1.73m2 >90     ALT      0 - 70 U/L 27     Albumin      3.5 - 5.2 g/dL 4.4     AST      0 - 45 U/L 23     Complement C4      13 - 39 mg/dL 23     Complement C3      81 - 157 mg/dL 99     CRP Inflammation      <5.00 mg/L 21.70 (H)     DNA-ds      <10.0 IU/mL 20.0 (H)     Sed Rate      0 - 20 mm/hr 8     PSA  "Tumor Marker      0.00 - 3.50 ng/mL 1.40        Legend:  (L) Low  (H) High  ! Abnormal      Ph.E:    Ht 1.88 m (6' 2\")   Wt 88.5 kg (195 lb)   BMI 25.04 kg/m      Constitutional: WD/WN. Healthy looking. Pleasant. In no acute distress.   Eyes: EOM intact, sclera anicteric, conj not injected  MS: No active synovitis   Skin: No skin rash   Neuro: A&O x 3. Grossly non focal  Psych: NL affect     Assessment/ plan:    1-Spondyloarthropathy with enthesopathy Dx 2/2019 on SSZ since 2/2019. He is HLA-B27 negative. Has fh/o psoriasis. He fits this diagnosis very well and already responds to SSZ. His inflammatory arthritis was under good control at initial visit, but in 2/2020, had flare ups of achilles tendonitis which started post flu. I increased sulfasalazine to 3 tabs in the morning, 2 tabs in the evening with food in 2/2020 which helped significantly. In 6/2021, given episodes of joint pain, swelling and for better control of arthritis, recommended to increase SSZ to 3 gram a day. He is on celebrex prn, not much help.    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 HCQ peak benefit is 6 months, will give it more time to show benefit. Will check labs, will monitor closely for lupus given + BEST, +anti-DNA. HCQ could help to prevent progression to lupus. Advised him to try tylenol.    Stable 4/2022 labs.      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 Will continue HCQ+SSZ. Last labs in 9/2022 showed no protein in urine but ur pr/cr=0.69 plus high CRP, mild increased anti-DNA, mild leukopenia, but he had infection, will re-check labs.    2-SSZ monitoring. Labs q6mo. 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 He does not have lupus and nothing on his exam or hx is suggestive of lupus. I'll monitor it for now.    4-Low vit D in the past. S/p replacement.     5-HCQ monitoring. Recommend yearly eye " exam          Today's plan:    Labs this month    Return video in 6 months      8/3/2023: Ongoing migratory arthralgia which is tolerable and not and enough to add another immunosuppressive drug like methotrexate. Will continue with  mg a day which requires yearly eye exam, and SSZ 3 gr a day, which requires q6mo monitoring labs. Leukopenia and urine protein in 7/2023 have improved, will continue to monitor. Anti-DNA is only slightly elevated. Has not met SLE criteria yet. Discussed doing labs with next flare.    2/9/2024:    Well controlled inflammatory arthritis, stable labs on  mg bid+SSZ 1.5 gr bid. Recent elevated CRP could be explained by flu A on 2/1.     Will continue to monitor mild leukopenia, borderline + anti-DNA and intermittent mild degree of proteinuria. Not enough to call it lupus.    Was advised to do yearly HCQ eye exam    Was advised to use sun screen during trips.      Plan:    Labs in 8/2024 then every 6 months    Return in a year (in person)        Today 2/20/2025:    Stable IA, continue same regimen, labs q6mo (will get outside lab report), yearly eye exam.    Plan:    Labs this month then every 6 months    Keep Aug appointment        TT 30 min was spent on date of the encounter doing chart review, history and exam, documentation and further activities as noted above. Any prior notes, outside records, laboratory results, and imaging studies were reviewed if relevant.      The longitudinal plan of care for the diagnosis(es)/condition(s) as documented were addressed during this visit. Due to the added complexity in care, I will continue to support Emerson in the subsequent management and with ongoing continuity of care.      Jovani Luna MD        Orders Placed This Encounter   Procedures    ALT    Albumin level    AST    Creatinine    Complement C4    Complement C3    CRP inflammation    DNA double stranded antibodies    Erythrocyte sedimentation rate auto    UA with Microscopic  reflex to Culture    Protein  random urine    Creatinine random urine    CBC with Platelets & Differential

## 2025-02-20 NOTE — NURSING NOTE
Current patient location:  [address] [place] [address]    Is the patient currently in the state of [place]? YES    Visit mode: VIDEO    If the visit is dropped, the patient can be reconnected by:VIDEO VISIT: Text to cell phone:   Telephone Information:   Mobile [phone]       Will anyone else be joining the visit? NO  (If patient encounters technical issues they should call [phone]    Are changes needed to the allergy or medication list? No    Are refills needed on medications prescribed by this physician? NO    Rooming Documentation:  Questionnaire(s) completed    Reason for visit: JAJA DEL TORO

## 2025-03-06 ENCOUNTER — OFFICE VISIT (OUTPATIENT)
Dept: INTERNAL MEDICINE | Facility: CLINIC | Age: 50
End: 2025-03-06
Payer: COMMERCIAL

## 2025-03-06 ENCOUNTER — ANCILLARY PROCEDURE (OUTPATIENT)
Dept: CT IMAGING | Facility: CLINIC | Age: 50
End: 2025-03-06
Attending: INTERNAL MEDICINE
Payer: COMMERCIAL

## 2025-03-06 ENCOUNTER — LAB (OUTPATIENT)
Dept: LAB | Facility: CLINIC | Age: 50
End: 2025-03-06
Payer: COMMERCIAL

## 2025-03-06 VITALS
DIASTOLIC BLOOD PRESSURE: 80 MMHG | OXYGEN SATURATION: 95 % | SYSTOLIC BLOOD PRESSURE: 122 MMHG | WEIGHT: 203.2 LBS | BODY MASS INDEX: 26.08 KG/M2 | HEART RATE: 96 BPM | RESPIRATION RATE: 15 BRPM | HEIGHT: 74 IN

## 2025-03-06 DIAGNOSIS — M47.819 SPONDYLOARTHROPATHY: ICD-10-CM

## 2025-03-06 DIAGNOSIS — R10.32 LLQ ABDOMINAL PAIN: ICD-10-CM

## 2025-03-06 DIAGNOSIS — Z12.5 SCREENING FOR PROSTATE CANCER: ICD-10-CM

## 2025-03-06 DIAGNOSIS — R10.32 LLQ ABDOMINAL PAIN: Primary | ICD-10-CM

## 2025-03-06 DIAGNOSIS — Z51.81 MEDICATION MONITORING ENCOUNTER: ICD-10-CM

## 2025-03-06 DIAGNOSIS — M19.90 INFLAMMATORY ARTHRITIS: ICD-10-CM

## 2025-03-06 LAB
ALBUMIN MFR UR ELPH: 38 MG/DL
ALBUMIN SERPL BCG-MCNC: 4.2 G/DL (ref 3.5–5.2)
ALBUMIN UR-MCNC: 10 MG/DL
ALP SERPL-CCNC: 71 U/L (ref 40–150)
ALT SERPL W P-5'-P-CCNC: 49 U/L (ref 0–70)
ANION GAP SERPL CALCULATED.3IONS-SCNC: 10 MMOL/L (ref 7–15)
APPEARANCE UR: CLEAR
AST SERPL W P-5'-P-CCNC: 30 U/L (ref 0–45)
BASOPHILS # BLD AUTO: 0 10E3/UL (ref 0–0.2)
BASOPHILS NFR BLD AUTO: 0 %
BILIRUB SERPL-MCNC: 0.7 MG/DL
BILIRUB UR QL STRIP: NEGATIVE
BUN SERPL-MCNC: 21.3 MG/DL (ref 6–20)
CALCIUM SERPL-MCNC: 9 MG/DL (ref 8.8–10.4)
CHLORIDE SERPL-SCNC: 99 MMOL/L (ref 98–107)
COLOR UR AUTO: YELLOW
CREAT SERPL-MCNC: 1.03 MG/DL (ref 0.67–1.17)
CREAT UR-MCNC: 127 MG/DL
CRP SERPL-MCNC: 112 MG/L
EGFRCR SERPLBLD CKD-EPI 2021: 88 ML/MIN/1.73M2
EOSINOPHIL # BLD AUTO: 0 10E3/UL (ref 0–0.7)
EOSINOPHIL NFR BLD AUTO: 0 %
ERYTHROCYTE [DISTWIDTH] IN BLOOD BY AUTOMATED COUNT: 12 % (ref 10–15)
ERYTHROCYTE [SEDIMENTATION RATE] IN BLOOD BY WESTERGREN METHOD: 11 MM/HR (ref 0–20)
GLUCOSE SERPL-MCNC: 89 MG/DL (ref 70–99)
GLUCOSE UR STRIP-MCNC: NEGATIVE MG/DL
HCO3 SERPL-SCNC: 25 MMOL/L (ref 22–29)
HCT VFR BLD AUTO: 41.6 % (ref 40–53)
HGB BLD-MCNC: 14.1 G/DL (ref 13.3–17.7)
HGB UR QL STRIP: NEGATIVE
HYALINE CASTS: 17 /LPF
IMM GRANULOCYTES # BLD: 0 10E3/UL
IMM GRANULOCYTES NFR BLD: 0 %
KETONES UR STRIP-MCNC: 10 MG/DL
LEUKOCYTE ESTERASE UR QL STRIP: NEGATIVE
LYMPHOCYTES # BLD AUTO: 0.6 10E3/UL (ref 0.8–5.3)
LYMPHOCYTES NFR BLD AUTO: 8 %
MCH RBC QN AUTO: 30.6 PG (ref 26.5–33)
MCHC RBC AUTO-ENTMCNC: 33.9 G/DL (ref 31.5–36.5)
MCV RBC AUTO: 90 FL (ref 78–100)
MONOCYTES # BLD AUTO: 0.5 10E3/UL (ref 0–1.3)
MONOCYTES NFR BLD AUTO: 7 %
MUCOUS THREADS #/AREA URNS LPF: PRESENT /LPF
NEUTROPHILS # BLD AUTO: 6.4 10E3/UL (ref 1.6–8.3)
NEUTROPHILS NFR BLD AUTO: 84 %
NITRATE UR QL: NEGATIVE
NRBC # BLD AUTO: 0 10E3/UL
NRBC BLD AUTO-RTO: 0 /100
PH UR STRIP: 6.5 [PH] (ref 5–7)
PLATELET # BLD AUTO: 203 10E3/UL (ref 150–450)
POTASSIUM SERPL-SCNC: 4.5 MMOL/L (ref 3.4–5.3)
PROT SERPL-MCNC: 6.9 G/DL (ref 6.4–8.3)
PROT/CREAT 24H UR: 0.3 MG/MG CR (ref 0–0.2)
PSA SERPL DL<=0.01 NG/ML-MCNC: 1.76 NG/ML (ref 0–3.5)
RBC # BLD AUTO: 4.61 10E6/UL (ref 4.4–5.9)
RBC URINE: <1 /HPF
SODIUM SERPL-SCNC: 134 MMOL/L (ref 135–145)
SP GR UR STRIP: 1.01 (ref 1–1.03)
UROBILINOGEN UR STRIP-MCNC: NORMAL MG/DL
WBC # BLD AUTO: 7.6 10E3/UL (ref 4–11)
WBC URINE: 1 /HPF

## 2025-03-06 PROCEDURE — 86160 COMPLEMENT ANTIGEN: CPT | Performed by: INTERNAL MEDICINE

## 2025-03-06 PROCEDURE — 86140 C-REACTIVE PROTEIN: CPT | Performed by: PATHOLOGY

## 2025-03-06 PROCEDURE — 85025 COMPLETE CBC W/AUTO DIFF WBC: CPT | Performed by: PATHOLOGY

## 2025-03-06 PROCEDURE — 36415 COLL VENOUS BLD VENIPUNCTURE: CPT | Performed by: PATHOLOGY

## 2025-03-06 PROCEDURE — 99000 SPECIMEN HANDLING OFFICE-LAB: CPT | Performed by: PATHOLOGY

## 2025-03-06 PROCEDURE — 81001 URINALYSIS AUTO W/SCOPE: CPT | Performed by: PATHOLOGY

## 2025-03-06 PROCEDURE — G0103 PSA SCREENING: HCPCS | Performed by: PATHOLOGY

## 2025-03-06 PROCEDURE — 84156 ASSAY OF PROTEIN URINE: CPT | Performed by: PATHOLOGY

## 2025-03-06 PROCEDURE — 85652 RBC SED RATE AUTOMATED: CPT | Performed by: PATHOLOGY

## 2025-03-06 PROCEDURE — 86225 DNA ANTIBODY NATIVE: CPT | Performed by: INTERNAL MEDICINE

## 2025-03-06 PROCEDURE — 74177 CT ABD & PELVIS W/CONTRAST: CPT | Mod: GC | Performed by: STUDENT IN AN ORGANIZED HEALTH CARE EDUCATION/TRAINING PROGRAM

## 2025-03-06 PROCEDURE — 80053 COMPREHEN METABOLIC PANEL: CPT | Performed by: PATHOLOGY

## 2025-03-06 RX ORDER — IOPAMIDOL 755 MG/ML
95 INJECTION, SOLUTION INTRAVASCULAR ONCE
Status: COMPLETED | OUTPATIENT
Start: 2025-03-06 | End: 2025-03-06

## 2025-03-06 RX ADMIN — IOPAMIDOL 95 ML: 755 INJECTION, SOLUTION INTRAVASCULAR at 14:20

## 2025-03-06 NOTE — DISCHARGE INSTRUCTIONS

## 2025-03-06 NOTE — PROGRESS NOTES
Emerson is a 49 year old that presents in clinic today for the following:     Chief Complaint   Patient presents with    Follow Up     Abdominal pain            3/6/2025     2:43 PM   Additional Questions   Roomed by RH     Screenings from encounters over the past 10 days    No data recorded       Bharathi Sharp at 3:00 PM on 3/6/2025

## 2025-03-06 NOTE — PROGRESS NOTES
HPI:    He has about 2 days of LLQ pain anorexia and one episode of vomiting. No systemic sxs. No fever or chills. No urinary complaints. No rectal bleeding. He does have some constipation. No other HEENT, cardiopulmonary, abdominal, , neurological, systemic, psychiatric, lymphatic, endocrine, vascular complaints.     Past Medical History:   Diagnosis Date    Arthritis 17    Biceps tendinopathy     GERD (gastroesophageal reflux disease)     Mucocele of lip     Spondyloarthropathy      Past Surgical History:   Procedure Laterality Date    APPENDECTOMY      COLONOSCOPY N/A 1/3/2022    Procedure: COLONOSCOPY, WITH POLYPECTOMY;  Surgeon: Ingrid Keyes MD;  Location: [facility] OR    ESOPHAGOSCOPY, GASTROSCOPY, DUODENOSCOPY (EGD), COMBINED N/A 3/19/2024    Procedure: Esophagoscopy, gastroscopy, duodenoscopy (EGD), combined;  Surgeon: Kofi Christianson MD;  Location: [facility]    HERNIA REPAIR, INGUINAL RT/LT      left     PE:    Vitals noted, gen nad, cooperative, alert, neck supple nl rom, lungs with good air movement, RRR S1, S2, no MRG, abdomen with tenderness LLQ with  bowel sounds. Grossly normal neurological exam.     Results for orders placed or performed in visit on 25   Comprehensive metabolic panel     Status: Abnormal   Result Value Ref Range    Sodium 134 (L) 135 - 145 mmol/L    Potassium 4.5 3.4 - 5.3 mmol/L    Carbon Dioxide (CO2) 25 22 - 29 mmol/L    Anion Gap 10 7 - 15 mmol/L    Urea Nitrogen 21.3 (H) 6.0 - 20.0 mg/dL    Creatinine 1.03 0.67 - 1.17 mg/dL    GFR Estimate 88 >60 mL/min/1.73m2    Calcium 9.0 8.8 - 10.4 mg/dL    Chloride 99 98 - 107 mmol/L    Glucose 89 70 - 99 mg/dL    Alkaline Phosphatase 71 40 - 150 U/L    AST 30 0 - 45 U/L    ALT 49 0 - 70 U/L    Protein Total 6.9 6.4 - 8.3 g/dL    Albumin 4.2 3.5 - 5.2 g/dL    Bilirubin Total 0.7 <=1.2 mg/dL   PSA, screen     Status: Normal   Result Value Ref Range    Prostate Specific Antigen Screen 1.76 0.00 - 3.50 ng/mL    Narrative     This result is obtained using the Roche Elecsys total PSA method on the oscar e411 immunoassay analyzer, which is an ultrasensitive method. Results obtained with different assay methods or kits cannot be used interchangeably.  This test is intended for initial prostate cancer screening. PSA values exceeding the age-specific limits are suspicious for prostate disease, but additional testing, such as prostate biopsy, is needed to diagnose prostate pathology. The American Cancer Society recommends annual examination with digital rectal examination and serum PSA beginning at age 50 and for men with a life expectancy of at least 10 years after detection of prostate cancer. For men in high-risk groups, such as  Americans or men with a first-degree relative diagnosed at a younger age, testing should begin at a younger age. It is generally recommended that information be provided to patients about the benefits and limitations of testing and treatment so they can make informed decisions.   CRP inflammation     Status: Abnormal   Result Value Ref Range    CRP Inflammation 112.00 (H) <5.00 mg/L   Erythrocyte sedimentation rate auto     Status: Normal   Result Value Ref Range    Erythrocyte Sedimentation Rate 11 0 - 20 mm/hr   UA with Microscopic reflex to Culture     Status: Abnormal    Specimen: Urine, Midstream   Result Value Ref Range    Color Urine Yellow Colorless, Straw, Light Yellow, Yellow    Appearance Urine Clear Clear    Glucose Urine Negative Negative mg/dL    Bilirubin Urine Negative Negative    Ketones Urine 10 (A) Negative mg/dL    Specific Gravity Urine 1.010 1.003 - 1.035    Blood Urine Negative Negative    pH Urine 6.5 5.0 - 7.0    Protein Albumin Urine 10 (A) Negative mg/dL    Urobilinogen Urine Normal Normal, 2.0 mg/dL    Nitrite Urine Negative Negative    Leukocyte Esterase Urine Negative Negative    Mucus Urine Present (A) None Seen /LPF    RBC Urine <1 <=2 /HPF    WBC Urine 1 <=5 /HPF     Hyaline Casts Urine 17 (H) <=2 /LPF    Narrative    Urine Culture not indicated   Protein  random urine     Status: Abnormal   Result Value Ref Range    Total Protein Urine mg/dL 38.0   mg/dL    Total Protein Urine mg/mg Creat 0.30 (H) 0.00 - 0.20 mg/mg Cr    Creatinine Urine mg/dL 127.0 mg/dL   CBC with platelets and differential     Status: Abnormal   Result Value Ref Range    WBC Count 7.6 4.0 - 11.0 10e3/uL    RBC Count 4.61 4.40 - 5.90 10e6/uL    Hemoglobin 14.1 13.3 - 17.7 g/dL    Hematocrit 41.6 40.0 - 53.0 %    MCV 90 78 - 100 fL    MCH 30.6 26.5 - 33.0 pg    MCHC 33.9 31.5 - 36.5 g/dL    RDW 12.0 10.0 - 15.0 %    Platelet Count 203 150 - 450 10e3/uL    % Neutrophils 84 %    % Lymphocytes 8 %    % Monocytes 7 %    % Eosinophils 0 %    % Basophils 0 %    % Immature Granulocytes 0 %    NRBCs per 100 WBC 0 <1 /100    Absolute Neutrophils 6.4 1.6 - 8.3 10e3/uL    Absolute Lymphocytes 0.6 (L) 0.8 - 5.3 10e3/uL    Absolute Monocytes 0.5 0.0 - 1.3 10e3/uL    Absolute Eosinophils 0.0 0.0 - 0.7 10e3/uL    Absolute Basophils 0.0 0.0 - 0.2 10e3/uL    Absolute Immature Granulocytes 0.0 <=0.4 10e3/uL    Absolute NRBCs 0.0 10e3/uL   CBC with Platelets & Differential     Status: Abnormal    Narrative    The following orders were created for panel order CBC with Platelets & Differential.  Procedure                               Abnormality         Status                     ---------                               -----------         ------                     CBC with platelets and d...[[id number]]  Abnormal            Final result                 Please view results for these tests on the individual orders.   Results for orders placed or performed in visit on 03/06/25   CT Abdomen Pelvis w Contrast     Status: None    Narrative    EXAMINATION: CT ABDOMEN PELVIS W CONTRAST  3/6/2025 2:57 PM      CLINICAL HISTORY: EXAM: CT abdomen and pelvis with intravenous  contrast. 3/6/2025 2:57 PM    HISTORY: Left lower quadrant  abdominal pain       TECHNIQUE: Helical acquisition of image data was performed for the  abdomen and pelvis with intravenous contrast.    COMPARISON: Pelvis MRI 5/30/2019.    FINDINGS:    Lower thorax & Mediastinum: Bibasilar dependent atelectasis. No  pleural effusion, mass or consolidation.    Hepatobiliary: subcentimeter hypodensity in peripheral segment 4A/8  measuring about 7 mm (3/74).    Gallbladder: No calcified gallstone. No intra or extrahepatic biliary  ductal dilatation.    Pancreas: The pancreatic duct is not dilated.    Spleen: The spleen is not enlarged.    Adrenal glands: No adrenal nodules.    Kidneys/ureters: Symmetric enhancement of the kidneys. No  hydronephrosis. Few renal cysts and too small to characterize renal  cortical hypodensities. Punctate renal calcifications/nonobstructing  calculus (3/22).    Bladder/pelvic organs: Mild prostatomegaly, mild distention of the  urinary bladder.    Bowel/mesentery: Colonic diverticulosis. Redundant sigmoid colon. Mild  wall thickening with increased enhancement and pericolonic streakiness  associated with trace fluid along the left interfascial plane (3/307).  No definite organized fluid collection or significant pneumoperitoneum  to suggest perforation.      Esophagus/Stomach:  Unremarkable.    Major vessels: No aneurysmal dilatation.. Replaced right hepatic  artery arising from baseline mammogram. Few subcentimeter  retroperitoneal lymph nodes. No bulky adenopathy by size criteria.    Lymph nodes: Few subcentimeter genesis hepatis lymph nodes..    Soft Tissues: Unremarkable    Bones:  Small sclerotic focus in the right inferior pubic bone (3/125  and sclerotic focus in the right sacral ala (3/322), favored to  represent bone island, consider correlation with prior imaging if  available and/or attention on follow-up if patient is at high risk for  osseous neoplastic lesion. Incidental limbus vertebrae involving  presumed L2 and L4 vertebral bodies.         Impression    IMPRESSION:      1. Acute diverticulitis involving the distal sigmoid and proximal  descending colon without associated pericolonic abscess or  pneumoperitoneum. Consider interval colonoscopy post resolution of  acute symptoms.    2. Indeterminate subcentimeter hepatic hypodensity, consider follow-up  MRI in 3-6 months if patient is at high risk for hepatic malignancy,  otherwise no routine follow-up is mandated.      3. Additional incidental findings as detailed above.      Findings relayed to Dr. Das over the phone at 2:52 PM, who  expressed understanding of the findings.    I have personally reviewed the examination and initial interpretation  and I agree with the findings.    MORENA TORRE MD         SYSTEM ID:  [id number]         A/P:    1. Immunizations; Pfizer COVID vaccine x 4 (bi-valent 11/10/2022). Tdap 7/17/2023. He is 48 now and recommend Shingrix vaccine series.   2. He had Rheumatology follow up with Dr. Luna  2/20/2025 for psoriasis and RA/spondyloarrthroapthy On Sulfasalazine and PRN  Celebrex. Labs were done 7/31/2023  He is Plaquenil and had an ophthalmology appt. On 12/6/2024(next 12/12/2025 with Dr. Bullock)  He is aware that Plaquenil is also a photosensitizer, he is outside fishing. He has a 8/29/2025 follow up with Dr. Pacheco.   3. Colonoscopy 1/3/2022 and repeat in 7-10 years   4. Lipids 7/31/2023; , HDL 53 and TG's 63.    5. Dermatology;  for skin check seen by Dr. Malin 1/20/2025 and next 5/21/2025.    6. PT R shoulder 12/30/2021  7. Vitamin D level checked 32 on 7/31/2023.    8. PSA was 1.40 on 2/5/2024  9.  Reflux EGD was done 3/19/2024  10. Anxiety/depression; seen by Ms. Miranda, Health Psychology 1/30/2025 and next 3/13/2025  11. Acute diverticulitis. Sent in rx for Augmentin for 10 days and advised if clinically worse to contact us.            40 minutes spent on the date of the encounter doing chart review, history and exam, documentation and further  activities as noted above exclusive of procedures and other billable interpretations

## 2025-03-07 LAB
C3 SERPL-MCNC: 100 MG/DL (ref 81–157)
C4 SERPL-MCNC: 17 MG/DL (ref 13–39)
DSDNA AB SER-ACNC: 17 IU/ML

## 2025-03-13 ENCOUNTER — VIRTUAL VISIT (OUTPATIENT)
Facility: CLINIC | Age: 50
End: 2025-03-13
Payer: COMMERCIAL

## 2025-03-13 DIAGNOSIS — F41.1 GENERALIZED ANXIETY DISORDER: Primary | ICD-10-CM

## 2025-03-13 NOTE — PROGRESS NOTES
[facility]                                     Progress Note    Patient Name: Sabino Castillo Every  Date: 3/13/2025         Service Type: Individual      Session Start Time: 8:39 AM  Session End Time: 9:30 AM     Session Length: 51 Minutes    Session #: 4    Attendees: Client attended alone    Service Modality:  Video Visit:      Provider verified identity through the following two step process.  Patient provided:  Patient is known previously to provider    Telemedicine Visit: The patient's condition can be safely assessed and treated via synchronous audio and visual telemedicine encounter.      Reason for Telemedicine Visit: Patient convenience (e.g. access to timely appointments / distance to available provider)    Originating Site (Patient Location): Patient's other his car    Distant Site (Provider Location): Provider Remote Setting- Home Office    Consent:  The patient/guardian has verbally consented to: the potential risks and benefits of telemedicine (video visit) versus in person care; bill my insurance or make self-payment for services provided; and responsibility for payment of non-covered services.     Patient would like the video invitation sent by:  Text to cell phone: [phone]    Mode of Communication:  Video Conference via [email or web address]    Distant Location (Provider):  Off-site    As the provider I attest to compliance with applicable laws and regulations related to telemedicine.    DATA  Extended Session (53+ minutes): No  Interactive Complexity: No  Crisis: No      Progress Since Last Session (Related to Symptoms / Goals / Homework):   Symptoms: No change continued anxiety, worry, rumination, stress, irritation, interrupted sleep    Homework: Achieved / completed to satisfaction      Episode of Care Goals: No improvement - PREPARATION (Decided to change - considering how); Intervened by negotiating a change plan and determining options / strategies for behavior change, identifying  triggers, exploring social supports, and working towards setting a date to begin behavior change     Current / Ongoing Stressors and Concerns:   Emerson is coming to therapy to learn skills to address his anxiety, over thinking, worries, and disrupted sleep.  He reports increased stress and anxiety has resulted in him feeling more irritable and short with his family.     Treatment Objective(s) Addressed in This Session:   practice deep breathing at least twice a day       Intervention:   Therapist utilized reflected listening as patient gave brief reflection of the past  few weeks. Patient reported continued anxiety, worry, rumination, stress, irritation, interrupted sleep. He processed how his stress and anxiety have impacting his sleep and his health. He reflected on th positive results has had when using his grounding skills, breathing and body scan- tension in his stomach and chest. Therapist supported patient as he processed and validated patient. Therapist engaged in cognitive restructuring/ reframing, looked at cognitive distortions and challenged distorted thoughts. Therapist introduced CBT.    Assessments completed prior to visit:  The following assessments were completed by patient for this visit:  PHQ2:       2/20/2025     2:35 PM 2/15/2025     9:15 AM 1/20/2025     7:16 PM 1/6/2025    10:49 AM 11/8/2024     7:43 AM 10/8/2024    12:20 PM 9/19/2024    12:15 PM   PHQ-2 ( 1999 Pfizer)   Q1: Little interest or pleasure in doing things 0 0 0 0 0 0 0   Q2: Feeling down, depressed or hopeless 0 0 0 0 0 0 0   PHQ-2 Score 0 0  0  0 0 0 0   Q1: Little interest or pleasure in doing things  Not at all Not at all       Q2: Feeling down, depressed or hopeless  Not at all Not at all       PHQ-2 Score  0 0           Patient-reported     PHQ9:       12/30/2024     6:42 AM   PHQ-9 SCORE   PHQ-9 Total Score MyChart 0   PHQ-9 Total Score 4     GAD2:       1/20/2025     7:17 PM 2/15/2025     9:15 AM   DUSTY-2   Feeling nervous,  anxious, or on edge 1 0   Not being able to stop or control worrying 1 0   DUSTY-2 Total Score 2  0        Patient-reported     GAD7:       12/30/2024     6:43 AM   DUSTY-7 SCORE   Total Score 0 (minimal anxiety)   Total Score 4     PROMIS 10-Global Health (all questions and answers displayed):       12/30/2024    11:00 AM   PROMIS 10   In general, would you say your health is: 3   In general, would you say your quality of life is: 5   In general, how would you rate your physical health? 4   In general, how would you rate your mental health, including your mood and your ability to think? 3   In general, how would you rate your satisfaction with your social activities and relationships? 2   In general, please rate how well you carry out your usual social activities and roles. (This includes activities at home, at work and in your community, and responsibilities as a parent, child, spouse, employee, friend, etc.) 3   To what extent are you able to carry out your everyday physical activities such as walking, climbing stairs, carrying groceries, or moving a chair? 5   In the past 7 days, how often have you been bothered by emotional problems such as feeling anxious, depressed, or irritable? 3   In the past 7 days, how would you rate your fatigue on average? 2   In the past 7 days, how would you rate your pain on average, where 0 means no pain, and 10 means worst imaginable pain? 1   Global Mental Health Score 13   Global Physical Health Score 17   PROMIS TOTAL - SUBSCORES 30         ASSESSMENT: Current Emotional / Mental Status (status of significant symptoms):   Risk status (Self / Other harm or suicidal ideation)   Patient denies current fears or concerns for personal safety.   Patient denies current or recent suicidal ideation or behaviors.   Patient denies current or recent homicidal ideation or behaviors.   Patient denies current or recent self injurious behavior or ideation.   Patient denies other safety  concerns.   Patient reports there has been no change in risk factors since their last session.     Patient reports there has been no change in protective factors since their last session.     Recommended that patient call 911 or go to the local ED should there be a change in any of these risk factors     Appearance:   Appropriate    Eye Contact:   Good    Psychomotor Behavior: Normal    Attitude:   Cooperative  Interested Pleasant   Orientation:   All   Speech    Rate / Production: Normal/ Responsive    Volume:  Normal    Mood:    Anxious  Stressed   Affect:    Appropriate    Thought Content:  Clear    Thought Form:  Coherent  Goal Directed  Logical    Insight:    Good      Medication Review:   No current psychiatric medications prescribed     Medication Compliance:   NA     Changes in Health Issues:   None reported     Chemical Use Review:   Substance Use: Chemical use reviewed, no active concerns identified      Tobacco Use: No current tobacco use.      Diagnosis:  1. Generalized anxiety disorder        Collateral Reports Completed:   Not Applicable    PLAN: (Patient Tasks / Therapist Tasks / Other)  Emerson will practice grounding exercises daily and create two CBT triangles.     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   PROMIS (reviewed every 90 days):     Referral / Collaboration:  Referral to another professional/service is not indicated at this time..    Anticipated number of session for this episode of care: 9-12  sessions  Anticipation frequency of session: Every other week  Anticipated Duration of each session: 38-52 minutes  Treatment plan will be reviewed in 90 days or when goals have been changed.       MeasurableTreatment Goal(s) related to diagnosis / functional impairment(s)  Goal 1: Patient will become more aware of their anxiety and utilize the skills taught to decrease their anxiety symptoms.2    I will know I've met my goal when I have less anxiety, worry, can do the things I need to and have better sleep.      Objective #A (Patient Action)    Patient will  use at least 4 coping skills for anxiety management in the next 12 weeks. .  Status: New - Date: 1/21/2025      Intervention(s)  Therapist will   teach coping skills and assign homework of practicing these. .    Objective #B  Patient will  use cognitive strategies identified in therapy to challenge anxious thoughts. Patient will engage in activities that are anxiety producing for them, slowly increasing their tolerance for new activities. Patient will identify and recognize past negative experiences and subsequent beliefs they hold that contribute to their anxiety .  Status: New - Date: 1/21/2025      Intervention(s)  Therapist will  teach cognitive behavioral skills and engage client in Socratic dialogue around distorted thinking.  Therapist will provide educational materials on CBT .    Objective #C  Patient will   implement self-care into their routine to decrease anxiety, focusing on sleep hygiene, nutrition, movement, regular engagement in mindful activity, and regular socialization.  .  Status: New - Date: 1/21/2025      Intervention(s)  Therapist will  provide psychoeducation around the benefit of self-care and help client identify mindfulness based activities that may work for their life. .    Patient has reviewed and agreed to the above plan.      STEPHANIE Ponce  January 21, 2025

## 2025-05-21 ENCOUNTER — OFFICE VISIT (OUTPATIENT)
Dept: DERMATOLOGY | Facility: CLINIC | Age: 50
End: 2025-05-21
Payer: COMMERCIAL

## 2025-05-21 DIAGNOSIS — D18.01 CHERRY ANGIOMA: ICD-10-CM

## 2025-05-21 DIAGNOSIS — L82.1 SK (SEBORRHEIC KERATOSIS): ICD-10-CM

## 2025-05-21 DIAGNOSIS — D22.9 MULTIPLE BENIGN NEVI: ICD-10-CM

## 2025-05-21 DIAGNOSIS — L81.4 LENTIGINES: ICD-10-CM

## 2025-05-21 DIAGNOSIS — Z85.828 HISTORY OF NONMELANOMA SKIN CANCER: Primary | ICD-10-CM

## 2025-05-21 DIAGNOSIS — Z12.83 SCREENING EXAM FOR SKIN CANCER: ICD-10-CM

## 2025-05-21 DIAGNOSIS — R21 RASH: ICD-10-CM

## 2025-05-21 ASSESSMENT — PAIN SCALES - GENERAL: PAINLEVEL_OUTOF10: NO PAIN (0)

## 2025-05-21 NOTE — Clinical Note
Hi Dr. Luna - I saw Emerson today and we did a biopsy of his rash. It is looking pretty discoid lupsy to me. I am forwarding my notes to you to keep you in the loop

## 2025-05-21 NOTE — NURSING NOTE
Sabino Cardona's chief complaint for this visit includes:  Chief Complaint   Patient presents with    Skin Check     Patient here for FBSC. He mentions red, blotchy patches around body. He states he was given triamcinolone for this, but he isn't sure if that was working. Hx of NMSC.     PCP: Huan Das    Referring Provider:  Referred Self, MD  No address on file    There were no vitals taken for this visit.  No Pain (0)        Allergies   Allergen Reactions    Nkda [No Known Drug Allergy]     Seasonal Allergies Other (See Comments)     Sneezing, running nose, itchy eyes         Do you need any medication refills at today's visit? Yes, triamcinolone.    Randi Sosa on 5/21/2025 at 7:12 AM

## 2025-05-21 NOTE — PATIENT INSTRUCTIONS
Patient Education       Proper skin care from [facility]:    -Eliminate harsh soaps as they strip the natural oils from the skin, often resulting in dry itchy skin ( i.e. Dial, Zest, Maltese Spring)  -Use mild soaps such as Cetaphil or Dove Sensitive Skin in the shower. You do not need to use soap on arms, legs, and trunk every time you shower unless visibly soiled.   -Avoid hot or cold showers.  -After showering, lightly dry off and apply moisturizing within 2-3 minutes. This will help trap moisture in the skin.   -Aggressive use of a moisturizer at least 1-2 times a day to the entire body (including -Vanicream, Cetaphil, Aquaphor or Cerave) and moisturize hands after every washing.  -We recommend using moisturizers that come in a tub that needs to be scooped out, not a pump. This has more of an oil base. It will hold moisture in your skin much better than a water base moisturizer. The above recommended are non-pore clogging.      Wear a sunscreen with at least SPF 30 on your face, ears, neck and V of the chest daily. Wear sunscreen on other areas of the body if those areas are exposed to the sun throughout the day. Sunscreens can contain physical and/or chemical blockers. Physical blockers are less likely to clog pores, these include zinc oxide and titanium dioxide. Reapply every two hour and after swimming.     Sunscreen examples: https://www.ewg.org/sunscreen/    UV radiation  UVA radiation remains constant throughout the day and throughout the year. It is a longer wavelength than UVB and therefore penetrates deeper into the skin leading to immediate and delayed tanning, photoaging, and skin cancer. 70-80% of UVA and UVB radiation occurs between the hours of 10am-2pm.  UVB radiation  UVB radiation causes the most harmful effects and is more significant during the summer months. However, snow and ice can reflect UVB radiation leading to skin damage during the winter months as well. UVB radiation is  responsible for tanning, burning, inflammation, delayed erythema (pinkness), pigmentation (brown spots), and skin cancer.     I recommend self monthly full body exams and yearly full body exams with a dermatology provider. If you develop a new or changing lesion please follow up for examination. Most skin cancers are pink and scaly or pink and pearly. However, we do see blue/brown/black skin cancers.  Consider the ABCDEs of melanoma when giving yourself your monthly full body exam ( don't forget the groin, buttocks, feet, toes, etc). A-asymmetry, B-borders, C-color, D-diameter, E-elevation or evolving. If you see any of these changes please follow up in clinic. If you cannot see your back I recommend purchasing a hand held mirror to use with a larger wall mirror.       Checking for Skin Cancer  You can find cancer early by checking your skin each month. There are 3 kinds of skin cancer. They are melanoma, basal cell carcinoma, and squamous cell carcinoma. Doing monthly skin checks is the best way to find new marks or skin changes. Follow the instructions below for checking your skin.   The ABCDEs of checking moles for melanoma   Check your moles or growths for signs of melanoma using ABCDE:   Asymmetry: the sides of the mole or growth don t match  Border: the edges are ragged, notched, or blurred  Color: the color within the mole or growth varies  Diameter: the mole or growth is larger than 6 mm (size of a pencil eraser)  Evolving: the size, shape, or color of the mole or growth is changing (evolving is not shown in the images below)    Checking for other types of skin cancer  Basal cell carcinoma or squamous cell carcinoma have symptoms such as:     A spot or mole that looks different from all other marks on your skin  Changes in how an area feels, such as itching, tenderness, or pain  Changes in the skin's surface, such as oozing, bleeding, or scaliness  A sore that does not heal  New swelling or redness beyond  the border of a mole    Who s at risk?  Anyone can get skin cancer. But you are at greater risk if you have:   Fair skin, light-colored hair, or light-colored eyes  Many moles or abnormal moles on your skin  A history of sunburns from sunlight or tanning beds  A family history of skin cancer  A history of exposure to radiation or chemicals  A weakened immune system  If you have had skin cancer in the past, you are at risk for recurring skin cancer.   How to check your skin  Do your monthly skin checkups in front of a full-length mirror. Check all parts of your body, including your:   Head (ears, face, neck, and scalp)  Torso (front, back, and sides)  Arms (tops, undersides, upper, and lower armpits)  Hands (palms, backs, and fingers, including under the nails)  Buttocks and genitals  Legs (front, back, and sides)  Feet (tops, soles, toes, including under the nails, and between toes)  If you have a lot of moles, take digital photos of them each month. Make sure to take photos both up close and from a distance. These can help you see if any moles change over time.   Most skin changes are not cancer. But if you see any changes in your skin, call your doctor right away. Only he or she can diagnose a problem. If you have skin cancer, seeing your doctor can be the first step toward getting the treatment that could save your life.   [employer] last reviewed this educational content on 4/1/2019 2000-2020 [employer]. [address] [place] [address], [place] [address]. All rights reserved. This information is not intended as a substitute for professional medical care. Always follow your healthcare professional's instructions.

## 2025-05-21 NOTE — LETTER
5/21/2025      Sabino Cardona  [address]  [facility] [address]      Dear Colleague,    Thank you for referring your patient, Sabino Cardona, to the [facility]. Please see a copy of my visit note below.    [facility] Dermatology Note  Encounter Date: May 21, 2025  Office Visit      Dermatology Problem List:  FBSE: 5/21/25    1. Rash - ddx: mild PSO vs discoid lupus of scalp and patch on mid upper chest - trial of corticosteroids, improves - then slowly recurs. Pt has positive DS DNA, inflammatory arthritis- probable SLE.  - triamcinolone, fluocinonide for scalp  - punch bx 5/21/25 for H&E and DIF  2. 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 DF - R mid abdomen - new in past 6mo - will monitor  4. *LTM - 3 mm dark brown macule on the right dorsal foot  - Unchanged 5/21/25, continue to monitor     PMHx: SLE given positive anti - DNA, inflammatory arthritis - HCQ 200mg po BID and sulfasalazine  FHx: Father with unknown type of skin cancer. 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 Wife is transplant surgeon at the   ____________________________________________    Assessment & Plan:  # Rash - ddx: discoid lupus vs mild PSO vs other - scalp and patch on mid upper chest - trial of corticosteroids, improves - then slowly recurs. Pt has positive DS DNA, inflammatory arthritis - probable SLE.  - currently on hydroxychloroquine and sulfasalazine per Rheum - Dr. Luna  - punch bx today for H&E and DIF  - will copy note to Dr. Luna as well for care continuity    # Hx of NMSC  - Sunscreen: Apply 20 minutes prior to going outdoors and reapply every two hours, when wet or sweating. We recommend using an SPF 30 or higher, and to use one that is water resistant.     - Advised to monitor for changing, non-healing, bleeding, painful,  changing, or otherwise symptomatic lesions  - Continue bi annual skin exams    # Benign findings: multiple benign nevi, lentigines, cherry angiomas, SKs  - edu on benign etiology  - Signs and Symptoms of non-melanoma skin cancer and ABCDEs of melanoma reviewed with patient. Patient encouraged to perform monthly self skin exams and educated on how to perform them. UV precautions reviewed with patient. Patient was asked about new or changing moles/lesions on body.   - Sunscreen: Apply 20 minutes prior to going outdoors and reapply every two hours, when wet or sweating. We recommend using an SPF 30 or higher, and to use one that is water resistant.     - RTC for changes    Procedures Performed:   - Punch biopsy procedure note, location(s): see above. After discussion of benefits and risks including but not limited to bleeding, infection, scar, incomplete removal, recurrence, and non-diagnostic biopsy, written consent and photographs were obtained. The area was cleaned with isopropyl alcohol. 0.5mL of 1% lidocaine with epinephrine was injected to obtain adequate anesthesia and a 3 mm punch biopsy was performed at site(s). 5-0 Prolene sutures were utilized to approximate the epidermal edges. White petrolatum ointment and a bandage was applied to the wound. Explicit verbal and written wound care instructions were provided. The patient left the dermatology clinic in good condition.      Follow-up: pending path results    Staff and scribe:     Scribe Disclosure:   I, DEAN JOYNER, am serving as a scribe; to document services personally performed by Shanna Valle PA-C -based on data collection and the provider's statements to me.     Provider Disclosure:  I agree with above History, Review of Systems, Physical exam and Plan.  I have reviewed the content of the documentation and have edited it as needed. I have personally performed the services documented here and the documentation accurately represents those services  and the decisions I have made.      Electronically signed by    All risks, benefits and alternatives were discussed with patient.  Patient is in agreement and understands the assessment and plan.  All questions were answered.    Shanna Valle PA-C, MPAS  [facility] [place]: Phone: [phone], Fax: [phone]  [facility]: Phone: [phone],  Fax: [phone]  [facility]: Phone: [phone], Fax: [phone]  ____________________________________________    CC: No chief complaint on file.      Reviewed patients past medical history and pertinent chart review prior to patient's visit today.     HPI:  Mr. Sabino Cardona is a 49 year old male who presents today as a return patient for FBSE. Has a hx of NMSC. Last seen by Dr. Malin, reviewed encounter.     Today patient reported red, blotchy patches around body. He states he was given triamcinolone for this, but he isn't sure if that was working.     Patient is otherwise feeling well, without additional concerns.    Labs:  N/A    Physical Exam:  Vitals: There were no vitals taken for this visit.  SKIN: Total skin excluding the undergarment areas was performed. The exam included the head/face, neck, both arms, chest, back, abdomen, both legs, digits and/or nails.    -  Collins's skin type II, <100 nevi  - There are dome shaped bright red papules on the trunk.   - Multiple regular brown pigmented macules and papules are identified on the trunk and extremities.   - Scattered brown macules on sun exposed areas.  - There are waxy stuck on tan to brown papules on the trunk.    -There are well healed surgical scars without erythema, nodularity or telangiectasias on the prior NMSC sites, NERD   - round erythematous macules and patches on the scalp, lateral face, mid upper chest, posterior neck and tops of the shoulders  - No other lesions of concern on areas  examined.                       Medications:  Current Outpatient Medications   Medication Sig Dispense Refill     amoxicillin-clavulanate (AUGMENTIN) 875-125 MG tablet Take 1 tablet by mouth 2 times daily. 20 tablet 0     fluocinonide (LIDEX) 0.05 % external solution Apply topically 2 times daily. 60 mL 5     hydroxychloroquine (PLAQUENIL) 200 MG tablet Take 1 tablet (200 mg) by mouth 2 times daily. Annual Plaquenil toxicity eye screening required. 180 tablet 1     sulfaSALAzine ER (AZULFIDINE EN) 500 MG EC tablet Take 3 tablets (1,500 mg) by mouth 2 times daily. Labs required every 6 months while taking this medication. Hold for signs of infection, and seek medical attention. Please call to scheduled follow-up appointment, overdue. 540 tablet 1     triamcinolone (KENALOG) 0.1 % external ointment Apply topically 2 times daily. 15 g 2     Vitamin D3 (CHOLECALCIFEROL) 25 mcg (1000 units) tablet Take by mouth every morning       No current facility-administered medications for this visit.      Past Medical/Surgical History:   Patient Active Problem List   Diagnosis     Esophageal reflux     Biceps tendinopathy     Wart     Mucocele of lip     Sore throat     Past Medical History:   Diagnosis Date     Arthritis 01/01/19     Biceps tendinopathy      GERD (gastroesophageal reflux disease)      Mucocele of lip      Spondyloarthropathy          copy Dr. Luna on close of this encounter           Again, thank you for allowing me to participate in the care of your patient.        Sincerely,        Shanna Valle PA-C    Electronically signed

## 2025-05-21 NOTE — PROGRESS NOTES
[facility] Dermatology Note  Encounter Date: May 21, 2025  Office Visit      Dermatology Problem List:  FBSE: 5/21/25    1. Rash - ddx: mild PSO vs discoid lupus of scalp and patch on mid upper chest - trial of corticosteroids, improves - then slowly recurs. Pt has positive DS DNA, inflammatory arthritis- probable SLE.  - triamcinolone, fluocinonide for scalp  - punch bx 5/21/25 for H&E and DIF  2. 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 DF - R mid abdomen - new in past 6mo - will monitor  4. *LTM - 3 mm dark brown macule on the right dorsal foot  - Unchanged 5/21/25, continue to monitor     PMHx: SLE given positive anti - DNA, inflammatory arthritis - HCQ 200mg po BID and sulfasalazine  FHx: Father with unknown type of skin cancer. 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 Wife is transplant surgeon at the   ____________________________________________    Assessment & Plan:  # Rash - ddx: discoid lupus vs mild PSO vs other - scalp and patch on mid upper chest - trial of corticosteroids, improves - then slowly recurs. Pt has positive DS DNA, inflammatory arthritis - probable SLE.  - currently on hydroxychloroquine and sulfasalazine per Rheum - Dr. Luna  - punch bx today for H&E and DIF  - will copy note to Dr. Luna as well for care continuity    # Hx of NMSC  - Sunscreen: Apply 20 minutes prior to going outdoors and reapply every two hours, when wet or sweating. We recommend using an SPF 30 or higher, and to use one that is water resistant.     - Advised to monitor for changing, non-healing, bleeding, painful, changing, or otherwise symptomatic lesions  - Continue bi annual skin exams    # Benign findings: multiple benign nevi, lentigines, cherry angiomas, SKs  - edu on benign etiology  - Signs and Symptoms of non-melanoma skin cancer and ABCDEs of melanoma  reviewed with patient. Patient encouraged to perform monthly self skin exams and educated on how to perform them. UV precautions reviewed with patient. Patient was asked about new or changing moles/lesions on body.   - Sunscreen: Apply 20 minutes prior to going outdoors and reapply every two hours, when wet or sweating. We recommend using an SPF 30 or higher, and to use one that is water resistant.     - RTC for changes    Procedures Performed:   - Punch biopsy procedure note, location(s): see above. After discussion of benefits and risks including but not limited to bleeding, infection, scar, incomplete removal, recurrence, and non-diagnostic biopsy, written consent and photographs were obtained. The area was cleaned with isopropyl alcohol. 0.5mL of 1% lidocaine with epinephrine was injected to obtain adequate anesthesia and a 3 mm punch biopsy was performed at site(s). 5-0 Prolene sutures were utilized to approximate the epidermal edges. White petrolatum ointment and a bandage was applied to the wound. Explicit verbal and written wound care instructions were provided. The patient left the dermatology clinic in good condition.      Follow-up: pending path results    Staff and scribe:     Scribe Disclosure:   I, DEAN JOYNER, am serving as a scribe; to document services personally performed by Shanna Valle PA-C -based on data collection and the provider's statements to me.     Provider Disclosure:  I agree with above History, Review of Systems, Physical exam and Plan.  I have reviewed the content of the documentation and have edited it as needed. I have personally performed the services documented here and the documentation accurately represents those services and the decisions I have made.      Electronically signed by    All risks, benefits and alternatives were discussed with patient.  Patient is in agreement and understands the assessment and plan.  All questions were answered.    Shanna Valle PA-C,  MPAS  [facility]: Phone: [phone], Fax: [phone]  [facility]: Phone: [phone],  Fax: [phone]  [facility]: Phone: [phone], Fax: [phone]  ____________________________________________    CC: No chief complaint on file.      Reviewed patients past medical history and pertinent chart review prior to patient's visit today.     HPI:  Mr. Sabino Cardona is a 49 year old male who presents today as a return patient for FBSE. Has a hx of NMSC. Last seen by Dr. Malin, reviewed encounter.     Today patient reported red, blotchy patches around body. He states he was given triamcinolone for this, but he isn't sure if that was working.     Patient is otherwise feeling well, without additional concerns.    Labs:  N/A    Physical Exam:  Vitals: There were no vitals taken for this visit.  SKIN: Total skin excluding the undergarment areas was performed. The exam included the head/face, neck, both arms, chest, back, abdomen, both legs, digits and/or nails.    -  Collins's skin type II, <100 nevi  - There are dome shaped bright red papules on the trunk.   - Multiple regular brown pigmented macules and papules are identified on the trunk and extremities.   - Scattered brown macules on sun exposed areas.  - There are waxy stuck on tan to brown papules on the trunk.    -There are well healed surgical scars without erythema, nodularity or telangiectasias on the prior NMSC sites, NERD   - round erythematous macules and patches on the scalp, lateral face, mid upper chest, posterior neck and tops of the shoulders  - No other lesions of concern on areas examined.                       Medications:  Current Outpatient Medications   Medication Sig Dispense Refill    amoxicillin-clavulanate (AUGMENTIN) 875-125 MG tablet Take 1 tablet by mouth 2 times daily. 20 tablet 0    fluocinonide (LIDEX) 0.05 %  external solution Apply topically 2 times daily. 60 mL 5    hydroxychloroquine (PLAQUENIL) 200 MG tablet Take 1 tablet (200 mg) by mouth 2 times daily. Annual Plaquenil toxicity eye screening required. 180 tablet 1    sulfaSALAzine ER (AZULFIDINE EN) 500 MG EC tablet Take 3 tablets (1,500 mg) by mouth 2 times daily. Labs required every 6 months while taking this medication. Hold for signs of infection, and seek medical attention. Please call to scheduled follow-up appointment, overdue. 540 tablet 1    triamcinolone (KENALOG) 0.1 % external ointment Apply topically 2 times daily. 15 g 2    Vitamin D3 (CHOLECALCIFEROL) 25 mcg (1000 units) tablet Take by mouth every morning       No current facility-administered medications for this visit.      Past Medical/Surgical History:   Patient Active Problem List   Diagnosis    Esophageal reflux    Biceps tendinopathy    Wart    Mucocele of lip    Sore throat     Past Medical History:   Diagnosis Date    Arthritis 01/01/19    Biceps tendinopathy     GERD (gastroesophageal reflux disease)     Mucocele of lip     Spondyloarthropathy          copy Dr. Luna on close of this encounter

## 2025-05-21 NOTE — NURSING NOTE
The following medication was given:     MEDICATION:  Lidocaine with epinephrine 1% 1:[id number]  ROUTE: SQ  SITE: see procedure note  DOSE: 3 mL  LOT #: [id number]  : [employer]  EXPIRATION DATE: 12-31-25  NDC#: 8178-1071-19  Was there drug waste? No  Multi-dose vial: Yes    Randi Sosa  May 21, 2025

## 2025-05-28 ENCOUNTER — RESULTS FOLLOW-UP (OUTPATIENT)
Dept: DERMATOLOGY | Facility: CLINIC | Age: 50
End: 2025-05-28

## 2025-05-28 LAB
PATH REPORT.COMMENTS IMP SPEC: NORMAL
PATH REPORT.FINAL DX SPEC: NORMAL
PATH REPORT.GROSS SPEC: NORMAL
PATH REPORT.MICROSCOPIC SPEC OTHER STN: NORMAL
PATH REPORT.RELEVANT HX SPEC: NORMAL

## 2025-05-29 ENCOUNTER — MYC MEDICAL ADVICE (OUTPATIENT)
Dept: RHEUMATOLOGY | Facility: CLINIC | Age: 50
End: 2025-05-29
Payer: COMMERCIAL

## 2025-05-30 NOTE — TELEPHONE ENCOUNTER
Pt isn't clear on what topical medications should be used at this point. He was unclear if there is another medication that Tori wants him to take. In reading Tori note, it doesn't look like there is a change of medications. Will be messaging Tori to clarify, then contacting patient.    Carolee Marks LPN on 5/30/2025 at 8:30 AM

## 2025-06-04 NOTE — TELEPHONE ENCOUNTER
Patient called and scheduled on 6/5 at 9am in person. No further action needed at this time.       Shruti SCHMIDT RN  [facility]

## 2025-06-05 ENCOUNTER — OFFICE VISIT (OUTPATIENT)
Dept: RHEUMATOLOGY | Facility: CLINIC | Age: 50
End: 2025-06-05
Attending: INTERNAL MEDICINE
Payer: COMMERCIAL

## 2025-06-05 ENCOUNTER — TELEPHONE (OUTPATIENT)
Dept: RHEUMATOLOGY | Facility: CLINIC | Age: 50
End: 2025-06-05
Payer: COMMERCIAL

## 2025-06-05 VITALS
SYSTOLIC BLOOD PRESSURE: 147 MMHG | DIASTOLIC BLOOD PRESSURE: 95 MMHG | BODY MASS INDEX: 25.67 KG/M2 | WEIGHT: 200 LBS | HEART RATE: 69 BPM | HEIGHT: 74 IN

## 2025-06-05 DIAGNOSIS — Z51.81 MEDICATION MONITORING ENCOUNTER: Primary | ICD-10-CM

## 2025-06-05 DIAGNOSIS — M32.19 OTHER SYSTEMIC LUPUS ERYTHEMATOSUS WITH OTHER ORGAN INVOLVEMENT (H): ICD-10-CM

## 2025-06-05 PROCEDURE — 99213 OFFICE O/P EST LOW 20 MIN: CPT | Performed by: INTERNAL MEDICINE

## 2025-06-05 RX ORDER — PREDNISONE 5 MG/1
TABLET ORAL
Qty: 50 TABLET | Refills: 0 | Status: SHIPPED | OUTPATIENT
Start: 2025-06-05

## 2025-06-05 ASSESSMENT — PAIN SCALES - GENERAL: PAINLEVEL_OUTOF10: NO PAIN (0)

## 2025-06-05 NOTE — LETTER
6/5/2025       RE: Sabino Cardona  [address]  [facility] [address]     Dear Colleague,    Thank you for referring your patient, Sabino Cardona, to the [facility] [place] [facility]. Please see a copy of my visit note below.    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 His wife is a transplant surgeon here at the  and they say the actual reason for referral is to establish/transfer care as they just moved to [place].      Today, he is feeling well.      They moved back from [place] to [place] about 1.5 month ago.      He reports having degenerative arthritis since age 18. Has chronic low back pain with worsening in AM and worsening with inactivity. Allergies in Spring makes it worse.      He started to have heel and ankle pain in Feb 2019, also knees and elbows. That was stressful time of life looking for moving back. Also has 3 small children (one 5 yo and two 3 yo twins) which keep them busy.      Both heels were red and blaching and his wife thought it was gout.      His brother has gout and is almost 51 yo.      He saw a rheumatologist at [facility]. 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 His wife questioned the diagnosis given lack of lupus symptoms.      Then he had x-rays of spine which showed lots of bone spurs.    He was diagnosed with OA, possible reactive arthritis. No h/o uveitis, urethritis, STDs. Has h/o food poisenings. Was put on SSZ in 2/2019, the dose was increased to current dose of 1 gr bid, he  tolerates it well with no toxicity. SSZ has helped and he has no current heel inflammation or recent flare ups.      Has fatigue but because of having 3 small children, it's hard for him to say if fatigue was related to inflammation.    ROS is positive for intermittent CP for years, ibuprofen helps. Prednisone 10 mg every day did not help in the past.    Gets cold sores. No photosensitivity. Gets migraines. No weight loss. No Raynaud's.      2/14/2020: No fatigue, skin rashes, oral ulcers.    Has mild achy knees.    About 1.5 months ago, started having flare up of achilles tendonitis in both feet, worse in AM and at night. They turn red and swollen.      7/10/2020: At last visit in 2/2020, increased SSZ from 2 to 2.5 gr/day given active achilles tendonitis, also prescribed celebrex 100 mg bid prn. Change in SSZ has helped and he no longer needs to take celebrex (last use 6 wk go). Tolerates SSZ at higher dose well. No flare ups since last visit.    No symptoms and no complaints today. Felling very well. No skin rashes, oral ulcers, CP or SOB or fatigue.    Had flu in 1/2020, after that got the flu shot.     6/28/2021: Hands, feet swell up 2 times a week x 1 hr, it gets better after moving around. No heel pain/swelling.    Has 3 small children, physical job. Fully vaccinated, no SE. Got pfizer.    Over last couple of months, has been good on taking her SSZ 5 tabs a day everyday. Noticed more sx including CP by missing some doses. No SSZ SEs. Rarely takes the celebrex, if notices sore feet x 2 days in a row, he takes it. It does not help as much.    9/23/2022: Emerson presents for follow up. 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 No other complaints. Tolerates HCQ well.      During the day he is usually good, at night migratory pain comes back affecting shoulders (R shoulder  more consistent) and hips. His pain is tolerable most days but sometimes,  keeps him up.    No am stiffness.    Advil works better than celebrex.    If misses SSZ x 3-4 days, pain recurs.      1/19/2023: Tolerates HCQ well, migratory arthralgia affecting large joints is much improved, reports occasional pain, takes ibuprofen prn. tolerates HCQ, thinks it has helped.    8/3/2023: Ongoing migratory arthralgia but tolerable.      Reports every other month flare ups of joint pain, lasts 2-3 weeks, no triggers. Sometimes takes advil which helps, sometimes it goes away by its own, sometimes shoulders, sometimes hands and sometimes back. Noticed fingers and hands swell up. It moves from joint to joint. Spring time is worse, pain is pretty consistent. With some flare ups, gets sharp CP. Pain is tolerable. Celebrex does not work.    No skin rash, oral ulcers, CP, SOB.    No flares today.      2/9/2024:    -was doing very well, no flares with better control of joint pain after adding HCQ to SSZ till recent flu A on 2/1/2024, which triggered a flare of diffuse arthralgia and body ache along with headaches/cough, now is doing much better, leaving on a cruise trip, has mild residual cough, had flu shot this season. Had vivian flu. Took tylenol, advil.        2/20/2025:    -doing very well today, no complaints    -since last visit, has developed scalp psoriasis, manageable with topical, seeing derm, psoriasis got a little worse on recent trip to [place]    -last arthritis flare was at the end of Oct when he got shingrix vaccine, flu and covid vaccine on the same day, it was a bad flare for about 3 wk, managed by NSIADs, tylenol      Today 6/5/2025:    -rash progressed to upper chest, shoulders, back of neck, skin biopsy in 5/2025 showed lupus, this appointment was made to discuss tx options    -outdoor a lot, uses sun protective clothing    -no major hair loss    -will  topicals today, will use it bid, last use was 2 wk ago,  likes to rash to resolve soon    -no fevers    -no infections    -no pleurisy    -no sicca    -more joint pain x past 2 weeks, worse in AM and at night, failed celebrex    -feet swelled up yesterday, hands swell up    -no fatigue    -ETOH rarely        ROS:  A comprehensive ROS was done, positives are per HPI.      Past Medical History:   Diagnosis Date     Arthritis 01/01/19     Biceps tendinopathy      GERD (gastroesophageal reflux disease)      Mucocele of lip      Spondyloarthropathy      Past Surgical History:   Procedure Laterality Date     APPENDECTOMY       COLONOSCOPY N/A 1/3/2022    Procedure: COLONOSCOPY, WITH POLYPECTOMY;  Surgeon: Ingrid Keyes MD;  Location: [facility] OR     ESOPHAGOSCOPY, GASTROSCOPY, DUODENOSCOPY (EGD), COMBINED N/A 3/19/2024    Procedure: Esophagoscopy, gastroscopy, duodenoscopy (EGD), combined;  Surgeon: Kofi Christianson MD;  Location: [facility]     HERNIA REPAIR, INGUINAL RT/LT      left     Family History   Problem Relation Age of Onset     Hypertension Mother      Hypertension Father      Asthma Father      Diabetes Father      Psoriasis Father      Hypertension Maternal Grandmother         RA     Diabetes Maternal Grandmother      Hypertension Maternal Grandfather      Cancer Maternal Grandfather         stomach     Cancer Paternal Grandfather         lung-smoker     Asthma Sister      Asthma Sister      Anesthesia Reaction No family hx of      Deep Vein Thrombosis (DVT) No family hx of      Glaucoma No family hx of      Macular Degeneration No family hx of    Mother has RA  Social History     Socioeconomic History     Marital status:      Spouse name: Not on file     Number of children: 3     Years of education: Not on file     Highest education level: Not on file   Occupational History     Occupation: [employer]   Tobacco Use     Smoking status: Never     Smokeless tobacco: Former     Types: Chew     Quit date: 4/1/2010     Tobacco comments:     chew 9 yr   Vaping Use      Vaping status: Never Used   Substance and Sexual Activity     Alcohol use: Yes     Alcohol/week: 1.7 standard drinks of alcohol     Types: 2 Standard drinks or equivalent per week     Drug use: No     Sexual activity: Yes     Partners: Female   Other Topics Concern      Service Not Asked     Blood Transfusions Not Asked     Caffeine Concern Not Asked     Occupational Exposure Not Asked     Hobby Hazards Not Asked     Sleep Concern Not Asked     Stress Concern Not Asked     Weight Concern Not Asked     Special Diet Not Asked     Back Care Not Asked     Exercise Yes     Comment: run, weights     Bike Helmet Not Asked     Seat Belt Not Asked     Self-Exams Not Asked   Social History Narrative     Not on file     Social Drivers of Health     Financial Resource Strain: Low Risk  (9/17/2024)    Financial Resource Strain      Within the past 12 months, have you or your family members you live with been unable to get utilities (heat, electricity) when it was really needed?: No   Food Insecurity: Low Risk  (9/17/2024)    Food Insecurity      Within the past 12 months, did you worry that your food would run out before you got money to buy more?: No      Within the past 12 months, did the food you bought just not last and you didn t have money to get more?: No   Transportation Needs: Low Risk  (9/17/2024)    Transportation Needs      Within the past 12 months, has lack of transportation kept you from medical appointments, getting your medicines, non-medical meetings or appointments, work, or from getting things that you need?: No   Physical Activity: Not on file   Stress: Not on file   Social Connections: Not on file   Interpersonal Safety: Not on file   Housing Stability: Low Risk  (9/17/2024)    Housing Stability      Do you have housing? : Yes      Are you worried about losing your housing?: No     Patient Active Problem List   Diagnosis     Esophageal reflux     Biceps tendinopathy     Wart     Mucocele of lip      Sore throat     Allergies   Allergen Reactions     Nkda [No Known Drug Allergy]      Seasonal Allergies Other (See Comments)     Sneezing, running nose, itchy eyes       Outpatient Encounter Medications as of 6/5/2025   Medication Sig Dispense Refill     fluocinonide (LIDEX) 0.05 % external solution Apply topically 2 times daily. 60 mL 5     hydroxychloroquine (PLAQUENIL) 200 MG tablet Take 1 tablet (200 mg) by mouth 2 times daily. Annual Plaquenil toxicity eye screening required. 180 tablet 1     sulfaSALAzine ER (AZULFIDINE EN) 500 MG EC tablet Take 3 tablets (1,500 mg) by mouth 2 times daily. Labs required every 6 months while taking this medication. Hold for signs of infection, and seek medical attention. Please call to scheduled follow-up appointment, overdue. 540 tablet 1     triamcinolone (KENALOG) 0.1 % external ointment Apply topically 2 times daily. 15 g 2     Vitamin D3 (CHOLECALCIFEROL) 25 mcg (1000 units) tablet Take by mouth every morning       amoxicillin-clavulanate (AUGMENTIN) 875-125 MG tablet Take 1 tablet by mouth 2 times daily. 20 tablet 0     No facility-administered encounter medications on file as of 6/5/2025.               His records were reviewed.      2/2019: +BEST 1:40 mixed homogenous and nucleolar pattern, positive anti-DNA 17 with NL being up to 4. Neg anti-Sm, RNP, SCL-70, SSA, SSB, ALEXANDREA-1, CHROMATIN, RF, anti-CCP. NL ESR. NL SUA 5.2. Neg HLA-B27.  NL TSH, C3, C4. Unremarkable CMP. Neg U/A. LOW vit D 27.    2/2019: Advanced degenerative changes of L spine and moderate degenerative changes of T spine on x-rays.  Component      Latest Ref Rng & Units 1/17/2020   WBC      4.0 - 11.0 10e9/L 3.6 (L)   RBC Count      4.4 - 5.9 10e12/L 4.52   Hemoglobin      13.3 - 17.7 g/dL 13.8   Hematocrit      40.0 - 53.0 % 41.1   MCV      78 - 100 fl 91   MCH      26.5 - 33.0 pg 30.5   MCHC      31.5 - 36.5 g/dL 33.6   RDW      10.0 - 15.0 % 12.0   Platelet Count      150 - 450 10e9/L 258   Diff  Method       Automated Method   % Neutrophils      % 55.5   % Lymphocytes      % 30.4   % Monocytes      % 11.0   % Eosinophils      % 2.5   % Basophils      % 0.3   % Immature Granulocytes      % 0.3   Nucleated RBCs      0 /100 0   Absolute Neutrophil      1.6 - 8.3 10e9/L 2.0   Absolute Lymphocytes      0.8 - 5.3 10e9/L 1.1   Absolute Monocytes      0.0 - 1.3 10e9/L 0.4   Absolute Eosinophils      0.0 - 0.7 10e9/L 0.1   Absolute Basophils      0.0 - 0.2 10e9/L 0.0   Abs Immature Granulocytes      0 - 0.4 10e9/L 0.0   Absolute Nucleated RBC       0.0   Creatinine      0.66 - 1.25 mg/dL 0.83   GFR Estimate      >60 mL/min/1.73:m2 >90   GFR Estimate If Black      >60 mL/min/1.73:m2 >90   Vitamin D Deficiency screening      20 - 75 ug/L 27   Sed Rate      0 - 15 mm/h 8   CRP Inflammation      0.0 - 8.0 mg/L <2.9   AST      0 - 45 U/L 19   Albumin      3.4 - 5.0 g/dL 3.9   ALT      0 - 70 U/L 36   Complement C3      81 - 157 mg/dL 118   Complement C4      13 - 39 mg/dL 25   DNA-ds      <10 IU/mL 16 (H)     Component      Latest Ref Rng & Units 1/18/2021   WBC      4.0 - 11.0 10e9/L 5.2   RBC Count      4.4 - 5.9 10e12/L 4.68   Hemoglobin      13.3 - 17.7 g/dL 14.7   Hematocrit      40.0 - 53.0 % 42.7   MCV      78 - 100 fl 91   MCH      26.5 - 33.0 pg 31.4   MCHC      31.5 - 36.5 g/dL 34.4   RDW      10.0 - 15.0 % 11.9   Platelet Count      150 - 450 10e9/L 241   % Neutrophils      % 63.3   % Lymphocytes      % 27.0   % Monocytes      % 8.3   % Eosinophils      % 1.0   % Basophils      % 0.4   Absolute Neutrophil      1.6 - 8.3 10e9/L 3.3   Absolute Lymphocytes      0.8 - 5.3 10e9/L 1.4   Absolute Monocytes      0.0 - 1.3 10e9/L 0.4   Absolute Eosinophils      0.0 - 0.7 10e9/L 0.1   Absolute Basophils      0.0 - 0.2 10e9/L 0.0   Diff Method       Automated Method   Creatinine      0.66 - 1.25 mg/dL 0.86   GFR Estimate      >60 mL/min/1.73:m2 >90   GFR Estimate If Black      >60 mL/min/1.73:m2 >90   AST      0 - 45 U/L  23   Albumin      3.4 - 5.0 g/dL 4.3   ALT      0 - 70 U/L 42     Component      Latest Ref Rng & Units 4/13/2022   WBC      4.0 - 11.0 10e3/uL 6.0   RBC Count      4.40 - 5.90 10e6/uL 4.51   Hemoglobin      13.3 - 17.7 g/dL 14.2   Hematocrit      40.0 - 53.0 % 41.5   MCV      78 - 100 fL 92   MCH      26.5 - 33.0 pg 31.5   MCHC      31.5 - 36.5 g/dL 34.2   RDW      10.0 - 15.0 % 12.0   Platelet Count      150 - 450 10e3/uL 223   % Neutrophils      % 77   % Lymphocytes      % 16   % Monocytes      % 6   % Eosinophils      % 0   % Basophils      % 1   % Immature Granulocytes      % 0   NRBCs per 100 WBC      <1 /100 0   Absolute Neutrophils      1.6 - 8.3 10e3/uL 4.6   Absolute Lymphocytes      0.8 - 5.3 10e3/uL 1.0   Absolute Monocytes      0.0 - 1.3 10e3/uL 0.3   Absolute Eosinophils      0.0 - 0.7 10e3/uL 0.0   Absolute Basophils      0.0 - 0.2 10e3/uL 0.0   Absolute Immature Granulocytes      <=0.4 10e3/uL 0.0   Absolute NRBCs      10e3/uL 0.0   Cholesterol      <200 mg/dL 210 (H)   Triglycerides      <150 mg/dL 104   HDL Cholesterol      >=40 mg/dL 52   LDL Cholesterol Calculated      <=100 mg/dL 137 (H)   Non HDL Cholesterol      <130 mg/dL 158 (H)   Patient Fasting > 8hrs?       No   Creatinine      0.66 - 1.25 mg/dL 0.87   GFR Estimate      >60 mL/min/1.73m2 >90   Sed Rate      0 - 15 mm/hr 5   Vitamin D Deficiency screening      20 - 75 ug/L 41   DNA-ds      <10.0 IU/mL 23.0 (H)   CRP Inflammation      0.0 - 8.0 mg/L <2.9   Complement C3      81 - 157 mg/dL 94   Complement C4      13 - 39 mg/dL 16   AST      0 - 45 U/L 20   Albumin      3.4 - 5.0 g/dL 4.3   ALT      0 - 70 U/L 39     Component      Latest Ref Rng 7/31/2023  7:56 AM   WBC      4.0 - 11.0 10e3/uL 3.2 (L)    RBC Count      4.40 - 5.90 10e6/uL 4.51    Hemoglobin      13.3 - 17.7 g/dL 14.1    Hematocrit      40.0 - 53.0 % 42.3    MCV      78 - 100 fL 94    MCH      26.5 - 33.0 pg 31.3    MCHC      31.5 - 36.5 g/dL 33.3    RDW      10.0 - 15.0 %  12.1    Platelet Count      150 - 450 10e3/uL 216    % Neutrophils      % 60    % Lymphocytes      % 24    % Monocytes      % 11    % Eosinophils      % 4    % Basophils      % 1    % Immature Granulocytes      % 0    NRBCs per 100 WBC      <1 /100 0    Absolute Neutrophils      1.6 - 8.3 10e3/uL 1.9    Absolute Lymphocytes      0.8 - 5.3 10e3/uL 0.8    Absolute Monocytes      0.0 - 1.3 10e3/uL 0.4    Absolute Eosinophils      0.0 - 0.7 10e3/uL 0.1    Absolute Basophils      0.0 - 0.2 10e3/uL 0.0    Absolute Immature Granulocytes      <=0.4 10e3/uL 0.0    Absolute NRBCs      10e3/uL 0.0    Color Urine      Colorless, Straw, Light Yellow, Yellow     Appearance Urine      Clear     Glucose Urine      Negative mg/dL    Bilirubin Urine      Negative     Ketones Urine      Negative mg/dL    Specific Gravity Urine      1.003 - 1.035     Blood Urine      Negative     pH Urine      5.0 - 7.0     Protein Albumin Urine      Negative mg/dL    Urobilinogen mg/dL      Normal, 2.0 mg/dL    Nitrite Urine      Negative     Leukocyte Esterase Urine      Negative     Mucus Urine      None Seen /LPF    RBC Urine      <=2 /HPF    WBC Urine      <=5 /HPF    Cholesterol      <200 mg/dL 173    Triglycerides      <150 mg/dL 63    HDL Cholesterol      >=40 mg/dL 53    LDL Cholesterol Calculated      <=100 mg/dL 107 (H)    Non HDL Cholesterol      <130 mg/dL 120    Total Protein Urine mg/dL        mg/dL    Total Protein UR MG/MG CR      0.00 - 0.20 mg/mg Cr    Creatinine Urine      mg/dL    Creatinine      0.67 - 1.17 mg/dL 0.88    GFR Estimate      >60 mL/min/1.73m2 >90    ALT      0 - 70 U/L 38    Albumin      3.5 - 5.2 g/dL 4.7    AST      0 - 45 U/L 29    Complement C4      13 - 39 mg/dL 16    Complement C3      81 - 157 mg/dL 93    CRP Inflammation      <5.00 mg/L <3.00    DNA-ds      <10.0 IU/mL 24.0 (H)    Sed Rate      0 - 20 mm/hr 2    PSA Tumor Marker      0.00 - 3.50 ng/mL 1.44    Vitamin D Deficiency screening      20 - 75 ug/L  32      Component      Latest Ref [facility] 7/31/2023  7:57 AM   WBC      4.0 - 11.0 10e3/uL    RBC Count      4.40 - 5.90 10e6/uL    Hemoglobin      13.3 - 17.7 g/dL    Hematocrit      40.0 - 53.0 %    MCV      78 - 100 fL    MCH      26.5 - 33.0 pg    MCHC      31.5 - 36.5 g/dL    RDW      10.0 - 15.0 %    Platelet Count      150 - 450 10e3/uL    % Neutrophils      %    % Lymphocytes      %    % Monocytes      %    % Eosinophils      %    % Basophils      %    % Immature Granulocytes      %    NRBCs per 100 WBC      <1 /100    Absolute Neutrophils      1.6 - 8.3 10e3/uL    Absolute Lymphocytes      0.8 - 5.3 10e3/uL    Absolute Monocytes      0.0 - 1.3 10e3/uL    Absolute Eosinophils      0.0 - 0.7 10e3/uL    Absolute Basophils      0.0 - 0.2 10e3/uL    Absolute Immature Granulocytes      <=0.4 10e3/uL    Absolute NRBCs      10e3/uL    Color Urine      Colorless, Straw, Light Yellow, Yellow  Yellow    Appearance Urine      Clear  Clear    Glucose Urine      Negative mg/dL Negative    Bilirubin Urine      Negative  Negative    Ketones Urine      Negative mg/dL Negative    Specific Gravity Urine      1.003 - 1.035  1.024    Blood Urine      Negative  Negative    pH Urine      5.0 - 7.0  6.0    Protein Albumin Urine      Negative mg/dL 10 !    Urobilinogen mg/dL      Normal, 2.0 mg/dL Normal    Nitrite Urine      Negative  Negative    Leukocyte Esterase Urine      Negative  Negative    Mucus Urine      None Seen /LPF Present !    RBC Urine      <=2 /HPF 2    WBC Urine      <=5 /HPF 0    Cholesterol      <200 mg/dL    Triglycerides      <150 mg/dL    HDL Cholesterol      >=40 mg/dL    LDL Cholesterol Calculated      <=100 mg/dL    Non HDL Cholesterol      <130 mg/dL    Total Protein Urine mg/dL        mg/dL 44.1 (H)    Total Protein UR MG/MG CR      0.00 - 0.20 mg/mg Cr 0.30 (H)    Creatinine Urine      mg/dL 147.0    Creatinine      0.67 - 1.17 mg/dL    GFR Estimate      >60 mL/min/1.73m2    ALT      0 - 70 U/L     Albumin      3.5 - 5.2 g/dL    AST      0 - 45 U/L    Complement C4      13 - 39 mg/dL    Complement C3      81 - 157 mg/dL    CRP Inflammation      <5.00 mg/L    DNA-ds      <10.0 IU/mL    Sed Rate      0 - 20 mm/hr    PSA Tumor Marker      0.00 - 3.50 ng/mL    Vitamin D Deficiency screening      20 - 75 ug/L       Legend:  (L) Low  (H) High  ! Abnormal  Component      Latest Ref Rn 2/5/2024  8:34 AM 2/5/2024  8:38 AM   WBC      4.0 - 11.0 10e3/uL 4.9     RBC Count      4.40 - 5.90 10e6/uL 4.23 (L)     Hemoglobin      13.3 - 17.7 g/dL 13.3     Hematocrit      40.0 - 53.0 % 41.4     MCV      78 - 100 fL 98     MCH      26.5 - 33.0 pg 31.4     MCHC      31.5 - 36.5 g/dL 32.1     RDW      10.0 - 15.0 % 11.8     Platelet Count      150 - 450 10e3/uL 165     % Neutrophils      % 78     % Lymphocytes      % 15     % Monocytes      % 7     % Eosinophils      % 0     % Basophils      % 0     % Immature Granulocytes      % 0     Absolute Neutrophils      1.6 - 8.3 10e3/uL 3.8     Absolute Lymphocytes      0.8 - 5.3 10e3/uL 0.7 (L)     Absolute Monocytes      0.0 - 1.3 10e3/uL 0.3     Absolute Eosinophils      0.0 - 0.7 10e3/uL 0.0     Absolute Basophils      0.0 - 0.2 10e3/uL 0.0     Absolute Immature Granulocytes      <=0.4 10e3/uL 0.0     Color Urine      Colorless, Straw, Light Yellow, Yellow   Yellow    Appearance Urine      Clear   Clear    Glucose Urine      Negative mg/dL  Negative    Bilirubin Urine      Negative   Negative    Ketones Urine      Negative mg/dL  Negative    Specific Gravity Urine      1.003 - 1.035   1.020    Blood Urine      Negative   Negative    pH Urine      5.0 - 7.0   6.5    Protein Albumin Urine      Negative mg/dL  Negative    Urobilinogen Urine      0.2, 1.0 E.U./dL  0.2    Nitrite Urine      Negative   Negative    Leukocyte Esterase Urine      Negative   Negative    Cholesterol      <200 mg/dL 131     Triglycerides      <150 mg/dL 59     HDL Cholesterol      >=40 mg/dL 34 (L)     LDL  Cholesterol Calculated      <=100 mg/dL 85     Non HDL Cholesterol      <130 mg/dL 97     Patient Fasting? Yes     Bacteria Urine      None Seen /HPF  Few !    RBC Urine      0-2 /HPF /HPF  0-2    WBC Urine      0-5 /HPF /HPF  0-5    Squamous Epithelial /LPF Urine      None Seen /LPF  Few !    Mucus Urine      None Seen /LPF  Present !    Total Protein Urine mg/dL        mg/dL  57.4    Total Protein Urine mg/mg Creat      0.00 - 0.20 mg/mg Cr  0.30 (H)    Creatinine Urine      mg/dL  191.0    Creatinine      0.67 - 1.17 mg/dL 0.90     GFR Estimate      >60 mL/min/1.73m2 >90     ALT      0 - 70 U/L 27     Albumin      3.5 - 5.2 g/dL 4.4     AST      0 - 45 U/L 23     Complement C4      13 - 39 mg/dL 23     Complement C3      81 - 157 mg/dL 99     CRP Inflammation      <5.00 mg/L 21.70 (H)     DNA-ds      <10.0 IU/mL 20.0 (H)     Sed Rate      0 - 20 mm/hr 8     PSA Tumor Marker      0.00 - 3.50 ng/mL 1.40        Legend:  (L) Low  (H) High  ! Abnormal         Component  Ref Range & Units (hover)  Resulting Agency   Case Report   Surgical Pathology Report                         Case: [id number]                                   Authorizing Provider:  Shanna Valle PA-C     Collected:           05/21/2025 08:02 AM           Ordering Location:     [facility]   Received:            05/21/2025 09:15 AM                                  [place]                                                                   Pathologist:           Janes Oswald MD                                                       Specimen:    Skin, R posterior neck                                                                    Final Diagnosis   Right posterior neck:  - Vacuolar interface dermatitis - (see comment and description)  - Negative immunofluorescence study - (see description)    Electronically signed by Janes Oswald MD on 5/28/2025 at 0928 CDT   Comment  [email or web address]   Clinical notes and photographs have been  "reviewed in conjunction with interpretation of this specimen. Notably, this patient has a positive serum BEST and anti-double-stranded DNA antibody, along with a history of spondyloarthropathy. Taken together with the clinical information, the findings present in this specimen of a moderately dense vacuolar interface dermatitis with superficial and deep perivascular and perifollicular involvement is interpreted as compatible with a connective tissue disease, such as subacute cutaneous lupus erythematosus. This infiltrate is more dense than typifies dermatomyositis, but this cannot be entirely excluded. Ongoing clinical correlation is recommended.     Ph.E:    BP (!) 147/95   Pulse 69   Ht 1.88 m (6' 2\")   Wt 90.7 kg (200 lb)   BMI 25.68 kg/m      Constitutional: WD/WN. Healthy looking. Pleasant. In no acute distress.   Eyes: EOM intact, sclera anicteric, conj not injected  HEENT: no oral ulcers or thrush  Neck: no cervical LAP  Chest: CTAB  CV: RRR, no M/R/G  MS: No active synovitis or joint tenderness  Skin: Erythematous rash over scalp, neck, upper chest, upper arms  Neuro: A&O x 3. Grossly non focal  Psych: NL affect     Assessment/ plan:    1-Spondyloarthropathy with enthesopathy Dx 2/2019 on SSZ since 2/2019. He is HLA-B27 negative. Has fh/o psoriasis. He fits this diagnosis very well and already responds to SSZ. His inflammatory arthritis was under good control at initial visit, but in 2/2020, had flare ups of achilles tendonitis which started post flu. I increased sulfasalazine to 3 tabs in the morning, 2 tabs in the evening with food in 2/2020 which helped significantly. In 6/2021, given episodes of joint pain, swelling and for better control of arthritis, recommended to increase SSZ to 3 gram a day. He is on celebrex prn, not much help.    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 HCQ peak benefit is " 6 months, will give it more time to show benefit. Will check labs, will monitor closely for lupus given + BEST, +anti-DNA. HCQ could help to prevent progression to lupus. Advised him to try tylenol.    Stable 4/2022 labs.      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 Will continue HCQ+SSZ. Last labs in 9/2022 showed no protein in urine but ur pr/cr=0.69 plus high CRP, mild increased anti-DNA, mild leukopenia, but he had infection, will re-check labs.    2-SSZ monitoring. Labs q6mo. 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 He does not have lupus and nothing on his exam or hx is suggestive of lupus. I'll monitor it for now.    4-Low vit D in the past. S/p replacement.     5-HCQ monitoring. Recommend yearly eye exam          Today's plan:    Labs this month    Return video in 6 months      8/3/2023: Ongoing migratory arthralgia which is tolerable and not and enough to add another immunosuppressive drug like methotrexate. Will continue with  mg a day which requires yearly eye exam, and SSZ 3 gr a day, which requires q6mo monitoring labs. Leukopenia and urine protein in 7/2023 have improved, will continue to monitor. Anti-DNA is only slightly elevated. Has not met SLE criteria yet. Discussed doing labs with next flare.    2/9/2024:    Well controlled inflammatory arthritis, stable labs on  mg bid+SSZ 1.5 gr bid. Recent elevated CRP could be explained by flu A on 2/1.     Will continue to monitor mild leukopenia, borderline + anti-DNA and intermittent mild degree of proteinuria. Not enough to call it lupus.    Was advised to do yearly HCQ eye exam    Was advised to use sun screen during trips.      Plan:    Labs in 8/2024 then every 6 months    Return in a year (in person)         2/20/2025:    Stable IA, continue same regimen, labs q6mo (will get outside lab report), yearly eye exam.    Plan:    Labs this month then every 6 months    Keep Aug appointment        Today 6/5/2025:    Emerson leon  meets criteria for SLE after recent Dx of lupus rash    SLE based on inflammatory arthritis, +photosensitive rash (biopsy showed interface dermatitis), leukopenia, +anti-DNA    Active rash+arthritis despite SSZ 3 gr/day+ mg bid    Discussed ways to avoid sun, using topical    Discussed tx option, switching SSZ to MTX versus benlysta. Risks were discussed. Agreed to try benlysta.    Will also give a steroid taper    SLEDAI=8    Will check alb, will continue to monitor urine protein (minimal intermittent protein in the past)      Plan:    Labs tomorrow    Prednisone 4tab=20 mg qd x 5 days, 3tab=15 mg qd x 5 days, 2tab=10 mg qd x 5 days, 1 tab=5 mg qd x 5 days then stop     On prednisone, do not take any ibuprofen    Will start benlysta weekly injection    Stay on plaquenil    Plan is to stop sulfasalazine when we start benlysta    Keep Aug 29 appointment    TT 40 min was spent on date of the encounter doing chart review, history and exam, documentation and further activities as noted above. Any prior notes, outside records, laboratory results, and imaging studies were reviewed if relevant.      The longitudinal plan of care for the diagnosis(es)/condition(s) as documented were addressed during this visit. Due to the added complexity in care, I will continue to support Emerson in the subsequent management and with ongoing continuity of care.      Jovani Luna MD        Orders Placed This Encounter   Procedures     ALT     Albumin level     AST     Creatinine     Complement C4     Complement C3     CRP inflammation     DNA double stranded antibodies     Erythrocyte sedimentation rate auto     UA with Microscopic reflex to Culture     Protein  random urine     Creatinine random urine     CHRIS Panel (RNP, SMITH, Scleroderma, SSAB, SSBB); CHRIS, Antibodies, Panel     Hepatitis B core antibody     Hepatitis B surface antigen     Hepatitis C antibody     Lupus Anticoagulant Panel     Cardiolipin Abby IgG and IgM     Beta  2 Glycoprotein Antibodies IGG IGM     Cardiolipin Antibody IgA     Beta 2 Glycoprotein 1 Antibody IgA     Med Therapy Management Referral     CBC with Platelets & Differential     Quantiferon TB Gold Plus         Again, thank you for allowing me to participate in the care of your patient.      Sincerely,    Jovani Luna MD

## 2025-06-05 NOTE — PROGRESS NOTES
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 His wife is a transplant surgeon here at the  and they say the actual reason for referral is to establish/transfer care as they just moved to [place].      Today, he is feeling well.      They moved back from [place] to [place] about 1.5 month ago.      He reports having degenerative arthritis since age 18. Has chronic low back pain with worsening in AM and worsening with inactivity. Allergies in Spring makes it worse.      He started to have heel and ankle pain in Feb 2019, also knees and elbows. That was stressful time of life looking for moving back. Also has 3 small children (one 5 yo and two 3 yo twins) which keep them busy.      Both heels were red and blaching and his wife thought it was gout.      His brother has gout and is almost 51 yo.      He saw a rheumatologist at [facility]. 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 His wife questioned the diagnosis given lack of lupus symptoms.      Then he had x-rays of spine which showed lots of bone spurs.    He was diagnosed with OA, possible reactive arthritis. No h/o uveitis, urethritis, STDs. Has h/o food poisenings. Was put on SSZ in 2/2019, the dose was increased to current dose of 1 gr bid, he tolerates it well with no toxicity. SSZ has helped and he has no current heel inflammation or recent flare ups.      Has fatigue but because of having 3 small children, it's hard for him to say if fatigue was related to inflammation.    ROS is positive for intermittent CP for years, ibuprofen helps. Prednisone 10 mg every  day did not help in the past.    Gets cold sores. No photosensitivity. Gets migraines. No weight loss. No Raynaud's.      2/14/2020: No fatigue, skin rashes, oral ulcers.    Has mild achy knees.    About 1.5 months ago, started having flare up of achilles tendonitis in both feet, worse in AM and at night. They turn red and swollen.      7/10/2020: At last visit in 2/2020, increased SSZ from 2 to 2.5 gr/day given active achilles tendonitis, also prescribed celebrex 100 mg bid prn. Change in SSZ has helped and he no longer needs to take celebrex (last use 6 wk go). Tolerates SSZ at higher dose well. No flare ups since last visit.    No symptoms and no complaints today. Felling very well. No skin rashes, oral ulcers, CP or SOB or fatigue.    Had flu in 1/2020, after that got the flu shot.     6/28/2021: Hands, feet swell up 2 times a week x 1 hr, it gets better after moving around. No heel pain/swelling.    Has 3 small children, physical job. Fully vaccinated, no SE. Got pfizer.    Over last couple of months, has been good on taking her SSZ 5 tabs a day everyday. Noticed more sx including CP by missing some doses. No SSZ SEs. Rarely takes the celebrex, if notices sore feet x 2 days in a row, he takes it. It does not help as much.    9/23/2022: Emerson presents for follow up. 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 No other complaints. Tolerates HCQ well.      During the day he is usually good, at night migratory pain comes back affecting shoulders (R shoulder more consistent) and hips. His pain is tolerable most days but sometimes,  keeps him up.    No am stiffness.    Advil works better than celebrex.    If misses SSZ x 3-4 days, pain recurs.      1/19/2023: Tolerates HCQ well, migratory arthralgia affecting large joints is much improved, reports occasional pain, takes  ibuprofen prn. tolerates HCQ, thinks it has helped.    8/3/2023: Ongoing migratory arthralgia but tolerable.      Reports every other month flare ups of joint pain, lasts 2-3 weeks, no triggers. Sometimes takes advil which helps, sometimes it goes away by its own, sometimes shoulders, sometimes hands and sometimes back. Noticed fingers and hands swell up. It moves from joint to joint. Spring time is worse, pain is pretty consistent. With some flare ups, gets sharp CP. Pain is tolerable. Celebrex does not work.    No skin rash, oral ulcers, CP, SOB.    No flares today.      2/9/2024:    -was doing very well, no flares with better control of joint pain after adding HCQ to SSZ till recent flu A on 2/1/2024, which triggered a flare of diffuse arthralgia and body ache along with headaches/cough, now is doing much better, leaving on a cruise trip, has mild residual cough, had flu shot this season. Had vivian flu. Took tylenol, advil.        2/20/2025:    -doing very well today, no complaints    -since last visit, has developed scalp psoriasis, manageable with topical, seeing derm, psoriasis got a little worse on recent trip to [place]    -last arthritis flare was at the end of Oct when he got shingrix vaccine, flu and covid vaccine on the same day, it was a bad flare for about 3 wk, managed by NSIADs, tylenol      Today 6/5/2025:    -rash progressed to upper chest, shoulders, back of neck, skin biopsy in 5/2025 showed lupus, this appointment was made to discuss tx options    -outdoor a lot, uses sun protective clothing    -no major hair loss    -will  topicals today, will use it bid, last use was 2 wk ago, likes to rash to resolve soon    -no fevers    -no infections    -no pleurisy    -no sicca    -more joint pain x past 2 weeks, worse in AM and at night, failed celebrex    -feet swelled up yesterday, hands swell up    -no fatigue    -ETOH rarely        ROS:  A comprehensive ROS was done, positives are per  HPI.      Past Medical History:   Diagnosis Date    Arthritis 01/01/19    Biceps tendinopathy     GERD (gastroesophageal reflux disease)     Mucocele of lip     Spondyloarthropathy      Past Surgical History:   Procedure Laterality Date    APPENDECTOMY      COLONOSCOPY N/A 1/3/2022    Procedure: COLONOSCOPY, WITH POLYPECTOMY;  Surgeon: Ingrid Keyes MD;  Location: [facility] OR    ESOPHAGOSCOPY, GASTROSCOPY, DUODENOSCOPY (EGD), COMBINED N/A 3/19/2024    Procedure: Esophagoscopy, gastroscopy, duodenoscopy (EGD), combined;  Surgeon: Kofi Christianson MD;  Location: [facility]    HERNIA REPAIR, INGUINAL RT/LT      left     Family History   Problem Relation Age of Onset    Hypertension Mother     Hypertension Father     Asthma Father     Diabetes Father     Psoriasis Father     Hypertension Maternal Grandmother         RA    Diabetes Maternal Grandmother     Hypertension Maternal Grandfather     Cancer Maternal Grandfather         stomach    Cancer Paternal Grandfather         lung-smoker    Asthma Sister     Asthma Sister     Anesthesia Reaction No family hx of     Deep Vein Thrombosis (DVT) No family hx of     Glaucoma No family hx of     Macular Degeneration No family hx of    Mother has RA  Social History     Socioeconomic History    Marital status:      Spouse name: Not on file    Number of children: 3    Years of education: Not on file    Highest education level: Not on file   Occupational History    Occupation: "[employer]"   Tobacco Use    Smoking status: Never    Smokeless tobacco: Former     Types: Chew     Quit date: 4/1/2010    Tobacco comments:     chew 9 yr   Vaping Use    Vaping status: Never Used   Substance and Sexual Activity    Alcohol use: Yes     Alcohol/week: 1.7 standard drinks of alcohol     Types: 2 Standard drinks or equivalent per week    Drug use: No    Sexual activity: Yes     Partners: Female   Other Topics Concern     Service Not Asked    Blood Transfusions Not Asked    Caffeine  Concern Not Asked    Occupational Exposure Not Asked    Hobby Hazards Not Asked    Sleep Concern Not Asked    Stress Concern Not Asked    Weight Concern Not Asked    Special Diet Not Asked    Back Care Not Asked    Exercise Yes     Comment: run, weights    Bike Helmet Not Asked    Seat Belt Not Asked    Self-Exams Not Asked   Social History Narrative    Not on file     Social Drivers of Health     Financial Resource Strain: Low Risk  (9/17/2024)    Financial Resource Strain     Within the past 12 months, have you or your family members you live with been unable to get utilities (heat, electricity) when it was really needed?: No   Food Insecurity: Low Risk  (9/17/2024)    Food Insecurity     Within the past 12 months, did you worry that your food would run out before you got money to buy more?: No     Within the past 12 months, did the food you bought just not last and you didn t have money to get more?: No   Transportation Needs: Low Risk  (9/17/2024)    Transportation Needs     Within the past 12 months, has lack of transportation kept you from medical appointments, getting your medicines, non-medical meetings or appointments, work, or from getting things that you need?: No   Physical Activity: Not on file   Stress: Not on file   Social Connections: Not on file   Interpersonal Safety: Not on file   Housing Stability: Low Risk  (9/17/2024)    Housing Stability     Do you have housing? : Yes     Are you worried about losing your housing?: No     Patient Active Problem List   Diagnosis    Esophageal reflux    Biceps tendinopathy    Wart    Mucocele of lip    Sore throat     Allergies   Allergen Reactions    Nkda [No Known Drug Allergy]     Seasonal Allergies Other (See Comments)     Sneezing, running nose, itchy eyes       Outpatient Encounter Medications as of 6/5/2025   Medication Sig Dispense Refill    fluocinonide (LIDEX) 0.05 % external solution Apply topically 2 times daily. 60 mL 5    hydroxychloroquine  (PLAQUENIL) 200 MG tablet Take 1 tablet (200 mg) by mouth 2 times daily. Annual Plaquenil toxicity eye screening required. 180 tablet 1    sulfaSALAzine ER (AZULFIDINE EN) 500 MG EC tablet Take 3 tablets (1,500 mg) by mouth 2 times daily. Labs required every 6 months while taking this medication. Hold for signs of infection, and seek medical attention. Please call to scheduled follow-up appointment, overdue. 540 tablet 1    triamcinolone (KENALOG) 0.1 % external ointment Apply topically 2 times daily. 15 g 2    Vitamin D3 (CHOLECALCIFEROL) 25 mcg (1000 units) tablet Take by mouth every morning      amoxicillin-clavulanate (AUGMENTIN) 875-125 MG tablet Take 1 tablet by mouth 2 times daily. 20 tablet 0     No facility-administered encounter medications on file as of 6/5/2025.               His records were reviewed.      2/2019: +BEST 1:40 mixed homogenous and nucleolar pattern, positive anti-DNA 17 with NL being up to 4. Neg anti-Sm, RNP, SCL-70, SSA, SSB, ALEXANDREA-1, CHROMATIN, RF, anti-CCP. NL ESR. NL SUA 5.2. Neg HLA-B27.  NL TSH, C3, C4. Unremarkable CMP. Neg U/A. LOW vit D 27.    2/2019: Advanced degenerative changes of L spine and moderate degenerative changes of T spine on x-rays.  Component      Latest Ref Rng & Units 1/17/2020   WBC      4.0 - 11.0 10e9/L 3.6 (L)   RBC Count      4.4 - 5.9 10e12/L 4.52   Hemoglobin      13.3 - 17.7 g/dL 13.8   Hematocrit      40.0 - 53.0 % 41.1   MCV      78 - 100 fl 91   MCH      26.5 - 33.0 pg 30.5   MCHC      31.5 - 36.5 g/dL 33.6   RDW      10.0 - 15.0 % 12.0   Platelet Count      150 - 450 10e9/L 258   Diff Method       Automated Method   % Neutrophils      % 55.5   % Lymphocytes      % 30.4   % Monocytes      % 11.0   % Eosinophils      % 2.5   % Basophils      % 0.3   % Immature Granulocytes      % 0.3   Nucleated RBCs      0 /100 0   Absolute Neutrophil      1.6 - 8.3 10e9/L 2.0   Absolute Lymphocytes      0.8 - 5.3 10e9/L 1.1   Absolute Monocytes      0.0 - 1.3 10e9/L 0.4    Absolute Eosinophils      0.0 - 0.7 10e9/L 0.1   Absolute Basophils      0.0 - 0.2 10e9/L 0.0   Abs Immature Granulocytes      0 - 0.4 10e9/L 0.0   Absolute Nucleated RBC       0.0   Creatinine      0.66 - 1.25 mg/dL 0.83   GFR Estimate      >60 mL/min/1.73:m2 >90   GFR Estimate If Black      >60 mL/min/1.73:m2 >90   Vitamin D Deficiency screening      20 - 75 ug/L 27   Sed Rate      0 - 15 mm/h 8   CRP Inflammation      0.0 - 8.0 mg/L <2.9   AST      0 - 45 U/L 19   Albumin      3.4 - 5.0 g/dL 3.9   ALT      0 - 70 U/L 36   Complement C3      81 - 157 mg/dL 118   Complement C4      13 - 39 mg/dL 25   DNA-ds      <10 IU/mL 16 (H)     Component      Latest Ref Rng & Units 1/18/2021   WBC      4.0 - 11.0 10e9/L 5.2   RBC Count      4.4 - 5.9 10e12/L 4.68   Hemoglobin      13.3 - 17.7 g/dL 14.7   Hematocrit      40.0 - 53.0 % 42.7   MCV      78 - 100 fl 91   MCH      26.5 - 33.0 pg 31.4   MCHC      31.5 - 36.5 g/dL 34.4   RDW      10.0 - 15.0 % 11.9   Platelet Count      150 - 450 10e9/L 241   % Neutrophils      % 63.3   % Lymphocytes      % 27.0   % Monocytes      % 8.3   % Eosinophils      % 1.0   % Basophils      % 0.4   Absolute Neutrophil      1.6 - 8.3 10e9/L 3.3   Absolute Lymphocytes      0.8 - 5.3 10e9/L 1.4   Absolute Monocytes      0.0 - 1.3 10e9/L 0.4   Absolute Eosinophils      0.0 - 0.7 10e9/L 0.1   Absolute Basophils      0.0 - 0.2 10e9/L 0.0   Diff Method       Automated Method   Creatinine      0.66 - 1.25 mg/dL 0.86   GFR Estimate      >60 mL/min/1.73:m2 >90   GFR Estimate If Black      >60 mL/min/1.73:m2 >90   AST      0 - 45 U/L 23   Albumin      3.4 - 5.0 g/dL 4.3   ALT      0 - 70 U/L 42     Component      Latest Ref Rng & Units 4/13/2022   WBC      4.0 - 11.0 10e3/uL 6.0   RBC Count      4.40 - 5.90 10e6/uL 4.51   Hemoglobin      13.3 - 17.7 g/dL 14.2   Hematocrit      40.0 - 53.0 % 41.5   MCV      78 - 100 fL 92   MCH      26.5 - 33.0 pg 31.5   MCHC      31.5 - 36.5 g/dL 34.2   RDW      10.0 -  15.0 % 12.0   Platelet Count      150 - 450 10e3/uL 223   % Neutrophils      % 77   % Lymphocytes      % 16   % Monocytes      % 6   % Eosinophils      % 0   % Basophils      % 1   % Immature Granulocytes      % 0   NRBCs per 100 WBC      <1 /100 0   Absolute Neutrophils      1.6 - 8.3 10e3/uL 4.6   Absolute Lymphocytes      0.8 - 5.3 10e3/uL 1.0   Absolute Monocytes      0.0 - 1.3 10e3/uL 0.3   Absolute Eosinophils      0.0 - 0.7 10e3/uL 0.0   Absolute Basophils      0.0 - 0.2 10e3/uL 0.0   Absolute Immature Granulocytes      <=0.4 10e3/uL 0.0   Absolute NRBCs      10e3/uL 0.0   Cholesterol      <200 mg/dL 210 (H)   Triglycerides      <150 mg/dL 104   HDL Cholesterol      >=40 mg/dL 52   LDL Cholesterol Calculated      <=100 mg/dL 137 (H)   Non HDL Cholesterol      <130 mg/dL 158 (H)   Patient Fasting > 8hrs?       No   Creatinine      0.66 - 1.25 mg/dL 0.87   GFR Estimate      >60 mL/min/1.73m2 >90   Sed Rate      0 - 15 mm/hr 5   Vitamin D Deficiency screening      20 - 75 ug/L 41   DNA-ds      <10.0 IU/mL 23.0 (H)   CRP Inflammation      0.0 - 8.0 mg/L <2.9   Complement C3      81 - 157 mg/dL 94   Complement C4      13 - 39 mg/dL 16   AST      0 - 45 U/L 20   Albumin      3.4 - 5.0 g/dL 4.3   ALT      0 - 70 U/L 39     Component      Latest Ref [facility] 7/31/2023  7:56 AM   WBC      4.0 - 11.0 10e3/uL 3.2 (L)    RBC Count      4.40 - 5.90 10e6/uL 4.51    Hemoglobin      13.3 - 17.7 g/dL 14.1    Hematocrit      40.0 - 53.0 % 42.3    MCV      78 - 100 fL 94    MCH      26.5 - 33.0 pg 31.3    MCHC      31.5 - 36.5 g/dL 33.3    RDW      10.0 - 15.0 % 12.1    Platelet Count      150 - 450 10e3/uL 216    % Neutrophils      % 60    % Lymphocytes      % 24    % Monocytes      % 11    % Eosinophils      % 4    % Basophils      % 1    % Immature Granulocytes      % 0    NRBCs per 100 WBC      <1 /100 0    Absolute Neutrophils      1.6 - 8.3 10e3/uL 1.9    Absolute Lymphocytes      0.8 - 5.3 10e3/uL 0.8    Absolute Monocytes       0.0 - 1.3 10e3/uL 0.4    Absolute Eosinophils      0.0 - 0.7 10e3/uL 0.1    Absolute Basophils      0.0 - 0.2 10e3/uL 0.0    Absolute Immature Granulocytes      <=0.4 10e3/uL 0.0    Absolute NRBCs      10e3/uL 0.0    Color Urine      Colorless, Straw, Light Yellow, Yellow     Appearance Urine      Clear     Glucose Urine      Negative mg/dL    Bilirubin Urine      Negative     Ketones Urine      Negative mg/dL    Specific Gravity Urine      1.003 - 1.035     Blood Urine      Negative     pH Urine      5.0 - 7.0     Protein Albumin Urine      Negative mg/dL    Urobilinogen mg/dL      Normal, 2.0 mg/dL    Nitrite Urine      Negative     Leukocyte Esterase Urine      Negative     Mucus Urine      None Seen /LPF    RBC Urine      <=2 /HPF    WBC Urine      <=5 /HPF    Cholesterol      <200 mg/dL 173    Triglycerides      <150 mg/dL 63    HDL Cholesterol      >=40 mg/dL 53    LDL Cholesterol Calculated      <=100 mg/dL 107 (H)    Non HDL Cholesterol      <130 mg/dL 120    Total Protein Urine mg/dL        mg/dL    Total Protein UR MG/MG CR      0.00 - 0.20 mg/mg Cr    Creatinine Urine      mg/dL    Creatinine      0.67 - 1.17 mg/dL 0.88    GFR Estimate      >60 mL/min/1.73m2 >90    ALT      0 - 70 U/L 38    Albumin      3.5 - 5.2 g/dL 4.7    AST      0 - 45 U/L 29    Complement C4      13 - 39 mg/dL 16    Complement C3      81 - 157 mg/dL 93    CRP Inflammation      <5.00 mg/L <3.00    DNA-ds      <10.0 IU/mL 24.0 (H)    Sed Rate      0 - 20 mm/hr 2    PSA Tumor Marker      0.00 - 3.50 ng/mL 1.44    Vitamin D Deficiency screening      20 - 75 ug/L 32      Component      Latest Ref [facility] 7/31/2023  7:57 AM   WBC      4.0 - 11.0 10e3/uL    RBC Count      4.40 - 5.90 10e6/uL    Hemoglobin      13.3 - 17.7 g/dL    Hematocrit      40.0 - 53.0 %    MCV      78 - 100 fL    MCH      26.5 - 33.0 pg    MCHC      31.5 - 36.5 g/dL    RDW      10.0 - 15.0 %    Platelet Count      150 - 450 10e3/uL    % Neutrophils      %    %  Lymphocytes      %    % Monocytes      %    % Eosinophils      %    % Basophils      %    % Immature Granulocytes      %    NRBCs per 100 WBC      <1 /100    Absolute Neutrophils      1.6 - 8.3 10e3/uL    Absolute Lymphocytes      0.8 - 5.3 10e3/uL    Absolute Monocytes      0.0 - 1.3 10e3/uL    Absolute Eosinophils      0.0 - 0.7 10e3/uL    Absolute Basophils      0.0 - 0.2 10e3/uL    Absolute Immature Granulocytes      <=0.4 10e3/uL    Absolute NRBCs      10e3/uL    Color Urine      Colorless, Straw, Light Yellow, Yellow  Yellow    Appearance Urine      Clear  Clear    Glucose Urine      Negative mg/dL Negative    Bilirubin Urine      Negative  Negative    Ketones Urine      Negative mg/dL Negative    Specific Gravity Urine      1.003 - 1.035  1.024    Blood Urine      Negative  Negative    pH Urine      5.0 - 7.0  6.0    Protein Albumin Urine      Negative mg/dL 10 !    Urobilinogen mg/dL      Normal, 2.0 mg/dL Normal    Nitrite Urine      Negative  Negative    Leukocyte Esterase Urine      Negative  Negative    Mucus Urine      None Seen /LPF Present !    RBC Urine      <=2 /HPF 2    WBC Urine      <=5 /HPF 0    Cholesterol      <200 mg/dL    Triglycerides      <150 mg/dL    HDL Cholesterol      >=40 mg/dL    LDL Cholesterol Calculated      <=100 mg/dL    Non HDL Cholesterol      <130 mg/dL    Total Protein Urine mg/dL        mg/dL 44.1 (H)    Total Protein UR MG/MG CR      0.00 - 0.20 mg/mg Cr 0.30 (H)    Creatinine Urine      mg/dL 147.0    Creatinine      0.67 - 1.17 mg/dL    GFR Estimate      >60 mL/min/1.73m2    ALT      0 - 70 U/L    Albumin      3.5 - 5.2 g/dL    AST      0 - 45 U/L    Complement C4      13 - 39 mg/dL    Complement C3      81 - 157 mg/dL    CRP Inflammation      <5.00 mg/L    DNA-ds      <10.0 IU/mL    Sed Rate      0 - 20 mm/hr    PSA Tumor Marker      0.00 - 3.50 ng/mL    Vitamin D Deficiency screening      20 - 75 ug/L       Legend:  (L) Low  (H) High  ! Abnormal  Component      Latest  Ref Rng 2/5/2024  8:34 AM 2/5/2024  8:38 AM   WBC      4.0 - 11.0 10e3/uL 4.9     RBC Count      4.40 - 5.90 10e6/uL 4.23 (L)     Hemoglobin      13.3 - 17.7 g/dL 13.3     Hematocrit      40.0 - 53.0 % 41.4     MCV      78 - 100 fL 98     MCH      26.5 - 33.0 pg 31.4     MCHC      31.5 - 36.5 g/dL 32.1     RDW      10.0 - 15.0 % 11.8     Platelet Count      150 - 450 10e3/uL 165     % Neutrophils      % 78     % Lymphocytes      % 15     % Monocytes      % 7     % Eosinophils      % 0     % Basophils      % 0     % Immature Granulocytes      % 0     Absolute Neutrophils      1.6 - 8.3 10e3/uL 3.8     Absolute Lymphocytes      0.8 - 5.3 10e3/uL 0.7 (L)     Absolute Monocytes      0.0 - 1.3 10e3/uL 0.3     Absolute Eosinophils      0.0 - 0.7 10e3/uL 0.0     Absolute Basophils      0.0 - 0.2 10e3/uL 0.0     Absolute Immature Granulocytes      <=0.4 10e3/uL 0.0     Color Urine      Colorless, Straw, Light Yellow, Yellow   Yellow    Appearance Urine      Clear   Clear    Glucose Urine      Negative mg/dL  Negative    Bilirubin Urine      Negative   Negative    Ketones Urine      Negative mg/dL  Negative    Specific Gravity Urine      1.003 - 1.035   1.020    Blood Urine      Negative   Negative    pH Urine      5.0 - 7.0   6.5    Protein Albumin Urine      Negative mg/dL  Negative    Urobilinogen Urine      0.2, 1.0 E.U./dL  0.2    Nitrite Urine      Negative   Negative    Leukocyte Esterase Urine      Negative   Negative    Cholesterol      <200 mg/dL 131     Triglycerides      <150 mg/dL 59     HDL Cholesterol      >=40 mg/dL 34 (L)     LDL Cholesterol Calculated      <=100 mg/dL 85     Non HDL Cholesterol      <130 mg/dL 97     Patient Fasting? Yes     Bacteria Urine      None Seen /HPF  Few !    RBC Urine      0-2 /HPF /HPF  0-2    WBC Urine      0-5 /HPF /HPF  0-5    Squamous Epithelial /LPF Urine      None Seen /LPF  Few !    Mucus Urine      None Seen /LPF  Present !    Total Protein Urine mg/dL        mg/dL  57.4     Total Protein Urine mg/mg Creat      0.00 - 0.20 mg/mg Cr  0.30 (H)    Creatinine Urine      mg/dL  191.0    Creatinine      0.67 - 1.17 mg/dL 0.90     GFR Estimate      >60 mL/min/1.73m2 >90     ALT      0 - 70 U/L 27     Albumin      3.5 - 5.2 g/dL 4.4     AST      0 - 45 U/L 23     Complement C4      13 - 39 mg/dL 23     Complement C3      81 - 157 mg/dL 99     CRP Inflammation      <5.00 mg/L 21.70 (H)     DNA-ds      <10.0 IU/mL 20.0 (H)     Sed Rate      0 - 20 mm/hr 8     PSA Tumor Marker      0.00 - 3.50 ng/mL 1.40        Legend:  (L) Low  (H) High  ! Abnormal         Component  Ref Range & Units (hover)  Resulting Agency   Case Report   Surgical Pathology Report                         Case: [id number]                                   Authorizing Provider:  Shanna Valle PA-C     Collected:           05/21/2025 08:02 AM           Ordering Location:     [facility]   Received:            05/21/2025 09:15 AM                                  [place]                                                                   Pathologist:           Janes Oswald MD                                                       Specimen:    Skin, R posterior neck                                                                    Final Diagnosis   Right posterior neck:  - Vacuolar interface dermatitis - (see comment and description)  - Negative immunofluorescence study - (see description)    Electronically signed by Janes Oswald MD on 5/28/2025 at 0928 CDT   Comment  [id number]   Clinical notes and photographs have been reviewed in conjunction with interpretation of this specimen. Notably, this patient has a positive serum BEST and anti-double-stranded DNA antibody, along with a history of spondyloarthropathy. Taken together with the clinical information, the findings present in this specimen of a moderately dense vacuolar interface dermatitis with superficial and deep perivascular and perifollicular  "involvement is interpreted as compatible with a connective tissue disease, such as subacute cutaneous lupus erythematosus. This infiltrate is more dense than typifies dermatomyositis, but this cannot be entirely excluded. Ongoing clinical correlation is recommended.     Ph.E:    BP (!) 147/95   Pulse 69   Ht 1.88 m (6' 2\")   Wt 90.7 kg (200 lb)   BMI 25.68 kg/m      Constitutional: WD/WN. Healthy looking. Pleasant. In no acute distress.   Eyes: EOM intact, sclera anicteric, conj not injected  HEENT: no oral ulcers or thrush  Neck: no cervical LAP  Chest: CTAB  CV: RRR, no M/R/G  MS: No active synovitis or joint tenderness  Skin: Erythematous rash over scalp, neck, upper chest, upper arms  Neuro: A&O x 3. Grossly non focal  Psych: NL affect     Assessment/ plan:    1-Spondyloarthropathy with enthesopathy Dx 2/2019 on SSZ since 2/2019. He is HLA-B27 negative. Has fh/o psoriasis. He fits this diagnosis very well and already responds to SSZ. His inflammatory arthritis was under good control at initial visit, but in 2/2020, had flare ups of achilles tendonitis which started post flu. I increased sulfasalazine to 3 tabs in the morning, 2 tabs in the evening with food in 2/2020 which helped significantly. In 6/2021, given episodes of joint pain, swelling and for better control of arthritis, recommended to increase SSZ to 3 gram a day. He is on celebrex prn, not much help.    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 HCQ peak benefit is 6 months, will give it more time to show benefit. Will check labs, will monitor closely for lupus given + BEST, +anti-DNA. HCQ could help to prevent progression to lupus. Advised him to try tylenol.    Stable 4/2022 labs.      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 Will continue HCQ+SSZ. Last labs in 9/2022 showed no protein in urine but ur pr/cr=0.69 plus high " CRP, mild increased anti-DNA, mild leukopenia, but he had infection, will re-check labs.    2-SSZ monitoring. Labs q6mo. 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 He does not have lupus and nothing on his exam or hx is suggestive of lupus. I'll monitor it for now.    4-Low vit D in the past. S/p replacement.     5-HCQ monitoring. Recommend yearly eye exam          Today's plan:    Labs this month    Return video in 6 months      8/3/2023: Ongoing migratory arthralgia which is tolerable and not and enough to add another immunosuppressive drug like methotrexate. Will continue with  mg a day which requires yearly eye exam, and SSZ 3 gr a day, which requires q6mo monitoring labs. Leukopenia and urine protein in 7/2023 have improved, will continue to monitor. Anti-DNA is only slightly elevated. Has not met SLE criteria yet. Discussed doing labs with next flare.    2/9/2024:    Well controlled inflammatory arthritis, stable labs on  mg bid+SSZ 1.5 gr bid. Recent elevated CRP could be explained by flu A on 2/1.     Will continue to monitor mild leukopenia, borderline + anti-DNA and intermittent mild degree of proteinuria. Not enough to call it lupus.    Was advised to do yearly HCQ eye exam    Was advised to use sun screen during trips.      Plan:    Labs in 8/2024 then every 6 months    Return in a year (in person)         2/20/2025:    Stable IA, continue same regimen, labs q6mo (will get outside lab report), yearly eye exam.    Plan:    Labs this month then every 6 months    Keep Aug appointment        Today 6/5/2025:    Emerson now meets criteria for SLE after recent Dx of lupus rash    SLE based on inflammatory arthritis, +photosensitive rash (biopsy showed interface dermatitis), leukopenia, +anti-DNA    Active rash+arthritis despite SSZ 3 gr/day+ mg bid    Discussed ways to avoid sun, using topical    Discussed tx option, switching SSZ to MTX versus benlysta. Risks were discussed. Agreed to try  benlysta.    Will also give a steroid taper    SLEDAI=8    Will check alb, will continue to monitor urine protein (minimal intermittent protein in the past)      Plan:    Labs tomorrow    Prednisone 4tab=20 mg qd x 5 days, 3tab=15 mg qd x 5 days, 2tab=10 mg qd x 5 days, 1 tab=5 mg qd x 5 days then stop     On prednisone, do not take any ibuprofen    Will start benlysta weekly injection    Stay on plaquenil    Plan is to stop sulfasalazine when we start benlysta    Keep Aug 29 appointment    TT 40 min was spent on date of the encounter doing chart review, history and exam, documentation and further activities as noted above. Any prior notes, outside records, laboratory results, and imaging studies were reviewed if relevant.      The longitudinal plan of care for the diagnosis(es)/condition(s) as documented were addressed during this visit. Due to the added complexity in care, I will continue to support Emerson in the subsequent management and with ongoing continuity of care.      Jovani Luna MD        Orders Placed This Encounter   Procedures    ALT    Albumin level    AST    Creatinine    Complement C4    Complement C3    CRP inflammation    DNA double stranded antibodies    Erythrocyte sedimentation rate auto    UA with Microscopic reflex to Culture    Protein  random urine    Creatinine random urine    CHRIS Panel (RNP, SMITH, Scleroderma, SSAB, SSBB); CHRIS, Antibodies, Panel    Hepatitis B core antibody    Hepatitis B surface antigen    Hepatitis C antibody    Lupus Anticoagulant Panel    Cardiolipin Abby IgG and IgM    Beta 2 Glycoprotein Antibodies IGG IGM    Cardiolipin Antibody IgA    Beta 2 Glycoprotein 1 Antibody IgA    Med Therapy Management Referral    CBC with Platelets & Differential    Quantiferon TB Gold Plus

## 2025-06-05 NOTE — NURSING NOTE
"Chief Complaint   Patient presents with    RECHECK     BP (!) 147/95   Pulse 69   Ht 1.88 m (6' 2\")   Wt 90.7 kg (200 lb)   BMI 25.68 kg/m    Lizett Venegas MA on 6/5/2025 at 9:11 AM    "

## 2025-06-05 NOTE — PATIENT INSTRUCTIONS
Labs tomorrow    Prednisone 4tab=20 mg qd x 5 days, 3tab=15 mg qd x 5 days, 2tab=10 mg qd x 5 days, 1 tab=5 mg qd x 5 days then stop     On prednisone, do not take any ibuprofen    Will start benlysta weekly injection    Stay on plaquenil    Plan is to stop sulfasalazine when we start benlysta    Keep Aug 29 appointment

## 2025-06-05 NOTE — TELEPHONE ENCOUNTER
Called and spoke with patient after his follow up visit. He needs labs done tomorrow which he plans to get done at [place] or [facility]. He was sent the central scheduling number and he will call to schedule.      Shruti SCHMIDT RN  [facility]

## 2025-06-06 ENCOUNTER — VIRTUAL VISIT (OUTPATIENT)
Facility: CLINIC | Age: 50
End: 2025-06-06
Payer: COMMERCIAL

## 2025-06-06 ENCOUNTER — TELEPHONE (OUTPATIENT)
Dept: RHEUMATOLOGY | Facility: CLINIC | Age: 50
End: 2025-06-06

## 2025-06-06 DIAGNOSIS — F41.1 GENERALIZED ANXIETY DISORDER: Primary | ICD-10-CM

## 2025-06-06 PROCEDURE — 90834 PSYTX W PT 45 MINUTES: CPT | Mod: 95

## 2025-06-06 ASSESSMENT — ANXIETY QUESTIONNAIRES
GAD7 TOTAL SCORE: 5
7. FEELING AFRAID AS IF SOMETHING AWFUL MIGHT HAPPEN: NOT AT ALL
6. BECOMING EASILY ANNOYED OR IRRITABLE: NOT AT ALL
4. TROUBLE RELAXING: SEVERAL DAYS
8. IF YOU CHECKED OFF ANY PROBLEMS, HOW DIFFICULT HAVE THESE MADE IT FOR YOU TO DO YOUR WORK, TAKE CARE OF THINGS AT HOME, OR GET ALONG WITH OTHER PEOPLE?: SOMEWHAT DIFFICULT
IF YOU CHECKED OFF ANY PROBLEMS ON THIS QUESTIONNAIRE, HOW DIFFICULT HAVE THESE PROBLEMS MADE IT FOR YOU TO DO YOUR WORK, TAKE CARE OF THINGS AT HOME, OR GET ALONG WITH OTHER PEOPLE: SOMEWHAT DIFFICULT
1. FEELING NERVOUS, ANXIOUS, OR ON EDGE: SEVERAL DAYS
2. NOT BEING ABLE TO STOP OR CONTROL WORRYING: SEVERAL DAYS
GAD7 TOTAL SCORE: 5
7. FEELING AFRAID AS IF SOMETHING AWFUL MIGHT HAPPEN: NOT AT ALL
5. BEING SO RESTLESS THAT IT IS HARD TO SIT STILL: SEVERAL DAYS
GAD7 TOTAL SCORE: 5
3. WORRYING TOO MUCH ABOUT DIFFERENT THINGS: SEVERAL DAYS

## 2025-06-06 NOTE — TELEPHONE ENCOUNTER
PA Initiation    Medication: BENLYSTA 200 MG/ML SC SOAJ  Insurance Company: [employer] - Phone [phone] Fax [phone]  Pharmacy Filling the Rx: [facility] - [place], [place] - [facility] [address]  Filling Pharmacy Phone:    Filling Pharmacy Fax:    Start Date: 6/6/2025    [id number]

## 2025-06-09 NOTE — PROGRESS NOTES
[facility]                                     Progress Note    Patient Name: Sabino Castillo Every  Date: 6/6/2025         Service Type: Individual      Session Start Time: 7:33 AM  Session End Time: 8:18 AM     Session Length: 45 Minutes    Session #: 6    Attendees: Client attended alone    Service Modality:  Video Visit:      Provider verified identity through the following two step process.  Patient provided:  Patient is known previously to provider    Telemedicine Visit: The patient's condition can be safely assessed and treated via synchronous audio and visual telemedicine encounter.      Reason for Telemedicine Visit: Patient convenience (e.g. access to timely appointments / distance to available provider)    Originating Site (Patient Location): Patient's home    Distant Site (Provider Location): Provider Remote Setting- Home Office    Consent:  The patient/guardian has verbally consented to: the potential risks and benefits of telemedicine (video visit) versus in person care; bill my insurance or make self-payment for services provided; and responsibility for payment of non-covered services.     Patient would like the video invitation sent by:  My Chart    Mode of Communication:  Video Conference via [facility]    Distant Location (Provider):  Off-site    As the provider I attest to compliance with applicable laws and regulations related to telemedicine.    DATA  Extended Session (53+ minutes): No  Interactive Complexity: No  Crisis: No      Progress Since Last Session (Related to Symptoms / Goals / Homework):   Symptoms: No change continued anxiety, worry, rumination, stress, irritation and utilizing his skills with interrupted sleep    Homework: Partially completed      Episode of Care Goals: No improvement - ACTION (Actively working towards change); Intervened by reinforcing change plan / affirming steps taken     Current / Ongoing Stressors and Concerns:   Emerson is coming to therapy to  learn skills to address his anxiety, over thinking, worries, and disrupted sleep.  He reports increased stress and anxiety has resulted in him feeling more irritable and short with his family.     Treatment Objective(s) Addressed in This Session:   use relaxation strategies twice times per day to reduce the physical symptoms of anxiety and use cognitive strategies identified in therapy to challenge anxious thoughts       Intervention:   Therapist utilized reflected listening as patient gave brief reflection of the past  few weeks. Patient reported continued anxiety, worry, rumination, stress, irritation and utilizing his skills with interrupted sleep. He processed his symptoms of anxiety and it being a trigger for his lupus. He reflected on his utilization of journaling and cognitive shuffling to address his anxiety at night and getting back to sleep. Therapist supported patient as he processed and validated patient. Therapist engaged in cognitive restructuring/ reframing, looked at cognitive distortions and challenged distorted thoughts.    Assessments completed prior to visit:  The following assessments were completed by patient for this visit:  PHQ2:       6/6/2025     7:29 AM 2/20/2025     2:35 PM 2/15/2025     9:15 AM 1/20/2025     7:16 PM 1/6/2025    10:49 AM 11/8/2024     7:43 AM 10/8/2024    12:20 PM   PHQ-2 ( 1999 [employer])   Q1: Little interest or pleasure in doing things 0 0 0 0 0 0 0   Q2: Feeling down, depressed or hopeless 0 0 0 0 0 0 0   PHQ-2 Score 0  0 0  0  0 0 0   Q1: Little interest or pleasure in doing things Not at all  Not at all Not at all      Q2: Feeling down, depressed or hopeless Not at all  Not at all Not at all      PHQ-2 Score 0  0 0          Patient-reported     PHQ9:       12/30/2024     6:42 AM   PHQ-9 SCORE   PHQ-9 Total Score MyChart 0   PHQ-9 Total Score 4     GAD2:       1/20/2025     7:17 PM 2/15/2025     9:15 AM 6/6/2025     7:31 AM   DUSTY-2   Feeling nervous, anxious, or on edge  1 0 2   Not being able to stop or control worrying 1 0 1   DUSTY-2 Total Score 2  0  3        Patient-reported     GAD7:       12/30/2024     6:43 AM 6/6/2025     7:31 AM   DUSTY-7 SCORE   Total Score 0 (minimal anxiety) 5 (mild anxiety)   Total Score 4 5        Patient-reported     PROMIS 10-Global Health (all questions and answers displayed):       12/30/2024    11:00 AM 4/24/2025     6:29 AM   PROMIS 10   In general, would you say your health is:  Fair   In general, would you say your quality of life is:  Very good   In general, how would you rate your physical health?  Good   In general, how would you rate your mental health, including your mood and your ability to think?  Fair   In general, how would you rate your satisfaction with your social activities and relationships?  Good   In general, please rate how well you carry out your usual social activities and roles  Good   To what extent are you able to carry out your everyday physical activities such as walking, climbing stairs, carrying groceries, or moving a chair?  Completely   In the past 7 days, how often have you been bothered by emotional problems such as feeling anxious, depressed, or irritable?  Often   In the past 7 days, how would you rate your fatigue on average?  Moderate   In the past 7 days, how would you rate your pain on average, where 0 means no pain, and 10 means worst imaginable pain?  2   In general, would you say your health is: 3 2   In general, would you say your quality of life is: 5 4   In general, how would you rate your physical health? 4 3   In general, how would you rate your mental health, including your mood and your ability to think? 3 2   In general, how would you rate your satisfaction with your social activities and relationships? 2 3   In general, please rate how well you carry out your usual social activities and roles. (This includes activities at home, at work and in your community, and responsibilities as a parent, child,  spouse, employee, friend, etc.) 3 3   To what extent are you able to carry out your everyday physical activities such as walking, climbing stairs, carrying groceries, or moving a chair? 5 5   In the past 7 days, how often have you been bothered by emotional problems such as feeling anxious, depressed, or irritable? 3 4   In the past 7 days, how would you rate your fatigue on average? 2 3   In the past 7 days, how would you rate your pain on average, where 0 means no pain, and 10 means worst imaginable pain? 1 2   Global Mental Health Score 13 11    Global Physical Health Score 17 15    PROMIS TOTAL - SUBSCORES 30 26        Patient-reported         ASSESSMENT: Current Emotional / Mental Status (status of significant symptoms):   Risk status (Self / Other harm or suicidal ideation)   Patient denies current fears or concerns for personal safety.   Patient denies current or recent suicidal ideation or behaviors.   Patient denies current or recent homicidal ideation or behaviors.   Patient denies current or recent self injurious behavior or ideation.   Patient denies other safety concerns.   Patient reports there has been no change in risk factors since their last session.     Patient reports there has been no change in protective factors since their last session.     Recommended that patient call 911 or go to the local ED should there be a change in any of these risk factors     Appearance:   Appropriate    Eye Contact:   Good    Psychomotor Behavior: Normal    Attitude:   Cooperative  Pleasant   Orientation:   All   Speech    Rate / Production: Normal/ Responsive    Volume:  Normal    Mood:    Anxious  Stressed   Affect:    Appropriate    Thought Content:  Clear    Thought Form:  Coherent  Goal Directed  Logical    Insight:    Good      Medication Review:   No current psychiatric medications prescribed     Medication Compliance:   NA     Changes in Health Issues:   None reported     Chemical Use Review:   Substance  Use: Chemical use reviewed, no active concerns identified      Tobacco Use: No current tobacco use.      Diagnosis:  1. Generalized anxiety disorder        Collateral Reports Completed:   Not Applicable    PLAN: (Patient Tasks / Therapist Tasks / Other)  Emerson will practice grounding exercises daily, create a CBT triangles and continue utilizing cognitive shuffling.    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   PROMIS (reviewed every 90 days):     Referral / Collaboration:  Referral to another professional/service is not indicated at this time..    Anticipated number of session for this episode of care: 9-12 sessions  Anticipation frequency of session: Every other week  Anticipated Duration of each session: 38-52 minutes  Treatment plan will be reviewed in 90 days or when goals have been changed.       MeasurableTreatment Goal(s) related to diagnosis / functional impairment(s)  Goal 1: Patient will become more aware of their anxiety and utilize the skills taught to decrease their anxiety symptoms.2    I will know I've met my goal when I have less anxiety, worry, can do the things I need to and have better sleep.      Objective #A (Patient Action)    Patient will use at least 4 coping skills for anxiety management in the next 12 weeks..  Status: Continued - Date(s):  4/25/2025     Intervention(s)  Therapist will  teach coping skills and assign homework of practicing these..    Objective #B  Patient will use cognitive strategies identified in therapy to challenge anxious thoughts.  Patient will engage in activities that are anxiety producing for them, slowly increasing their tolerance for new activities. Patient will identify and recognize past negative experiences and subsequent beliefs they hold that contribute to their anxiety.  Status: Continued - Date(s):  4/25/2025     Intervention(s)  Therapist will teach cognitive behavioral skills and engage client in Socratic dialogue around distorted thinking.  Therapist will provide educational materials on CBT.    Objective #C  Patient will  implement self-care into their routine to decrease anxiety, focusing on sleep hygiene, nutrition, movement, regular engagement in mindful activity, and regular socialization. .  Status: Continued - Date(s):  4/25/2025     Intervention(s)  Therapist will provide psychoeducation around the benefit of self-care and help client identify mindfulness based activities that may work for their life..    Patient has reviewed and agreed to the above plan.      STEPHANIE Ponce  April 25, 2025

## 2025-06-10 NOTE — TELEPHONE ENCOUNTER
Prior Authorization Approval    Medication: BENLYSTA 200 MG/ML SC SOAJ  Authorization Effective Date: 5/8/2025  Authorization Expiration Date: 6/7/2026  Approved Dose/Quantity: 200mg/ml  Reference #: [id number]   Insurance Company: [employer] - Phone [phone] Fax [phone]  Expected CoPay: $  75.00  CoPay Card Available: No    Financial Assistance Needed: n/a  [facility] [address]  Pharmacy Notified: Yes  Patient Notified: Yes

## 2025-06-11 ENCOUNTER — VIRTUAL VISIT (OUTPATIENT)
Dept: PHARMACY | Facility: CLINIC | Age: 50
End: 2025-06-11
Attending: INTERNAL MEDICINE
Payer: COMMERCIAL

## 2025-06-11 DIAGNOSIS — M19.90 INFLAMMATORY ARTHRITIS: ICD-10-CM

## 2025-06-11 DIAGNOSIS — M32.19 OTHER SYSTEMIC LUPUS ERYTHEMATOSUS WITH OTHER ORGAN INVOLVEMENT (H): ICD-10-CM

## 2025-06-11 DIAGNOSIS — Z71.85 VACCINE COUNSELING: ICD-10-CM

## 2025-06-11 DIAGNOSIS — M32.9 SYSTEMIC LUPUS ERYTHEMATOSUS, UNSPECIFIED SLE TYPE, UNSPECIFIED ORGAN INVOLVEMENT STATUS (H): Primary | ICD-10-CM

## 2025-06-11 NOTE — PROGRESS NOTES
Medication Therapy Management (MTM) Encounter    ASSESSMENT:                            Medication Adherence/Access: No issues identified.    Systemic Lupus Erythematosus:   Provided education on Benlysta today including dosing, general administration, side effects (both common/serious), precautions, monitoring and time to efficacy. Discussed data on malignancy and risk of serious infection in depth. Encouraged indicated non-live vaccines and avoidance of live vaccines. Discussed potential need to hold therapy in the setting of signs/symptoms of active infection. Encouraged him to contact the rheumatology clinic in the event he has questions on this. Would benefit from starting Benlysta once it arrives and using 200 mg weekly as directed.     Vaccine:  Per ACIP guidelines, patient qualifies to receive the pneumococcal vaccine.    PLAN:                            You can expect a call from [facility] to verify some information needed for your medication delivery. Once this is delivered start taking 200 mg (1 injection) weekly.    Sign up for a Benlysta copay card at [email or web address]     Once you start the Benlysta, remember to stop taking the sulfasalazine.    Go to your local pharmacy to receive your pneumonia vaccine when you are able.    Follow-up: Return in about 3 months (around 9/11/2025) for MTM Pharmacist Visit.    SUBJECTIVE/OBJECTIVE:                          Emerson Cardona is a 50 year old male seen for an initial visit. He was referred to me from Dr. Luna.      Reason for visit: Benlysta eductation.    Allergies/ADRs: Reviewed in chart  Past Medical History: Reviewed in chart  Tobacco: He reports that he has never smoked. He quit smokeless tobacco use about 15 years ago.  His smokeless tobacco use included chew.  Alcohol: 1-3 beverages / week    Medication Adherence/Access: no issues reported.    Systemic Lupus Erythematosus/Inflammatory Arthritis  Benlysta 200mg  subcutaneous weekly (not yet started)  Hydroxychloroquine 200mg twice daily  Sulfasalazine ER 1500mg twice daily  Prednisone 15mg daily (taper)    Patient's rash progressed to upper chest, shoulders, back of neck despite sulfasalazine treatment. Plan per rheumatology is to transition from sulfasalazine to Benlysta. Patient reported that he has never had an injection medication before, and is wondering when they will be receiving the medication. Also reports that they have history of anxiety.    Pre-Biologic Screening:   Hep B Surface Antibody No record of completion    Hep B Core Antibody  Non-reactive (6/6/2025)    Hep B Surface Antigen Non-reactive (6/6/2025)    Hep C Antibody  Non-reactive (6/6/2025)    HIV Antigen Antibody No record of completion   Quantiferon TB Gold Negative (6/6/2025)      Last lab monitoring completed: 6/6/2025    Vaccines:  Immunization History   Covid-19 vaccine (5420-9985 version)  Up-to-date   Influenza (annual) Consider vaccine in 2025-26 season, avoid live FluMist   Pneumococcal Due to receive PCV-20   Tetanus/Tdap  Up-to-date   Shingrix Up-to-date   All patients on biologics should avoid live vaccines (varicella/VZV, intranasal influenza, MMR, or yellow fever vaccine (if traveling))        Today's Vitals: There were no vitals taken for this visit.  ----------------    I spent 26 minutes with this patient today. All changes were made via collaborative practice agreement with Jovani Luna.    A summary of these recommendations was sent via [employer].    Mile Avelar PharmD  Medication Therapy Management Pharmacist  [facility]  Phone: [phone]    Dexter Kennedy PharmD  [facility] PGY-1 Pharmacy Resident    Telemedicine Visit Details  The patient's medications can be safely assessed via a telemedicine encounter.  Type of service:  Telephone visit  Originating Location (pt. Location): Home    Distant Location (provider  location):  Off-site  Start Time: 09:10 AM  End Time: 9:36 AM     Medication Therapy Recommendations  Vaccine counseling   1 Current Medication: belimumab (BENLYSTA) subcutaneous injection (prefilled autoinjector)   Current Medication Sig: Inject 1 mL (200 mg) subcutaneously every 7 days. Hold for signs of infection and seek medical attention.   Rationale: Preventive therapy - Needs additional medication therapy - Indication   Recommendation: Start Medication - PREVNAR 20 IM   Status: Accepted - no CPA Needed   Identified Date: 6/11/2025 Completed Date: 6/11/2025

## 2025-06-11 NOTE — PATIENT INSTRUCTIONS
"Recommendations from today's MTM visit:                                                       You can expect a call from [facility] to verify some information needed for your medication delivery. Once this is delivered start taking 200 mg (1 injection) weekly.    Sign up for a Benlysta copay card at [email or web address]     Once you start the Benlysta, remember to stop taking the sulfasalazine.    Go to your local pharmacy to receive your pneumonia vaccine when you are able.    Follow-up: Return in about 3 months (around 9/11/2025) for MTM Pharmacist Visit.    It was great speaking with you today.  I value your experience and would be very thankful for your time in providing feedback in our clinic survey. In the next few days, you may receive an email or text message from [employer] with a link to a survey related to your  clinical pharmacist.\"     To schedule another MTM appointment, please call the clinic directly or you may call the MTM scheduling line at [phone].    My Clinical Pharmacist's contact information:                                                      Please feel free to contact me with any questions or concerns you have.      Mile Avelar, PharmD  Medication Therapy Management Pharmacist  [facility]  Phone: [phone]     "

## 2025-06-16 ENCOUNTER — RESULTS FOLLOW-UP (OUTPATIENT)
Dept: RHEUMATOLOGY | Facility: CLINIC | Age: 50
End: 2025-06-16

## 2025-06-19 ENCOUNTER — TELEPHONE (OUTPATIENT)
Dept: RHEUMATOLOGY | Facility: CLINIC | Age: 50
End: 2025-06-19
Payer: COMMERCIAL

## 2025-06-19 NOTE — TELEPHONE ENCOUNTER
Patient confirmed scheduled appointment:  Date: September 3rd, 2025  Time: 9a  Visit type: Return Rheumatology  Provider: Dr. Luna  Location: [facility] [place]  Testing/imaging: NA  Additional notes: Rescheduled 8/29 appt

## 2025-06-24 ENCOUNTER — VIRTUAL VISIT (OUTPATIENT)
Facility: CLINIC | Age: 50
End: 2025-06-24
Payer: COMMERCIAL

## 2025-06-24 DIAGNOSIS — F41.1 GENERALIZED ANXIETY DISORDER: Primary | ICD-10-CM

## 2025-06-24 PROCEDURE — 90834 PSYTX W PT 45 MINUTES: CPT | Mod: 95

## 2025-07-02 NOTE — PROGRESS NOTES
[facility]                                     Progress Note    Patient Name: Sabino Castillo Every  Date: 6/24/2025         Service Type: Individual      Session Start Time: 7:34 AM  Session End Time: 8:19 AM     Session Length: 45 Minutes    Session #: 7    Attendees: Client attended alone    Service Modality:  Video Visit:      Provider verified identity through the following two step process.  Patient provided:  Patient is known previously to provider    Telemedicine Visit: The patient's condition can be safely assessed and treated via synchronous audio and visual telemedicine encounter.      Reason for Telemedicine Visit: Patient convenience (e.g. access to timely appointments / distance to available provider)    Originating Site (Patient Location): Patient's home    Distant Site (Provider Location): Provider Remote Setting- Home Office    Consent:  The patient/guardian has verbally consented to: the potential risks and benefits of telemedicine (video visit) versus in person care; bill my insurance or make self-payment for services provided; and responsibility for payment of non-covered services.     Patient would like the video invitation sent by:  My Chart    Mode of Communication:  [facility]    Distant Location (Provider):  Off-site    As the provider I attest to compliance with applicable laws and regulations related to telemedicine.    DATA  Extended Session (53+ minutes): No  Interactive Complexity: No  Crisis: No      Progress Since Last Session (Related to Symptoms / Goals / Homework):   Symptoms: No change continued anxiety, stress, rumination, irritation and improved disrupted sleep.     Homework: Partially completed      Episode of Care Goals: Minimal progress - ACTION (Actively working towards change); Intervened by reinforcing change plan / affirming steps taken     Current / Ongoing Stressors and Concerns:   Emerson is coming to therapy to learn skills to address  his anxiety, over thinking, worries, and disrupted sleep.  He reports increased stress and anxiety has resulted in him feeling more irritable and short with his family.     Treatment Objective(s) Addressed in This Session:   use relaxation strategies twice times per day to reduce the physical symptoms of anxiety and use cognitive strategies identified in therapy to challenge anxious thoughts       Intervention:   Therapist utilized reflected listening as patient gave brief reflection of the past  few weeks. Patient reported continued anxiety, stress, rumination, irritation and improved disrupted sleep. He processed his anxiety and stress in parenting demands and work stressors. He reflected on his continued utilization of journaling and cognitive shuffling to address his anxiety at night and getting back to sleep which has helped improved his quality of sleep. Therapist supported patient as he processed and validated patient. Therapist engaged in cognitive restructuring/ reframing, looked at cognitive distortions and challenged distorted thoughts.    Assessments completed prior to visit:  The following assessments were completed by patient for this visit:  PHQ2:       6/24/2025     7:22 AM 6/6/2025     7:29 AM 2/20/2025     2:35 PM 2/15/2025     9:15 AM 1/20/2025     7:16 PM 1/6/2025    10:49 AM 11/8/2024     7:43 AM   PHQ-2 ( 1999 [employer])   Q1: Little interest or pleasure in doing things 0 0 0 0 0 0 0   Q2: Feeling down, depressed or hopeless 0 0 0 0 0 0 0   PHQ-2 Score 0  0  0 0  0  0 0   Q1: Little interest or pleasure in doing things Not at all Not at all  Not at all Not at all     Q2: Feeling down, depressed or hopeless Not at all Not at all  Not at all Not at all     PHQ-2 Score 0 0  0 0         Patient-reported     PHQ9:       12/30/2024     6:42 AM   PHQ-9 SCORE   PHQ-9 Total Score MyChart 0   PHQ-9 Total Score 4     GAD2:       1/20/2025     7:17 PM 2/15/2025     9:15 AM 6/6/2025     7:31 AM 6/24/2025      7:22 AM   DUSTY-2   Feeling nervous, anxious, or on edge 1 0 2 1   Not being able to stop or control worrying 1 0 1 1   DUSTY-2 Total Score 2  0  3  2        Patient-reported     GAD7:       12/30/2024     6:43 AM 6/6/2025     7:31 AM   DUSTY-7 SCORE   Total Score 0 (minimal anxiety) 5 (mild anxiety)   Total Score 4 5        Patient-reported     PROMIS 10-Global Health (all questions and answers displayed):       12/30/2024    11:00 AM 4/24/2025     6:29 AM   PROMIS 10   In general, would you say your health is:  Fair   In general, would you say your quality of life is:  Very good   In general, how would you rate your physical health?  Good   In general, how would you rate your mental health, including your mood and your ability to think?  Fair   In general, how would you rate your satisfaction with your social activities and relationships?  Good   In general, please rate how well you carry out your usual social activities and roles  Good   To what extent are you able to carry out your everyday physical activities such as walking, climbing stairs, carrying groceries, or moving a chair?  Completely   In the past 7 days, how often have you been bothered by emotional problems such as feeling anxious, depressed, or irritable?  Often   In the past 7 days, how would you rate your fatigue on average?  Moderate   In the past 7 days, how would you rate your pain on average, where 0 means no pain, and 10 means worst imaginable pain?  2   In general, would you say your health is: 3 2   In general, would you say your quality of life is: 5 4   In general, how would you rate your physical health? 4 3   In general, how would you rate your mental health, including your mood and your ability to think? 3 2   In general, how would you rate your satisfaction with your social activities and relationships? 2 3   In general, please rate how well you carry out your usual social activities and roles. (This includes activities at home, at work and  in your community, and responsibilities as a parent, child, spouse, employee, friend, etc.) 3 3   To what extent are you able to carry out your everyday physical activities such as walking, climbing stairs, carrying groceries, or moving a chair? 5 5   In the past 7 days, how often have you been bothered by emotional problems such as feeling anxious, depressed, or irritable? 3 4   In the past 7 days, how would you rate your fatigue on average? 2 3   In the past 7 days, how would you rate your pain on average, where 0 means no pain, and 10 means worst imaginable pain? 1 2   Global Mental Health Score 13 11    Global Physical Health Score 17 15    PROMIS TOTAL - SUBSCORES 30 26        Patient-reported         ASSESSMENT: Current Emotional / Mental Status (status of significant symptoms):   Risk status (Self / Other harm or suicidal ideation)   Patient denies current fears or concerns for personal safety.   Patient denies current or recent suicidal ideation or behaviors.   Patient denies current or recent homicidal ideation or behaviors.   Patient denies current or recent self injurious behavior or ideation.   Patient denies other safety concerns.   Patient reports there has been no change in risk factors since their last session.     Patient reports there has been no change in protective factors since their last session.     Recommended that patient call 911 or go to the local ED should there be a change in any of these risk factors     Appearance:   Appropriate    Eye Contact:   Good    Psychomotor Behavior: Normal    Attitude:   Cooperative  Pleasant   Orientation:   All   Speech    Rate / Production: Normal/ Responsive    Volume:  Normal    Mood:    Anxious  Stressed   Affect:    Appropriate    Thought Content:  Clear    Thought Form:  Coherent  Goal Directed  Logical    Insight:    Good      Medication Review:   No current psychiatric medications prescribed     Medication Compliance:   NA     Changes in Health  Issues:   None reported     Chemical Use Review:   Substance Use: Chemical use reviewed, no active concerns identified      Tobacco Use: No current tobacco use.      Diagnosis:  1. Generalized anxiety disorder        Collateral Reports Completed:   Not Applicable    PLAN: (Patient Tasks / Therapist Tasks / Other)  Emerson will practice grounding exercises daily, create a CBT triangles and continue utilizing cognitive shuffling.    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   PROMIS (reviewed every 90 days):     Referral / Collaboration:  Referral to another professional/service is not indicated at this time..    Anticipated number of session for this episode of care: 9-12 sessions  Anticipation frequency of session: Every other week  Anticipated Duration of each session: 38-52 minutes  Treatment plan will be reviewed in 90 days or when goals have been changed.       MeasurableTreatment Goal(s) related to diagnosis / functional impairment(s)  Goal 1: Patient will become more aware of their anxiety and utilize the skills taught to decrease their anxiety symptoms.2    I will know I've met my goal when I have less anxiety, worry, can do the things I need to and have better sleep.      Objective #A (Patient Action)    Patient will use at least 4 coping skills for anxiety management in the next 12 weeks..  Status: Continued - Date(s):  4/25/2025     Intervention(s)  Therapist will  teach coping skills and assign homework of practicing these..    Objective #B  Patient will use cognitive  strategies identified in therapy to challenge anxious thoughts. Patient will engage in activities that are anxiety producing for them, slowly increasing their tolerance for new activities. Patient will identify and recognize past negative experiences and subsequent beliefs they hold that contribute to their anxiety.  Status: Continued - Date(s):  4/25/2025     Intervention(s)  Therapist will teach cognitive behavioral skills and engage client in Socratic dialogue around distorted thinking.  Therapist will provide educational materials on CBT.    Objective #C  Patient will  implement self-care into their routine to decrease anxiety, focusing on sleep hygiene, nutrition, movement, regular engagement in mindful activity, and regular socialization. .  Status: Continued - Date(s):  4/25/2025     Intervention(s)  Therapist will provide psychoeducation around the benefit of self-care and help client identify mindfulness based activities that may work for their life..    Patient has reviewed and agreed to the above plan.      STEPHANIE Ponce  April 25, 2025

## 2025-07-08 ENCOUNTER — VIRTUAL VISIT (OUTPATIENT)
Dept: NEPHROLOGY | Facility: CLINIC | Age: 50
End: 2025-07-08
Payer: COMMERCIAL

## 2025-07-08 VITALS — BODY MASS INDEX: 25.03 KG/M2 | WEIGHT: 195 LBS | HEIGHT: 74 IN

## 2025-07-08 DIAGNOSIS — Z53.9 NO SHOW: Primary | ICD-10-CM

## 2025-07-08 ASSESSMENT — PAIN SCALES - GENERAL: PAINLEVEL_OUTOF10: NO PAIN (0)

## 2025-07-08 NOTE — NURSING NOTE
Current patient location: Patient declined to provide     Is the patient currently in the state of [place]? YES    Visit mode: VIDEO    If the visit is dropped, the patient can be reconnected by:VIDEO VISIT: Text to cell phone:   Telephone Information:   Mobile [phone]       Will anyone else be joining the visit? NO  (If patient encounters technical issues they should call [phone] :[phone])    Are changes needed to the allergy or medication list? No    Are refills needed on medications prescribed by this physician? NO    Rooming Documentation:  Questionnaire(s) completed    Reason for visit: Consult (consult)    Shanda DEL TORO

## 2025-07-08 NOTE — PROGRESS NOTES
07/08/25  No visit occurred on this date.  Will reschedule when patient is available.  Hunter Mauricio, DO

## 2025-07-09 ENCOUNTER — MYC REFILL (OUTPATIENT)
Dept: PHARMACY | Facility: CLINIC | Age: 50
End: 2025-07-09
Payer: COMMERCIAL

## 2025-07-09 DIAGNOSIS — M32.19 OTHER SYSTEMIC LUPUS ERYTHEMATOSUS WITH OTHER ORGAN INVOLVEMENT (H): ICD-10-CM

## 2025-07-10 NOTE — TELEPHONE ENCOUNTER
Last prescription sent 6/11/25 with 11 refills. Advised patient to call [facility] to have refill dispensed.    James MontielD  Medication Therapy Management Pharmacist  [facility]

## 2025-07-17 DIAGNOSIS — M47.819 SPONDYLOARTHROPATHY: ICD-10-CM

## 2025-07-17 RX ORDER — SULFASALAZINE 500 MG/1
1500 TABLET, DELAYED RELEASE ORAL 2 TIMES DAILY
Qty: 540 TABLET | Refills: 1 | Status: SHIPPED | OUTPATIENT
Start: 2025-07-17

## 2025-07-22 ENCOUNTER — VIRTUAL VISIT (OUTPATIENT)
Facility: CLINIC | Age: 50
End: 2025-07-22
Payer: COMMERCIAL

## 2025-07-22 DIAGNOSIS — F41.1 GENERALIZED ANXIETY DISORDER: Primary | ICD-10-CM

## 2025-07-22 PROCEDURE — 90837 PSYTX W PT 60 MINUTES: CPT | Mod: 95

## 2025-07-22 NOTE — PROGRESS NOTES
[facility]                                     Progress Note    Patient Name: Sabino Castillo Every  Date: 07/22/2025        Service Type: Individual      Session Start Time: 7:32 AM  Session End Time: 8:30 AM     Session Length: 58 Minutes    Session #: 8    Attendees: Client attended alone    Service Modality:  Video Visit:      Provider verified identity through the following two step process.  Patient provided:  Patient is known previously to provider    Telemedicine Visit: The patient's condition can be safely assessed and treated via synchronous audio and visual telemedicine encounter.      Reason for Telemedicine Visit: Patient convenience (e.g. access to timely appointments / distance to available provider)    Originating Site (Patient Location): Patient's place of employment    Distant Site (Provider Location): Provider Remote Setting- Home Office    Consent:  The patient/guardian has verbally consented to: the potential risks and benefits of telemedicine (video visit) versus in person care; bill my insurance or make self-payment for services provided; and responsibility for payment of non-covered services.     Patient would like the video invitation sent by:  My Chart    Mode of Communication:  [facility]    Distant Location (Provider):  Off-site    As the provider I attest to compliance with applicable laws and regulations related to telemedicine.    DATA  Extended Session (53+ minutes): PROLONGED SERVICE IN THE OUTPATIENT SETTING REQUIRING DIRECT (FACE-TO-FACE) PATIENT CONTACT BEYOND THE USUAL SERVICE:    - Patient's presenting concerns require more intensive intervention than could be completed within the usual service  Interactive Complexity: No  Crisis: No      Progress Since Last Session (Related to Symptoms / Goals / Homework):   Symptoms: No change continued anxiety, stress, rumination, worry and disrupted sleep.     Homework: Partially completed      Episode of  Care Goals: Minimal progress - ACTION (Actively working towards change); Intervened by reinforcing change plan / affirming steps taken     Current / Ongoing Stressors and Concerns:   Emerson is coming to therapy to learn skills to address his anxiety, over thinking, worries, and disrupted sleep.  He reports increased stress and anxiety has resulted in him feeling more irritable and short with his family.     Treatment Objective(s) Addressed in This Session:   use relaxation strategies twice times per day to reduce the physical symptoms of anxiety and use cognitive strategies identified in therapy to challenge anxious thoughts       Intervention:   Therapist utilized reflected listening as patient gave brief reflection of the past  few weeks. Patient reported continued anxiety, stress, rumination, worry and disrupted sleep. He processed his work stress. He reflected on his worry and attempts of supporting his children navigate hard or uncomfortable emotions. Therapist supported patient as he processed and validated patient. Therapist engaged in cognitive restructuring/ reframing, looked at cognitive distortions and challenged distorted thoughts, hold two things to be true, gratitude and prioritizing his daily needs. Therapist introduced the learning space and building frustration tolerance.     Assessments completed prior to visit:  The following assessments were completed by patient for this visit:  PHQ2:       6/24/2025     7:22 AM 6/6/2025     7:29 AM 2/20/2025     2:35 PM 2/15/2025     9:15 AM 1/20/2025     7:16 PM 1/6/2025    10:49 AM 11/8/2024     7:43 AM   PHQ-2 ( 1999 [employer])   Q1: Little interest or pleasure in doing things 0 0 0 0 0 0 0   Q2: Feeling down, depressed or hopeless 0 0 0 0 0 0 0   PHQ-2 Score 0  0  0 0  0  0 0   Q1: Little interest or pleasure in doing things Not at all Not at all  Not at all Not at all     Q2: Feeling down, depressed or hopeless Not at all Not at all  Not at all Not at all      PHQ-2 Score 0 0  0 0         Patient-reported     PHQ9:       12/30/2024     6:42 AM   PHQ-9 SCORE   PHQ-9 Total Score MyChart 0   PHQ-9 Total Score 4     GAD2:       1/20/2025     7:17 PM 2/15/2025     9:15 AM 6/6/2025     7:31 AM 6/24/2025     7:22 AM   DUSTY-2   Feeling nervous, anxious, or on edge 1 0 2 1   Not being able to stop or control worrying 1 0 1 1   DUSTY-2 Total Score 2  0  3  2        Patient-reported     GAD7:       12/30/2024     6:43 AM 6/6/2025     7:31 AM   DUSTY-7 SCORE   Total Score 0 (minimal anxiety) 5 (mild anxiety)   Total Score 4 5        Patient-reported     PROMIS 10-Global Health (all questions and answers displayed):       12/30/2024    11:00 AM 4/24/2025     6:29 AM   PROMIS 10   In general, would you say your health is:  Fair   In general, would you say your quality of life is:  Very good   In general, how would you rate your physical health?  Good   In general, how would you rate your mental health, including your mood and your ability to think?  Fair   In general, how would you rate your satisfaction with your social activities and relationships?  Good   In general, please rate how well you carry out your usual social activities and roles  Good   To what extent are you able to carry out your everyday physical activities such as walking, climbing stairs, carrying groceries, or moving a chair?  Completely   In the past 7 days, how often have you been bothered by emotional problems such as feeling anxious, depressed, or irritable?  Often   In the past 7 days, how would you rate your fatigue on average?  Moderate   In the past 7 days, how would you rate your pain on average, where 0 means no pain, and 10 means worst imaginable pain?  2   In general, would you say your health is: 3 2   In general, would you say your quality of life is: 5 4   In general, how would you rate your physical health? 4 3   In general, how would you rate your mental health, including your mood and your ability to  think? 3 2   In general, how would you rate your satisfaction with your social activities and relationships? 2 3   In general, please rate how well you carry out your usual social activities and roles. (This includes activities at home, at work and in your community, and responsibilities as a parent, child, spouse, employee, friend, etc.) 3 3   To what extent are you able to carry out your everyday physical activities such as walking, climbing stairs, carrying groceries, or moving a chair? 5 5   In the past 7 days, how often have you been bothered by emotional problems such as feeling anxious, depressed, or irritable? 3 4   In the past 7 days, how would you rate your fatigue on average? 2 3   In the past 7 days, how would you rate your pain on average, where 0 means no pain, and 10 means worst imaginable pain? 1 2   Global Mental Health Score 13 11    Global Physical Health Score 17 15    PROMIS TOTAL - SUBSCORES 30 26        Patient-reported         ASSESSMENT: Current Emotional / Mental Status (status of significant symptoms):   Risk status (Self / Other harm or suicidal ideation)   Patient denies current fears or concerns for personal safety.   Patient denies current or recent suicidal ideation or behaviors.   Patient denies current or recent homicidal ideation or behaviors.   Patient denies current or recent self injurious behavior or ideation.   Patient denies other safety concerns.   Patient reports there has been no change in risk factors since their last session.     Patient reports there has been no change in protective factors since their last session.     Recommended that patient call 911 or go to the local ED should there be a change in any of these risk factors     Appearance:   Appropriate    Eye Contact:   Good    Psychomotor Behavior: Normal    Attitude:   Cooperative  Interested   Orientation:   All   Speech    Rate / Production: Normal/ Responsive    Volume:  Normal    Mood:    Anxious   Stressed   Affect:    Appropriate    Thought Content:  Clear    Thought Form:  Coherent  Goal Directed  Logical    Insight:    Good      Medication Review:   No current psychiatric medications prescribed     Medication Compliance:   NA     Changes in Health Issues:   None reported     Chemical Use Review:   Substance Use: Chemical use reviewed, no active concerns identified      Tobacco Use: No current tobacco use.      Diagnosis:  1. Generalized anxiety disorder        Collateral Reports Completed:   Not Applicable    PLAN: (Patient Tasks / Therapist Tasks / Other)  Emerson will practice grounding exercises daily, challenge his cognitive distortions, and schedule his work lunch and breaks.    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   PROMIS (reviewed every 90 days):     Referral / Collaboration:  Referral to another professional/service is not indicated at this time..    Anticipated number of session for this episode of care: 9-12 sessions  Anticipation frequency of session: Every other week  Anticipated Duration of each session: 38-52 minutes  Treatment plan will be reviewed in 90 days or when goals have been changed.       MeasurableTreatment Goal(s) related to diagnosis / functional impairment(s)  Goal 1: Patient will become more aware of their anxiety and utilize the skills taught to decrease their anxiety symptoms.2    I will know I've met my goal when I have less anxiety, worry, can do the things I need to and have better sleep.      Objective #A (Patient Action)    Patient  will use at least 4 coping skills for anxiety management in the next 12 weeks..  Status: Continued - Date(s):  4/25/2025     Intervention(s)  Therapist will  teach coping skills and assign homework of practicing these..    Objective #B  Patient will use cognitive strategies identified in therapy to challenge anxious thoughts. Patient will engage in activities that are anxiety producing for them, slowly increasing their tolerance for new activities. Patient will identify and recognize past negative experiences and subsequent beliefs they hold that contribute to their anxiety.  Status: Continued - Date(s):  4/25/2025     Intervention(s)  Therapist will teach cognitive behavioral skills and engage client in Socratic dialogue around distorted thinking.  Therapist will provide educational materials on CBT.    Objective #C  Patient will  implement self-care into their routine to decrease anxiety, focusing on sleep hygiene, nutrition, movement, regular engagement in mindful activity, and regular socialization. .  Status: Continued - Date(s):  4/25/2025     Intervention(s)  Therapist will provide psychoeducation around the benefit of self-care and help client identify mindfulness based activities that may work for their life..    Patient has reviewed and agreed to the above plan.      STEPHANIE Ponce  April 25, 2025

## 2025-08-09 DIAGNOSIS — M32.19 OTHER SYSTEMIC LUPUS ERYTHEMATOSUS WITH OTHER ORGAN INVOLVEMENT (H): Primary | ICD-10-CM

## 2025-08-11 ENCOUNTER — TELEPHONE (OUTPATIENT)
Dept: DERMATOLOGY | Facility: CLINIC | Age: 50
End: 2025-08-11
Payer: COMMERCIAL

## 2025-08-12 ENCOUNTER — TELEPHONE (OUTPATIENT)
Dept: DERMATOLOGY | Facility: CLINIC | Age: 50
End: 2025-08-12
Payer: COMMERCIAL

## 2025-09-02 ENCOUNTER — TELEPHONE (OUTPATIENT)
Dept: RHEUMATOLOGY | Facility: CLINIC | Age: 50
End: 2025-09-02
Payer: COMMERCIAL

## (undated) DEVICE — TUBING SUCTION 12"X1/4" N612

## (undated) DEVICE — SUCTION MANIFOLD NEPTUNE 2 SYS 1 PORT 702-025-000

## (undated) DEVICE — KIT ENDO TURNOVER/PROCEDURE CARRY-ON 101822

## (undated) DEVICE — GOWN IMPERVIOUS 2XL BLUE

## (undated) DEVICE — SOL WATER IRRIG 500ML BOTTLE 2F7113

## (undated) DEVICE — SPECIMEN CONTAINER 3OZ W/FORMALIN 59901

## (undated) RX ORDER — FENTANYL CITRATE 50 UG/ML
INJECTION, SOLUTION INTRAMUSCULAR; INTRAVENOUS
Status: DISPENSED
Start: 2022-01-03

## (undated) RX ORDER — LIDOCAINE HYDROCHLORIDE 20 MG/ML
SOLUTION OROPHARYNGEAL
Status: DISPENSED
Start: 2021-04-06

## (undated) RX ORDER — LIDOCAINE HYDROCHLORIDE AND EPINEPHRINE 10; 10 MG/ML; UG/ML
INJECTION, SOLUTION INFILTRATION; PERINEURAL
Status: DISPENSED
Start: 2021-04-06